# Patient Record
Sex: FEMALE | Race: BLACK OR AFRICAN AMERICAN | Employment: UNEMPLOYED | URBAN - METROPOLITAN AREA
[De-identification: names, ages, dates, MRNs, and addresses within clinical notes are randomized per-mention and may not be internally consistent; named-entity substitution may affect disease eponyms.]

---

## 2017-01-02 ENCOUNTER — HOSPITAL ENCOUNTER (EMERGENCY)
Age: 37
Discharge: HOME | End: 2017-01-02
Attending: EMERGENCY MEDICINE
Payer: SUBSIDIZED

## 2017-01-02 ENCOUNTER — APPOINTMENT (OUTPATIENT)
Dept: CT IMAGING | Age: 37
End: 2017-01-02
Attending: EMERGENCY MEDICINE
Payer: SUBSIDIZED

## 2017-01-02 VITALS
TEMPERATURE: 98.1 F | HEIGHT: 65 IN | BODY MASS INDEX: 29.05 KG/M2 | OXYGEN SATURATION: 98 % | RESPIRATION RATE: 24 BRPM | WEIGHT: 174.38 LBS | DIASTOLIC BLOOD PRESSURE: 65 MMHG | SYSTOLIC BLOOD PRESSURE: 114 MMHG | HEART RATE: 94 BPM

## 2017-01-02 DIAGNOSIS — R51.9 NONINTRACTABLE HEADACHE, UNSPECIFIED CHRONICITY PATTERN, UNSPECIFIED HEADACHE TYPE: Primary | ICD-10-CM

## 2017-01-02 LAB
ALBUMIN SERPL BCP-MCNC: 3.3 G/DL (ref 3.5–5)
ALBUMIN/GLOB SERPL: 0.7 {RATIO} (ref 1.1–2.2)
ALP SERPL-CCNC: 44 U/L (ref 45–117)
ALT SERPL-CCNC: 16 U/L (ref 12–78)
ANION GAP BLD CALC-SCNC: 12 MMOL/L (ref 5–15)
AST SERPL W P-5'-P-CCNC: 13 U/L (ref 15–37)
BASOPHILS # BLD AUTO: 0 K/UL (ref 0–0.1)
BASOPHILS # BLD: 0 % (ref 0–1)
BILIRUB SERPL-MCNC: 0.5 MG/DL (ref 0.2–1)
BUN SERPL-MCNC: 10 MG/DL (ref 6–20)
BUN/CREAT SERPL: 11 (ref 12–20)
CALCIUM SERPL-MCNC: 8.5 MG/DL (ref 8.5–10.1)
CHLORIDE SERPL-SCNC: 105 MMOL/L (ref 97–108)
CO2 SERPL-SCNC: 22 MMOL/L (ref 21–32)
CREAT SERPL-MCNC: 0.91 MG/DL (ref 0.55–1.02)
DIFFERENTIAL METHOD BLD: NORMAL
EOSINOPHIL # BLD: 0.2 K/UL (ref 0–0.4)
EOSINOPHIL NFR BLD: 3 % (ref 0–7)
ERYTHROCYTE [DISTWIDTH] IN BLOOD BY AUTOMATED COUNT: 13 % (ref 11.5–14.5)
GLOBULIN SER CALC-MCNC: 4.5 G/DL (ref 2–4)
GLUCOSE SERPL-MCNC: 91 MG/DL (ref 65–100)
HCG UR QL: NEGATIVE
HCT VFR BLD AUTO: 36.5 % (ref 35–47)
HGB BLD-MCNC: 12 G/DL (ref 11.5–16)
LYMPHOCYTES # BLD AUTO: 29 % (ref 12–49)
LYMPHOCYTES # BLD: 1.7 K/UL (ref 0.8–3.5)
MCH RBC QN AUTO: 27.7 PG (ref 26–34)
MCHC RBC AUTO-ENTMCNC: 32.9 G/DL (ref 30–36.5)
MCV RBC AUTO: 84.3 FL (ref 80–99)
MONOCYTES # BLD: 0.7 K/UL (ref 0–1)
MONOCYTES NFR BLD AUTO: 11 % (ref 5–13)
NEUTS SEG # BLD: 3.4 K/UL (ref 1.8–8)
NEUTS SEG NFR BLD AUTO: 57 % (ref 32–75)
PLATELET # BLD AUTO: 335 K/UL (ref 150–400)
POTASSIUM SERPL-SCNC: 3.6 MMOL/L (ref 3.5–5.1)
PROT SERPL-MCNC: 7.8 G/DL (ref 6.4–8.2)
RBC # BLD AUTO: 4.33 M/UL (ref 3.8–5.2)
SODIUM SERPL-SCNC: 139 MMOL/L (ref 136–145)
TSH SERPL DL<=0.05 MIU/L-ACNC: 3.26 UIU/ML (ref 0.36–3.74)
WBC # BLD AUTO: 6 K/UL (ref 3.6–11)

## 2017-01-02 PROCEDURE — 84443 ASSAY THYROID STIM HORMONE: CPT | Performed by: EMERGENCY MEDICINE

## 2017-01-02 PROCEDURE — 36415 COLL VENOUS BLD VENIPUNCTURE: CPT | Performed by: EMERGENCY MEDICINE

## 2017-01-02 PROCEDURE — 74011250636 HC RX REV CODE- 250/636: Performed by: EMERGENCY MEDICINE

## 2017-01-02 PROCEDURE — 99284 EMERGENCY DEPT VISIT MOD MDM: CPT

## 2017-01-02 PROCEDURE — 96374 THER/PROPH/DIAG INJ IV PUSH: CPT

## 2017-01-02 PROCEDURE — 70450 CT HEAD/BRAIN W/O DYE: CPT

## 2017-01-02 PROCEDURE — 96375 TX/PRO/DX INJ NEW DRUG ADDON: CPT

## 2017-01-02 PROCEDURE — 80053 COMPREHEN METABOLIC PANEL: CPT | Performed by: EMERGENCY MEDICINE

## 2017-01-02 PROCEDURE — 81025 URINE PREGNANCY TEST: CPT

## 2017-01-02 PROCEDURE — 85025 COMPLETE CBC W/AUTO DIFF WBC: CPT | Performed by: EMERGENCY MEDICINE

## 2017-01-02 RX ORDER — BUTALBITAL, ACETAMINOPHEN AND CAFFEINE 300; 40; 50 MG/1; MG/1; MG/1
1 CAPSULE ORAL
Qty: 20 CAP | Refills: 0 | Status: SHIPPED | OUTPATIENT
Start: 2017-01-02 | End: 2019-08-05

## 2017-01-02 RX ORDER — METOCLOPRAMIDE HYDROCHLORIDE 5 MG/ML
10 INJECTION INTRAMUSCULAR; INTRAVENOUS ONCE
Status: COMPLETED | OUTPATIENT
Start: 2017-01-02 | End: 2017-01-02

## 2017-01-02 RX ORDER — KETOROLAC TROMETHAMINE 30 MG/ML
30 INJECTION, SOLUTION INTRAMUSCULAR; INTRAVENOUS
Status: COMPLETED | OUTPATIENT
Start: 2017-01-02 | End: 2017-01-02

## 2017-01-02 RX ORDER — DIPHENHYDRAMINE HYDROCHLORIDE 50 MG/ML
25 INJECTION, SOLUTION INTRAMUSCULAR; INTRAVENOUS
Status: COMPLETED | OUTPATIENT
Start: 2017-01-02 | End: 2017-01-02

## 2017-01-02 RX ORDER — DEXAMETHASONE SODIUM PHOSPHATE 4 MG/ML
10 INJECTION, SOLUTION INTRA-ARTICULAR; INTRALESIONAL; INTRAMUSCULAR; INTRAVENOUS; SOFT TISSUE ONCE
Status: COMPLETED | OUTPATIENT
Start: 2017-01-02 | End: 2017-01-02

## 2017-01-02 RX ADMIN — DEXAMETHASONE SODIUM PHOSPHATE 10 MG: 4 INJECTION, SOLUTION INTRAMUSCULAR; INTRAVENOUS at 09:59

## 2017-01-02 RX ADMIN — METOCLOPRAMIDE 10 MG: 5 INJECTION, SOLUTION INTRAMUSCULAR; INTRAVENOUS at 09:55

## 2017-01-02 RX ADMIN — KETOROLAC TROMETHAMINE 30 MG: 30 INJECTION, SOLUTION INTRAMUSCULAR at 09:58

## 2017-01-02 RX ADMIN — DIPHENHYDRAMINE HYDROCHLORIDE 25 MG: 50 INJECTION, SOLUTION INTRAMUSCULAR; INTRAVENOUS at 10:03

## 2017-01-02 NOTE — ED NOTES
The patient was discharged home by Dr Paula Denver in stable condition. The patient is alert and oriented, in no respiratory distress and discharge vital signs obtained. The patient's diagnosis, condition and treatment were explained. The patient expressed understanding. Prescriptions given. No work/school note given. A discharge plan has been developed. A  was not involved in the process. Aftercare instructions were given. Pt ambulatory out of the ED with her friend.

## 2017-01-02 NOTE — ED TRIAGE NOTES
Pt rpts headache for the past 4 days. Pt rpts taking Ibuprofen without relief. Pt tearful and rpts currently having a panic attack because \"people\" are telling her she is loosing weight and is sick. Recently seen by GYN for infection; antibiotics completed.

## 2017-01-02 NOTE — DISCHARGE INSTRUCTIONS

## 2017-01-02 NOTE — ED NOTES
Pt resting on the stretcher in no distress. All results available for review and awaiting further care mgmt. Will continue to monitor.

## 2017-01-02 NOTE — ED NOTES
No changes in pt's condition since arrival.  Pt remains tearful and anxious. Toileting offered; pt declined. Ongoing plan of care discussed and pts questions addressed. Pt informed of time factors with lab/imaging study results; especially TSH. IV established and pt medicated per orders. V/S updated. Pt provided with warm blanket and lights dimmed for comfort. Call bell within reach; will continue to monitor.

## 2017-01-02 NOTE — ED PROVIDER NOTES
HPI Comments: The patient has a 5 day history of left sided headache, generalized weakness and dizziness/lightheadedness. She is currently experiencing a panic attack (which she has a history of) the call she is not sure what is causing her symptoms. She denies syncope, fever, vomiting, abdominal pain, chest pain, shortness of breath. She also complains of weight loss and says that she has lost 50 pounds in the past 2 months. She is unsure of the cause of the weight loss. Patient is a 29 y.o. female presenting with headaches. Headache    Associated symptoms include weakness and dizziness. Pertinent negatives include no fever, no shortness of breath, no nausea and no vomiting. Past Medical History:   Diagnosis Date    Anemia NEC     Anxiety attack     Asthma     B12 deficiency     Constipation     Depression     Gastroparesis 7/18/2014    Heart abnormalities      right atria at 30% [facility]    Hyperlipidemia     IGT (impaired glucose tolerance)     Incontinence of urine      1 episode of bed wetting 1 mo ago    Insomnia     Kidney stone     Neuropathy     PTSD (post-traumatic stress disorder)     Stroke ([address] 2011     TIA    Urinary tract bacterial infections     Vaginal discharge 12/4/2013       Past Surgical History:   Procedure Laterality Date    Pr eye exam & treatment      Hx gi  may 2013     polyps removed,colonscopy         Family History:   Problem Relation Age of Onset    Diabetes Mother     Heart Disease Mother     Hypertension Mother     Stroke Mother     Liver Disease Mother     Dementia Mother     Kidney Disease Mother        Social History     Social History    Marital status: SINGLE     Spouse name: N/A    Number of children: N/A    Years of education: N/A     Occupational History    Not on file.      Social History Main Topics    Smoking status: Former Smoker     Types: Cigarettes    Smokeless tobacco: Never Used    Alcohol use 0.0 oz/week     0 Standard drinks or equivalent per week      Comment: Rarely    Drug use: No    Sexual activity: Yes     Partners: Male     Birth control/ protection: Pill     Other Topics Concern    Not on file     Social History Narrative    Never , no children. Lives alone. ALLERGIES: Amoxicillin and Flagyl [metronidazole]    Review of Systems   Constitutional: Negative for fever. Eyes: Negative for visual disturbance. Respiratory: Negative for cough, shortness of breath and wheezing. Cardiovascular: Negative for chest pain and leg swelling. Gastrointestinal: Negative for abdominal pain, diarrhea, nausea and vomiting. Genitourinary: Negative for dysuria. Musculoskeletal: Negative. Negative for back pain and neck stiffness. Skin: Negative for rash. Neurological: Positive for dizziness, weakness and headaches. Negative for syncope. Psychiatric/Behavioral: Positive for agitation. Negative for confusion. Vitals:    01/02/17 0932 01/02/17 0935   BP: (!) 158/101 (!) 141/99   Pulse: 94    Resp: 24    Temp: 98.1 °F (36.7 °C)    SpO2: 100% 100%   Weight: 79.1 kg (174 lb 6.1 oz)    Height: 5' 5\" (1.651 m)             Physical Exam   Constitutional: She is oriented to person, place, and time. She appears well-developed and well-nourished. No distress. anxious   HENT:   Head: Normocephalic. Mouth/Throat: No oropharyngeal exudate. Eyes: Pupils are equal, round, and reactive to light. Neck: Normal range of motion. No thyromegaly present. Cardiovascular: Normal rate and regular rhythm. No murmur heard. Pulmonary/Chest: Effort normal and breath sounds normal. No respiratory distress. Abdominal: Soft. There is no tenderness. Musculoskeletal: Normal range of motion. She exhibits no edema. Neurological: She is alert and oriented to person, place, and time. She has normal strength. No cranial nerve deficit. Skin: Skin is warm and dry. Nursing note and vitals reviewed. [facility]  ED Course   12 noon - ha much better.     Procedures

## 2017-01-03 ENCOUNTER — DOCUMENTATION ONLY (OUTPATIENT)
Dept: INTERNAL MEDICINE CLINIC | Age: 37
End: 2017-01-03

## 2017-01-03 NOTE — PROGRESS NOTES
Patient called office wanting to speak with Dr Lola Berumen. I took the call s/w patient she stated that she was seen in the ER at [facility] for bad headache. Patient stated she was given 4 medication in an IV , said that it helped but when she got home she started to have numbness in both hands up to the elbows and numbness also in both feet up to the ankles. Patient called ER and let them know what was happening. The Patient stated that the  Er told her that she needed to take benadryl that it was probably a reaction to the IV  And to follow up with her PCP. Patient was distraught stated she needed to speak with Dr Lola Berumen , Let her know I would pass this on to Dr Lola Berumen and call her back at the number she provided.

## 2017-01-03 NOTE — PROGRESS NOTES
Medication Xanax 1MG  prescribed by Dr Crystal King called into pharmacy [facility] at [facility]   Phone # [phone]

## 2017-01-06 ENCOUNTER — OFFICE VISIT (OUTPATIENT)
Dept: INTERNAL MEDICINE CLINIC | Age: 37
End: 2017-01-06

## 2017-01-06 VITALS
SYSTOLIC BLOOD PRESSURE: 125 MMHG | HEIGHT: 65 IN | TEMPERATURE: 98.4 F | RESPIRATION RATE: 16 BRPM | DIASTOLIC BLOOD PRESSURE: 78 MMHG | WEIGHT: 172 LBS | BODY MASS INDEX: 28.66 KG/M2 | HEART RATE: 99 BPM | OXYGEN SATURATION: 99 %

## 2017-01-06 DIAGNOSIS — F39 MOOD DISORDER (HCC): ICD-10-CM

## 2017-01-06 DIAGNOSIS — K59.00 CONSTIPATION, UNSPECIFIED CONSTIPATION TYPE: ICD-10-CM

## 2017-01-06 DIAGNOSIS — R53.83 LETHARGIC: ICD-10-CM

## 2017-01-06 DIAGNOSIS — R42 DIZZINESS: Primary | ICD-10-CM

## 2017-01-06 DIAGNOSIS — R20.2 PARESTHESIA: ICD-10-CM

## 2017-01-06 DIAGNOSIS — R53.1 WEAKNESS: ICD-10-CM

## 2017-01-06 LAB — GLUCOSE POC: 72 MG/DL (ref 70–110)

## 2017-01-06 NOTE — MR AVS SNAPSHOT
Visit Information Date & Time Provider Department Dept. Phone Encounter #  
 1/6/2017  4:30 PM Lucius Wu, [address] [place] [address] and Internal Medicine [phone] [id number] Follow-up Instructions Return in about 2 weeks (around 1/20/2017), or if symptoms worsen or fail to improve, for fatigue. Upcoming Health Maintenance Date Due DTaP/Tdap/Td series (1 - Tdap) 10/2/2002 PAP AKA CERVICAL CYTOLOGY 11/11/2018 Allergies as of 1/6/2017  Review Complete On: 1/6/2017 By: Lucius Wu MD  
  
 Severity Noted Reaction Type Reactions Amoxicillin  06/23/2016    Rash Flagyl [Metronidazole]  11/28/2012   Side Effect Itching Eyes change color Current Immunizations  Reviewed on 9/14/2016 Name Date Influenza Vaccine (Quad) PF 9/12/2016, 10/8/2015 Influenza Vaccine PF 9/17/2013 Not reviewed this visit You Were Diagnosed With   
  
 Codes Comments Dizziness    -  Primary ICD-10-CM: W59 ICD-9-CM: 780.4 Lethargic     ICD-10-CM: R53.83 ICD-9-CM: 780.79 Weakness     ICD-10-CM: R53.1 ICD-9-CM: 780.79 Mood disorder ([facility]     ICD-10-CM: F39 
ICD-9-CM: 296.90 Paresthesia     ICD-10-CM: R20.2 ICD-9-CM: 782.0 Constipation, unspecified constipation type     ICD-10-CM: K59.00 ICD-9-CM: 564.00 Vitals BP Pulse Temp Resp Height(growth percentile) Weight(growth percentile) 125/78 (BP 1 Location: Left arm, BP Patient Position: Sitting) 99 98.4 °F (36.9 °C) (Oral) 16 5' 5\" (1.651 m) 172 lb (78 kg) LMP SpO2 BMI OB Status Smoking Status 12/30/2016 99% 28.62 kg/m2 Having regular periods Former Smoker Vitals History BMI and BSA Data Body Mass Index Body Surface Area  
 28.62 kg/m 2 1.89 m 2 Preferred Pharmacy Pharmacy Name Phone [facility] [address] [facility], [address] [facility] Floor [phone] Your Updated Medication List  
  
   
 This list is accurate as of: 1/6/17  6:17 PM.  Always use your most recent med list. ADVIL 200 mg tablet Generic drug:  ibuprofen Take 200 mg by mouth every eight (8) hours as needed for Pain. ALPRAZolam 1 mg tablet Commonly known as:  [place] Triny Take 1 Tab by mouth two (2) times daily as needed for Anxiety. Max Daily Amount: 2 mg. Indications: ANXIETY Biotin 2,500 mcg Cap Take 1 Cap by mouth daily. butalbital-acetaminophen-caff -40 mg per capsule Commonly known as:  [employer] Take 1 Cap by mouth every four (4) hours as needed for Pain for up to 20 doses. Max Daily Amount: 6 Caps. norethindrone 0.35 mg Tab Commonly known as:  Andreas [employer] Andreas Take 1 Tab by mouth daily. zolpidem 10 mg tablet Commonly known as:  AMBIEN Take 1 Tab by mouth nightly as needed for Sleep. Max Daily Amount: 10 mg. TAKE 1 TABLET BY MOUTH AT BEDTIME AS NEEDED FOR SLEEP  Indications: SLEEP-ONSET INSOMNIA We Performed the Following AMB POC GLUCOSE BLOOD, BY GLUCOSE MONITORING DEVICE [53379 CPT(R)] Follow-up Instructions Return in about 2 weeks (around 1/20/2017), or if symptoms worsen or fail to improve, for fatigue. To-Do List   
 Around 01/10/2017 Lab:  CBC WITH AUTOMATED DIFF Around 01/10/2017 Lab:  CK Around 01/10/2017 Lab:  MAGNESIUM Around 01/10/2017 Lab:  METABOLIC PANEL, COMPREHENSIVE Around 01/10/2017 Lab:  SED RATE (ESR) Around 01/10/2017 Lab:  T4, FREE Around 01/10/2017 Lab:  TSH 3RD GENERATION Around 01/10/2017 Lab:  VITAMIN B12 Introducing [facility]! Dear Lauri Draper: Thank you for requesting a [employer] account. Our records indicate that you already have an active [employer] account. You can access your account anytime at [email or web address] Did you know that you can access your hospital and ER discharge instructions at any time in [employer]? You can also review all of your test results from your hospital stay or ER visit. Additional Information If you have questions, please visit the Frequently Asked Questions section of the [employer] website at [email or web address]/. Remember, [employer] is NOT to be used for urgent needs. For medical emergencies, dial 911. Now available from your iPhone and Android! Please provide this summary of care documentation to your next provider. Your primary care clinician is listed as [address] If you have any questions after today's visit, please call [phone].

## 2017-01-06 NOTE — PROGRESS NOTES
HISTORY OF PRESENT ILLNESS  Irlanda Harrell is a 29 y.o. female. HPI  Presents for f/u HA and ER visit    Seen in ER for HA  Treatment resulted in tingling, dizziness    Then, lightheadedness, weakness - generalized  Not vertigo    Feels like she will pass out once up and moving around    Pt reports eating regularly, drinking plenty of fluids    Poor BMs , though - chronic problem    Mild URI sx 1 week ago    Past medical, Social, and Family history reviewed  Medications reviewed and updated. ROS  Complete ROS reviewed and negative or stable except as noted in HPI. Physical Exam   Constitutional: She is oriented to person, place, and time. She appears well-nourished. No distress. HENT:   Head: Normocephalic and atraumatic. Eyes: EOM are normal. Pupils are equal, round, and reactive to light. No scleral icterus. Neck: Normal range of motion. Neck supple. No JVD present. Cardiovascular: Normal rate, regular rhythm and normal heart sounds. Exam reveals no gallop and no friction rub. No murmur heard. Pulmonary/Chest: Effort normal and breath sounds normal. No respiratory distress. She has no wheezes. She has no rales. Abdominal: Soft. Bowel sounds are normal. She exhibits no distension. There is no tenderness. Musculoskeletal: Normal range of motion. She exhibits no edema. Lymphadenopathy:     She has no cervical adenopathy. Neurological: She is alert and oriented to person, place, and time. She exhibits normal muscle tone. Skin: Skin is warm. No rash noted. Psychiatric: Her mood appears anxious. Nursing note and vitals reviewed. Reviewed prior imaging reports  Prior labs reviewed.   Reviewed ER records    ASSESSMENT and PLAN  ?viral illness  Unclear etiology    ICD-10-CM ICD-9-CM    1. Dizziness R42 780.4 AMB POC GLUCOSE BLOOD, BY GLUCOSE MONITORING DEVICE      CBC WITH AUTOMATED DIFF      CK      METABOLIC PANEL, COMPREHENSIVE      MAGNESIUM      SED RATE (ESR)   2. Lethargic R53.83 780.79 AMB POC GLUCOSE BLOOD, BY GLUCOSE MONITORING DEVICE   3. Weakness R53.1 780.79 T4, FREE      TSH 3RD GENERATION      VITAMIN B12      MONONUCLEOSIS SCREEN      LUCY BARR VIRUS AB PANEL   4. Mood disorder (HCC) F39 296.90    5. Paresthesia R20.2 782.0    6. Constipation, unspecified constipation type K59.00 564.00      Follow-up Disposition:  Return in about 2 weeks (around 1/20/2017), or if symptoms worsen or fail to improve, for fatigue.   results and schedule of future studies reviewed with patient  reviewed diet, exercise and weight   reviewed medications and side effects in detail   Return for labs next week  Consider POTS - may need referral for tilt table testing if persists      >40 minutes time spent with >50% in counseling and coordination of care

## 2017-01-06 NOTE — PROGRESS NOTES
Room # 15    Chief Complaint   Patient presents with    Dizziness    Headache    Follow-up     from ED, still has tingling, HA, shaky    Weight Loss     Health Maintenance Due   Topic Date Due    DTaP/Tdap/Td series (1 - Tdap) 10/02/2002     Coordination of Care Questions    1. Have you been to the ER, urgent care clinic since your last visit? No       Hospitalized since your last visit? No    2. Have you seen or consulted any other health care providers outside of the [address] [place] [address] since your last visit? Include any pap smears or colon screening.  No

## 2017-01-13 ENCOUNTER — LAB ONLY (OUTPATIENT)
Dept: INTERNAL MEDICINE CLINIC | Age: 37
End: 2017-01-13

## 2017-01-13 DIAGNOSIS — R42 DIZZINESS: ICD-10-CM

## 2017-01-13 DIAGNOSIS — E53.8 B12 DEFICIENCY: Primary | ICD-10-CM

## 2017-01-13 DIAGNOSIS — R53.1 WEAKNESS: ICD-10-CM

## 2017-01-16 ENCOUNTER — LAB ONLY (OUTPATIENT)
Dept: INTERNAL MEDICINE CLINIC | Age: 37
End: 2017-01-16

## 2017-01-17 LAB
ALBUMIN SERPL-MCNC: 3.9 G/DL (ref 3.5–5.5)
ALBUMIN/GLOB SERPL: 1.1 {RATIO} (ref 1.1–2.5)
ALP SERPL-CCNC: 50 IU/L (ref 39–117)
ALT SERPL-CCNC: 7 IU/L (ref 0–32)
AST SERPL-CCNC: 11 IU/L (ref 0–40)
BASOPHILS # BLD AUTO: 0 X10E3/UL (ref 0–0.2)
BASOPHILS NFR BLD AUTO: 0 %
BILIRUB SERPL-MCNC: 0.2 MG/DL (ref 0–1.2)
BUN SERPL-MCNC: 7 MG/DL (ref 6–20)
BUN/CREAT SERPL: 10 (ref 8–20)
CALCIUM SERPL-MCNC: 8.7 MG/DL (ref 8.7–10.2)
CHLORIDE SERPL-SCNC: 102 MMOL/L (ref 96–106)
CK SERPL-CCNC: 44 U/L (ref 24–173)
CO2 SERPL-SCNC: 19 MMOL/L (ref 18–29)
CREAT SERPL-MCNC: 0.67 MG/DL (ref 0.57–1)
EOSINOPHIL # BLD AUTO: 0.1 X10E3/UL (ref 0–0.4)
EOSINOPHIL NFR BLD AUTO: 1 %
ERYTHROCYTE [DISTWIDTH] IN BLOOD BY AUTOMATED COUNT: 13.8 % (ref 12.3–15.4)
ERYTHROCYTE [SEDIMENTATION RATE] IN BLOOD BY WESTERGREN METHOD: 42 MM/HR (ref 0–32)
GLOBULIN SER CALC-MCNC: 3.4 G/DL (ref 1.5–4.5)
GLUCOSE SERPL-MCNC: 70 MG/DL (ref 65–99)
HCT VFR BLD AUTO: 37.3 % (ref 34–46.6)
HGB BLD-MCNC: 12.7 G/DL (ref 11.1–15.9)
IMM GRANULOCYTES # BLD: 0 X10E3/UL (ref 0–0.1)
IMM GRANULOCYTES NFR BLD: 0 %
LYMPHOCYTES # BLD AUTO: 1.8 X10E3/UL (ref 0.7–3.1)
LYMPHOCYTES NFR BLD AUTO: 21 %
MAGNESIUM SERPL-MCNC: 1.8 MG/DL (ref 1.6–2.3)
MCH RBC QN AUTO: 28.9 PG (ref 26.6–33)
MCHC RBC AUTO-ENTMCNC: 34 G/DL (ref 31.5–35.7)
MCV RBC AUTO: 85 FL (ref 79–97)
MONOCYTES # BLD AUTO: 0.8 X10E3/UL (ref 0.1–0.9)
MONOCYTES NFR BLD AUTO: 9 %
NEUTROPHILS # BLD AUTO: 5.8 X10E3/UL (ref 1.4–7)
NEUTROPHILS NFR BLD AUTO: 69 %
PLATELET # BLD AUTO: 294 X10E3/UL (ref 150–379)
POTASSIUM SERPL-SCNC: 3.8 MMOL/L (ref 3.5–5.2)
PROT SERPL-MCNC: 7.3 G/DL (ref 6–8.5)
RBC # BLD AUTO: 4.39 X10E6/UL (ref 3.77–5.28)
SODIUM SERPL-SCNC: 141 MMOL/L (ref 134–144)
T4 FREE SERPL-MCNC: 1.19 NG/DL (ref 0.82–1.77)
TSH SERPL DL<=0.005 MIU/L-ACNC: 3.42 UIU/ML (ref 0.45–4.5)
VIT B12 SERPL-MCNC: 170 PG/ML (ref 211–946)
WBC # BLD AUTO: 8.5 X10E3/UL (ref 3.4–10.8)

## 2017-01-17 RX ORDER — LANOLIN ALCOHOL/MO/W.PET/CERES
500 CREAM (GRAM) TOPICAL DAILY
Qty: 30 TAB | Refills: 11 | Status: SHIPPED | OUTPATIENT
Start: 2017-01-17 | End: 2019-08-05

## 2017-01-18 ENCOUNTER — HOSPITAL ENCOUNTER (OUTPATIENT)
Dept: GENERAL RADIOLOGY | Age: 37
Discharge: HOME | End: 2017-01-18
Attending: INTERNAL MEDICINE
Payer: SUBSIDIZED

## 2017-01-18 DIAGNOSIS — R63.4 WEIGHT LOSS: ICD-10-CM

## 2017-01-18 DIAGNOSIS — R13.10 DYSPHAGIA, UNSPECIFIED TYPE: ICD-10-CM

## 2017-01-18 PROCEDURE — 74241 XR UPPER GI W KUB/ BA SWALLOW: CPT

## 2017-01-19 ENCOUNTER — APPOINTMENT (OUTPATIENT)
Dept: GENERAL RADIOLOGY | Age: 37
End: 2017-01-19
Attending: INTERNAL MEDICINE
Payer: SUBSIDIZED

## 2017-01-19 ENCOUNTER — HOSPITAL ENCOUNTER (OUTPATIENT)
Dept: ULTRASOUND IMAGING | Age: 37
Discharge: HOME | End: 2017-01-19
Attending: INTERNAL MEDICINE
Payer: SUBSIDIZED

## 2017-01-19 DIAGNOSIS — R63.4 WEIGHT LOSS: ICD-10-CM

## 2017-01-19 DIAGNOSIS — R13.10 DYSPHAGIA, UNSPECIFIED TYPE: ICD-10-CM

## 2017-01-19 PROCEDURE — 76700 US EXAM ABDOM COMPLETE: CPT

## 2017-01-20 ENCOUNTER — OFFICE VISIT (OUTPATIENT)
Dept: INTERNAL MEDICINE CLINIC | Age: 37
End: 2017-01-20

## 2017-01-20 VITALS
BODY MASS INDEX: 28.96 KG/M2 | TEMPERATURE: 97.7 F | SYSTOLIC BLOOD PRESSURE: 120 MMHG | HEIGHT: 65 IN | RESPIRATION RATE: 16 BRPM | WEIGHT: 173.8 LBS | HEART RATE: 77 BPM | DIASTOLIC BLOOD PRESSURE: 79 MMHG

## 2017-01-20 DIAGNOSIS — F41.1 GAD (GENERALIZED ANXIETY DISORDER): ICD-10-CM

## 2017-01-20 DIAGNOSIS — E53.8 B12 DEFICIENCY: ICD-10-CM

## 2017-01-20 DIAGNOSIS — J02.9 SORE THROAT: Primary | ICD-10-CM

## 2017-01-20 DIAGNOSIS — F43.10 PTSD (POST-TRAUMATIC STRESS DISORDER): ICD-10-CM

## 2017-01-20 DIAGNOSIS — K21.9 GASTROESOPHAGEAL REFLUX DISEASE, ESOPHAGITIS PRESENCE NOT SPECIFIED: ICD-10-CM

## 2017-01-20 DIAGNOSIS — F41.0 PANIC DISORDER: ICD-10-CM

## 2017-01-20 DIAGNOSIS — B37.81 CANDIDA ESOPHAGITIS (HCC): ICD-10-CM

## 2017-01-20 LAB
S PYO AG THROAT QL: NEGATIVE
VALID INTERNAL CONTROL?: YES

## 2017-01-20 RX ORDER — CYANOCOBALAMIN 1000 UG/ML
1000 INJECTION, SOLUTION INTRAMUSCULAR; SUBCUTANEOUS ONCE
Qty: 1 ML | Refills: 0
Start: 2017-01-20 | End: 2017-01-20

## 2017-01-20 RX ORDER — LANOLIN ALCOHOL/MO/W.PET/CERES
500 CREAM (GRAM) TOPICAL DAILY
Qty: 30 TAB | Refills: 5 | Status: SHIPPED | OUTPATIENT
Start: 2017-01-20 | End: 2019-08-05

## 2017-01-20 RX ORDER — ALPRAZOLAM 1 MG/1
1 TABLET ORAL
Qty: 15 TAB | Refills: 0 | Status: SHIPPED | OUTPATIENT
Start: 2017-01-20 | End: 2017-09-08 | Stop reason: SDUPTHER

## 2017-01-20 RX ORDER — NYSTATIN 100000 [USP'U]/ML
1 SUSPENSION ORAL 3 TIMES DAILY
Qty: 240 ML | Refills: 1 | Status: SHIPPED | OUTPATIENT
Start: 2017-01-20 | End: 2017-02-03

## 2017-01-20 RX ORDER — PHENOL/SODIUM PHENOLATE
20 AEROSOL, SPRAY (ML) MUCOUS MEMBRANE DAILY
Qty: 30 TAB | Refills: 5 | Status: SHIPPED | OUTPATIENT
Start: 2017-01-20 | End: 2019-08-05

## 2017-01-20 RX ORDER — CITALOPRAM 20 MG/1
20 TABLET, FILM COATED ORAL DAILY
Qty: 30 TAB | Refills: 5 | Status: SHIPPED | OUTPATIENT
Start: 2017-01-20 | End: 2017-03-01 | Stop reason: SINTOL

## 2017-01-20 NOTE — PROGRESS NOTES
RM 13    Chief Complaint   Patient presents with    Sore Throat     x 2 weeks    Labs     results        1. Have you been to the ER, urgent care clinic since your last visit? Hospitalized since your last visit? No    2. Have you seen or consulted any other health care providers outside of the [address] [place] [address] since your last visit? Include any pap smears or colon screening.  No    Health Maintenance Due   Topic Date Due    DTaP/Tdap/Td series (1 - Tdap) 10/02/2002

## 2017-01-20 NOTE — PROGRESS NOTES
HISTORY OF PRESENT ILLNESS  Bonny Kamara is a 29 y.o. female. HPI  Presents for f/u fatigue, ST    C/o ST x 2 weeks  pepcid AC did not help  Even water irritates  Acidic fluids     Magic mouthwash did not help    Ibuprofen helps a little, but only for a short period of time    Pt reports stress and anxiety    Past medical, Social, and Family history reviewed  Medications reviewed and updated. ROS  Complete ROS reviewed and negative or stable except as noted in HPI. Physical Exam   Constitutional: She is oriented to person, place, and time. She appears well-nourished. No distress. HENT:   Head: Normocephalic and atraumatic. Mouth/Throat: Posterior oropharyngeal erythema (cobblestoning) present. No oropharyngeal exudate. Eyes: EOM are normal. Pupils are equal, round, and reactive to light. No scleral icterus. Neck: Normal range of motion. Neck supple. No JVD present. Cardiovascular: Normal rate, regular rhythm and normal heart sounds. Exam reveals no gallop and no friction rub. No murmur heard. Pulmonary/Chest: Effort normal and breath sounds normal. No respiratory distress. She has no wheezes. She has no rales. Abdominal: Soft. Bowel sounds are normal. She exhibits no distension. There is no tenderness. Musculoskeletal: Normal range of motion. She exhibits no edema. Lymphadenopathy:     She has no cervical adenopathy. Neurological: She is alert and oriented to person, place, and time. She exhibits normal muscle tone. Skin: Skin is warm. No rash noted. Psychiatric: Her mood appears anxious. Nursing note and vitals reviewed. Strep neg  Prior labs reviewed. Reviewed prior imaging reports    ASSESSMENT and PLAN  Symptoms are c/w esophagitis - ?etiology    ICD-10-CM ICD-9-CM    1. Sore throat J02.9 462 AMB POC RAPID STREP A      CULTURE, STREP THROAT      magic mouthwash solution   2.  Gastroesophageal reflux disease, esophagitis presence not specified K21.9 530.81 Omeprazole delayed release (PRILOSEC D/R) 20 mg tablet   3. Candida esophagitis (HCC) B37.81 112.84 nystatin (MYCOSTATIN) 100,000 unit/mL suspension   4. B12 deficiency E53.8 266.2 VITAMIN B12 INJECTION      THER/PROPH/DIAG INJECTION, SUBCUT/IM      cyanocobalamin (VITAMIN B-12) 1,000 mcg/mL injection   5. PTSD (post-traumatic stress disorder) F43.10 309.81 citalopram (CELEXA) 20 mg tablet   6. DEEPA (generalized anxiety disorder) F41.1 300.02 ALPRAZolam (XANAX) 1 mg tablet      citalopram (CELEXA) 20 mg tablet   7.  Panic disorder F41.0 300.01 ALPRAZolam (XANAX) 1 mg tablet      citalopram (CELEXA) 20 mg tablet     Follow-up Disposition:  Return in about 1 month (around 2/20/2017), or if symptoms worsen or fail to improve, for mood, throat, B12.   results and schedule of future studies reviewed with patient  reviewed diet, exercise and weight control  reviewed medications and side effects in detail   PPI  Throat cx  See GI - may need EGD and biopsy - ?eosinophilic   empiric tx for potential esophageal candidiasis - nystatin  Magic mouthwash  Prn xanax  Resume celexa

## 2017-01-20 NOTE — MR AVS SNAPSHOT
Visit Information Date & Time Provider Department Dept. Phone Encounter #  
 1/20/2017 10:45 AM Zander Hastings MD [facility] Pediatric[facility] and Internal Medicine [phone] [id number] Follow-up Instructions Return in about 1 month (around 2/20/2017), or if symptoms worsen or fail to improve, for mood, throat, B12. Upcoming Health Maintenance Date Due DTaP/Tdap/Td series (1 - Tdap) 10/2/2002 PAP AKA CERVICAL CYTOLOGY 11/11/2018 Allergies as of 1/20/2017  Review Complete On: 1/20/2017 By: Zander Hastings MD  
  
 Severity Noted Reaction Type Reactions Amoxicillin  06/23/2016    Rash Flagyl [Metronidazole]  11/28/2012   Side Effect Itching Eyes change color Current Immunizations  Reviewed on 9/14/2016 Name Date Influenza Vaccine (Quad) PF 9/12/2016, 10/8/2015 Influenza Vaccine PF 9/17/2013 Not reviewed this visit You Were Diagnosed With   
  
 Codes Comments Sore throat    -  Primary ICD-10-CM: J02.9 ICD-9-CM: 220 Gastroesophageal reflux disease, esophagitis presence not specified     ICD-10-CM: K21.9 ICD-9-CM: 530.81 Candida esophagitis ([facility] [address]     ICD-10-CM: B37.81 ICD-9-CM: 112.84   
 B12 deficiency     ICD-10-CM: E53.8 ICD-9-CM: 266.2 PTSD (post-traumatic stress disorder)     ICD-10-CM: F43.10 ICD-9-CM: 309.81 DEEPA (generalized anxiety disorder)     ICD-10-CM: F41.1 ICD-9-CM: 300.02 Panic disorder     ICD-10-CM: F41.0 ICD-9-CM: 300.01 Vitals BP Pulse Temp Resp Height(growth percentile) Weight(growth percentile) 120/79 77 97.7 °F (36.5 °C) (Oral) 16 5' 5\" (1.651 m) 173 lb 12.8 oz (78.8 kg) LMP BMI OB Status Smoking Status 12/30/2016 28.92 kg/m2 Having regular periods Former Smoker BMI and BSA Data Body Mass Index Body Surface Area  
 28.92 kg/m 2 1.9 m 2 Preferred Pharmacy Pharmacy Name Phone  [facility] - [place], [address] [facility] [address] [facility] [phone] Your Updated Medication List  
  
   
This list is accurate as of: 1/20/17 12:01 PM.  Always use your most recent med list. ADVIL 200 mg tablet Generic drug:  ibuprofen Take 200 mg by mouth every eight (8) hours as needed for Pain. ALPRAZolam 1 mg tablet Commonly known as:  [place] Amend Take 1 Tab by mouth two (2) times daily as needed for Anxiety. Max Daily Amount: 2 mg. Indications: ANXIETY Biotin 2,500 mcg Cap Take 1 Cap by mouth daily. butalbital-acetaminophen-caff -40 mg per capsule Commonly known as:  [employer] Take 1 Cap by mouth every four (4) hours as needed for Pain for up to 20 doses. Max Daily Amount: 6 Caps. citalopram 20 mg tablet Commonly known as:  Lenward Campanile Take 1 Tab by mouth daily. * cyanocobalamin 500 mcg tablet Commonly known as:  VITAMIN B12 Take 1 Tab by mouth daily. * cyanocobalamin 1,000 mcg/mL injection Commonly known as:  VITAMIN B-12  
1 mL by IntraMUSCular route once for 1 dose. * cyanocobalamin 500 mcg tablet Commonly known as:  VITAMIN B12 Take 1 Tab by mouth daily. magic mouthwash solution Take 5 mL by mouth three (3) times daily as needed for Pain. Benadryl, maalox,, lidocaine in equal quantity  
  
 norethindrone 0.35 mg Tab Commonly known as:  Andreas & Andreas Take 1 Tab by mouth daily. nystatin 100,000 unit/mL suspension Commonly known as:  MYCOSTATIN Take 5 mL by mouth three (3) times daily for 14 days. swish and spit Omeprazole delayed release 20 mg tablet Commonly known as:  PRILOSEC D/R Take 1 Tab by mouth daily. zolpidem 10 mg tablet Commonly known as:  AMBIEN Take 1 Tab by mouth nightly as needed for Sleep. Max Daily Amount: 10 mg. TAKE 1 TABLET BY MOUTH AT BEDTIME AS NEEDED FOR SLEEP  Indications: SLEEP-ONSET INSOMNIA * Notice:   This list has 3 medication(s) that are the same as other medications prescribed for you. Read the directions carefully, and ask your doctor or other care provider to review them with you. Prescriptions Printed Refills  
 magic mouthwash solution 1 Sig: Take 5 mL by mouth three (3) times daily as needed for Pain. Benadryl, maalox,, lidocaine in equal quantity Class: Print Route: Oral  
 ALPRAZolam (XANAX) 1 mg tablet 0 Sig: Take 1 Tab by mouth two (2) times daily as needed for Anxiety. Max Daily Amount: 2 mg. Indications: ANXIETY Class: Print Route: Oral  
  
Prescriptions Sent to Pharmacy Refills  
 nystatin (MYCOSTATIN) 100,000 unit/mL suspension 1 Sig: Take 5 mL by mouth three (3) times daily for 14 days. swish and spit Class: Normal  
 Pharmacy: [facility] [address] Ph #: [phone] Route: Oral  
 Omeprazole delayed release (PRILOSEC D/R) 20 mg tablet 5 Sig: Take 1 Tab by mouth daily. Class: Normal  
 Pharmacy: [facility] [address] Ph #: [phone] Route: Oral  
 cyanocobalamin (VITAMIN B12) 500 mcg tablet 5 Sig: Take 1 Tab by mouth daily. Class: Normal  
 Pharmacy: [address] [place] [address] [address] Ph #: [phone] Route: Oral  
 citalopram (CELEXA) 20 mg tablet 5 Sig: Take 1 Tab by mouth daily. Class: Normal  
 Pharmacy: [facility] [address] [place] [address] Ph #: [phone] Route: Oral  
  
We Performed the Following AMB POC RAPID STREP A [08750 CPT(R)] CULTURE, STREP THROAT [id number] CPT(R)] THER/PROPH/DIAG INJECTION, SUBCUT/IM [id number] CPT(R)] VITAMIN B12 INJECTION [ [facility]] Follow-up Instructions Return in about 1 month (around 2/20/2017), or if symptoms worsen or fail to improve, for mood, throat, B12. Introducing [facility]! Dear Boundary Fleeting: Thank you for requesting a SozializeMe account. Our records indicate that you already have an active SozializeMe account. You can access your account anytime at [email or web address] Did you know that you can access your hospital and ER discharge instructions at any time in SozializeMe? You can also review all of your test results from your hospital stay or ER visit. Additional Information If you have questions, please visit the Frequently Asked Questions section of the SozializeMe website at [email or web address] Remember, SozializeMe is NOT to be used for urgent needs. For medical emergencies, dial 911. Now available from your iPhone and Android! Please provide this summary of care documentation to your next provider. Your primary care clinician is listed as [address] If you have any questions after today's visit, please call [phone].

## 2017-01-23 LAB — B-HEM STREP SPEC QL CULT: ABNORMAL

## 2017-01-27 ENCOUNTER — TELEPHONE (OUTPATIENT)
Dept: INTERNAL MEDICINE CLINIC | Age: 37
End: 2017-01-27

## 2017-01-27 DIAGNOSIS — T14.8XXA BRUISING: Primary | ICD-10-CM

## 2017-01-27 NOTE — TELEPHONE ENCOUNTER
I spoke with pt. Pt reports bruising on LE since 1 day after last visit 1/20/17. I rec'd full office visit for comprehensive eval but pt cites her anxiety and embarassment as limitations to wanting to schedule appt. I agreed to order some labs to eval for PLTs, clotting factors and she will come in 1/30/17 to have them drawn. She should then make f/u appt later in the week for exam and to discuss results.

## 2017-01-27 NOTE — TELEPHONE ENCOUNTER
Patient states she does not want to come into clinic to be evaluated. She wants to speak directly to PCP. Pt states she has been here twice this month and doesn't want to return. Reviewed with patient that the issues she is reporting did not start until Saturday and Dr. Bipin Allen has not addressed these issues with her. Strongly encouraged office visit to be evaluated. Pt declines again and wants message sent directly to PCP to discuss. Advised he is seeing patients currently, but message will be sent. She confirmed.

## 2017-01-27 NOTE — TELEPHONE ENCOUNTER
These are physical findings that require examination to confirm in addition to considering lab work to clarify why. She must be seen to answer these concerns. Please encourage follow up for office visit appt in order to address her concerns.

## 2017-01-27 NOTE — TELEPHONE ENCOUNTER
Pt called stating that on 1/21/17 she started noticing purplish colored bruises on both legs she is having pain on one of the legs and there is oozing coming from the legs,pt state's that she know's for a fact that she didn't bump into anything or trip or even fall. Pt really doesn't want to come in she is requesting to have  call her today. Pt's # [phone].

## 2017-01-31 ENCOUNTER — HOSPITAL ENCOUNTER (OUTPATIENT)
Dept: NUCLEAR MEDICINE | Age: 37
Discharge: HOME | End: 2017-01-31
Attending: INTERNAL MEDICINE
Payer: SUBSIDIZED

## 2017-01-31 VITALS — BODY MASS INDEX: 28.29 KG/M2 | WEIGHT: 170 LBS

## 2017-01-31 DIAGNOSIS — J02.9 SORE THROAT: ICD-10-CM

## 2017-01-31 DIAGNOSIS — R10.13 EPIGASTRIC ABDOMINAL PAIN OF UNKNOWN ETIOLOGY: ICD-10-CM

## 2017-01-31 DIAGNOSIS — R13.10 DYSPHAGIA: ICD-10-CM

## 2017-01-31 DIAGNOSIS — K59.00 CONSTIPATION: ICD-10-CM

## 2017-01-31 PROCEDURE — 78227 HEPATOBIL SYST IMAGE W/DRUG: CPT

## 2017-01-31 PROCEDURE — 74011250636 HC RX REV CODE- 250/636: Performed by: INTERNAL MEDICINE

## 2017-01-31 PROCEDURE — 74011000258 HC RX REV CODE- 258: Performed by: INTERNAL MEDICINE

## 2017-01-31 RX ORDER — SODIUM CHLORIDE 9 MG/ML
25 INJECTION, SOLUTION INTRAVENOUS
Status: COMPLETED | OUTPATIENT
Start: 2017-01-31 | End: 2017-01-31

## 2017-01-31 RX ORDER — SODIUM CHLORIDE 0.9 % (FLUSH) 0.9 %
10 SYRINGE (ML) INJECTION
Status: COMPLETED | OUTPATIENT
Start: 2017-01-31 | End: 2017-01-31

## 2017-01-31 RX ADMIN — SODIUM CHLORIDE 25 ML: 900 INJECTION, SOLUTION INTRAVENOUS at 12:57

## 2017-01-31 RX ADMIN — SINCALIDE 1.54 MCG: 5 INJECTION, POWDER, LYOPHILIZED, FOR SOLUTION INTRAVENOUS at 12:57

## 2017-01-31 RX ADMIN — Medication 10 ML: at 11:55

## 2017-02-02 ENCOUNTER — OFFICE VISIT (OUTPATIENT)
Dept: INTERNAL MEDICINE CLINIC | Age: 37
End: 2017-02-02

## 2017-02-02 VITALS
SYSTOLIC BLOOD PRESSURE: 133 MMHG | DIASTOLIC BLOOD PRESSURE: 112 MMHG | HEIGHT: 65 IN | TEMPERATURE: 98.3 F | BODY MASS INDEX: 2.83 KG/M2 | WEIGHT: 17 LBS | HEART RATE: 102 BPM | RESPIRATION RATE: 16 BRPM

## 2017-02-02 DIAGNOSIS — E78.5 HYPERLIPIDEMIA, UNSPECIFIED HYPERLIPIDEMIA TYPE: ICD-10-CM

## 2017-02-02 DIAGNOSIS — J02.0 STREP PHARYNGITIS: Primary | ICD-10-CM

## 2017-02-02 DIAGNOSIS — K82.8 DYSFUNCTIONAL GALLBLADDER: ICD-10-CM

## 2017-02-02 DIAGNOSIS — R73.02 IGT (IMPAIRED GLUCOSE TOLERANCE): ICD-10-CM

## 2017-02-02 DIAGNOSIS — K31.84 GASTROPARESIS: ICD-10-CM

## 2017-02-02 DIAGNOSIS — F43.10 PTSD (POST-TRAUMATIC STRESS DISORDER): ICD-10-CM

## 2017-02-02 DIAGNOSIS — T14.8XXA BRUISING: ICD-10-CM

## 2017-02-02 DIAGNOSIS — L29.9 ITCH: ICD-10-CM

## 2017-02-02 DIAGNOSIS — F41.1 GAD (GENERALIZED ANXIETY DISORDER): ICD-10-CM

## 2017-02-02 RX ORDER — HYDROXYZINE 25 MG/1
25 TABLET, FILM COATED ORAL
Qty: 20 TAB | Refills: 1 | Status: SHIPPED | OUTPATIENT
Start: 2017-02-02 | End: 2018-10-09 | Stop reason: ALTCHOICE

## 2017-02-02 NOTE — PROGRESS NOTES
HISTORY OF PRESENT ILLNESS  Andrea Lopez is a 29 y.o. female. HPI  Presents for f/u bruising and review results    Reviewed HIDA scan results    abd pain after she eats    Reports bruising - has improved in the interim    Pt took left over azithromycin rather than omnicef  ST improved, but pt worried that did not resolve    Past medical, Social, and Family history reviewed  Medications reviewed and updated. ROS  Complete ROS reviewed and negative or stable except as noted in HPI. Physical Exam   Constitutional: She is oriented to person, place, and time. She appears well-nourished. No distress. HENT:   Head: Normocephalic and atraumatic. Eyes: EOM are normal. Pupils are equal, round, and reactive to light. No scleral icterus. Neck: Normal range of motion. Neck supple. No JVD present. Cardiovascular: Normal rate, regular rhythm and normal heart sounds. Exam reveals no gallop and no friction rub. No murmur heard. Pulmonary/Chest: Effort normal and breath sounds normal. No respiratory distress. She has no wheezes. She has no rales. Abdominal: Soft. Bowel sounds are normal. She exhibits no distension. There is no tenderness. Musculoskeletal: Normal range of motion. She exhibits no edema. Lymphadenopathy:     She has no cervical adenopathy. Neurological: She is alert and oriented to person, place, and time. She exhibits normal muscle tone. Skin: Skin is warm. No rash noted. Psychiatric: Her mood appears anxious. Nursing note and vitals reviewed. Prior labs reviewed. Pt shows photo of ecchymosis on right medial lower leg with surrounding erythema  Another photo of left knee area with similar ecchymosis and erythema  Reviewed imaging reports  Review GI notes. ASSESSMENT and PLAN  GI symptoms are at least in part related to GB dysfunction    ICD-10-CM ICD-9-CM    1. Strep pharyngitis J02.0 034.0 CULTURE, STREP THROAT   2.  Bruising T14.8 924.9 CBC WITH AUTOMATED DIFF METABOLIC PANEL, COMPREHENSIVE      C REACTIVE PROTEIN, QT      PT AND PTT      SED RATE (ESR)   3. Dysfunctional gallbladder K82.8 575.8    4. IGT (impaired glucose tolerance) R73.02 790.22    5. Hyperlipidemia, unspecified hyperlipidemia type E78.5 272.4    6. PTSD (post-traumatic stress disorder) F43.10 309.81    7. DEEPA (generalized anxiety disorder) F41.1 300.02    8. Gastroparesis K31.84 536.3    9. Itch L29.9 698.9 hydrOXYzine HCl (ATARAX) 25 mg tablet     Follow-up Disposition:  Return in about 1 month (around 3/2/2017), or if symptoms worsen or fail to improve, for stomach issues.   results and schedule of future studies reviewed with patient  reviewed diet, exercise and weight    reviewed medications and side effects in detail   GI to help arrange surgeon appt for candelaria  EGD scheduled   to review collections re: romy Chávez      >40 minutes time spent with >50% in counseling and coordination of care

## 2017-02-02 NOTE — PROGRESS NOTES
RM 13  Pt did not take antibiotic rx for strep throat due to cost. Pt states she took an old antbiotic she had at home, azithromycin  Pt states she got a call from Dr. Kalyani Castillo office stating she needed surgery . Chief Complaint   Patient presents with    Leg Injury     bruises on the legs x 2 weeks       1. Have you been to the ER, urgent care clinic since your last visit? Hospitalized since your last visit? No    2. Have you seen or consulted any other health care providers outside of the [address] [place] [address] since your last visit? Include any pap smears or colon screening.  No    Health Maintenance Due   Topic Date Due    DTaP/Tdap/Td series (1 - Tdap) 10/02/2002

## 2017-02-02 NOTE — MR AVS SNAPSHOT
Visit Information Date & Time Provider Department Dept. Phone Encounter #  
 2/2/2017 10:45 AM Petrona Bliss MD [facility] Internal Medicine [phone] [id number] Follow-up Instructions Return in about 1 month (around 3/2/2017), or if symptoms worsen or fail to improve, for stomach issues. Upcoming Health Maintenance Date Due DTaP/Tdap/Td series (1 - Tdap) 10/2/2002 PAP AKA CERVICAL CYTOLOGY 11/11/2018 Allergies as of 2/2/2017  Review Complete On: 2/2/2017 By: Petrona Bliss MD  
  
 Severity Noted Reaction Type Reactions Amoxicillin  06/23/2016    Rash Flagyl [Metronidazole]  11/28/2012   Side Effect Itching Eyes change color Current Immunizations  Reviewed on 9/14/2016 Name Date Influenza Vaccine (Quad) PF 9/12/2016, 10/8/2015 Influenza Vaccine PF 9/17/2013 Not reviewed this visit You Were Diagnosed With   
  
 Codes Comments Strep pharyngitis    -  Primary ICD-10-CM: J02.0 ICD-9-CM: 034.0 Bruising     ICD-10-CM: T14.8 ICD-9-CM: 924.9 Dysfunctional gallbladder     ICD-10-CM: K82.8 ICD-9-CM: 575.8 IGT (impaired glucose tolerance)     ICD-10-CM: R73.02 
ICD-9-CM: 790.22 Hyperlipidemia, unspecified hyperlipidemia type     ICD-10-CM: E78.5 ICD-9-CM: 272.4 PTSD (post-traumatic stress disorder)     ICD-10-CM: F43.10 ICD-9-CM: 309.81 DEEPA (generalized anxiety disorder)     ICD-10-CM: F41.1 ICD-9-CM: 300.02 Gastroparesis     ICD-10-CM: K31.84 ICD-9-CM: 536.3 Itch     ICD-10-CM: L29.9 ICD-9-CM: 698.9 Vitals BP Pulse Temp Resp Height(growth percentile) Weight(growth percentile) (!) 133/112 (!) 102 98.3 °F (36.8 °C) (Oral) 16 5' 5\" (1.651 m) 17 lb (7.711 kg) LMP BMI OB Status Smoking Status 01/26/2017 (Approximate) 2.83 kg/m2 Having regular periods Former Smoker Vitals History BMI and BSA Data Body Mass Index Body Surface Area 2.83 kg/m 2 0.59 m 2 Preferred Pharmacy Pharmacy Name Phone [facility] [address] [facility], [address] [place] [address],[address] [phone] Your Updated Medication List  
  
   
This list is accurate as of: 2/2/17 12:12 PM.  Always use your most recent med list. ADVIL 200 mg tablet Generic drug:  ibuprofen Take 200 mg by mouth every eight (8) hours as needed for Pain. ALPRAZolam 1 mg tablet Commonly known as:  Leward Lennox Take 1 Tab by mouth two (2) times daily as needed for Anxiety. Max Daily Amount: 2 mg. Indications: ANXIETY Biotin 2,500 mcg Cap Take 1 Cap by mouth daily. butalbital-acetaminophen-caff -40 mg per capsule Commonly known as:  [employer] Take 1 Cap by mouth every four (4) hours as needed for Pain for up to 20 doses. Max Daily Amount: 6 Caps. citalopram 20 mg tablet Commonly known as:  Saulo Sarmiento Take 1 Tab by mouth daily. * cyanocobalamin 500 mcg tablet Commonly known as:  VITAMIN B12 Take 1 Tab by mouth daily. * cyanocobalamin 500 mcg tablet Commonly known as:  VITAMIN B12 Take 1 Tab by mouth daily. hydrOXYzine HCl 25 mg tablet Commonly known as:  ATARAX Take 1 Tab by mouth three (3) times daily as needed for Itching.  
  
 magic mouthwash solution Take 5 mL by mouth three (3) times daily as needed for Pain. Benadryl, maalox,, lidocaine in equal quantity  
  
 norethindrone 0.35 mg Tab Commonly known as:  Andreas & Andreas Take 1 Tab by mouth daily. nystatin 100,000 unit/mL suspension Commonly known as:  MYCOSTATIN Take 5 mL by mouth three (3) times daily for 14 days. swish and spit Omeprazole delayed release 20 mg tablet Commonly known as:  PRILOSEC D/R Take 1 Tab by mouth daily. zolpidem 10 mg tablet Commonly known as:  AMBIEN Take 1 Tab by mouth nightly as needed for Sleep. Max Daily Amount: 10 mg. TAKE 1 TABLET BY MOUTH AT BEDTIME AS NEEDED FOR SLEEP  Indications: SLEEP-ONSET INSOMNIA * Notice: This list has 2 medication(s) that are the same as other medications prescribed for you. Read the directions carefully, and ask your doctor or other care provider to review them with you. Prescriptions Sent to Pharmacy Refills  
 hydrOXYzine HCl (ATARAX) 25 mg tablet 1 Sig: Take 1 Tab by mouth three (3) times daily as needed for Itching. Class: Normal  
 Pharmacy: [address] [facility], [address] [place] [address],[address]  #: [phone] Route: Oral  
  
We Performed the Following CULTURE, STREP THROAT [id number] CPT(R)] Follow-up Instructions Return in about 1 month (around 3/2/2017), or if symptoms worsen or fail to improve, for stomach issues. Introducing [facility]! Dear Garrett Sor: Thank you for requesting a iOpener account. Our records indicate that you already have an active iOpener account. You can access your account anytime at [email or web address] Did you know that you can access your hospital and ER discharge instructions at any time in iOpener? You can also review all of your test results from your hospital stay or ER visit. Additional Information If you have questions, please visit the Frequently Asked Questions section of the iOpener website at [email or web address] Remember, iOpener is NOT to be used for urgent needs. For medical emergencies, dial 911. Now available from your iPhone and Android! Please provide this summary of care documentation to your next provider. Your primary care clinician is listed as [address] If you have any questions after today's visit, please call [phone].

## 2017-02-05 LAB
ALBUMIN SERPL-MCNC: 4.1 G/DL (ref 3.5–5.5)
ALBUMIN/GLOB SERPL: 1.2 {RATIO} (ref 1.1–2.5)
ALP SERPL-CCNC: 52 IU/L (ref 39–117)
ALT SERPL-CCNC: 8 IU/L (ref 0–32)
AST SERPL-CCNC: 10 IU/L (ref 0–40)
B-HEM STREP SPEC QL CULT: NEGATIVE
BASOPHILS # BLD AUTO: 0 X10E3/UL (ref 0–0.2)
BASOPHILS NFR BLD AUTO: 0 %
BILIRUB SERPL-MCNC: 0.2 MG/DL (ref 0–1.2)
BUN SERPL-MCNC: 10 MG/DL (ref 6–20)
BUN/CREAT SERPL: 14 (ref 8–20)
CALCIUM SERPL-MCNC: 8.9 MG/DL (ref 8.7–10.2)
CHLORIDE SERPL-SCNC: 99 MMOL/L (ref 96–106)
CO2 SERPL-SCNC: 21 MMOL/L (ref 18–29)
CREAT SERPL-MCNC: 0.73 MG/DL (ref 0.57–1)
CRP SERPL-MCNC: 8.8 MG/L (ref 0–4.9)
EOSINOPHIL # BLD AUTO: 0.1 X10E3/UL (ref 0–0.4)
EOSINOPHIL NFR BLD AUTO: 1 %
ERYTHROCYTE [DISTWIDTH] IN BLOOD BY AUTOMATED COUNT: 14 % (ref 12.3–15.4)
ERYTHROCYTE [SEDIMENTATION RATE] IN BLOOD BY WESTERGREN METHOD: 33 MM/HR (ref 0–32)
GLOBULIN SER CALC-MCNC: 3.3 G/DL (ref 1.5–4.5)
GLUCOSE SERPL-MCNC: 71 MG/DL (ref 65–99)
HCT VFR BLD AUTO: 38.3 % (ref 34–46.6)
HGB BLD-MCNC: 12.5 G/DL (ref 11.1–15.9)
IMM GRANULOCYTES # BLD: 0 X10E3/UL (ref 0–0.1)
IMM GRANULOCYTES NFR BLD: 0 %
LYMPHOCYTES # BLD AUTO: 1.5 X10E3/UL (ref 0.7–3.1)
LYMPHOCYTES NFR BLD AUTO: 25 %
MCH RBC QN AUTO: 28 PG (ref 26.6–33)
MCHC RBC AUTO-ENTMCNC: 32.6 G/DL (ref 31.5–35.7)
MCV RBC AUTO: 86 FL (ref 79–97)
MONOCYTES # BLD AUTO: 0.6 X10E3/UL (ref 0.1–0.9)
MONOCYTES NFR BLD AUTO: 10 %
NEUTROPHILS # BLD AUTO: 3.8 X10E3/UL (ref 1.4–7)
NEUTROPHILS NFR BLD AUTO: 64 %
PLATELET # BLD AUTO: 394 X10E3/UL (ref 150–379)
POTASSIUM SERPL-SCNC: 4 MMOL/L (ref 3.5–5.2)
PROT SERPL-MCNC: 7.4 G/DL (ref 6–8.5)
RBC # BLD AUTO: 4.47 X10E6/UL (ref 3.77–5.28)
SODIUM SERPL-SCNC: 138 MMOL/L (ref 134–144)
WBC # BLD AUTO: 6 X10E3/UL (ref 3.4–10.8)

## 2017-02-06 ENCOUNTER — OFFICE VISIT (OUTPATIENT)
Dept: SURGERY | Age: 37
End: 2017-02-06

## 2017-02-06 VITALS
TEMPERATURE: 98.6 F | RESPIRATION RATE: 16 BRPM | HEIGHT: 65 IN | OXYGEN SATURATION: 97 % | HEART RATE: 74 BPM | SYSTOLIC BLOOD PRESSURE: 118 MMHG | WEIGHT: 176 LBS | BODY MASS INDEX: 29.32 KG/M2 | DIASTOLIC BLOOD PRESSURE: 78 MMHG

## 2017-02-06 DIAGNOSIS — K82.8 BILIARY DYSKINESIA: Primary | ICD-10-CM

## 2017-02-06 NOTE — PROGRESS NOTES
Surgery Consult    Subjective:      Quintin Harrell is a 29 y.o.  female who was referred by Dr. Wilber Mcarthur for evaluation of abdominal pain. She complains of RUQ pain when she consumes food, along with swelling, early satiety, unintentional weight loss and nausea but no vomiting. The pain does not radiate to her back. She also complains of fluid getting stuck in upper GI tract. Of note, she reports she was generally constipated before but since the symptoms started, she has been having BMs right after eating. She has had a HIDA scan and US ABD. HIDA scan 1/31/17:   Gallbladder ejection fraction is diminished and equals 23%    US ABD 1/19/17:  The gallbladder shows a small polyp measuring 3 mm. No stones are identified. There is no wall thickening or fluid around the gallbladder.        Chief Complaint   Patient presents with    Abdominal Pain     Patient Active Problem List    Diagnosis Date Noted    Biliary dyskinesia 02/06/2017    IGT (impaired glucose tolerance)     Hyperlipidemia     Polysubstance abuse 06/16/2016    Eczema 02/04/2016    Acne 08/29/2015    PTSD (post-traumatic stress disorder)     DEEPA (generalized anxiety disorder) 06/25/2015    Gastroparesis 07/18/2014    Mastodynia 01/19/2014    Mass of multiple sites of left breast 01/19/2014    B12 deficiency     Vitamin D deficiency 12/19/2013    Constipation 12/09/2013    Abnormal thyroid function test 12/09/2013    Vaginal discharge 12/04/2013    Mood disorder ([address] 09/30/2013    Panic disorder 09/30/2013    Paresthesia 07/11/2013    Depression with anxiety 03/30/2013     Past Medical History   Diagnosis Date    Anemia NEC     Anxiety attack     Asthma     B12 deficiency     Biliary dyskinesia 2/6/2017    Constipation     Depression     Gastroparesis 7/18/2014    Heart abnormalities      right atria at 30% per [facility]    Hyperlipidemia     IGT (impaired glucose tolerance)     Incontinence of urine 1 episode of bed wetting 1 mo ago    Insomnia     Kidney stone     Neuropathy     PTSD (post-traumatic stress disorder)     Stroke ([address] 2011     TIA    Urinary tract bacterial infections     Vaginal discharge 12/4/2013      Past Surgical History   Procedure Laterality Date    Pr eye exam & treatment      Hx gi  may 2013     polyps removed,colonscopy      Social History   Substance Use Topics    Smoking status: Former Smoker     Types: Cigarettes    Smokeless tobacco: Never Used    Alcohol use 0.0 oz/week     0 Standard drinks or equivalent per week      Comment: Rarely      Family History   Problem Relation Age of Onset    Diabetes Mother     Heart Disease Mother     Hypertension Mother     Stroke Mother     Liver Disease Mother     Dementia Mother     Kidney Disease Mother       Current Outpatient Prescriptions   Medication Sig    hydrOXYzine HCl (ATARAX) 25 mg tablet Take 1 Tab by mouth three (3) times daily as needed for Itching.  Omeprazole delayed release (PRILOSEC D/R) 20 mg tablet Take 1 Tab by mouth daily.  magic mouthwash solution Take 5 mL by mouth three (3) times daily as needed for Pain. Benadryl, maalox,, lidocaine in equal quantity    ALPRAZolam (XANAX) 1 mg tablet Take 1 Tab by mouth two (2) times daily as needed for Anxiety. Max Daily Amount: 2 mg. Indications: ANXIETY    cyanocobalamin (VITAMIN B12) 500 mcg tablet Take 1 Tab by mouth daily.  citalopram (CELEXA) 20 mg tablet Take 1 Tab by mouth daily.  cyanocobalamin (VITAMIN B12) 500 mcg tablet Take 1 Tab by mouth daily.  butalbital-acetaminophen-caff (FIORICET) -40 mg per capsule Take 1 Cap by mouth every four (4) hours as needed for Pain for up to 20 doses. Max Daily Amount: 6 Caps.  zolpidem (AMBIEN) 10 mg tablet Take 1 Tab by mouth nightly as needed for Sleep. Max Daily Amount: 10 mg.  TAKE 1 TABLET BY MOUTH AT BEDTIME AS NEEDED FOR SLEEP  Indications: SLEEP-ONSET INSOMNIA    norethindrone (MICRONOR) 0.35 mg tab Take 1 Tab by mouth daily.  Biotin 2,500 mcg cap Take 1 Cap by mouth daily.  ibuprofen (ADVIL) 200 mg tablet Take 200 mg by mouth every eight (8) hours as needed for Pain. No current facility-administered medications for this visit. Allergies   Allergen Reactions    Amoxicillin Rash    Biaxin [Clarithromycin] Itching and Other (comments)     'eyes turn an funny color and she starts itching'    Flagyl [Metronidazole] Itching     Eyes change color        Review of Systems:    A comprehensive review of systems was negative except for that written in the History of Present Illness. Objective:     Visit Vitals    /78 (BP 1 Location: Left arm, BP Patient Position: Sitting)    Pulse 74    Temp 98.6 °F (37 °C) (Oral)    Resp 16    Ht 5' 5\" (1.651 m)    Wt 176 lb (79.8 kg)    LMP 01/26/2017 (Approximate)    SpO2 97%    BMI 29.29 kg/m2         Physical Exam:  General appearance: alert, cooperative, no distress, appears stated age  Head: Normocephalic, without obvious abnormality, atraumatic  Neurologic: Grossly normal      Assessment:       ICD-10-CM ICD-9-CM    1. Biliary dyskinesia K82.8 575.8    need t be sure an ulcer  Is not giving us a false positive HIDA      Plan:     We'll be waiting for the EGD results with Dr. Lisset Umanzor on 2/8/17 before considering a cholecystectomy. Written by karen Mederos, as dictated by Chandler Siemens Brandon Sol, MD.    Total face to face time with patient: 19 minutes. Greater than 50% of the time was spent in counseling. Signed By: Chandler Siemens Brandon Sol, MD     February 6, 2017

## 2017-02-06 NOTE — PROGRESS NOTES
1. Have you been to the ER, urgent care clinic since your last visit? Hospitalized since your last visit? No    2. Have you seen or consulted any other health care providers outside of the [address] [place] [address] since your last visit? Include any pap smears or colon screening.  Dr. Janet Sotelo [address] [facility]

## 2017-02-06 NOTE — MR AVS SNAPSHOT
Visit Information Date & Time Provider Department Dept. Phone Encounter #  
 2/6/2017  2:50 PM Lizbeth Garza, [address] [phone] [id number] Upcoming Health Maintenance Date Due DTaP/Tdap/Td series (1 - Tdap) 10/2/2002 PAP AKA CERVICAL CYTOLOGY 11/11/2018 Allergies as of 2/6/2017  Review Complete On: 2/6/2017 By: Lizbeth Garza MD  
  
 Severity Noted Reaction Type Reactions Amoxicillin  06/23/2016    Rash Biaxin [Clarithromycin]  02/06/2017    Itching, Other (comments) 'eyes turn an funny color and she starts itching' Flagyl [Metronidazole]  11/28/2012   Side Effect Itching Eyes change color Current Immunizations  Reviewed on 9/14/2016 Name Date Influenza Vaccine (Quad) PF 9/12/2016, 10/8/2015 Influenza Vaccine PF 9/17/2013 Not reviewed this visit You Were Diagnosed With   
  
 Codes Comments Biliary dyskinesia    -  Primary ICD-10-CM: K82.8 ICD-9-CM: 575.8 Vitals BP Pulse Temp Resp Height(growth percentile) Weight(growth percentile) 118/78 (BP 1 Location: Left arm, BP Patient Position: Sitting) 74 98.6 °F (37 °C) (Oral) 16 5' 5\" (1.651 m) 176 lb (79.8 kg) LMP SpO2 BMI OB Status Smoking Status 01/26/2017 (Approximate) 97% 29.29 kg/m2 Having regular periods Former Smoker BMI and BSA Data Body Mass Index Body Surface Area  
 29.29 kg/m 2 1.91 m 2 Preferred Pharmacy Pharmacy Name Phone [facility] [address] [facility], [address] [place] [address],[facility] Floor [phone] Your Updated Medication List  
  
   
This list is accurate as of: 2/6/17  4:30 PM.  Always use your most recent med list. ADVIL 200 mg tablet Generic drug:  ibuprofen Take 200 mg by mouth every eight (8) hours as needed for Pain. ALPRAZolam 1 mg tablet Commonly known as:  Sherrie Fell Take 1 Tab by mouth two (2) times daily as needed for Anxiety.  Max Daily Amount: 2 mg. Indications: ANXIETY Biotin 2,500 mcg Cap Take 1 Cap by mouth daily. butalbital-acetaminophen-caff -40 mg per capsule Commonly known as:  [employer] Take 1 Cap by mouth every four (4) hours as needed for Pain for up to 20 doses. Max Daily Amount: 6 Caps. citalopram 20 mg tablet Commonly known as:  Delona Nestle Take 1 Tab by mouth daily. * cyanocobalamin 500 mcg tablet Commonly known as:  VITAMIN B12 Take 1 Tab by mouth daily. * cyanocobalamin 500 mcg tablet Commonly known as:  VITAMIN B12 Take 1 Tab by mouth daily. hydrOXYzine HCl 25 mg tablet Commonly known as:  ATARAX Take 1 Tab by mouth three (3) times daily as needed for Itching.  
  
 magic mouthwash solution Take 5 mL by mouth three (3) times daily as needed for Pain. Benadryl, maalox,, lidocaine in equal quantity  
  
 norethindrone 0.35 mg Tab Commonly known as:  Anderas & Andreas Take 1 Tab by mouth daily. Omeprazole delayed release 20 mg tablet Commonly known as:  PRILOSEC D/R Take 1 Tab by mouth daily. zolpidem 10 mg tablet Commonly known as:  AMBIEN Take 1 Tab by mouth nightly as needed for Sleep. Max Daily Amount: 10 mg. TAKE 1 TABLET BY MOUTH AT BEDTIME AS NEEDED FOR SLEEP  Indications: SLEEP-ONSET INSOMNIA * Notice: This list has 2 medication(s) that are the same as other medications prescribed for you. Read the directions carefully, and ask your doctor or other care provider to review them with you. Introducing [facility] & HEALTH SERVICES! Dear Josias Fletcher: Thank you for requesting a [employer] account. Our records indicate that you already have an active [employer] account. You can access your account anytime at [email or web address] Did you know that you can access your hospital and ER discharge instructions at any time in [employer]? You can also review all of your test results from your hospital stay or ER visit. Additional Information If you have questions, please visit the Frequently Asked Questions section of the [employer] website at [email or web address] Remember, [employer] is NOT to be used for urgent needs. For medical emergencies, dial 911. Now available from your iPhone and Android! Please provide this summary of care documentation to your next provider. Your primary care clinician is listed as [address] If you have any questions after today's visit, please call [phone].

## 2017-02-07 ENCOUNTER — TELEPHONE (OUTPATIENT)
Dept: INTERNAL MEDICINE CLINIC | Age: 37
End: 2017-02-07

## 2017-02-08 ENCOUNTER — ANESTHESIA (OUTPATIENT)
Dept: ENDOSCOPY | Age: 37
End: 2017-02-08
Payer: SUBSIDIZED

## 2017-02-08 ENCOUNTER — HOSPITAL ENCOUNTER (OUTPATIENT)
Age: 37
Setting detail: OUTPATIENT SURGERY
Discharge: HOME | End: 2017-02-08
Attending: INTERNAL MEDICINE | Admitting: INTERNAL MEDICINE
Payer: SUBSIDIZED

## 2017-02-08 ENCOUNTER — APPOINTMENT (OUTPATIENT)
Dept: CT IMAGING | Age: 37
End: 2017-02-08
Attending: INTERNAL MEDICINE
Payer: SUBSIDIZED

## 2017-02-08 ENCOUNTER — ANESTHESIA EVENT (OUTPATIENT)
Dept: ENDOSCOPY | Age: 37
End: 2017-02-08
Payer: SUBSIDIZED

## 2017-02-08 ENCOUNTER — SURGERY (OUTPATIENT)
Age: 37
End: 2017-02-08

## 2017-02-08 VITALS
OXYGEN SATURATION: 100 % | HEART RATE: 84 BPM | WEIGHT: 176 LBS | BODY MASS INDEX: 29.29 KG/M2 | TEMPERATURE: 97.6 F | SYSTOLIC BLOOD PRESSURE: 122 MMHG | RESPIRATION RATE: 23 BRPM | DIASTOLIC BLOOD PRESSURE: 73 MMHG

## 2017-02-08 LAB
H PYLORI FROM TISSUE: NEGATIVE
HCG UR QL: NEGATIVE
KIT LOT NO., HCLOLOT: NORMAL
NEGATIVE CONTROL: NEGATIVE
POSITIVE CONTROL: POSITIVE

## 2017-02-08 PROCEDURE — 77030009426 HC FCPS BIOP ENDOSC BSC -B: Performed by: INTERNAL MEDICINE

## 2017-02-08 PROCEDURE — 74011000258 HC RX REV CODE- 258

## 2017-02-08 PROCEDURE — 76060000031 HC ANESTHESIA FIRST 0.5 HR: Performed by: INTERNAL MEDICINE

## 2017-02-08 PROCEDURE — 74011250636 HC RX REV CODE- 250/636

## 2017-02-08 PROCEDURE — 74011000250 HC RX REV CODE- 250

## 2017-02-08 PROCEDURE — 88305 TISSUE EXAM BY PATHOLOGIST: CPT | Performed by: INTERNAL MEDICINE

## 2017-02-08 PROCEDURE — 87077 CULTURE AEROBIC IDENTIFY: CPT | Performed by: INTERNAL MEDICINE

## 2017-02-08 PROCEDURE — 81025 URINE PREGNANCY TEST: CPT

## 2017-02-08 PROCEDURE — 74150 CT ABDOMEN W/O CONTRAST: CPT

## 2017-02-08 PROCEDURE — 76040000019: Performed by: INTERNAL MEDICINE

## 2017-02-08 RX ORDER — LIDOCAINE HYDROCHLORIDE 20 MG/ML
INJECTION, SOLUTION EPIDURAL; INFILTRATION; INTRACAUDAL; PERINEURAL AS NEEDED
Status: DISCONTINUED | OUTPATIENT
Start: 2017-02-08 | End: 2017-02-08 | Stop reason: HOSPADM

## 2017-02-08 RX ORDER — PROPOFOL 10 MG/ML
INJECTION, EMULSION INTRAVENOUS AS NEEDED
Status: DISCONTINUED | OUTPATIENT
Start: 2017-02-08 | End: 2017-02-08 | Stop reason: HOSPADM

## 2017-02-08 RX ORDER — SODIUM CHLORIDE 0.9 % (FLUSH) 0.9 %
5-10 SYRINGE (ML) INJECTION AS NEEDED
Status: DISCONTINUED | OUTPATIENT
Start: 2017-02-08 | End: 2017-02-08 | Stop reason: HOSPADM

## 2017-02-08 RX ORDER — EPINEPHRINE 0.1 MG/ML
1 INJECTION INTRACARDIAC; INTRAVENOUS
Status: DISCONTINUED | OUTPATIENT
Start: 2017-02-08 | End: 2017-02-08 | Stop reason: HOSPADM

## 2017-02-08 RX ORDER — MIDAZOLAM HYDROCHLORIDE 1 MG/ML
.25-5 INJECTION, SOLUTION INTRAMUSCULAR; INTRAVENOUS
Status: DISCONTINUED | OUTPATIENT
Start: 2017-02-08 | End: 2017-02-08 | Stop reason: HOSPADM

## 2017-02-08 RX ORDER — SODIUM CHLORIDE 9 MG/ML
INJECTION, SOLUTION INTRAVENOUS
Status: DISCONTINUED | OUTPATIENT
Start: 2017-02-08 | End: 2017-02-08 | Stop reason: HOSPADM

## 2017-02-08 RX ORDER — SODIUM CHLORIDE 9 MG/ML
150 INJECTION, SOLUTION INTRAVENOUS CONTINUOUS
Status: DISCONTINUED | OUTPATIENT
Start: 2017-02-08 | End: 2017-02-08 | Stop reason: HOSPADM

## 2017-02-08 RX ORDER — SODIUM CHLORIDE 0.9 % (FLUSH) 0.9 %
5-10 SYRINGE (ML) INJECTION EVERY 8 HOURS
Status: DISCONTINUED | OUTPATIENT
Start: 2017-02-08 | End: 2017-02-08 | Stop reason: HOSPADM

## 2017-02-08 RX ORDER — ATROPINE SULFATE 0.1 MG/ML
0.5 INJECTION INTRAVENOUS
Status: DISCONTINUED | OUTPATIENT
Start: 2017-02-08 | End: 2017-02-08 | Stop reason: HOSPADM

## 2017-02-08 RX ORDER — DEXTROMETHORPHAN/PSEUDOEPHED 2.5-7.5/.8
1.2 DROPS ORAL
Status: DISCONTINUED | OUTPATIENT
Start: 2017-02-08 | End: 2017-02-08 | Stop reason: HOSPADM

## 2017-02-08 RX ADMIN — LIDOCAINE HYDROCHLORIDE 60 MG: 20 INJECTION, SOLUTION EPIDURAL; INFILTRATION; INTRACAUDAL; PERINEURAL at 15:15

## 2017-02-08 RX ADMIN — PROPOFOL 30 MG: 10 INJECTION, EMULSION INTRAVENOUS at 15:41

## 2017-02-08 RX ADMIN — PROPOFOL 50 MG: 10 INJECTION, EMULSION INTRAVENOUS at 15:39

## 2017-02-08 RX ADMIN — PROPOFOL 50 MG: 10 INJECTION, EMULSION INTRAVENOUS at 15:37

## 2017-02-08 RX ADMIN — SODIUM CHLORIDE: 9 INJECTION, SOLUTION INTRAVENOUS at 15:12

## 2017-02-08 RX ADMIN — LIDOCAINE HYDROCHLORIDE 80 MG: 20 INJECTION, SOLUTION EPIDURAL; INFILTRATION; INTRACAUDAL; PERINEURAL at 15:36

## 2017-02-08 RX ADMIN — PROPOFOL 50 MG: 10 INJECTION, EMULSION INTRAVENOUS at 15:40

## 2017-02-08 RX ADMIN — PROPOFOL 10 MG: 10 INJECTION, EMULSION INTRAVENOUS at 15:15

## 2017-02-08 RX ADMIN — PROPOFOL 10 MG: 10 INJECTION, EMULSION INTRAVENOUS at 15:16

## 2017-02-08 RX ADMIN — PROPOFOL 30 MG: 10 INJECTION, EMULSION INTRAVENOUS at 15:42

## 2017-02-08 RX ADMIN — PROPOFOL 30 MG: 10 INJECTION, EMULSION INTRAVENOUS at 15:44

## 2017-02-08 RX ADMIN — PROPOFOL 30 MG: 10 INJECTION, EMULSION INTRAVENOUS at 15:46

## 2017-02-08 RX ADMIN — PROPOFOL 50 MG: 10 INJECTION, EMULSION INTRAVENOUS at 15:38

## 2017-02-08 RX ADMIN — PROPOFOL 30 MG: 10 INJECTION, EMULSION INTRAVENOUS at 15:48

## 2017-02-08 RX ADMIN — PROPOFOL 30 MG: 10 INJECTION, EMULSION INTRAVENOUS at 15:43

## 2017-02-08 NOTE — H&P
[address], [address] [place] [address]          Pre-procedure History and Physical       NAME:  Chau Rodriguez   :   1980   MRN:   [id number]     CHIEF COMPLAINT/HPI: See procedure note    PMH:  Past Medical History   Diagnosis Date    Anemia NEC     Anxiety attack     Asthma     B12 deficiency     Biliary dyskinesia 2017    Constipation     Depression     Gastroparesis 2014    Heart abnormalities      right atria at 30% per [facility]    Hyperlipidemia     IGT (impaired glucose tolerance)     Incontinence of urine      1 episode of bed wetting 1 mo ago    Insomnia     Kidney stone     Neuropathy     PTSD (post-traumatic stress disorder)     Stroke ([facility] [address]      TIA    Urinary tract bacterial infections     Vaginal discharge 2013       PSH:  Past Surgical History   Procedure Laterality Date    Pr eye exam & treatment      Hx gi  may 2013     polyps removed,colonscopy       Allergies: Allergies   Allergen Reactions    Amoxicillin Rash    Biaxin [Clarithromycin] Itching and Other (comments)     'eyes turn an funny color and she starts itching'    Flagyl [Metronidazole] Itching     Eyes change color       Home Medications:  Prior to Admission Medications   Prescriptions Last Dose Informant Patient Reported? Taking? ALPRAZolam (XANAX) 1 mg tablet Not Taking at Unknown time  No No   Sig: Take 1 Tab by mouth two (2) times daily as needed for Anxiety. Max Daily Amount: 2 mg. Indications: ANXIETY   Biotin 2,500 mcg cap 2017 at Unknown time  Yes Yes   Sig: Take 1 Cap by mouth daily. Omeprazole delayed release (PRILOSEC D/R) 20 mg tablet 2017 at Unknown time  No Yes   Sig: Take 1 Tab by mouth daily. butalbital-acetaminophen-caff (FIORICET) -40 mg per capsule Not Taking at Unknown time  No No   Sig: Take 1 Cap by mouth every four (4) hours as needed for Pain for up to 20 doses. Max Daily Amount: 6 Caps.    citalopram (CELEXA) 20 mg tablet Not Taking at Unknown time  No No   Sig: Take 1 Tab by mouth daily. cyanocobalamin (VITAMIN B12) 500 mcg tablet Not Taking at Unknown time  No No   Sig: Take 1 Tab by mouth daily. cyanocobalamin (VITAMIN B12) 500 mcg tablet Not Taking at Unknown time  No No   Sig: Take 1 Tab by mouth daily. hydrOXYzine HCl (ATARAX) 25 mg tablet Not Taking at Unknown time  No No   Sig: Take 1 Tab by mouth three (3) times daily as needed for Itching. ibuprofen (ADVIL) 200 mg tablet Unknown at Unknown time  Yes No   Sig: Take 200 mg by mouth every eight (8) hours as needed for Pain.   magic mouthwash solution Not Taking at Unknown time  No No   Sig: Take 5 mL by mouth three (3) times daily as needed for Pain. Benadryl, maalox,, lidocaine in equal quantity   norethindrone (MICRONOR) 0.35 mg tab 2/7/2017 at Unknown time  No Yes   Sig: Take 1 Tab by mouth daily. zolpidem (AMBIEN) 10 mg tablet 2/7/2017 at Unknown time  No Yes   Sig: Take 1 Tab by mouth nightly as needed for Sleep. Max Daily Amount: 10 mg.  TAKE 1 TABLET BY MOUTH AT BEDTIME AS NEEDED FOR SLEEP  Indications: SLEEP-ONSET INSOMNIA      Facility-Administered Medications: None       Hospital Medications:  Current Facility-Administered Medications   Medication Dose Route Frequency    0.9% sodium chloride infusion  150 mL/hr IntraVENous CONTINUOUS    sodium chloride (NS) flush 5-10 mL  5-10 mL IntraVENous Q8H    sodium chloride (NS) flush 5-10 mL  5-10 mL IntraVENous PRN    midazolam (VERSED) injection 0.25-5 mg  0.25-5 mg IntraVENous Multiple    simethicone (MYLICON) 90ZZ/8.3WX oral drops 80 mg  1.2 mL Oral Multiple    atropine injection 0.5 mg  0.5 mg IntraVENous ONCE PRN    EPINEPHrine (ADRENALIN) 0.1 mg/mL syringe 1 mg  1 mg Endoscopically ONCE PRN     Facility-Administered Medications Ordered in Other Encounters   Medication Dose Route Frequency    0.9% sodium chloride infusion   IntraVENous CONTINUOUS    lidocaine (PF) (XYLOCAINE) 20 mg/mL (2 %) injection   IntraVENous PRN    propofol (DIPRIVAN) 10 mg/mL injection   IntraVENous PRN       Family History:  Family History   Problem Relation Age of Onset    Diabetes Mother     Heart Disease Mother     Hypertension Mother     Stroke Mother     Liver Disease Mother     Dementia Mother     Kidney Disease Mother        Social History:  Social History   Substance Use Topics    Smoking status: Former Smoker     Types: Cigarettes    Smokeless tobacco: Never Used    Alcohol use 0.0 oz/week     0 Standard drinks or equivalent per week      Comment: Rarely         PHYSICAL EXAM PRIOR TO SEDATION:  General: Alert, in no acute distress    Lungs:            CTA bilaterally  Heart:  Normal S1, S2    Abdomen: Soft, Non distended, Non tender. Normoactive bowel sounds. Assessment:   Stable for sedation administration.     Plan:   · Endoscopic procedure with sedation     Signed By: Vanna Isaac MD     2/8/2017  3:37 PM

## 2017-02-08 NOTE — ANESTHESIA POSTPROCEDURE EVALUATION
Post-Anesthesia Evaluation and Assessment    Patient: Chau Rodriguez MRN: [id number]  SSN: [id number]    YOB: 1980  Age: 29 y.o. Sex: female       Cardiovascular Function/Vital Signs  Visit Vitals    BP 97/56    Pulse 83    Temp 36.4 °C (97.6 °F)    Resp 16    Wt 79.8 kg (176 lb)    SpO2 100%    BMI 29.29 kg/m2       Patient is status post MAC anesthesia for Procedure(s):  ESOPHAGOGASTRODUODENOSCOPY (EGD)  ESOPHAGOGASTRODUODENAL (EGD) BIOPSY. Nausea/Vomiting: None    Postoperative hydration reviewed and adequate. Pain:  Pain Scale 1: Numeric (0 - 10) (02/08/17 1557)  Pain Intensity 1: 8 (02/08/17 1557)   Managed    Neurological Status: At baseline    Mental Status and Level of Consciousness: Arousable    Pulmonary Status:   O2 Device: Room air (02/08/17 1557)   Adequate oxygenation and airway patent    Complications related to anesthesia: None    Post-anesthesia assessment completed.  No concerns    Signed By: Stefan Acosta MD     February 8, 2017

## 2017-02-08 NOTE — PROGRESS NOTES
CT RESULTS, NO PERFORATION, DR. PATTEN INFORMED, OK TO DISCHARGE PT.UNDER LEFT BREAST PAIN 6/10. ABDOMEN SOFT AND NON--DISTENDED.

## 2017-02-08 NOTE — IP AVS SNAPSHOT
[address] 
[phone] Patient: Ailyn Anne MRN: [id number] :1980 You are allergic to the following Allergen Reactions Amoxicillin Rash Biaxin (Clarithromycin) Itching Other (comments) 'eyes turn an funny color and she starts itching' Flagyl (Metronidazole) Itching Eyes change color Recent Documentation Weight BMI OB Status Smoking Status 79.8 kg 29.29 kg/m2 Having regular periods Former Smoker Emergency Contacts Name Discharge Info Relation Home Work Mobile Riri Duncan DISCHARGE CAREGIVER [3] Other Relative [6] [phone] About your hospitalization You were admitted on:  2017 You last received care in the:  [facility] ENDOSCOPY You were discharged on:  2017 Unit phone number:  [phone] Why you were hospitalized Your primary diagnosis was:  Not on File Providers Seen During Your Hospitalizations Provider Role Specialty Primary office phone Stuart Card MD Attending Provider Gastroenterology [phone] Your Primary Care Physician (PCP) Primary Care Physician Office Phone Office Fax Cheyenne Lesch [phone] Follow-up Information None Current Discharge Medication List  
  
CONTINUE these medications which have NOT CHANGED Dose & Instructions Dispensing Information Comments Morning Noon Evening Bedtime ADVIL 200 mg tablet Generic drug:  ibuprofen Your next dose is: Today, Tomorrow Other:  _________ Dose:  200 mg Take 200 mg by mouth every eight (8) hours as needed for Pain. Refills:  0 ALPRAZolam 1 mg tablet Commonly known as:  Colleen Isaac Your next dose is: Today, Tomorrow Other:  _________ Dose:  1 mg Take 1 Tab by mouth two (2) times daily as needed for Anxiety. Max Daily Amount: 2 mg. Indications: ANXIETY Quantity:  15 Tab Refills:  0 Biotin 2,500 mcg Cap Your next dose is: Today, Tomorrow Other:  _________ Dose:  1 Cap Take 1 Cap by mouth daily. Refills:  0  
     
   
   
   
  
 butalbital-acetaminophen-caff -40 mg per capsule Commonly known as:  [employer] Your next dose is: Today, Tomorrow Other:  _________ Dose:  1 Cap Take 1 Cap by mouth every four (4) hours as needed for Pain for up to 20 doses. Max Daily Amount: 6 Caps. Quantity:  20 Cap Refills:  0  
     
   
   
   
  
 citalopram 20 mg tablet Commonly known as:  Deldyllan Nestle Your next dose is: Today, Tomorrow Other:  _________ Dose:  20 mg Take 1 Tab by mouth daily. Quantity:  30 Tab Refills:  5  
     
   
   
   
  
 * cyanocobalamin 500 mcg tablet Commonly known as:  VITAMIN B12 Your next dose is: Today, Tomorrow Other:  _________ Dose:  500 mcg Take 1 Tab by mouth daily. Quantity:  30 Tab Refills:  11  
     
   
   
   
  
 * cyanocobalamin 500 mcg tablet Commonly known as:  VITAMIN B12 Your next dose is: Today, Tomorrow Other:  _________ Dose:  500 mcg Take 1 Tab by mouth daily. Quantity:  30 Tab Refills:  5  
     
   
   
   
  
 hydrOXYzine HCl 25 mg tablet Commonly known as:  ATARAX Your next dose is: Today, Tomorrow Other:  _________ Dose:  25 mg Take 1 Tab by mouth three (3) times daily as needed for Itching. Quantity:  20 Tab Refills:  1  
     
   
   
   
  
 magic mouthwash solution Your next dose is: Today, Tomorrow Other:  _________ Dose:  5 mL Take 5 mL by mouth three (3) times daily as needed for Pain. Benadryl, maalox,, lidocaine in equal quantity Quantity:  240 mL Refills:  1 norethindrone 0.35 mg Tab Commonly known as:  Andreas & Andreas Your next dose is: Today, Tomorrow Other:  _________ Dose:  1 Tab Take 1 Tab by mouth daily. Quantity:  28 Tab Refills:  11 Omeprazole delayed release 20 mg tablet Commonly known as:  PRILOSEC D/R Your next dose is: Today, Tomorrow Other:  _________ Dose:  20 mg Take 1 Tab by mouth daily. Quantity:  30 Tab Refills:  5  
     
   
   
   
  
 zolpidem 10 mg tablet Commonly known as:  AMBIEN Your next dose is: Today, Tomorrow Other:  _________ Dose:  10 mg Take 1 Tab by mouth nightly as needed for Sleep. Max Daily Amount: 10 mg. TAKE 1 TABLET BY MOUTH AT BEDTIME AS NEEDED FOR SLEEP  Indications: SLEEP-ONSET INSOMNIA Quantity:  30 Tab Refills:  5  
     
   
   
   
  
 * Notice: This list has 2 medication(s) that are the same as other medications prescribed for you. Read the directions carefully, and ask your doctor or other care provider to review them with you. Discharge Instructions [address] [place] [address] Bianca Montgomery M.D. 
[address] [place] [address] 
[phone] EGD DISCHARGE INSTRUCTIONS Dianna Montana [id number] 
1980 DISCOMFORT: 
Sore throat- throat lozenges or warm salt water gargle Redness at IV site- apply warm compress to area; if redness or soreness persist- contact your physician Gaseous discomfort- walking, belching will help relieve any discomfort You may not operate a vehicle for 12 hours You may not engage in an occupation involving machinery or appliances for the  rest of today You may not drink alcoholic beverages for at least 12 hours Avoid making any critical decisions for at least 24 hours DIET: 
 You may resume your normal diet, but some patients find that heavy or large  meals may lead to indigestion or vomiting. I suggest a light meal as first food  intake. ACTIVITY: 
You may resume your normal daily activities. It is recommended that you spend the remainder of the day resting - avoid any strenuous activity. CALL MAIDA Miles ANY SIGN OF: Increasing pain, nausea, vomiting Abdominal distension (swelling) Significant bleeding (oral or rectal) Fever Pain in chest area Shortness of breath Additional Instructions: 
 Call Dr. Cherri Varghese if any questions or problems at [phone] You should receive the biopsy results by phone or mail within 3 weeks, if not, call my office for the results. EGD with small sliding hiatal hernia-not the cause of symptoms. Follow-up with Dr. Michael Balderas. Hiatal Hernia: Care Instructions Your Care Instructions A hiatal hernia occurs when part of the stomach bulges into the chest cavity. A hiatal hernia may allow stomach acid and juices to back up into the esophagus (acid reflux). This can cause a feeling of burning, warmth, heat, or pain behind the breastbone. This feeling may often occur after you eat, soon after you lie down, or when you bend forward, and it may come and go. You also may have a sour taste in your mouth. These symptoms are commonly known as heartburn or reflux. But not all hiatal hernias cause symptoms. Follow-up care is a key part of your treatment and safety. Be sure to make and go to all appointments, and call your doctor if you are having problems. Its also a good idea to know your test results and keep a list of the medicines you take. How can you care for yourself at home? · Take your medicines exactly as prescribed. Call your doctor if you think you are having a problem with your medicine.  
· Do not take aspirin or other nonsteroidal anti-inflammatory drugs (NSAIDs), such as ibuprofen (Advil, Motrin) or naproxen (Aleve), unless your doctor says it is okay. Ask your doctor what you can take for pain. · Your doctor may recommend over-the-counter medicine. For mild or occasional indigestion, antacids such as Tums, Gaviscon, Maalox, or Mylanta may help. Your doctor also may recommend over-the-counter acid reducers, such as famotidine (Pepcid AC), cimetidine (Tagamet HB), ranitidine (Zantac 75 and Zantac 150), or omeprazole (Prilosec). Read and follow all instructions on the label. If you use these medicines often, talk with your doctor. · Change your eating habits. ¨ Its best to eat several small meals instead of two or three large meals. ¨ After you eat, wait 2 to 3 hours before you lie down. Late-night snacks arent a good idea. ¨ Chocolate, mint, and alcohol can make heartburn worse. They relax the valve between the esophagus and the stomach. ¨ Spicy foods, foods that have a lot of acid (like tomatoes and oranges), and coffee can make heartburn symptoms worse in some people. If your symptoms are worse after you eat a certain food, you may want to stop eating that food to see if your symptoms get better. · Do not smoke or chew tobacco. 
· If you get heartburn at night, raise the head of your bed 6 to 8 inches by putting the frame on blocks or placing a foam wedge under the head of your mattress. (Adding extra pillows does not work.) · Do not wear tight clothing around your middle. · Lose weight if you need to. Losing just 5 to 10 pounds can help. When should you call for help? Call 911 anytime you think you may need emergency care. For example, call if: 
· You have symptoms of a heart attack such as: ¨ Chest pain or pressure. ¨ Sweating. ¨ Shortness of breath. ¨ Nausea or vomiting. ¨ Pain that spreads from the chest to the neck, jaw, or one or both shoulders or arms. ¨ Dizziness or lightheadedness. ¨ A fast or uneven pulse. After calling 911, chew 1 adult-strength aspirin. Wait for an ambulance. Do not try to drive yourself. · You vomit blood or what looks like coffee grounds. · You pass maroon or very bloody stools. Call your doctor now or seek immediate medical care if: 
· You have new or different belly pain. · Your stools are black and tarlike or have streaks of blood. · Food seems to catch in your throat or chest. 
Watch closely for changes in your health, and be sure to contact your doctor if: 
· Your symptoms get worse. · Your symptoms have not improved after 2 days. Where can you learn more? Go to http://maria del carmen-maury.info/. Enter V444 in the search box to learn more about \"Hiatal Hernia: Care Instructions. \" Current as of: August 9, 2016 Content Version: 11.1 © 8052-7720 [employer]. Care instructions adapted under license by [employer] (which disclaims liability or warranty for this information). If you have questions about a medical condition or this instruction, always ask your healthcare professional. [place] [address] any warranty or liability for your use of this information. Discharge Orders None Introducing [facility]! Dear Sam Mcdermott: Thank you for requesting a [employer] account. Our records indicate that you already have an active [employer] account. You can access your account anytime at [email or web address] Did you know that you can access your hospital and ER discharge instructions at any time in [employer]? You can also review all of your test results from your hospital stay or ER visit. Additional Information If you have questions, please visit the Frequently Asked Questions section of the [employer] website at [email or web address] Remember, [employer] is NOT to be used for urgent needs. For medical emergencies, dial 911. Now available from your iPhone and Android! General Information Please provide this summary of care documentation to your next provider. Patient Signature:  ____________________________________________________________ Date:  ____________________________________________________________  
  
Elmyra Sharp Provider Signature:  ____________________________________________________________ Date:  ____________________________________________________________

## 2017-02-08 NOTE — ANESTHESIA PREPROCEDURE EVALUATION
Anesthetic History   No history of anesthetic complications            Review of Systems / Medical History  Patient summary reviewed, nursing notes reviewed and pertinent labs reviewed    Pulmonary  Within defined limits          Asthma        Neuro/Psych   Within defined limits           Cardiovascular  Within defined limits                     GI/Hepatic/Renal  Within defined limits              Endo/Other  Within defined limits           Other Findings              Physical Exam    Airway  Mallampati: I  TM Distance: > 6 cm  Neck ROM: normal range of motion   Mouth opening: Normal     Cardiovascular  Regular rate and rhythm,  S1 and S2 normal,  no murmur, click, rub, or gallop             Dental  No notable dental hx       Pulmonary  Breath sounds clear to auscultation               Abdominal  GI exam deferred       Other Findings            Anesthetic Plan    ASA: 2  Anesthesia type: MAC          Induction: Intravenous  Anesthetic plan and risks discussed with: Patient

## 2017-02-08 NOTE — TELEPHONE ENCOUNTER
On call provider    Patient reports she is 'stressed out\" because she has not received return call rel lab results. She is aware of abnormal lab results from [address]      Patient advised PCP will be informed

## 2017-02-08 NOTE — TELEPHONE ENCOUNTER
Please clarify pt's concerns and notify pt of lab results    Her repeat throat culture was normal.  The mildly elevated platelet count, mild ESR elevated (which is improved since she took the antibiotic) and mild elevation of CRP are all likely the result of her recent strep throat. Which has now been treated. Generally, her lab work is reassuring.

## 2017-02-08 NOTE — PROGRESS NOTES
Pt. C/o abdominal pain and rates it a 10/10 under left breastABDOMEN SOFT AND NONDISTENDED. Dr. Wendy Aburto  NOTIFIED, MD  AT PTS BEDSIDE FOR ASSESSMENT. CT OF ABDOMEN ORDERED FOR PERFORATION.

## 2017-02-09 ENCOUNTER — TELEPHONE (OUTPATIENT)
Dept: OBGYN CLINIC | Age: 37
End: 2017-02-09

## 2017-02-09 NOTE — TELEPHONE ENCOUNTER
Pt called requesting a phone call back from the doctor. Patient stated that every time she comes to her visit she never gets a pap smear. Patient stated she is concern of having cervical cancer due to her past medical history. Patient stated she contacted her insurance company and was told that if she feel like she need a pap smear then she can get one. Patient informed me that she has records from [facility] that states she has to have a pap every year. Gave patient the fax number to have her records faxed for the MD can review them. Informed patient of the guidelines and when her next pap is and the results of her last pap on 11/11/2015 was negative. Patient continued to go on about she never get a pap smear. Patient is requesting a phone call back from the MD. Patient became hostile and continued to repeat the same thing over. Informed patient that I will pass the message to the MD. Patient gave me her number. I verbalized understanding.

## 2017-02-09 NOTE — TELEPHONE ENCOUNTER
Dr. Anneliese Blake Telephone  Received: Today       200                      Patient would like a call back from ONLY the Doctor regarding her test results.  Contact is [phone]

## 2017-02-10 NOTE — TELEPHONE ENCOUNTER
Pt has been upset that the nurse or Stacie Land has not called her back to go over her recent labs. Pt has been \"Googling\" different medications pt would like if  would call in a medication called Pamelor for anxiety. I informed the pt that Stacie Land may need to see her before he would prescribe her anything. The pharmacist keep's telling her that she really need's to talk with her PCP. Pt also state's that she has been having dreams about her mother again. Pt's # [phone]. Please call and advise.

## 2017-02-10 NOTE — TELEPHONE ENCOUNTER
She is welcome to schedule an exam for a pap smear, however since it is not needed and not medically indicated she may be required to pay for it out of pocket (that is between her an her insurace).

## 2017-02-10 NOTE — TELEPHONE ENCOUNTER
Patient called back and was advised of MD recommendations and requested appointment. Patient placed on the schedule for 2/21/17 at 2:10 PM. Patient verbalized understanding.

## 2017-02-12 NOTE — TELEPHONE ENCOUNTER
Please notify pt of results as previously noted. Please encourage follow up for office visit appt to review new medications.

## 2017-02-13 NOTE — TELEPHONE ENCOUNTER
I called pt. Pt reports increased depression in addition to her anxiety. Crying more, more emotional.   Pt inquires re: pamelor    Pt reports nightmares whenever she doesn't take ambien. Agree to restart celexa  Defer pamelor due to potential SE. Also reviewed labs and reassured pt re: CRP likely related to non group A strep throat.

## 2017-02-13 NOTE — TELEPHONE ENCOUNTER
Patient notified of all results and confirmed. Patient advised to make an appt to disucss medications. Patient begged for 5 minutes for PCP to contact her directly because she is not coming back into clinic. Reinforced importance of appt, pt declines to continuing speaking with me and states she needs to speak directly to PCP regarding current celexa rx that she is not taking and wants to discuss new medication. Advised message would be sent, but pcp with patients. She confirmed.

## 2017-02-14 DIAGNOSIS — F51.05 INSOMNIA DUE TO OTHER MENTAL DISORDER: ICD-10-CM

## 2017-02-14 DIAGNOSIS — F99 INSOMNIA DUE TO OTHER MENTAL DISORDER: ICD-10-CM

## 2017-02-15 RX ORDER — ZOLPIDEM TARTRATE 10 MG/1
10 TABLET ORAL
Qty: 30 TAB | Refills: 5 | OUTPATIENT
Start: 2017-02-15

## 2017-02-15 NOTE — TELEPHONE ENCOUNTER
Verbal given to pharmacy for Zolpidem 10mg 30 tab 3 refills as previous pharmacy closed and Rx lost in transit to new pharmacy taking over old pharmacy's pt's.

## 2017-02-15 NOTE — TELEPHONE ENCOUNTER
Rx from office visit 12/21/16 had 5 additional refills on it. Should have refills through the end of April. Please verify this with pt and pharmacy.

## 2017-02-15 NOTE — TELEPHONE ENCOUNTER
Pt called stating that she spoke with [facility] at [facility] they informed her that it had been transferred to a [facility] Pt state's that the pharmacist didn't know which [facility] it was transferred to. Pt then stated that she was sent a letter stating that the store was closing pt didn't read the letter. The pharmacist at the [facility] need's to have Bryanna Burt send a new prescription being that it's a controlled substance. [facility] # [id number] pt's # [phone].

## 2017-02-23 ENCOUNTER — OFFICE VISIT (OUTPATIENT)
Dept: DERMATOLOGY | Facility: AMBULATORY SURGERY CENTER | Age: 37
End: 2017-02-23

## 2017-02-23 ENCOUNTER — HOSPITAL ENCOUNTER (OUTPATIENT)
Dept: LAB | Age: 37
Discharge: HOME | End: 2017-02-23

## 2017-02-23 VITALS
OXYGEN SATURATION: 100 % | RESPIRATION RATE: 20 BRPM | WEIGHT: 176 LBS | SYSTOLIC BLOOD PRESSURE: 126 MMHG | TEMPERATURE: 98.1 F | HEART RATE: 94 BPM | BODY MASS INDEX: 29.32 KG/M2 | DIASTOLIC BLOOD PRESSURE: 84 MMHG | HEIGHT: 65 IN

## 2017-02-23 DIAGNOSIS — L82.1 SEBORRHEIC KERATOSES: ICD-10-CM

## 2017-02-23 DIAGNOSIS — D48.5 NEOPLASM OF UNCERTAIN BEHAVIOR OF SKIN OF FACE: Primary | ICD-10-CM

## 2017-02-23 NOTE — MR AVS SNAPSHOT
Visit Information Date & Time Provider Department Dept. Phone Encounter #  
 2/23/2017 10:30 AM Cintia Hood NP [facility] 8057 [phone] [id number] Upcoming Health Maintenance Date Due DTaP/Tdap/Td series (1 - Tdap) 10/2/2002 PAP AKA CERVICAL CYTOLOGY 11/11/2018 Allergies as of 2/23/2017  Review Complete On: 2/23/2017 By: Audi Potts LPN Severity Noted Reaction Type Reactions Amoxicillin  06/23/2016    Rash Biaxin [Clarithromycin]  02/06/2017    Itching, Other (comments) 'eyes turn an funny color and she starts itching' Flagyl [Metronidazole]  11/28/2012   Side Effect Itching Eyes change color Current Immunizations  Reviewed on 9/14/2016 Name Date Influenza Vaccine (Quad) PF 9/12/2016, 10/8/2015 Influenza Vaccine PF 9/17/2013 Not reviewed this visit Vitals BP  
  
  
  
  
  
 126/84 (BP 1 Location: Left arm, BP Patient Position: Sitting) Vitals History BMI and BSA Data Body Mass Index Body Surface Area  
 29.29 kg/m 2 1.91 m 2 Preferred Pharmacy Pharmacy Name Phone [address] [phone] Your Updated Medication List  
  
   
This list is accurate as of: 2/23/17 10:58 AM.  Always use your most recent med list. ADVIL 200 mg tablet Generic drug:  ibuprofen Take 200 mg by mouth every eight (8) hours as needed for Pain. ALPRAZolam 1 mg tablet Commonly known as:  Arline Kaska Take 1 Tab by mouth two (2) times daily as needed for Anxiety. Max Daily Amount: 2 mg. Indications: ANXIETY Biotin 2,500 mcg Cap Take 1 Cap by mouth daily. butalbital-acetaminophen-caff -40 mg per capsule Commonly known as:  [employer] Take 1 Cap by mouth every four (4) hours as needed for Pain for up to 20 doses. Max Daily Amount: 6 Caps. citalopram 20 mg tablet Commonly known as:  Jose Gola Take 1 Tab by mouth daily. * cyanocobalamin 500 mcg tablet Commonly known as:  VITAMIN B12 Take 1 Tab by mouth daily. * cyanocobalamin 500 mcg tablet Commonly known as:  VITAMIN B12 Take 1 Tab by mouth daily. hydrOXYzine HCl 25 mg tablet Commonly known as:  ATARAX Take 1 Tab by mouth three (3) times daily as needed for Itching.  
  
 magic mouthwash solution Take 5 mL by mouth three (3) times daily as needed for Pain. Benadryl, maalox,, lidocaine in equal quantity  
  
 norethindrone 0.35 mg Tab Commonly known as:  Andreas & Andreas Take 1 Tab by mouth daily. Omeprazole delayed release 20 mg tablet Commonly known as:  PRILOSEC D/R Take 1 Tab by mouth daily. zolpidem 10 mg tablet Commonly known as:  AMBIEN Take 1 Tab by mouth nightly as needed for Sleep. Max Daily Amount: 10 mg. TAKE 1 TABLET BY MOUTH AT BEDTIME AS NEEDED FOR SLEEP  Indications: SLEEP-ONSET INSOMNIA * Notice: This list has 2 medication(s) that are the same as other medications prescribed for you. Read the directions carefully, and ask your doctor or other care provider to review them with you. Introducing [facility] & HEALTH SERVICES! Dear Jaz Gray: Thank you for requesting a VeraLight account. Our records indicate that you already have an active VeraLight account. You can access your account anytime at [email or web address] Did you know that you can access your hospital and ER discharge instructions at any time in VeraLight? You can also review all of your test results from your hospital stay or ER visit. Additional Information If you have questions, please visit the Frequently Asked Questions section of the VeraLight website at [email or web address] Remember, VeraLight is NOT to be used for urgent needs. For medical emergencies, dial 911. Now available from your iPhone and Android! Please provide this summary of care documentation to your next provider. Your primary care clinician is listed as [address] If you have any questions after today's visit, please call [phone].

## 2017-02-24 NOTE — PROGRESS NOTES
Benign letter sent for SK, no further tx. Needed. Patient did call this morning with questions about wound care that I addressed. She is doing well.

## 2017-03-01 ENCOUNTER — TELEPHONE (OUTPATIENT)
Dept: INTERNAL MEDICINE CLINIC | Age: 37
End: 2017-03-01

## 2017-03-01 DIAGNOSIS — F41.8 DEPRESSION WITH ANXIETY: Primary | ICD-10-CM

## 2017-03-01 RX ORDER — PAROXETINE 10 MG/1
10 TABLET, FILM COATED ORAL DAILY
Qty: 30 TAB | Refills: 5 | Status: SHIPPED | OUTPATIENT
Start: 2017-03-01 | End: 2017-09-08 | Stop reason: ALTCHOICE

## 2017-03-01 NOTE — TELEPHONE ENCOUNTER
Patient stated that she would like to try something a new medication for her anxiety ( she stated she needed more for the anxiety than the depression)  Since celexa makes her very nauseated stated she did try to take it with food but did not help    Send new medication to pharmacy   Patient would like a call when this has been done

## 2017-03-02 NOTE — TELEPHONE ENCOUNTER
Ok.    Agree to [employer] celexa and start paxil 10mg daily    Sent to Rafat Curry at [employer] for $4 per month

## 2017-03-02 NOTE — TELEPHONE ENCOUNTER
Spoke to patient, verified using 2 patient identifiers, advised he agrees to change. Advised we sent into [facility] and it is a 4 dollar prescription. She will update us if any side effects or concerns after starting the Paxil 10 mg. She will discontinue Celexa.

## 2017-03-03 ENCOUNTER — TELEPHONE (OUTPATIENT)
Dept: INTERNAL MEDICINE CLINIC | Age: 37
End: 2017-03-03

## 2017-03-03 DIAGNOSIS — N39.0 URINARY TRACT INFECTION, SITE UNSPECIFIED: Primary | ICD-10-CM

## 2017-03-03 DIAGNOSIS — N39.0 URINARY TRACT INFECTION, SITE UNSPECIFIED: ICD-10-CM

## 2017-03-03 RX ORDER — SULFAMETHOXAZOLE AND TRIMETHOPRIM 800; 160 MG/1; MG/1
1 TABLET ORAL 2 TIMES DAILY
Qty: 20 TAB | Refills: 0 | Status: SHIPPED | OUTPATIENT
Start: 2017-03-03 | End: 2017-03-13

## 2017-03-03 RX ORDER — LEVOFLOXACIN 500 MG/1
500 TABLET, FILM COATED ORAL DAILY
Qty: 7 TAB | Refills: 0 | Status: SHIPPED | OUTPATIENT
Start: 2017-03-03 | End: 2018-11-27 | Stop reason: SDUPTHER

## 2017-03-03 NOTE — TELEPHONE ENCOUNTER
Advised pt of Dr. Jeevan Montana' recommendations    Pt states she is unable to come in to office as her insurance is \"messed up right now\".   She states she \"knows this is a UTI and has exact sx as last time\"  She would like abx sent to [facility] because she does not want this to get bad \"like last time\"

## 2017-03-03 NOTE — TELEPHONE ENCOUNTER
Pt feel's she may have a UTI she's experiencing a burning sensation whenever she urinate's,also having frequent urination and pressure in her bladder area. Pt would like if  could please call her in a prescription today before the weekend to the [address] in [facility] there # [phone]. Pt's # [phone].

## 2017-03-20 ENCOUNTER — TELEPHONE (OUTPATIENT)
Dept: INTERNAL MEDICINE CLINIC | Age: 37
End: 2017-03-20

## 2017-03-20 DIAGNOSIS — G50.0 TRIGEMINAL NEURALGIA OF LEFT SIDE OF FACE: Primary | ICD-10-CM

## 2017-03-20 DIAGNOSIS — R51.9 HEADACHE, UNSPECIFIED HEADACHE TYPE: ICD-10-CM

## 2017-03-20 RX ORDER — NORGESTIMATE AND ETHINYL ESTRADIOL 7DAYSX3 28
1 KIT ORAL DAILY
Qty: 28 TAB | Refills: 12 | Status: SHIPPED | OUTPATIENT
Start: 2017-03-20 | End: 2018-03-21 | Stop reason: SDUPTHER

## 2017-03-20 NOTE — TELEPHONE ENCOUNTER
Pt want refill on birth control [id number]. She states it is not the Micronor on her current list.    She also states that she has been having bad headaches off/on since Thursday where her eye site has been effected. She bescribes it as lights and shadows. Advil isnt effective. She refused to come in for an appt. Here and at eye Dr. And didn't want fiorcet refilled. She only wants returned call from Dr. Bertha Sanchez.   AM

## 2017-03-20 NOTE — TELEPHONE ENCOUNTER
Per pt, she wants a refill for Risprintec 28 day tablets.   Also, pt request for a nurse to call her back at #[phone]

## 2017-03-27 NOTE — TELEPHONE ENCOUNTER
Patient called again requesting if Dr. Valentin Amaral can call her back asap regarding having tests done for severe headaches.

## 2017-03-27 NOTE — TELEPHONE ENCOUNTER
Pt reports severe left sided HA, temporal area. Worst HA of her life reported.   Nothing helps - fioricet, imitrex, ibuprofen, naproxen all failed to help  Has neuro specialist appt and requested MRI    ?trigeminal neuralgia    Agree to trial of carbamazepine and MRI brain  Encouraged to see neurologist as scheduled    Please mail MRI order to pt

## 2017-03-27 NOTE — TELEPHONE ENCOUNTER
----- Message from Justin Moore sent at 3/27/2017 12:40 PM EDT -----  Regarding: Dr. Lesly Opitz   Pt would like to speak with the doctor in regards to a issue she have going on. Pt stated she's been waiting all week for a call back. Pt best contact number is [phone].

## 2017-04-07 ENCOUNTER — TELEPHONE (OUTPATIENT)
Dept: INTERNAL MEDICINE CLINIC | Age: 37
End: 2017-04-07

## 2017-04-07 DIAGNOSIS — E53.8 B12 DEFICIENCY: Primary | ICD-10-CM

## 2017-07-07 ENCOUNTER — TELEPHONE (OUTPATIENT)
Dept: INTERNAL MEDICINE CLINIC | Age: 37
End: 2017-07-07

## 2017-07-07 NOTE — TELEPHONE ENCOUNTER
I called to pt's phone #. No contact made. Detailed message left reassuring her that the finasteride is not a hormone, rather it blocks testosterone and is appropriate for her to take if Rx'd by the dermatologist.    I also noted that she should maintain daily dosing of her OCP, but reassured her that there was not likely any harm in a single missed dose.

## 2017-07-07 NOTE — TELEPHONE ENCOUNTER
Patient states she has to speak to the doctor. She was put on Finasteride by a Dr. Valerie Villar of [facility]) She has not began taking this because she needs the ok from Dr. Black Figueroa. She says this medication is known to be a hormone for men and has a lot of side effects. Ever since prescribed the Finasteride she says she has not been able to get in touch with the prescribing provider. She also is very worried and stressed because last Thursday she missed one of her pills (Tri Sprintec). She needs the doctor to confirm that this will be ok.

## 2017-07-07 NOTE — TELEPHONE ENCOUNTER
----- Message from Elisa Acuna sent at 7/7/2017  1:17 PM EDT -----  Regarding: Dr. Kayleen Garza / Telephone  Pt requesting to speak to the doctor about her Medication and best contact number  [phone].

## 2017-07-12 RX ORDER — ZOLPIDEM TARTRATE 10 MG/1
10 TABLET ORAL
Qty: 30 TAB | Refills: 5 | Status: SHIPPED | OUTPATIENT
Start: 2017-07-12 | End: 2019-01-23 | Stop reason: SDUPTHER

## 2017-07-12 NOTE — TELEPHONE ENCOUNTER
Spoke with pt regarding Rx Ambien 10 mg tablet, to let her know Rx is ready for pickup at pharmacy. Pt had understanding.

## 2017-08-21 ENCOUNTER — TELEPHONE (OUTPATIENT)
Dept: INTERNAL MEDICINE CLINIC | Age: 37
End: 2017-08-21

## 2017-08-21 NOTE — TELEPHONE ENCOUNTER
Patient called very upset over her test results, requesting a call from Dr Charo Lynch asap  [phone]

## 2017-08-22 NOTE — TELEPHONE ENCOUNTER
Clarify her concerns. [employer] messages have already been sent regarding her results. Please encourage follow up for office visit appt.

## 2017-08-22 NOTE — TELEPHONE ENCOUNTER
At this point, in order to offer any additional recommendations, she must make an appt and be examined. I ordered the labs as a courtesy to move the process along, but pt MUST MAKE AN APPT.

## 2017-08-22 NOTE — TELEPHONE ENCOUNTER
Patient stated she is concerned about the results of the manuel bar virus results  States she is still in pain in her armpits and the glands in her neck. Patient was encouraged yesterday to make an appt but patient refused stated she wanted to talk to Dr Zunilda Oh. I did tell her the best way to talk to Dr Zunilda Oh is to schedule and appt and he could go over all her results in detail and answer all her questions. I will contact patient again to try to schedule and appt.

## 2017-09-08 ENCOUNTER — OFFICE VISIT (OUTPATIENT)
Dept: INTERNAL MEDICINE CLINIC | Age: 37
End: 2017-09-08

## 2017-09-08 VITALS
HEIGHT: 65 IN | TEMPERATURE: 98.5 F | HEART RATE: 70 BPM | DIASTOLIC BLOOD PRESSURE: 88 MMHG | BODY MASS INDEX: 30.79 KG/M2 | SYSTOLIC BLOOD PRESSURE: 131 MMHG | WEIGHT: 184.8 LBS | RESPIRATION RATE: 16 BRPM | OXYGEN SATURATION: 100 %

## 2017-09-08 DIAGNOSIS — R68.89 IMPAIRED REGULATION OF BODY TEMPERATURE: ICD-10-CM

## 2017-09-08 DIAGNOSIS — I88.9 AXILLARY LYMPHADENITIS: Primary | ICD-10-CM

## 2017-09-08 DIAGNOSIS — R68.89 COLD INTOLERANCE: ICD-10-CM

## 2017-09-08 DIAGNOSIS — F43.10 PTSD (POST-TRAUMATIC STRESS DISORDER): ICD-10-CM

## 2017-09-08 DIAGNOSIS — R21 RASH: ICD-10-CM

## 2017-09-08 DIAGNOSIS — F41.0 PANIC ATTACKS: ICD-10-CM

## 2017-09-08 DIAGNOSIS — M79.622 AXILLARY PAIN, LEFT: ICD-10-CM

## 2017-09-08 DIAGNOSIS — F41.0 PANIC DISORDER: ICD-10-CM

## 2017-09-08 DIAGNOSIS — F41.8 DEPRESSION WITH ANXIETY: ICD-10-CM

## 2017-09-08 DIAGNOSIS — R59.9 PALPABLE LYMPH NODE: ICD-10-CM

## 2017-09-08 DIAGNOSIS — F41.1 GAD (GENERALIZED ANXIETY DISORDER): ICD-10-CM

## 2017-09-08 RX ORDER — ALPRAZOLAM 1 MG/1
1 TABLET ORAL
Qty: 20 TAB | Refills: 0 | Status: SHIPPED | OUTPATIENT
Start: 2017-09-08 | End: 2018-03-28 | Stop reason: SDUPTHER

## 2017-09-08 RX ORDER — FLUOXETINE HYDROCHLORIDE 20 MG/1
20 CAPSULE ORAL DAILY
Qty: 30 CAP | Refills: 5 | Status: SHIPPED | OUTPATIENT
Start: 2017-09-08 | End: 2018-02-02 | Stop reason: SDUPTHER

## 2017-09-08 RX ORDER — TRAMADOL HYDROCHLORIDE 50 MG/1
50 TABLET ORAL
Qty: 30 TAB | Refills: 1 | Status: SHIPPED | OUTPATIENT
Start: 2017-09-08 | End: 2019-08-05

## 2017-09-08 RX ORDER — CLINDAMYCIN HYDROCHLORIDE 300 MG/1
300 CAPSULE ORAL 3 TIMES DAILY
Qty: 30 CAP | Refills: 0 | Status: SHIPPED | OUTPATIENT
Start: 2017-09-08 | End: 2017-09-18

## 2017-09-08 NOTE — MR AVS SNAPSHOT
Visit Information Date & Time Provider Department Dept. Phone Encounter #  
 9/8/2017  3:00 PM Reyes Curling, [address] [place] [address] and Internal Medicine [phone] [id number] Follow-up Instructions Return in about 6 weeks (around 10/20/2017), or if symptoms worsen or fail to improve, for mood, node. Upcoming Health Maintenance Date Due DTaP/Tdap/Td series (1 - Tdap) 10/2/2002 INFLUENZA AGE 9 TO ADULT 8/1/2017 PAP AKA CERVICAL CYTOLOGY 11/11/2018 Allergies as of 9/8/2017  Review Complete On: 9/8/2017 By: Reyes Curling, MD  
  
 Severity Noted Reaction Type Reactions Amoxicillin  06/23/2016    Rash Biaxin [Clarithromycin]  02/06/2017    Itching, Other (comments) 'eyes turn an funny color and she starts itching' Flagyl [Metronidazole]  11/28/2012   Side Effect Itching Eyes change color Current Immunizations  Reviewed on 9/14/2016 Name Date Influenza Vaccine (Quad) PF 9/12/2016, 10/8/2015 Influenza Vaccine PF 9/17/2013 Not reviewed this visit You Were Diagnosed With   
  
 Codes Comments Axillary lymphadenitis    -  Primary ICD-10-CM: V38.9 ICD-9-CM: 289.3 Rash     ICD-10-CM: R21 
ICD-9-CM: 782.1 Axillary pain, left     ICD-10-CM: W55.388 ICD-9-CM: 729.5 DEEPA (generalized anxiety disorder)     ICD-10-CM: F41.1 ICD-9-CM: 300.02 Panic disorder     ICD-10-CM: F41.0 ICD-9-CM: 300.01 Depression with anxiety     ICD-10-CM: F41.8 ICD-9-CM: 300.4 PTSD (post-traumatic stress disorder)     ICD-10-CM: F43.10 ICD-9-CM: 309.81 Panic attacks     ICD-10-CM: F41.0 ICD-9-CM: 300.01 Vitals BP Pulse Temp Resp Height(growth percentile) Weight(growth percentile) 131/88 (BP 1 Location: Left arm, BP Patient Position: Sitting) 70 98.5 °F (36.9 °C) (Oral) 16 5' 5\" (1.651 m) 184 lb 12.8 oz (83.8 kg) SpO2 BMI OB Status Smoking Status 100% 30.75 kg/m2 Having regular periods Former Smoker Vitals History BMI and BSA Data Body Mass Index Body Surface Area 30.75 kg/m 2 1.96 m 2 Preferred Pharmacy Pharmacy Name Phone [id number], [address] [place] [address] [phone] Your Updated Medication List  
  
   
This list is accurate as of: 9/8/17  4:22 PM.  Always use your most recent med list. ADVIL 200 mg tablet Generic drug:  ibuprofen Take 200 mg by mouth every eight (8) hours as needed for Pain. ALPRAZolam 1 mg tablet Commonly known as:  Tasneem Hopes Take 1 Tab by mouth two (2) times daily as needed for Anxiety. Max Daily Amount: 2 mg. Indications: anxiety Biotin 2,500 mcg Cap Take 1 Cap by mouth daily. butalbital-acetaminophen-caff -40 mg per capsule Commonly known as:  [employer] Take 1 Cap by mouth every four (4) hours as needed for Pain for up to 20 doses. Max Daily Amount: 6 Caps. clindamycin 300 mg capsule Commonly known as:  CLEOCIN Take 1 Cap by mouth three (3) times daily for 10 days. * cyanocobalamin 500 mcg tablet Commonly known as:  VITAMIN B12 Take 1 Tab by mouth daily. * cyanocobalamin 500 mcg tablet Commonly known as:  VITAMIN B12 Take 1 Tab by mouth daily. * cyanocobalamin (vitamin B-12) 1,000 mcg/mL Kit Commonly known as:  B-12 KIT  
1 mL by Injection route every thirty (30) days. FLUoxetine 20 mg capsule Commonly known as:  PROzac Take 1 Cap by mouth daily. hydrOXYzine HCl 25 mg tablet Commonly known as:  ATARAX Take 1 Tab by mouth three (3) times daily as needed for Itching.  
  
 magic mouthwash solution Take 5 mL by mouth three (3) times daily as needed for Pain. Benadryl, maalox,, lidocaine in equal quantity  
  
 multivit-min-folic acid-biotin 449-997 mcg Chew Take 1 Tab by mouth daily. norgestimate-ethinyl estradiol 0.18/0.215/0.25 mg-35 mcg (28) Tab Commonly known as:  ORTHO TRI-CYCLEN, TRI-SPRINTEC Take 1 Tab by mouth daily. Omeprazole delayed release 20 mg tablet Commonly known as:  PRILOSEC D/R Take 1 Tab by mouth daily. traMADol 50 mg tablet Commonly known as:  ULTRAM  
Take 1 Tab by mouth every eight (8) hours as needed for Pain. Max Daily Amount: 150 mg.  
  
 zolpidem 10 mg tablet Commonly known as:  AMBIEN Take 1 Tab by mouth nightly as needed for Sleep. Max Daily Amount: 10 mg. Indications: SLEEP-ONSET INSOMNIA * Notice: This list has 3 medication(s) that are the same as other medications prescribed for you. Read the directions carefully, and ask your doctor or other care provider to review them with you. Prescriptions Printed Refills  
 traMADol (ULTRAM) 50 mg tablet 1 Sig: Take 1 Tab by mouth every eight (8) hours as needed for Pain. Max Daily Amount: 150 mg.  
 Class: Print Route: Oral  
 ALPRAZolam (XANAX) 1 mg tablet 0 Sig: Take 1 Tab by mouth two (2) times daily as needed for Anxiety. Max Daily Amount: 2 mg. Indications: anxiety Class: Print Route: Oral  
  
Prescriptions Sent to Pharmacy Refills  
 clindamycin (CLEOCIN) 300 mg capsule 0 Sig: Take 1 Cap by mouth three (3) times daily for 10 days. Class: Normal  
 Pharmacy: [facility] Ph #: [phone] Route: Oral  
 FLUoxetine (PROZAC) 20 mg capsule 5 Sig: Take 1 Cap by mouth daily. Class: Normal  
 Pharmacy: [facility] Ph #: [phone] Route: Oral  
  
We Performed the Following REFERRAL TO PSYCHIATRY [REF91 Custom] Comments:  
 Please evaluate patient for panic, anxiety, PTSD. Follow-up Instructions Return in about 6 weeks (around 10/20/2017), or if symptoms worsen or fail to improve, for mood, node. Referral Information Referral ID Referred By Referred To  
  
 [id number] SY José Miguel1 W President Ángel Snyder MD   
   [facility] [place] [facility] [address] [place] Phone: [phone] Fax: [phone] Visits Status Start Date End Date 1 New Request 9/8/17 9/8/18 If your referral has a status of pending review or denied, additional information will be sent to support the outcome of this decision. Introducing \[facility]\"" & HEALTH SERVICES! Dear Antonio Cavazos: Thank you for requesting a Oktogo account. Our records indicate that you already have an active Oktogo account. You can access your account anytime at [email or web address] Telephone [email or web address] Telegraph. Art Circle/Virtual Telephone [email or web address] Telegraph Did you know that you can access your hospital and ER discharge instructions at any time in Oktogo? You can also review all of your test results from your hospital stay or ER visit. Additional Information If you have questions, please visit the Frequently Asked Questions section of the Oktogo website at [email or web address] [email or web address] Remember, Oktogo is NOT to be used for urgent needs. For medical emergencies, dial 911. Now available from your iPhone and Android! Please provide this summary of care documentation to your next provider. Your primary care clinician is listed as [address] If you have any questions after today's visit, please call [phone].

## 2017-09-08 NOTE — PROGRESS NOTES
Rm 14    Chief Complaint   Patient presents with    Arm Pain     underarm, bilateral,ongoing for about a month, not sure what happened     1. Have you been to the ER, urgent care clinic since your last visit? Hospitalized since your last visit? No    2. Have you seen or consulted any other health care providers outside of the [address] [place] [address] since your last visit? Include any pap smears or colon screening.  No    Health Maintenance Due   Topic Date Due    DTaP/Tdap/Td series (1 - Tdap) 10/02/2002    INFLUENZA AGE 9 TO ADULT  08/01/2017

## 2017-09-08 NOTE — PROGRESS NOTES
HISTORY OF PRESENT ILLNESS  aMggie Nash is a 29 y.o. female. HPI  Presents for f/u left axillary pain > right     Pt has seen [facility]  Dx node   Node biopsy rec'd  Pt was unsure of this and hoped the issue would resolve spontaneously    It has not resolved. Pt reports severe pain with activity  Pt describes waxing and waning pattern  No clear trigger. Not burning or squeezing  Limited benefit from ibuprofen    paxil not helpful per pt report  Pt requests to resume prozac    +panic attacks and requests some xanax    Pt reports a longstanding hx of cold intolerance and impaired temperature regulation. This results in the need for additional layers of clothing and goosebumps and chills. Has been severe enough at work at times that she felt she must leave work. Pt requests space heater at work - cold intolerance  Needs letter for such an accommodation. Past medical, Social, and Family history reviewed  Medications reviewed and updated. ROS  Complete ROS reviewed and negative or stable except as noted in HPI. Physical Exam   Constitutional: She is oriented to person, place, and time. She appears well-nourished. No distress. HENT:   Head: Normocephalic and atraumatic. Eyes: EOM are normal. Pupils are equal, round, and reactive to light. No scleral icterus. Neck: Normal range of motion. Neck supple. Cardiovascular: Normal rate, regular rhythm and normal heart sounds. Exam reveals no gallop and no friction rub. No murmur heard. Pulmonary/Chest: Effort normal and breath sounds normal. No respiratory distress. She has no wheezes. She has no rales. Abdominal: Soft. She exhibits no distension. There is no tenderness. Musculoskeletal: Normal range of motion. She exhibits tenderness (left anterior axillary fold ). She exhibits no edema or deformity. Lymphadenopathy:     She has no cervical adenopathy. Neurological: She is alert and oriented to person, place, and time.  She exhibits normal muscle tone. Skin: Skin is warm. No rash noted. Psychiatric: She has a normal mood and affect. Nursing note and vitals reviewed. Prior labs reviewed. Reviewed prior imaging reports      ASSESSMENT and PLAN    ICD-10-CM ICD-9-CM    1. Axillary lymphadenitis I88.9 289.3 clindamycin (CLEOCIN) 300 mg capsule   2. Rash R21 782.1    3. Axillary pain, left M79.622 729.5 traMADol (ULTRAM) 50 mg tablet   4. DEEPA (generalized anxiety disorder) F41.1 300.02 FLUoxetine (PROZAC) 20 mg capsule      ALPRAZolam (XANAX) 1 mg tablet      REFERRAL TO PSYCHIATRY   5. Panic disorder F41.0 300.01 FLUoxetine (PROZAC) 20 mg capsule      ALPRAZolam (XANAX) 1 mg tablet      REFERRAL TO PSYCHIATRY   6. Depression with anxiety F41.8 300.4 FLUoxetine (PROZAC) 20 mg capsule      REFERRAL TO PSYCHIATRY   7. PTSD (post-traumatic stress disorder) F43.10 309.81 FLUoxetine (PROZAC) 20 mg capsule      REFERRAL TO PSYCHIATRY   8. Panic attacks F41.0 300.01 FLUoxetine (PROZAC) 20 mg capsule      ALPRAZolam (XANAX) 1 mg tablet      REFERRAL TO PSYCHIATRY   9. Cold intolerance R68.89 780.99    10. Impaired regulation of body temperature R68.89 780.99    11. Palpable lymph node R59.9 785.6      Follow-up Disposition:  Return in about 6 weeks (around 10/20/2017), or if symptoms worsen or fail to improve, for mood, node.    results and schedule of future studies reviewed with patient  reviewed diet, exercise and weight   reviewed medications and side effects in detail   clinda trial for ?lymphadenitis  If pain persists then needs biopsy - encouraged pt to seek it at [facility]  If pain resolves, consider confirmatory repeat US  Resume prozac  Prn xanax  Letter for space heater at work      >40 minutes time spent with >50% in counseling and coordination of care

## 2017-09-08 NOTE — LETTER
9/11/2017 Ms. Ed Delay [address] [facility] [address] To whom it may concern: 
 
I am writing as Ms. Vanda Sebastian primary care physician in support of a reasonable work place accommodation for her chronic, longstanding cold intolerance and impaired temperature regulation. This represents a disability as defined under the ADAAA. She is unable to work productively, concentrate, or perform manual tasks when the ambient temperature is below her body's regulatory set point. She experiences piloerection of the skin and chills and discomfort which limit her functional status under these conditions. Thus, her job function is impaired. In my opinion, Ms. Vanda Sebastian would be able to overcome this disability and perform her job functions with an accommodation. A localized space heater would allow her to better regulate her temperature tolerance and maintain her job functions and improve her performance. Please consider the allowance and/or provision of a local space heater at her work station/area. Thank you for your consideration. Sincerely, Faraz Courtney MD

## 2017-09-11 PROBLEM — R68.89 COLD INTOLERANCE: Status: ACTIVE | Noted: 2017-09-11

## 2017-09-11 PROBLEM — R68.89 IMPAIRED REGULATION OF BODY TEMPERATURE: Status: ACTIVE | Noted: 2017-09-11

## 2017-10-27 ENCOUNTER — TELEPHONE (OUTPATIENT)
Dept: INTERNAL MEDICINE CLINIC | Age: 37
End: 2017-10-27

## 2017-10-27 DIAGNOSIS — K59.00 CONSTIPATION, UNSPECIFIED CONSTIPATION TYPE: Primary | ICD-10-CM

## 2017-10-27 RX ORDER — SODIUM, POTASSIUM,MAG SULFATES 17.5-3.13G
177 SOLUTION, RECONSTITUTED, ORAL ORAL
Qty: 1 KIT | Refills: 1 | Status: SHIPPED | OUTPATIENT
Start: 2017-10-27 | End: 2018-11-27 | Stop reason: ALTCHOICE

## 2017-10-27 NOTE — TELEPHONE ENCOUNTER
Patient is requesting to have Supret sent to pharmacy. Wanted to get more samples from our office as she has gotten in the past. States that her stomach is swollen and has not eaten in a week. Would like to have it asap.   Spoke with clinical supervisor and placed a refill request. Patient can be reached at: [phone]

## 2017-12-21 DIAGNOSIS — E55.9 VITAMIN D DEFICIENCY: Primary | ICD-10-CM

## 2017-12-21 RX ORDER — GLUCOSAMINE SULFATE 1500 MG
POWDER IN PACKET (EA) ORAL
Qty: 30 CAP | Refills: 11 | Status: SHIPPED | OUTPATIENT
Start: 2017-12-21 | End: 2018-11-02 | Stop reason: SDUPTHER

## 2018-02-02 ENCOUNTER — OFFICE VISIT (OUTPATIENT)
Dept: INTERNAL MEDICINE CLINIC | Age: 38
End: 2018-02-02

## 2018-02-02 VITALS
HEIGHT: 65 IN | DIASTOLIC BLOOD PRESSURE: 90 MMHG | BODY MASS INDEX: 31.25 KG/M2 | WEIGHT: 187.6 LBS | TEMPERATURE: 98.6 F | HEART RATE: 81 BPM | OXYGEN SATURATION: 98 % | SYSTOLIC BLOOD PRESSURE: 145 MMHG | RESPIRATION RATE: 16 BRPM

## 2018-02-02 DIAGNOSIS — F41.0 PANIC ATTACKS: ICD-10-CM

## 2018-02-02 DIAGNOSIS — F41.0 PANIC DISORDER: ICD-10-CM

## 2018-02-02 DIAGNOSIS — E55.9 VITAMIN D DEFICIENCY: ICD-10-CM

## 2018-02-02 DIAGNOSIS — E53.8 B12 DEFICIENCY: ICD-10-CM

## 2018-02-02 DIAGNOSIS — R53.83 FATIGUE, UNSPECIFIED TYPE: ICD-10-CM

## 2018-02-02 DIAGNOSIS — R68.89 COLD INTOLERANCE: Primary | ICD-10-CM

## 2018-02-02 DIAGNOSIS — K59.00 CONSTIPATION, UNSPECIFIED CONSTIPATION TYPE: ICD-10-CM

## 2018-02-02 DIAGNOSIS — R06.83 SNORING: ICD-10-CM

## 2018-02-02 DIAGNOSIS — F41.8 DEPRESSION WITH ANXIETY: ICD-10-CM

## 2018-02-02 DIAGNOSIS — R73.02 IGT (IMPAIRED GLUCOSE TOLERANCE): ICD-10-CM

## 2018-02-02 DIAGNOSIS — F41.1 GAD (GENERALIZED ANXIETY DISORDER): ICD-10-CM

## 2018-02-02 DIAGNOSIS — F43.10 PTSD (POST-TRAUMATIC STRESS DISORDER): ICD-10-CM

## 2018-02-02 RX ORDER — FINASTERIDE 1 MG/1
1 TABLET, FILM COATED ORAL DAILY
COMMUNITY
End: 2019-08-05

## 2018-02-02 RX ORDER — FLUOXETINE HYDROCHLORIDE 40 MG/1
40 CAPSULE ORAL DAILY
Qty: 30 CAP | Refills: 5 | Status: SHIPPED | OUTPATIENT
Start: 2018-02-02 | End: 2018-03-28 | Stop reason: ALTCHOICE

## 2018-02-02 NOTE — PROGRESS NOTES
Chief Complaint   Patient presents with    Fatigue     Visit Vitals    /90 (BP 1 Location: Left arm, BP Patient Position: Sitting)    Pulse 81    Temp 98.6 °F (37 °C) (Oral)    Resp 16    Ht 5' 5\" (1.651 m)    Wt 187 lb 9.6 oz (85.1 kg)    SpO2 98%    BMI 31.22 kg/m2     1. Have you been to the ER, urgent care clinic since your last visit? Hospitalized since your last visit?no    2. Have you seen or consulted any other health care providers outside of the [address] [place] [address] since your last visit? Include any pap smears or colon screening.  no

## 2018-02-02 NOTE — MR AVS SNAPSHOT
[address] [place] [address] [address] [facility] [address] 
[phone] Patient: Debi Holder MRN: [id number] :1980 Visit Information Date & Time Provider Department Dept. Phone Encounter #  
 2018  1:30 PM Charles Menendez, [address] [place] [address] and Internal Medicine [phone] [id number] Follow-up Instructions Return in about 3 months (around 2018), or if symptoms worsen or fail to improve, for fatigue, mood. Upcoming Health Maintenance Date Due DTaP/Tdap/Td series (1 - Tdap) 10/2/2002 Influenza Age 5 to Adult 2017 PAP AKA CERVICAL CYTOLOGY 2018 Allergies as of 2018  Review Complete On: 2018 By: Charles Menendez MD  
  
 Severity Noted Reaction Type Reactions Amoxicillin  2016    Rash Biaxin [Clarithromycin]  2017    Itching, Other (comments) 'eyes turn an funny color and she starts itching' Flagyl [Metronidazole]  2012   Side Effect Itching Eyes change color Current Immunizations  Reviewed on 2016 Name Date Influenza Vaccine (Quad) PF 2016, 10/8/2015 Influenza Vaccine PF 2013 Not reviewed this visit You Were Diagnosed With   
  
 Codes Comments Cold intolerance    -  Primary ICD-10-CM: R68.89 ICD-9-CM: 780.99 IGT (impaired glucose tolerance)     ICD-10-CM: R73.02 
ICD-9-CM: 790.22 PTSD (post-traumatic stress disorder)     ICD-10-CM: F43.10 ICD-9-CM: 309.81   
 B12 deficiency     ICD-10-CM: E53.8 ICD-9-CM: 266.2 Vitamin D deficiency     ICD-10-CM: E55.9 ICD-9-CM: 268.9 Constipation, unspecified constipation type     ICD-10-CM: K59.00 ICD-9-CM: 564.00 Fatigue, unspecified type     ICD-10-CM: R53.83 ICD-9-CM: 780.79 DEEPA (generalized anxiety disorder)     ICD-10-CM: F41.1 ICD-9-CM: 300.02 Snoring     ICD-10-CM: R06.83 
ICD-9-CM: 786.09 Panic disorder     ICD-10-CM: F41.0 ICD-9-CM: 300.01 Depression with anxiety     ICD-10-CM: F41.8 ICD-9-CM: 300.4 Panic attacks     ICD-10-CM: F41.0 ICD-9-CM: 300.01 Vitals BP Pulse Temp Resp Height(growth percentile) Weight(growth percentile) 145/90 (BP 1 Location: Left arm, BP Patient Position: Sitting) 81 98.6 °F (37 °C) (Oral) 16 5' 5\" (1.651 m) 187 lb 9.6 oz (85.1 kg) SpO2 BMI OB Status Smoking Status 98% 31.22 kg/m2 Having regular periods Former Smoker Vitals History BMI and BSA Data Body Mass Index Body Surface Area  
 31.22 kg/m 2 1.98 m 2 Preferred Pharmacy Pharmacy Name Phone Tod [facility] [address] [place] [address], . [address] [phone] Your Updated Medication List  
  
   
This list is accurate as of: 2/2/18  2:21 PM.  Always use your most recent med list. ADVIL 200 mg tablet Generic drug:  ibuprofen Take 200 mg by mouth every eight (8) hours as needed for Pain. ALPRAZolam 1 mg tablet Commonly known as:  Bettie Peter Take 1 Tab by mouth two (2) times daily as needed for Anxiety. Max Daily Amount: 2 mg. Indications: anxiety Biotin 2,500 mcg Cap Take 1 Cap by mouth daily. butalbital-acetaminophen-caff -40 mg per capsule Commonly known as:  [employer] Take 1 Cap by mouth every four (4) hours as needed for Pain for up to 20 doses. Max Daily Amount: 6 Caps. cholecalciferol 1,000 unit Cap Commonly known as:  VITAMIN D3  
TAKE 1 CAP BY MOUTH EVERY DAY  
  
 * cyanocobalamin 500 mcg tablet Commonly known as:  VITAMIN B12 Take 1 Tab by mouth daily. * cyanocobalamin 500 mcg tablet Commonly known as:  VITAMIN B12 Take 1 Tab by mouth daily. * cyanocobalamin (vitamin B-12) 1,000 mcg/mL Kit Commonly known as:  B-12 KIT  
1 mL by Injection route every thirty (30) days. FLUoxetine 40 mg capsule Commonly known as:  PROzac Take 1 Cap by mouth daily. hydrOXYzine HCl 25 mg tablet Commonly known as:  ATARAX Take 1 Tab by mouth three (3) times daily as needed for Itching.  
  
 magic mouthwash solution Take 5 mL by mouth three (3) times daily as needed for Pain. Benadryl, maalox,, lidocaine in equal quantity  
  
 multivit-min-folic acid-biotin 343-327 mcg Chew Take 1 Tab by mouth daily. norgestimate-ethinyl estradiol 0.18/0.215/0.25 mg-35 mcg (28) Tab Commonly known as:  ORTHO TRI-CYCLEN, TRI-SPRINTEC Take 1 Tab by mouth daily. Omeprazole delayed release 20 mg tablet Commonly known as:  PRILOSEC D/R Take 1 Tab by mouth daily. PROPECIA 1 mg tablet Generic drug:  finasteride Take 1 mg by mouth daily. sodium-potassium-mag sulfate 17.5-3.13-1.6 gram Solr oral solution Commonly known as:  Nomi Crofts Take 177 mL by mouth once as needed for up to 1 dose. traMADol 50 mg tablet Commonly known as:  ULTRAM  
Take 1 Tab by mouth every eight (8) hours as needed for Pain. Max Daily Amount: 150 mg.  
  
 zolpidem 10 mg tablet Commonly known as:  AMBIEN Take 1 Tab by mouth nightly as needed for Sleep. Max Daily Amount: 10 mg. Indications: SLEEP-ONSET INSOMNIA * Notice: This list has 3 medication(s) that are the same as other medications prescribed for you. Read the directions carefully, and ask your doctor or other care provider to review them with you. Prescriptions Sent to Pharmacy Refills  
 cyanocobalamin, vitamin B-12, (B-12 KIT) 1,000 mcg/mL kit 1 Si mL by Injection route every thirty (30) days. Class: Normal  
 Pharmacy: [facility] [address] [facility], [address] Ph #: [phone] Route: Injection FLUoxetine (PROZAC) 40 mg capsule 5 Sig: Take 1 Cap by mouth daily. Class: Normal  
 Pharmacy: [facility] [address] [facility], [address] Ph #: [phone] Route: Oral  
  
We Performed the Following CBC WITH AUTOMATED DIFF [39689 CPT(R)] CK [id number] CPT(R)] FERRITIN [10308 CPT(R)] IRON PROFILE [id number] CPT(R)] METABOLIC PANEL, COMPREHENSIVE [02967 CPT(R)] SED RATE (ESR) [id number] CPT(R)] SLEEP MEDICINE REFERRAL [BWA215 Custom] Comments:  
 Orders: 
Sleep Medicine Consult - Schedule patient for a sleep specialist consult. If appropriate, schedule patient for sleep study(s). Initiate treatment if needed. Forward correspondance to my office. T4, FREE [id number] CPT(R)] THYROID PEROXIDASE (TPO) AB [62110 CPT(R)] TSH 3RD GENERATION [71337 CPT(R)] VITAMIN B12 [id number] CPT(R)] VITAMIN D, 25 HYDROXY [id number] CPT(R)] Follow-up Instructions Return in about 3 months (around 5/2/2018), or if symptoms worsen or fail to improve, for fatigue, mood. Referral Information Referral ID Referred By Referred To  
  
 [id number] Xochilt DAN MD   
   [address] [place] [address] Phone: [phone] Fax: [phone] Visits Status Start Date End Date 1 New Request 2/2/18 2/2/19 If your referral has a status of pending review or denied, additional information will be sent to support the outcome of this decision. Introducing [facility]! Dear Jo-Ann Ragland: Thank you for requesting a [facility] [employer] account. Our records indicate that you already have an active [facility] [employer] account. You can access your account anytime at [email or web address]. [email or web address] Did you know that you can access your hospital and ER discharge instructions at any time in [facility] [employer]? You can also review all of your test results from your hospital stay or ER visit. Additional Information If you have questions, please visit the Frequently Asked Questions section of the [facility] [employer] website at [email or web address]. [email or web address] Remember, [facility] [employer] [facility] is NOT to be used for urgent needs. For medical emergencies, dial 911. Now available from your iPhone and Android! Please provide this summary of care documentation to your next provider. Your primary care clinician is listed as [address] If you have any questions after today's visit, please call [phone].

## 2018-02-02 NOTE — PROGRESS NOTES
HISTORY OF PRESENT ILLNESS  Tatianna Carmichael is a 40 y.o. female. HPI  Presents for f/u fatigue    Pt reports persistent fatigue  Sleeps \"a lot\"  Feels like her sleep does not feel productive  No energy. +snoring  No known apnea at night    Also, reports feeling cold  Has heater that she must use even in addition to normal room temperature    Pt reports labs showed B12 and vit D deficiencies and IGT    Pt taking prozac daily x 3 weeks  Pt has not noticed any improvement. Past medical, Social, and Family history reviewed  Medications reviewed and updated. ROS  Complete ROS reviewed and negative or stable except as noted in HPI. Physical Exam   Constitutional: She is oriented to person, place, and time. She appears well-nourished. No distress. HENT:   Head: Normocephalic and atraumatic. Eyes: EOM are normal. Pupils are equal, round, and reactive to light. No scleral icterus. Neck: Normal range of motion. Neck supple. No JVD present. Cardiovascular: Normal rate, regular rhythm and normal heart sounds. Exam reveals no gallop and no friction rub. No murmur heard. Pulmonary/Chest: Effort normal and breath sounds normal. No respiratory distress. She has no wheezes. She has no rales. Abdominal: Soft. Bowel sounds are normal. She exhibits no distension. There is no tenderness. Musculoskeletal: Normal range of motion. She exhibits no edema. Lymphadenopathy:     She has no cervical adenopathy. Neurological: She is alert and oriented to person, place, and time. She exhibits normal muscle tone. Skin: Skin is warm. No rash noted. Psychiatric: Her mood appears not anxious. Much better interaction today. Nursing note and vitals reviewed. Prior labs reviewed. ASSESSMENT and PLAN    ICD-10-CM ICD-9-CM    1. Cold intolerance R68.89 780.99    2. IGT (impaired glucose tolerance) R73.02 790.22    3.  PTSD (post-traumatic stress disorder) F43.10 309.81 FLUoxetine (PROZAC) 40 mg capsule 4. B12 deficiency E53.8 266.2 VITAMIN B12      cyanocobalamin, vitamin B-12, (B-12 KIT) 1,000 mcg/mL kit   5. Vitamin D deficiency E55.9 268.9 VITAMIN D, 25 HYDROXY      ergocalciferol (ERGOCALCIFEROL) 50,000 unit capsule   6. Constipation, unspecified constipation type K59.00 564.00    7. Fatigue, unspecified type R53.83 780.79 CBC WITH AUTOMATED DIFF      CK      METABOLIC PANEL, COMPREHENSIVE      SED RATE (ESR)      T4, FREE      TSH 3RD GENERATION      THYROID PEROXIDASE (TPO) AB      FERRITIN      IRON PROFILE      SLEEP MEDICINE REFERRAL   8. DEEPA (generalized anxiety disorder) F41.1 300.02 FLUoxetine (PROZAC) 40 mg capsule   9. Snoring R06.83 786.09 SLEEP MEDICINE REFERRAL   10. Panic disorder F41.0 300.01 FLUoxetine (PROZAC) 40 mg capsule   11. Depression with anxiety F41.8 300.4 FLUoxetine (PROZAC) 40 mg capsule   12. Panic attacks F41.0 300.01 FLUoxetine (PROZAC) 40 mg capsule     Follow-up Disposition:  Return in about 3 months (around 5/2/2018), or if symptoms worsen or fail to improve, for fatigue, mood. results and schedule of future studies reviewed with patient  reviewed diet, exercise and weight   reviewed medications and side effects in detail   Ref to sleep medicine   Increase prozac to 40 mg daily  Repeat labs  B12 shots - pt prefers to do it at the pharmacy since insurance bills her when done here.

## 2018-02-03 LAB
25(OH)D3+25(OH)D2 SERPL-MCNC: 17.4 NG/ML (ref 30–100)
ALBUMIN SERPL-MCNC: 4.2 G/DL (ref 3.5–5.5)
ALBUMIN/GLOB SERPL: 1.4 {RATIO} (ref 1.2–2.2)
ALP SERPL-CCNC: 48 IU/L (ref 39–117)
ALT SERPL-CCNC: 6 IU/L (ref 0–32)
AST SERPL-CCNC: 14 IU/L (ref 0–40)
BASOPHILS # BLD AUTO: 0 X10E3/UL (ref 0–0.2)
BASOPHILS NFR BLD AUTO: 0 %
BILIRUB SERPL-MCNC: 0.2 MG/DL (ref 0–1.2)
BUN SERPL-MCNC: 9 MG/DL (ref 6–20)
BUN/CREAT SERPL: 12 (ref 9–23)
CALCIUM SERPL-MCNC: 8.7 MG/DL (ref 8.7–10.2)
CHLORIDE SERPL-SCNC: 101 MMOL/L (ref 96–106)
CK SERPL-CCNC: 81 U/L (ref 24–173)
CO2 SERPL-SCNC: 20 MMOL/L (ref 18–29)
CREAT SERPL-MCNC: 0.77 MG/DL (ref 0.57–1)
EOSINOPHIL # BLD AUTO: 0.1 X10E3/UL (ref 0–0.4)
EOSINOPHIL NFR BLD AUTO: 1 %
ERYTHROCYTE [DISTWIDTH] IN BLOOD BY AUTOMATED COUNT: 14.1 % (ref 12.3–15.4)
ERYTHROCYTE [SEDIMENTATION RATE] IN BLOOD BY WESTERGREN METHOD: 21 MM/HR (ref 0–32)
FERRITIN SERPL-MCNC: 40 NG/ML (ref 15–150)
GLOBULIN SER CALC-MCNC: 2.9 G/DL (ref 1.5–4.5)
GLUCOSE SERPL-MCNC: 79 MG/DL (ref 65–99)
HCT VFR BLD AUTO: 36.4 % (ref 34–46.6)
HGB BLD-MCNC: 11.9 G/DL (ref 11.1–15.9)
IMM GRANULOCYTES # BLD: 0 X10E3/UL (ref 0–0.1)
IMM GRANULOCYTES NFR BLD: 0 %
IRON SATN MFR SERPL: 19 % (ref 15–55)
IRON SERPL-MCNC: 84 UG/DL (ref 27–159)
LYMPHOCYTES # BLD AUTO: 1.9 X10E3/UL (ref 0.7–3.1)
LYMPHOCYTES NFR BLD AUTO: 31 %
MCH RBC QN AUTO: 27.8 PG (ref 26.6–33)
MCHC RBC AUTO-ENTMCNC: 32.7 G/DL (ref 31.5–35.7)
MCV RBC AUTO: 85 FL (ref 79–97)
MONOCYTES # BLD AUTO: 0.6 X10E3/UL (ref 0.1–0.9)
MONOCYTES NFR BLD AUTO: 10 %
NEUTROPHILS # BLD AUTO: 3.4 X10E3/UL (ref 1.4–7)
NEUTROPHILS NFR BLD AUTO: 58 %
PLATELET # BLD AUTO: 288 X10E3/UL (ref 150–379)
POTASSIUM SERPL-SCNC: 3.9 MMOL/L (ref 3.5–5.2)
PROT SERPL-MCNC: 7.1 G/DL (ref 6–8.5)
RBC # BLD AUTO: 4.28 X10E6/UL (ref 3.77–5.28)
SODIUM SERPL-SCNC: 139 MMOL/L (ref 134–144)
T4 FREE SERPL-MCNC: 1.3 NG/DL (ref 0.82–1.77)
THYROPEROXIDASE AB SERPL-ACNC: 22 IU/ML (ref 0–34)
TIBC SERPL-MCNC: 431 UG/DL (ref 250–450)
TSH SERPL DL<=0.005 MIU/L-ACNC: 2.09 UIU/ML (ref 0.45–4.5)
UIBC SERPL-MCNC: 347 UG/DL (ref 131–425)
VIT B12 SERPL-MCNC: 243 PG/ML (ref 232–1245)
WBC # BLD AUTO: 5.9 X10E3/UL (ref 3.4–10.8)

## 2018-02-04 RX ORDER — ERGOCALCIFEROL 1.25 MG/1
50000 CAPSULE ORAL
Qty: 10 CAP | Refills: 0 | Status: SHIPPED | OUTPATIENT
Start: 2018-02-04 | End: 2019-01-14 | Stop reason: SDUPTHER

## 2018-03-15 ENCOUNTER — TELEPHONE (OUTPATIENT)
Dept: INTERNAL MEDICINE CLINIC | Age: 38
End: 2018-03-15

## 2018-03-15 NOTE — TELEPHONE ENCOUNTER
Pt not getting B12. Agree to continue prozac for now. Get B12 shots weekly x 4 weeks    If no improvement in 3-4 weeks then plan to transition to an alternative med ie lexapro. If B12 helps, then plan to continue monthly.

## 2018-03-15 NOTE — TELEPHONE ENCOUNTER
Pt would like a return call from Dr. Gaston Morrow. Pt states that she has been taking Prozac and that in her last appt Dr. Gaston Morrow increased medication to 40 mg. Pt states that she feels horrible and very weak.  She can be reached best at [phone]

## 2018-03-27 ENCOUNTER — TELEPHONE (OUTPATIENT)
Dept: INTERNAL MEDICINE CLINIC | Age: 38
End: 2018-03-27

## 2018-03-27 DIAGNOSIS — F41.8 DEPRESSION WITH ANXIETY: Primary | ICD-10-CM

## 2018-03-27 DIAGNOSIS — F41.0 PANIC DISORDER: ICD-10-CM

## 2018-03-27 DIAGNOSIS — F43.10 PTSD (POST-TRAUMATIC STRESS DISORDER): ICD-10-CM

## 2018-03-27 DIAGNOSIS — F41.1 GAD (GENERALIZED ANXIETY DISORDER): ICD-10-CM

## 2018-03-27 DIAGNOSIS — F41.0 PANIC ATTACKS: ICD-10-CM

## 2018-03-27 NOTE — TELEPHONE ENCOUNTER
Spoke to Patient. Patient stated that the Prozac isn't working. Patient wants to stop taking Prozac. Writer advised the patient ,not to and to discuss stopping Prozac with Dr. Dulce Smith. Patient stated she still feels stressed out, upset all the time and anxious. Patient stated she is seeing dead people walking around. Patient is requesting a call from Dr. Dulce Smith. Will discuss with Dr. Dulce Smith. Patient given an opportunity to ask questions, repeated information, and verbalized understanding.

## 2018-03-28 RX ORDER — ALPRAZOLAM 1 MG/1
1 TABLET ORAL
Qty: 20 TAB | Refills: 0 | Status: SHIPPED | OUTPATIENT
Start: 2018-03-28 | End: 2018-07-12 | Stop reason: SDUPTHER

## 2018-03-28 RX ORDER — FLUOXETINE HYDROCHLORIDE 20 MG/1
20 CAPSULE ORAL DAILY
Qty: 10 CAP | Refills: 0 | Status: SHIPPED | OUTPATIENT
Start: 2018-03-28 | End: 2018-04-17 | Stop reason: ALTCHOICE

## 2018-03-28 RX ORDER — ESCITALOPRAM OXALATE 10 MG/1
10 TABLET ORAL DAILY
Qty: 30 TAB | Refills: 5 | Status: SHIPPED | OUTPATIENT
Start: 2018-03-28 | End: 2018-10-09 | Stop reason: SDUPTHER

## 2018-03-28 NOTE — TELEPHONE ENCOUNTER
Patient called stating she was on the phone with Dr. Karla Park and the line went dead and she was unable to hear what he had to say.  She wanted a return call from Dr. Karla Park

## 2018-03-28 NOTE — TELEPHONE ENCOUNTER
I spoke with pt. Pt feeling more anxiety, having flashbacks re: mother. Pt reports weekly B12 x 2 weeks and still feels weak, poor energy. Getting good sleep per report. Mood likely primary problem, not B12 def. Transition to lexapro. Taper off prozac.   Xanax prn    Pt has counseling appt Monday  Encouraged counseling    Please call in xanax since cannot be sent electronically

## 2018-04-17 ENCOUNTER — OFFICE VISIT (OUTPATIENT)
Dept: INTERNAL MEDICINE CLINIC | Age: 38
End: 2018-04-17

## 2018-04-17 VITALS
SYSTOLIC BLOOD PRESSURE: 125 MMHG | RESPIRATION RATE: 16 BRPM | BODY MASS INDEX: 31.19 KG/M2 | DIASTOLIC BLOOD PRESSURE: 75 MMHG | HEIGHT: 65 IN | WEIGHT: 187.2 LBS | OXYGEN SATURATION: 99 % | HEART RATE: 71 BPM | TEMPERATURE: 98.5 F

## 2018-04-17 DIAGNOSIS — F41.1 GAD (GENERALIZED ANXIETY DISORDER): ICD-10-CM

## 2018-04-17 DIAGNOSIS — F39 MOOD DISORDER (HCC): Primary | ICD-10-CM

## 2018-04-17 DIAGNOSIS — R73.02 IGT (IMPAIRED GLUCOSE TOLERANCE): ICD-10-CM

## 2018-04-17 DIAGNOSIS — R53.83 FATIGUE, UNSPECIFIED TYPE: ICD-10-CM

## 2018-04-17 DIAGNOSIS — E53.8 B12 DEFICIENCY: ICD-10-CM

## 2018-04-17 DIAGNOSIS — E55.9 VITAMIN D DEFICIENCY: ICD-10-CM

## 2018-04-17 DIAGNOSIS — F43.10 PTSD (POST-TRAUMATIC STRESS DISORDER): ICD-10-CM

## 2018-04-17 DIAGNOSIS — E56.9 VITAMIN DEFICIENCY RELATED NEUROPATHY (HCC): ICD-10-CM

## 2018-04-17 DIAGNOSIS — E53.8 FOLATE DEFICIENCY: ICD-10-CM

## 2018-04-17 DIAGNOSIS — F41.8 DEPRESSION WITH ANXIETY: ICD-10-CM

## 2018-04-17 DIAGNOSIS — R20.2 PARESTHESIA: ICD-10-CM

## 2018-04-17 DIAGNOSIS — G63 VITAMIN DEFICIENCY RELATED NEUROPATHY (HCC): ICD-10-CM

## 2018-04-17 NOTE — PROGRESS NOTES
Rm 13    Chief Complaint   Patient presents with    Tingling     bilateral feet ongoing for a couple weeks per pt    Fatigue     weak and tired all the time, ongoing for about 2 months per pt   pt would like blood work done to test nutrient levels    1. Have you been to the ER, urgent care clinic since your last visit? Hospitalized since your last visit? No    2. Have you seen or consulted any other health care providers outside of the [facility] since your last visit? Include any pap smears or colon screening.  No    Health Maintenance Due   Topic Date Due    DTaP/Tdap/Td series (1 - Tdap) 10/02/2002    Influenza Age 5 to Adult  08/01/2017   flu shot done      Learning Assessment 2/6/2017   PRIMARY LEARNER Patient   HIGHEST LEVEL OF EDUCATION - PRIMARY LEARNER  -   BARRIERS PRIMARY LEARNER -   CO-LEARNER CAREGIVER No   PRIMARY LANGUAGE ENGLISH    NEED No   LEARNER PREFERENCE PRIMARY DEMONSTRATION     PICTURES     VIDEOS     LISTENING     READING   LEARNING SPECIAL TOPICS -   ANSWERED BY self   RELATIONSHIP SELF   ASSESSMENT COMMENT none

## 2018-04-17 NOTE — MR AVS SNAPSHOT
[address] [place] [address] E Amy Bermeo [address] 
[phone] Patient: Jacquelyn Rudd MRN: [id number] :[id number] Visit Information Date & Time Provider Department Dept. Phone Encounter #  
 2018  4:00 PM Rafy Silva, [address] [place] [address] and Internal Medicine [phone] [id number] Follow-up Instructions Return in about 6 weeks (around 2018), or if symptoms worsen or fail to improve, for mood, med adjustments. Upcoming Health Maintenance Date Due DTaP/Tdap/Td series (1 - Tdap) 10/2/2002 Influenza Age 5 to Adult 2017 PAP AKA CERVICAL CYTOLOGY 2018 Allergies as of 2018  Review Complete On: 2018 By: Rafy Silva MD  
  
 Severity Noted Reaction Type Reactions Amoxicillin  2016    Rash Biaxin [Clarithromycin]  2017    Itching, Other (comments) 'eyes turn an funny color and she starts itching' Flagyl [Metronidazole]  2012   Side Effect Itching Eyes change color Current Immunizations  Reviewed on 2016 Name Date Influenza Vaccine (Quad) PF 2016, 10/8/2015 Influenza Vaccine PF 2013 Not reviewed this visit You Were Diagnosed With   
  
 Codes Comments Mood disorder ([facility]    -  Primary ICD-10-CM: F39 
ICD-9-CM: 296.90 IGT (impaired glucose tolerance)     ICD-10-CM: R73.02 
ICD-9-CM: 790.22 DEEPA (generalized anxiety disorder)     ICD-10-CM: F41.1 ICD-9-CM: 300.02 PTSD (post-traumatic stress disorder)     ICD-10-CM: F43.10 ICD-9-CM: 309.81   
 B12 deficiency     ICD-10-CM: E53.8 ICD-9-CM: 266.2 Vitamin D deficiency     ICD-10-CM: E55.9 ICD-9-CM: 268.9 Paresthesia     ICD-10-CM: R20.2 ICD-9-CM: 782.0 Fatigue, unspecified type     ICD-10-CM: R53.83 ICD-9-CM: 780.79 Folate deficiency     ICD-10-CM: E53.8 ICD-9-CM: 266.2 Vitamin deficiency related neuropathy     ICD-10-CM: E56.9 ICD-9-CM: 269.2, 357.4 Vitals BP Pulse Temp Resp Height(growth percentile) Weight(growth percentile) 125/75 (BP 1 Location: Left arm, BP Patient Position: Sitting) 71 98.5 °F (36.9 °C) (Oral) 16 5' 5\" (1.651 m) 187 lb 3.2 oz (84.9 kg) SpO2 BMI OB Status Smoking Status 99% 31.15 kg/m2 Having regular periods Former Smoker Vitals History BMI and BSA Data Body Mass Index Body Surface Area  
 31.15 kg/m 2 1.97 m 2 Preferred Pharmacy Pharmacy Name Phone [facility] #[id number]- [address] [phone] Your Updated Medication List  
  
   
This list is accurate as of 4/17/18  5:17 PM.  Always use your most recent med list. ADVIL 200 mg tablet Generic drug:  ibuprofen Take 200 mg by mouth every eight (8) hours as needed for Pain. ALPRAZolam 1 mg tablet Commonly known as:  Preeti Belling Take 1 Tab by mouth two (2) times daily as needed for Anxiety. Max Daily Amount: 2 mg. Indications: anxiety Biotin 2,500 mcg Cap Take 1 Cap by mouth daily. butalbital-acetaminophen-caff -40 mg per capsule Commonly known as:  [employer] Take 1 Cap by mouth every four (4) hours as needed for Pain for up to 20 doses. Max Daily Amount: 6 Caps. cholecalciferol 1,000 unit Cap Commonly known as:  VITAMIN D3  
TAKE 1 CAP BY MOUTH EVERY DAY  
  
 * cyanocobalamin 500 mcg tablet Commonly known as:  VITAMIN B12 Take 1 Tab by mouth daily. * cyanocobalamin 500 mcg tablet Commonly known as:  VITAMIN B12 Take 1 Tab by mouth daily. * cyanocobalamin (vitamin B-12) 1,000 mcg/mL Kit Commonly known as:  B-12 KIT  
1 mL by Injection route every thirty (30) days. escitalopram oxalate 10 mg tablet Commonly known as:  Monica Mealing Take 1 Tab by mouth daily. 0.5 tab x 1 week then 1 tab per day hydrOXYzine HCl 25 mg tablet Commonly known as:  ATARAX Take 1 Tab by mouth three (3) times daily as needed for Itching.  
  
 magic mouthwash solution Take 5 mL by mouth three (3) times daily as needed for Pain. Benadryl, maalox,, lidocaine in equal quantity  
  
 multivit-min-folic acid-biotin 809-910 mcg Chew Take 1 Tab by mouth daily. Omeprazole delayed release 20 mg tablet Commonly known as:  PRILOSEC D/R Take 1 Tab by mouth daily. PROPECIA 1 mg tablet Generic drug:  finasteride Take 1 mg by mouth daily. sodium-potassium-mag sulfate 17.5-3.13-1.6 gram Solr oral solution Commonly known as:  Roman Passey Take 177 mL by mouth once as needed for up to 1 dose. traMADol 50 mg tablet Commonly known as:  ULTRAM  
Take 1 Tab by mouth every eight (8) hours as needed for Pain. Max Daily Amount: 150 mg. TRI-SPRINTEC (28) 0.18/0.215/0.25 mg-35 mcg (28) Tab Generic drug:  norgestimate-ethinyl estradiol TAKE ONE TABLET BY MOUTH DAILY  
  
 vit B Cmplx 3-FA-Vit C-Biotin 1- mg-mg-mcg tablet Commonly known as:  NEPHRO MERCEDES RX Take 1 Tab by mouth daily. zolpidem 10 mg tablet Commonly known as:  AMBIEN Take 1 Tab by mouth nightly as needed for Sleep. Max Daily Amount: 10 mg. Indications: SLEEP-ONSET INSOMNIA * Notice: This list has 3 medication(s) that are the same as other medications prescribed for you. Read the directions carefully, and ask your doctor or other care provider to review them with you. Prescriptions Sent to Pharmacy Refills  
 vit B Cmplx 3-FA-Vit C-Biotin (NEPHRO MERCEDES RX) 1- mg-mg-mcg tablet 5 Sig: Take 1 Tab by mouth daily. Class: Normal  
 Pharmacy: [facility] #: [phone] Route: Oral  
  
We Performed the Following CBC WITH AUTOMATED DIFF [07128 CPT(R)] FOLATE [id number] CPT(R)] HEMOGLOBIN A1C WITH EAG [84847 CPT(R)] MAGNESIUM [id number] CPT(R)] METABOLIC PANEL, COMPREHENSIVE [39851 CPT(R)] METHYLMALONIC ACID [03445 CPT(R)] VITAMIN B12 [id number] CPT(R)] VITAMIN D, 25 HYDROXY [id number] CPT(R)] Follow-up Instructions Return in about 6 weeks (around 5/29/2018), or if symptoms worsen or fail to improve, for mood, med adjustments. Introducing [facility]! Dear Crista Durant: Thank you for requesting a Zong account. Our records indicate that you already have an active Zong account. You can access your account anytime at [email or web address] Did you know that you can access your hospital and ER discharge instructions at any time in Zong? You can also review all of your test results from your hospital stay or ER visit. Additional Information If you have questions, please visit the Frequently Asked Questions section of the Zong website at [email or web address] Remember, Zong is NOT to be used for urgent needs. For medical emergencies, dial 911. Now available from your iPhone and Android! Please provide this summary of care documentation to your next provider. Your primary care clinician is listed as [address] If you have any questions after today's visit, please call [phone].

## 2018-04-17 NOTE — PROGRESS NOTES
HPI:  Presents for f/u mood, fatigue    C/o persistent fatigue, weakness x 2 months    Paresthesias x 2 weeks in bilat feet. Pt concerned re: nutritional deficiency(s)  Brings a list of common nutritional vitamin deficiencies    Pt did not change to lexapro due to cost  So, pt still taking prozac  But, missed a couple of doses and felt more depressed. Pt reports limited benefit from prozac though. Seeing a counselor with +benefit    Using marijuana to control mood. It has allowed her to maintain a job, limit anxiety and panic  But, ingested versions not effective while smoking is. Thus, pt concerned re: GI absorption issues shreyas given other known hx poor gastric emptying and poor GB function on nuclear studies. Pt expresses goal to find another agent, FDA approved pill, that can control her symptoms at least as well as marijuana has. Past medical, Social, and Family history reviewed    Prior to Admission medications    Medication Sig Start Date End Date Taking? Authorizing Provider   ALPRAZolam Drexelmichelle Escoto) 1 mg tablet Take 1 Tab by mouth two (2) times daily as needed for Anxiety. Max Daily Amount: 2 mg. Indications: anxiety 3/28/18  Yes Jeniffer Bennett MD   TRI-SPRINTEC, 28, 0.18/0.215/0.25 mg-35 mcg (28) tab TAKE ONE TABLET BY MOUTH DAILY 3/21/18  Yes Jeniffer Bennett MD   cyanocobalamin, vitamin B-12, (B-12 KIT) 1,000 mcg/mL kit 1 mL by Injection route every thirty (30) days. 2/2/18  Yes Jeniffer Bennett MD   cholecalciferol (VITAMIN D3) 1,000 unit cap TAKE 1 CAP BY MOUTH EVERY DAY 12/21/17  Yes Jeniffer Bennett MD   escitalopram oxalate (LEXAPRO) 10 mg tablet Take 1 Tab by mouth daily. 0.5 tab x 1 week then 1 tab per day 3/28/18   Jeniffer Bennett MD   finasteride (PROPECIA) 1 mg tablet Take 1 mg by mouth daily. Historical Provider   sodium-potassium-mag sulfate (SUPREP) 17.5-3.13-1.6 gram solr oral solution Take 177 mL by mouth once as needed for up to 1 dose.  10/27/17   Jeniffer Bennett MD traMADol (ULTRAM) 50 mg tablet Take 1 Tab by mouth every eight (8) hours as needed for Pain. Max Daily Amount: 150 mg. 9/8/17   Obed Ponce MD   zolpidem (AMBIEN) 10 mg tablet Take 1 Tab by mouth nightly as needed for Sleep. Max Daily Amount: 10 mg. Indications: SLEEP-ONSET INSOMNIA 7/12/17   Obed Ponce MD   multivit-min-folic acid-biotin 153-220 mcg chew Take 1 Tab by mouth daily. 5/23/17   Obed Ponce MD   hydrOXYzine HCl (ATARAX) 25 mg tablet Take 1 Tab by mouth three (3) times daily as needed for Itching. 2/2/17   Obed Ponce MD   Omeprazole delayed release (PRILOSEC D/R) 20 mg tablet Take 1 Tab by mouth daily. 1/20/17   Obed Ponce MD   magic mouthwash solution Take 5 mL by mouth three (3) times daily as needed for Pain. Benadryl, maalox,, lidocaine in equal quantity 1/20/17   Obed Ponce MD   cyanocobalamin (VITAMIN B12) 500 mcg tablet Take 1 Tab by mouth daily. 1/20/17   Obed Ponce MD   cyanocobalamin (VITAMIN B12) 500 mcg tablet Take 1 Tab by mouth daily. 1/17/17   Obed Ponce MD   butalbital-acetaminophen-caff (FIORICET) -40 mg per capsule Take 1 Cap by mouth every four (4) hours as needed for Pain for up to 20 doses. Max Daily Amount: 6 Caps. 1/2/17   Tessa Hurley MD   Biotin 2,500 mcg cap Take 1 Cap by mouth daily. Historical Provider   ibuprofen (ADVIL) 200 mg tablet Take 200 mg by mouth every eight (8) hours as needed for Pain. Historical Provider          ROS  Complete ROS reviewed and negative or stable except as noted in HPI. Physical Exam   Constitutional: She is oriented to person, place, and time. She appears well-nourished. No distress. HENT:   Head: Normocephalic and atraumatic. Eyes: EOM are normal. Pupils are equal, round, and reactive to light. No scleral icterus. Neck: Normal range of motion. Neck supple. No JVD present. Cardiovascular: Normal rate, regular rhythm and normal heart sounds.   Exam reveals no gallop and no friction rub. No murmur heard. Pulmonary/Chest: Effort normal and breath sounds normal. No respiratory distress. She has no wheezes. She has no rales. Abdominal: Soft. Bowel sounds are normal. She exhibits no distension. There is no tenderness. Musculoskeletal: Normal range of motion. She exhibits no edema. Lymphadenopathy:     She has no cervical adenopathy. Neurological: She is alert and oriented to person, place, and time. She exhibits normal muscle tone. Skin: Skin is warm. No rash noted. Psychiatric: She has a normal mood and affect. Her mood appears not anxious. Not anxious or tremulous which had been her baseline. Nursing note and vitals reviewed. Prior labs reviewed. Assessment/Plan:    ICD-10-CM ICD-9-CM    1. Mood disorder (HCC) F39 296.90    2. IGT (impaired glucose tolerance) R73.02 790.22 HEMOGLOBIN A1C WITH EAG   3. DEEPA (generalized anxiety disorder) F41.1 300.02    4. PTSD (post-traumatic stress disorder) F43.10 309.81    5. B12 deficiency E53.8 266.2 VITAMIN B12      METHYLMALONIC ACID   6. Vitamin D deficiency E55.9 268.9 VITAMIN D, 25 HYDROXY   7. Paresthesia I30.0 288.8 METABOLIC PANEL, COMPREHENSIVE      MAGNESIUM   8. Fatigue, unspecified type R53.83 780.79 CBC WITH AUTOMATED DIFF      FOLATE   9. Folate deficiency E53.8 266.2    10. 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 Depression with anxiety F41.8 300.4      Follow-up Disposition:  Return in about 6 weeks (around 5/29/2018), or if symptoms worsen or fail to improve, for mood, med adjustments.   results and schedule of future studies reviewed with patient  reviewed diet, exercise and weight  reviewed medications and side effects in detail   Recheck labs  Explained that specific testing for all of the vitamins should not be needed if use MVI and other benign or measureable supplements  Agreed to B complex vit daily   Continue vit D and B12 supplements  Agreed to change to lexapro from prozac  Once on lexapro titrated and on x 4 weeks, then try to decrease or taper off of marijuana use      >40 minutes time spent with >50% in counseling and coordination of care

## 2018-04-18 LAB — FOLATE SERPL-MCNC: 9.1 NG/ML

## 2018-04-19 LAB
25(OH)D3+25(OH)D2 SERPL-MCNC: 35.6 NG/ML (ref 30–100)
ALBUMIN SERPL-MCNC: 4.1 G/DL (ref 3.5–5.5)
ALBUMIN/GLOB SERPL: 1.1 {RATIO} (ref 1.2–2.2)
ALP SERPL-CCNC: 52 IU/L (ref 39–117)
ALT SERPL-CCNC: 7 IU/L (ref 0–32)
AST SERPL-CCNC: 10 IU/L (ref 0–40)
BASOPHILS # BLD AUTO: 0 X10E3/UL (ref 0–0.2)
BASOPHILS NFR BLD AUTO: 0 %
BILIRUB SERPL-MCNC: 0.2 MG/DL (ref 0–1.2)
BUN SERPL-MCNC: 8 MG/DL (ref 6–20)
BUN/CREAT SERPL: 10 (ref 9–23)
CALCIUM SERPL-MCNC: 9.1 MG/DL (ref 8.7–10.2)
CHLORIDE SERPL-SCNC: 101 MMOL/L (ref 96–106)
CO2 SERPL-SCNC: 22 MMOL/L (ref 18–29)
CREAT SERPL-MCNC: 0.83 MG/DL (ref 0.57–1)
EOSINOPHIL # BLD AUTO: 0.1 X10E3/UL (ref 0–0.4)
EOSINOPHIL NFR BLD AUTO: 1 %
ERYTHROCYTE [DISTWIDTH] IN BLOOD BY AUTOMATED COUNT: 13.4 % (ref 12.3–15.4)
EST. AVERAGE GLUCOSE BLD GHB EST-MCNC: 105 MG/DL
GLOBULIN SER CALC-MCNC: 3.6 G/DL (ref 1.5–4.5)
GLUCOSE SERPL-MCNC: 86 MG/DL (ref 65–99)
HBA1C MFR BLD: 5.3 % (ref 4.8–5.6)
HCT VFR BLD AUTO: 36.6 % (ref 34–46.6)
HGB BLD-MCNC: 12.1 G/DL (ref 11.1–15.9)
IMM GRANULOCYTES # BLD: 0 X10E3/UL (ref 0–0.1)
IMM GRANULOCYTES NFR BLD: 0 %
LYMPHOCYTES # BLD AUTO: 1.9 X10E3/UL (ref 0.7–3.1)
LYMPHOCYTES NFR BLD AUTO: 28 %
MAGNESIUM SERPL-MCNC: 1.8 MG/DL (ref 1.6–2.3)
MCH RBC QN AUTO: 27.6 PG (ref 26.6–33)
MCHC RBC AUTO-ENTMCNC: 33.1 G/DL (ref 31.5–35.7)
MCV RBC AUTO: 84 FL (ref 79–97)
METHYLMALONATE SERPL-SCNC: 96 NMOL/L (ref 0–378)
MONOCYTES # BLD AUTO: 0.7 X10E3/UL (ref 0.1–0.9)
MONOCYTES NFR BLD AUTO: 11 %
NEUTROPHILS # BLD AUTO: 3.9 X10E3/UL (ref 1.4–7)
NEUTROPHILS NFR BLD AUTO: 60 %
PLATELET # BLD AUTO: 354 X10E3/UL (ref 150–379)
POTASSIUM SERPL-SCNC: 3.9 MMOL/L (ref 3.5–5.2)
PROT SERPL-MCNC: 7.7 G/DL (ref 6–8.5)
RBC # BLD AUTO: 4.38 X10E6/UL (ref 3.77–5.28)
SODIUM SERPL-SCNC: 139 MMOL/L (ref 134–144)
VIT B12 SERPL-MCNC: 343 PG/ML (ref 232–1245)
WBC # BLD AUTO: 6.6 X10E3/UL (ref 3.4–10.8)

## 2018-04-19 NOTE — PROGRESS NOTES
These labs are all normal.  HgbA1C (average blood sugar) is normal, too. Keep up the good work! Continue your current medications including the B12 and daily vitamin D in order to keep these in the normal range.

## 2018-05-10 DIAGNOSIS — E53.8 B12 DEFICIENCY: ICD-10-CM

## 2018-05-21 RX ORDER — FLUOXETINE HYDROCHLORIDE 20 MG/1
CAPSULE ORAL
Qty: 10 CAP | Refills: 0 | OUTPATIENT
Start: 2018-05-21

## 2018-05-21 NOTE — TELEPHONE ENCOUNTER
Pt has not started taking the Lexapro but is not taking the Prozac. After conversation pt states she will start taking the Lexapro.

## 2018-07-10 ENCOUNTER — PATIENT MESSAGE (OUTPATIENT)
Dept: INTERNAL MEDICINE CLINIC | Age: 38
End: 2018-07-10

## 2018-07-10 DIAGNOSIS — F41.0 PANIC ATTACKS: ICD-10-CM

## 2018-07-10 DIAGNOSIS — F41.1 GAD (GENERALIZED ANXIETY DISORDER): ICD-10-CM

## 2018-07-10 DIAGNOSIS — F41.0 PANIC DISORDER: ICD-10-CM

## 2018-07-12 RX ORDER — ALPRAZOLAM 1 MG/1
1 TABLET ORAL
Qty: 20 TAB | Refills: 0 | Status: SHIPPED | OUTPATIENT
Start: 2018-07-12 | End: 2018-09-21 | Stop reason: SDUPTHER

## 2018-07-12 NOTE — TELEPHONE ENCOUNTER
From: Rock Adame  To: Anshu Maravilla MD  Sent: 7/10/2018 1:51 PM EDT  Subject: Prescription Question    Hi doctor Cindy Hu I need a refill of the Xanax. I will be getting on a plane Monday and I don't want them kicking me off the plane. I panic just thinking about trip. Even tho in my mind I'm like im going to be ok. When I think about it my leg shakes and my hands sweat and I get excited. My friend told me to make sure I have my medicine because he doesn't want them kicking us off the flight and I could not guarantee him that I was not gonna have a panick attack. I don't have control over the attacks.

## 2018-09-21 DIAGNOSIS — F41.1 GAD (GENERALIZED ANXIETY DISORDER): ICD-10-CM

## 2018-09-21 DIAGNOSIS — F41.0 PANIC DISORDER: ICD-10-CM

## 2018-09-21 DIAGNOSIS — F41.0 PANIC ATTACKS: ICD-10-CM

## 2018-09-21 RX ORDER — ALPRAZOLAM 1 MG/1
1 TABLET ORAL
Qty: 20 TAB | Refills: 0 | Status: CANCELLED | OUTPATIENT
Start: 2018-09-21

## 2018-09-21 RX ORDER — ALPRAZOLAM 1 MG/1
1 TABLET ORAL
Qty: 20 TAB | Refills: 0 | Status: SHIPPED | OUTPATIENT
Start: 2018-09-21 | End: 2018-10-09 | Stop reason: SDUPTHER

## 2018-09-21 NOTE — TELEPHONE ENCOUNTER
Pt called stating that she is having a Panic Attack today and would like if  could refill her Xanax 1 mg today. Pt just started a new job and can't take a chance on losing it and doesn't want her co-worker's to see her like this. Pt state's that has been having more Panic Attack's recently. Pt would like it sent to [facility] on   [facility] there # [phone].

## 2018-10-09 ENCOUNTER — TELEPHONE (OUTPATIENT)
Dept: INTERNAL MEDICINE CLINIC | Age: 38
End: 2018-10-09

## 2018-10-09 DIAGNOSIS — F41.8 ANXIETY WITH DEPRESSION: ICD-10-CM

## 2018-10-09 DIAGNOSIS — F41.1 GAD (GENERALIZED ANXIETY DISORDER): Primary | ICD-10-CM

## 2018-10-09 DIAGNOSIS — F43.10 PTSD (POST-TRAUMATIC STRESS DISORDER): ICD-10-CM

## 2018-10-09 RX ORDER — ALPRAZOLAM 1 MG/1
1 TABLET ORAL
Qty: 20 TAB | Refills: 0 | Status: SHIPPED | OUTPATIENT
Start: 2018-10-09 | End: 2018-11-20 | Stop reason: SDUPTHER

## 2018-10-09 RX ORDER — ESCITALOPRAM OXALATE 10 MG/1
10 TABLET ORAL DAILY
Qty: 30 TAB | Refills: 5 | Status: SHIPPED | OUTPATIENT
Start: 2018-10-09 | End: 2018-10-31

## 2018-10-09 RX ORDER — BUSPIRONE HYDROCHLORIDE 7.5 MG/1
7.5 TABLET ORAL 2 TIMES DAILY
Qty: 60 TAB | Refills: 5 | Status: SHIPPED | OUTPATIENT
Start: 2018-10-09 | End: 2018-10-29

## 2018-10-09 NOTE — TELEPHONE ENCOUNTER
Telephone  Received:  Today       [address] [facility] Office Pool       Phone Number: [phone]                     Pt returning call and requested a call back regarding jean carlos herrerag.

## 2018-10-09 NOTE — TELEPHONE ENCOUNTER
I called to speak with pt. Pt reports frequent panic attacks recently. Using xanax prn, but lost some of the pills. Fears losing her job. Seeing a counselor regularly. I encouraged it. Resume lexapro  Add buspar bid as well for more short term relief  Xanax prn called in earlier today    Encouraged pt to f/u with us here in 1-2 months to review further.

## 2018-10-09 NOTE — TELEPHONE ENCOUNTER
----- Message from Cristofer Torres LPN sent at  10:56 AM EDT -----  Regarding: FW: Visit Follow-Up Question  Contact: [phone]      ----- Message -----     From: Mack Stephany     Sent: 10/9/2018  10:29 AM       To: [facility] Nurse Pool  Subject: Visit Follow-Up Question                         Dr. Sarah Murray please call me so i can  talk to you. i have been seeing a counselor. I just gotta get thru this month. October My birtday and my momma  in the same month plus my boyfriend dumped me last week and I keep having panick attacks and flashbacks. Im having them at work and My supervisor has gotten involved. I cant afford to lose this job. Please call me. [phone]. I have a decent job I cant afford to let this DEEPA take this job away from me. I need to explain to you what happen to my medicine too. They have a bann on taking off where im at because we are behind in our work and the [employer] office has gotten involved. I can keep asking for time off but danielle if i will get it approved. I go to my counseling at 730 once a week.

## 2018-10-29 ENCOUNTER — TELEPHONE (OUTPATIENT)
Dept: INTERNAL MEDICINE CLINIC | Age: 38
End: 2018-10-29

## 2018-10-29 DIAGNOSIS — F43.10 PTSD (POST-TRAUMATIC STRESS DISORDER): ICD-10-CM

## 2018-10-29 DIAGNOSIS — F41.1 GAD (GENERALIZED ANXIETY DISORDER): ICD-10-CM

## 2018-10-29 RX ORDER — BUSPIRONE HYDROCHLORIDE 15 MG/1
15 TABLET ORAL 2 TIMES DAILY
Qty: 60 TAB | Refills: 5 | Status: SHIPPED | OUTPATIENT
Start: 2018-10-29 | End: 2018-10-31 | Stop reason: RX

## 2018-10-29 NOTE — TELEPHONE ENCOUNTER
Patient came into the office today to get labs completed. Patient stated her hands are super sweaty at different times. Patient stated she is currently taking all the medicines that Dr. Lesley Sotomayor have prescribed for her but the Buspar isn't working. Patient would like to know what Dr. Lesley Sotomayor would recommendation. Today is the anniversary of her mother's death and patient is experiencing flashbacks. Patient stated she had an anxiety/panic attack today. Patient stated she used xanax today and it helped a lot. Will discuss wit Dr. Lesley Sotomayor.

## 2018-10-29 NOTE — TELEPHONE ENCOUNTER
She may use the xanax for panic symptoms. Increase the buspar to 15 mg bid    The lexapro is 3 weeks into it, so may not be fully effective yet.   Continue the lexapro 10mg  daily

## 2018-10-30 NOTE — TELEPHONE ENCOUNTER
LOV: 4/17/18  NOV: 11/20/18    busPIRone (BUSPAR) 15 mg tablet   SIG: Take 1 tablet by mouth twice daily  Qty: 60    This med is backordered, please send an alternative med to [facility] pharmacy on file

## 2018-10-30 NOTE — TELEPHONE ENCOUNTER
Please clarify whether only the 15 mg dose is back ordered.     Pt could take 7.5 mg tab 2 po bid if needed and I could send new Rx for this if med is available

## 2018-10-31 RX ORDER — ESCITALOPRAM OXALATE 20 MG/1
20 TABLET ORAL DAILY
Qty: 30 TAB | Refills: 5 | Status: SHIPPED | OUTPATIENT
Start: 2018-10-31 | End: 2019-03-18 | Stop reason: SDUPTHER

## 2018-11-02 DIAGNOSIS — E55.9 VITAMIN D DEFICIENCY: ICD-10-CM

## 2018-11-02 RX ORDER — LEVOTHYROXINE SODIUM 25 UG/1
25 TABLET ORAL
Qty: 30 TAB | Refills: 5 | Status: SHIPPED | OUTPATIENT
Start: 2018-11-02 | End: 2019-01-11

## 2018-11-02 RX ORDER — GLUCOSAMINE SULFATE 1500 MG
2000 POWDER IN PACKET (EA) ORAL DAILY
Qty: 60 CAP | Refills: 11 | Status: SHIPPED | OUTPATIENT
Start: 2018-11-02 | End: 2021-06-02 | Stop reason: SDUPTHER

## 2018-11-20 ENCOUNTER — OFFICE VISIT (OUTPATIENT)
Dept: INTERNAL MEDICINE CLINIC | Age: 38
End: 2018-11-20

## 2018-11-20 VITALS
RESPIRATION RATE: 18 BRPM | DIASTOLIC BLOOD PRESSURE: 80 MMHG | WEIGHT: 198 LBS | HEIGHT: 65 IN | TEMPERATURE: 98.3 F | BODY MASS INDEX: 32.99 KG/M2 | HEART RATE: 102 BPM | OXYGEN SATURATION: 100 % | SYSTOLIC BLOOD PRESSURE: 119 MMHG

## 2018-11-20 DIAGNOSIS — F41.0 PANIC ATTACKS: ICD-10-CM

## 2018-11-20 DIAGNOSIS — F41.0 PANIC DISORDER: ICD-10-CM

## 2018-11-20 DIAGNOSIS — E03.9 ACQUIRED HYPOTHYROIDISM: ICD-10-CM

## 2018-11-20 DIAGNOSIS — E55.9 VITAMIN D DEFICIENCY: ICD-10-CM

## 2018-11-20 DIAGNOSIS — F41.8 DEPRESSION WITH ANXIETY: ICD-10-CM

## 2018-11-20 DIAGNOSIS — E78.5 HYPERLIPIDEMIA, UNSPECIFIED HYPERLIPIDEMIA TYPE: ICD-10-CM

## 2018-11-20 DIAGNOSIS — R11.0 NAUSEA: ICD-10-CM

## 2018-11-20 DIAGNOSIS — F41.1 GAD (GENERALIZED ANXIETY DISORDER): Primary | ICD-10-CM

## 2018-11-20 RX ORDER — ALPRAZOLAM 1 MG/1
1 TABLET ORAL
Qty: 20 TAB | Refills: 0 | Status: SHIPPED | OUTPATIENT
Start: 2018-11-20 | End: 2018-12-21 | Stop reason: SDUPTHER

## 2018-11-20 RX ORDER — ONDANSETRON 8 MG/1
8 TABLET, ORALLY DISINTEGRATING ORAL
Qty: 30 TAB | Refills: 2 | Status: SHIPPED | OUTPATIENT
Start: 2018-11-20 | End: 2019-08-05

## 2018-11-20 NOTE — PROGRESS NOTES
Rm #[id number]      Chief Complaint   Patient presents with    Fatigue    Anxiety     1. Have you been to the ER, urgent care clinic since your last visit? Hospitalized since your last visit? No    2. Have you seen or consulted any other health care providers outside of the [facility] since your last visit? Include any pap smears or colon screening.  No

## 2018-11-20 NOTE — LETTER
NOTIFICATION RETURN TO WORK / SCHOOL 
 
11/20/2018 Ms. Sabino Don [address] [facility] [address] To Whom It May Concern: 
 
Sabino Don is currently under the care of Omar. She will return to work/school on: 11/20/18 If there are questions or concerns please have the patient contact our office. Sincerely, Dennie Castleman, MD

## 2018-11-20 NOTE — PROGRESS NOTES
HPI:  Presents for f/u anxiety, cold intolerance    Pt c/o cold intolerance, thinning hair    buspar caused nausea - has limited use  Stopped x 1 week  Also, limited benefit  Not feeling less anxiety with it. Taking lexapro daily - but 10 mg dose only  Did not get message re: 20 mg titration. Pt did not get message re: thyroid    Seeing counseling weekly  Pt not sure of the benefit yet. Past medical, Social, and Family history reviewed    Prior to Admission medications    Medication Sig Start Date End Date Taking? Authorizing Provider   levothyroxine (SYNTHROID) 25 mcg tablet Take 1 Tab by mouth Daily (before breakfast). 11/2/18  Yes Jose Bo MD   escitalopram oxalate (LEXAPRO) 20 mg tablet Take 1 Tab by mouth daily. 10/31/18  Yes Jose Bo MD   ALPRAZolam Dorotha Riches) 1 mg tablet Take 1 Tab by mouth two (2) times daily as needed for Anxiety. Max Daily Amount: 2 mg. Indications: anxiety 10/9/18  Yes Jose Bo MD   vit B Cmplx 3-FA-Vit C-Biotin (NEPHRO MERCEDES RX) 1- mg-mg-mcg tablet Take 1 Tab by mouth daily. 4/17/18  Yes Jose Bo MD   TRI-SPRINTEC, 28, 0.18/0.215/0.25 mg-35 mcg (28) tab TAKE ONE TABLET BY MOUTH DAILY 3/21/18  Yes Jose Bo MD   zolpidem (AMBIEN) 10 mg tablet Take 1 Tab by mouth nightly as needed for Sleep. Max Daily Amount: 10 mg. Indications: SLEEP-ONSET INSOMNIA 7/12/17  Yes Jose Bo MD   cholecalciferol (VITAMIN D3) 1,000 unit cap Take 2 Caps by mouth daily. 11/2/18   Jose Bo MD   cyanocobalamin, vitamin B-12, (B-12 KIT) 1,000 mcg/mL kit 1 mL by Injection route every thirty (30) days. 5/10/18   Jose Bo MD   finasteride (PROPECIA) 1 mg tablet Take 1 mg by mouth daily. Provider, Historical   sodium-potassium-mag sulfate (SUPREP) 17.5-3.13-1.6 gram solr oral solution Take 177 mL by mouth once as needed for up to 1 dose.  10/27/17   Jose Bo MD   traMADol Lanie Worthy) 50 mg tablet Take 1 Tab by mouth every eight (8) hours as needed for Pain. Max Daily Amount: 150 mg. 9/8/17   Ramona Owen MD   multivit-min-folic acid-biotin 059-144 mcg chew Take 1 Tab by mouth daily. 5/23/17   Ramona Owen MD   Omeprazole delayed release (PRILOSEC D/R) 20 mg tablet Take 1 Tab by mouth daily. 1/20/17   Ramona Owen MD   magic mouthwash solution Take 5 mL by mouth three (3) times daily as needed for Pain. Benadryl, maalox,, lidocaine in equal quantity 1/20/17   Ramona Owen MD   cyanocobalamin (VITAMIN B12) 500 mcg tablet Take 1 Tab by mouth daily. 1/20/17   Ramona Owen MD   cyanocobalamin (VITAMIN B12) 500 mcg tablet Take 1 Tab by mouth daily. 1/17/17   Ramona Owen MD   butalbital-acetaminophen-caff (FIORICET) -40 mg per capsule Take 1 Cap by mouth every four (4) hours as needed for Pain for up to 20 doses. Max Daily Amount: 6 Caps. 1/2/17   Neetu Pena MD   Biotin 2,500 mcg cap Take 1 Cap by mouth daily. Provider, Historical   ibuprofen (ADVIL) 200 mg tablet Take 200 mg by mouth every eight (8) hours as needed for Pain. Provider, Historical          ROS  Complete ROS reviewed and negative or stable except as noted in HPI. Physical Exam   Constitutional: She is oriented to person, place, and time. She appears well-nourished. No distress. HENT:   Head: Normocephalic and atraumatic. Eyes: EOM are normal. Pupils are equal, round, and reactive to light. No scleral icterus. Neck: Normal range of motion. Neck supple. Cardiovascular: Normal rate. Pulmonary/Chest: Effort normal. No respiratory distress. Abdominal: Soft. She exhibits no distension. There is no tenderness. Musculoskeletal: Normal range of motion. She exhibits no edema. Neurological: She is alert and oriented to person, place, and time. She exhibits normal muscle tone. Skin: Skin is warm. No rash noted. Psychiatric:   Anxious, but fairly contained. Nursing note and vitals reviewed.         Prior labs reviewed. Assessment/Plan:    ICD-10-CM ICD-9-CM    1. DEEPA (generalized anxiety disorder) F41.1 300.02 ALPRAZolam (XANAX) 1 mg tablet   2. Hyperlipidemia, unspecified hyperlipidemia type E78.5 272.4    3. Vitamin D deficiency E55.9 268.9    4. Depression with anxiety F41.8 300.4    5. Nausea R11.0 787.02    6. Acquired hypothyroidism E03.9 244.9    7. Panic disorder F41.0 300.01 ALPRAZolam (XANAX) 1 mg tablet   8. Panic attacks F41.0 300.01 ALPRAZolam (XANAX) 1 mg tablet     Follow-up Disposition:  Return in about 2 months (around 1/20/2019), or if symptoms worsen or fail to improve, for thyroid, mood.   results and schedule of future studies reviewed with patient  reviewed diet, exercise and weight    cardiovascular risk and specific lipid/LDL goals reviewed  reviewed medications and side effects in detail   Increase lexapro to 20mg  Start low dose thyroid supplement  zofran prn - may allow buspar use  Trial of 15mg buspar bid if tolerated and available      >40 minutes time spent with >50% in counseling and coordination of care

## 2018-11-27 ENCOUNTER — OFFICE VISIT (OUTPATIENT)
Dept: INTERNAL MEDICINE CLINIC | Age: 38
End: 2018-11-27

## 2018-11-27 ENCOUNTER — HOSPITAL ENCOUNTER (OUTPATIENT)
Dept: GENERAL RADIOLOGY | Age: 38
Discharge: HOME | End: 2018-11-27
Payer: COMMERCIAL

## 2018-11-27 VITALS
TEMPERATURE: 98.5 F | RESPIRATION RATE: 16 BRPM | HEIGHT: 65 IN | WEIGHT: 194.8 LBS | HEART RATE: 85 BPM | DIASTOLIC BLOOD PRESSURE: 84 MMHG | BODY MASS INDEX: 32.46 KG/M2 | OXYGEN SATURATION: 100 % | SYSTOLIC BLOOD PRESSURE: 133 MMHG

## 2018-11-27 DIAGNOSIS — J40 BRONCHITIS: Primary | ICD-10-CM

## 2018-11-27 DIAGNOSIS — J02.8 PHARYNGITIS DUE TO OTHER ORGANISM: ICD-10-CM

## 2018-11-27 DIAGNOSIS — F41.1 GAD (GENERALIZED ANXIETY DISORDER): ICD-10-CM

## 2018-11-27 DIAGNOSIS — J04.0 LARYNGITIS, ACUTE: ICD-10-CM

## 2018-11-27 DIAGNOSIS — R05.9 COUGH: ICD-10-CM

## 2018-11-27 DIAGNOSIS — J32.9 SINUSITIS, UNSPECIFIED CHRONICITY, UNSPECIFIED LOCATION: ICD-10-CM

## 2018-11-27 DIAGNOSIS — E78.5 HYPERLIPIDEMIA, UNSPECIFIED HYPERLIPIDEMIA TYPE: ICD-10-CM

## 2018-11-27 DIAGNOSIS — E03.9 ACQUIRED HYPOTHYROIDISM: ICD-10-CM

## 2018-11-27 DIAGNOSIS — F41.8 DEPRESSION WITH ANXIETY: ICD-10-CM

## 2018-11-27 DIAGNOSIS — J02.9 SORE THROAT: ICD-10-CM

## 2018-11-27 LAB
S PYO AG THROAT QL: NEGATIVE
VALID INTERNAL CONTROL?: YES

## 2018-11-27 PROCEDURE — 71046 X-RAY EXAM CHEST 2 VIEWS: CPT

## 2018-11-27 RX ORDER — LEVOFLOXACIN 500 MG/1
500 TABLET, FILM COATED ORAL DAILY
Qty: 7 TAB | Refills: 0 | Status: SHIPPED | OUTPATIENT
Start: 2018-11-27 | End: 2019-01-11 | Stop reason: SDUPTHER

## 2018-11-27 RX ORDER — ALBUTEROL SULFATE 90 UG/1
2 AEROSOL, METERED RESPIRATORY (INHALATION)
Qty: 1 INHALER | Refills: 2 | Status: SHIPPED | OUTPATIENT
Start: 2018-11-27 | End: 2019-08-05

## 2018-11-27 RX ORDER — PREDNISONE 20 MG/1
40 TABLET ORAL DAILY
Qty: 10 TAB | Refills: 0 | Status: SHIPPED | OUTPATIENT
Start: 2018-11-27 | End: 2019-01-11 | Stop reason: SDUPTHER

## 2018-11-27 RX ORDER — BENZONATATE 200 MG/1
200 CAPSULE ORAL
Qty: 30 CAP | Refills: 1 | Status: SHIPPED | OUTPATIENT
Start: 2018-11-27 | End: 2019-08-05

## 2018-11-27 NOTE — PROGRESS NOTES
HPI:  Presents for acute care    Cough and congestion x several days    Started as upper resp sx - nasal    Increased blood tinged nasal secretions  +colored, thickened secretions      Past medical, Social, and Family history reviewed    Prior to Admission medications    Medication Sig Start Date End Date Taking? Authorizing Provider   predniSONE (DELTASONE) 20 mg tablet Take 40 mg by mouth daily. 11/27/18  Yes Addie Amaral MD   albuterol (PROVENTIL HFA, VENTOLIN HFA, PROAIR HFA) 90 mcg/actuation inhaler Take 2 Puffs by inhalation every four (4) hours as needed for Wheezing. 11/27/18  Yes Addie Amaral MD   levoFLOXacin (LEVAQUIN) 500 mg tablet Take 1 Tab by mouth daily for 7 days. 11/27/18 12/4/18 Yes Addie Amaral MD   benzonatate (TESSALON) 200 mg capsule Take 1 Cap by mouth three (3) times daily as needed for Cough. 11/27/18  Yes Addie Amaral MD   guaiFENesin-dextromethorphan SR [facility] [place] DM) 600-30 mg per tablet Take 1 Tab by mouth every twelve (12) hours as needed for Cough. 11/27/18  Yes Addie Amaral MD   ALPRAZolam Brerichard Felicianoman) 1 mg tablet Take 1 Tab by mouth two (2) times daily as needed for Anxiety. Max Daily Amount: 2 mg. 11/20/18  Yes Addie Amaral MD   levothyroxine (SYNTHROID) 25 mcg tablet Take 1 Tab by mouth Daily (before breakfast). 11/2/18  Yes Addie Amaral MD   escitalopram oxalate (LEXAPRO) 20 mg tablet Take 1 Tab by mouth daily. 10/31/18  Yes Addie Amaral MD   TRI-SPRINTEC, 28, 0.18/0.215/0.25 mg-35 mcg (28) tab TAKE ONE TABLET BY MOUTH DAILY 3/21/18  Yes Addie Amaral MD   zolpidem (AMBIEN) 10 mg tablet Take 1 Tab by mouth nightly as needed for Sleep. Max Daily Amount: 10 mg. Indications: SLEEP-ONSET INSOMNIA 7/12/17  Yes Addie Amaarl MD   ondansetron (ZOFRAN ODT) 8 mg disintegrating tablet Take 1 Tab by mouth every eight (8) hours as needed for Nausea.  11/20/18   Addie Amaral MD   cholecalciferol (VITAMIN D3) 1,000 unit cap Take 2 Caps by mouth daily. 11/2/18   Diana Miller MD   cyanocobalamin, vitamin B-12, (B-12 KIT) 1,000 mcg/mL kit 1 mL by Injection route every thirty (30) days. 5/10/18   Diana Miller MD   vit B Cmplx 3-FA-Vit C-Biotin (NEPHRO MERCEDES RX) 1- mg-mg-mcg tablet Take 1 Tab by mouth daily. 4/17/18   Diana Miller MD   finasteride (PROPECIA) 1 mg tablet Take 1 mg by mouth daily. Provider, Historical   traMADol (ULTRAM) 50 mg tablet Take 1 Tab by mouth every eight (8) hours as needed for Pain. Max Daily Amount: 150 mg. 9/8/17   Diana Miller MD   multivit-min-folic acid-biotin 194-819 mcg chew Take 1 Tab by mouth daily. 5/23/17   Diana Miller MD   Omeprazole delayed release (PRILOSEC D/R) 20 mg tablet Take 1 Tab by mouth daily. 1/20/17   Diana Miller MD   magic mouthwash solution Take 5 mL by mouth three (3) times daily as needed for Pain. Benadryl, maalox,, lidocaine in equal quantity 1/20/17   Diana Miller MD   cyanocobalamin (VITAMIN B12) 500 mcg tablet Take 1 Tab by mouth daily. 1/20/17   Diana Miller MD   cyanocobalamin (VITAMIN B12) 500 mcg tablet Take 1 Tab by mouth daily. 1/17/17   Diana Miller MD   butalbital-acetaminophen-caff (FIORICET) -40 mg per capsule Take 1 Cap by mouth every four (4) hours as needed for Pain for up to 20 doses. Max Daily Amount: 6 Caps. 1/2/17   Tara Martinez MD   Biotin 2,500 mcg cap Take 1 Cap by mouth daily. Provider, Historical   ibuprofen (ADVIL) 200 mg tablet Take 200 mg by mouth every eight (8) hours as needed for Pain. Provider, Historical          ROS  Complete ROS reviewed and negative or stable except as noted in HPI. Physical Exam   Constitutional: She is oriented to person, place, and time. She appears well-nourished. No distress. HENT:   Head: Normocephalic and atraumatic. Eyes: EOM are normal. Pupils are equal, round, and reactive to light. No scleral icterus. Neck: Normal range of motion. Neck supple.  No JVD present. Cardiovascular: Normal rate, regular rhythm and normal heart sounds. Exam reveals no gallop and no friction rub. No murmur heard. Pulmonary/Chest: Effort normal and breath sounds normal. No respiratory distress. She has no wheezes. She has no rales. Abdominal: Soft. Bowel sounds are normal. She exhibits no distension. There is no tenderness. Musculoskeletal: Normal range of motion. She exhibits no edema. Lymphadenopathy:     She has no cervical adenopathy. Neurological: She is alert and oriented to person, place, and time. She exhibits normal muscle tone. Skin: Skin is warm. No rash noted. Psychiatric: She has a normal mood and affect. Her mood appears not anxious. Not anxious or tremulous which had been her baseline. Nursing note and vitals reviewed. Prior labs reviewed. Assessment/Plan:    ICD-10-CM ICD-9-CM    1. Bronchitis J40 490 predniSONE (DELTASONE) 20 mg tablet      albuterol (PROVENTIL HFA, VENTOLIN HFA, PROAIR HFA) 90 mcg/actuation inhaler      levoFLOXacin (LEVAQUIN) 500 mg tablet      benzonatate (TESSALON) 200 mg capsule      guaiFENesin-dextromethorphan SR (MUCINEX DM) 600-30 mg per tablet   2. Sore throat J02.9 462 AMB POC RAPID STREP A   3. Pharyngitis due to other organism J02.8 462    4. Sinusitis, unspecified chronicity, unspecified location J32.9 473.9    5. Laryngitis, acute J04.0 464.00    6. Acquired hypothyroidism E03.9 244.9    7. DEEPA (generalized anxiety disorder) F41.1 300.02    8. Hyperlipidemia, unspecified hyperlipidemia type E78.5 272.4    9. Depression with anxiety F41.8 300.4      Follow-up Disposition:  Return in about 8 weeks (around 1/23/2019), or if symptoms worsen or fail to improve, for as scheduled.   results and schedule of future studies reviewed with patient  reviewed diet, exercise and weight   cardiovascular risk and specific lipid/LDL goals reviewed  reviewed medications and side effects in detail  pred  Bronchodilator  abx  Cough meds  Continue other care.

## 2018-11-27 NOTE — PROGRESS NOTES
Rm 13    Chief Complaint   Patient presents with    Cough     productive yellowish/greenish,chest hurting from coughing per pt    Cold Symptoms     chest congestion    Sore Throat     started yesterday, hoarsness     1. Have you been to the ER, urgent care clinic since your last visit? Hospitalized since your last visit? No    2. Have you seen or consulted any other health care providers outside of the [address] [place] [address] since your last visit? Include any pap smears or colon screening.  No    Health Maintenance Due   Topic Date Due    DTaP/Tdap/Td series (1 - Tdap) 10/02/2002    PAP AKA CERVICAL CYTOLOGY  11/11/2018     Learning Assessment 2/6/2017   PRIMARY LEARNER Patient   HIGHEST LEVEL OF EDUCATION - PRIMARY LEARNER  -   BARRIERS PRIMARY LEARNER -   CO-LEARNER CAREGIVER No   PRIMARY LANGUAGE ENGLISH    NEED No   LEARNER PREFERENCE PRIMARY DEMONSTRATION     PICTURES     VIDEOS     LISTENING     READING   LEARNING SPECIAL TOPICS -   ANSWERED BY self   RELATIONSHIP SELF   ASSESSMENT COMMENT none

## 2018-12-13 NOTE — TELEPHONE ENCOUNTER
----- Message from Dena Reardon sent at 4/7/2017 12:32 AM EDT -----  Regarding: Prescription Question  Contact: [phone]  The viles I need are called cyanocobalamine. I haven't had b12 since the shot u gave me in January. This tingling is driving me crazy. I didn't know I needed it every month so I guess its got real low again. If u call me in the 4 viles like u did before I will get someone to give it to me.
----- Message from Dena Trevizo Constantino Inder Rollins sent at 4/7/2017 12:11 AM EDT -----  Regarding: Prescription Question  Contact: [phone]  Dr Benedict Rosenberg I am experiencing really bad body tingling from low b12. Can u call in a prescription for the 4 viles of b12. I have old viles at the house from where I was suppose to take it and I couldn't find anybody to give me the shots. They are old. I need new script. I will ask the pharmacist at [facility] to give me the shot. As bad as I feel I will stick myself if I have too. I tingle really bad and I am not anxious I know its my b12. Sooner I get the script sooner I feel better.
Agree to B12 injection kit Rx. But, pt may come to the office for nursing visit to get B12 as well at an appropriate schedule.
Pt called back again stating that she called [address] they informed her that they have not received the B-12 Injections. Pt still state's that she is experiencing tingling all over her body and would like if  could please call the prescription in before the end of today's business.
Pt was given a B-12 shot back in January and Lanning Buerger had given the pt a prescription for the B-12 500 mcg. Pt state's that she didn't realize that she needed to take it daily until she looked on her Mychart. Pt is now experiencing tingling all over her body and feel's that it could be from her not taking the B-12 daily. Pt would like if  could please call her in a B-12 injection prescription today to [facility] there # [phone].
Spoke with patient stated she was waiting to  her RX at [facility] on [address] but was called into wrong pharmacy.   Patient stated she would go pick it up at the [facility]
needed assist

## 2019-01-11 ENCOUNTER — OFFICE VISIT (OUTPATIENT)
Dept: INTERNAL MEDICINE CLINIC | Age: 39
End: 2019-01-11

## 2019-01-11 VITALS
TEMPERATURE: 98.3 F | RESPIRATION RATE: 16 BRPM | BODY MASS INDEX: 33.32 KG/M2 | OXYGEN SATURATION: 100 % | WEIGHT: 200 LBS | HEART RATE: 68 BPM | HEIGHT: 65 IN | DIASTOLIC BLOOD PRESSURE: 77 MMHG | SYSTOLIC BLOOD PRESSURE: 119 MMHG

## 2019-01-11 DIAGNOSIS — E03.9 ACQUIRED HYPOTHYROIDISM: Primary | ICD-10-CM

## 2019-01-11 DIAGNOSIS — E55.9 VITAMIN D DEFICIENCY: ICD-10-CM

## 2019-01-11 DIAGNOSIS — H69.81 EUSTACHIAN TUBE DYSFUNCTION, RIGHT: ICD-10-CM

## 2019-01-11 DIAGNOSIS — J01.90: ICD-10-CM

## 2019-01-11 DIAGNOSIS — R73.02 IGT (IMPAIRED GLUCOSE TOLERANCE): ICD-10-CM

## 2019-01-11 DIAGNOSIS — J11.1 INFLUENZA-LIKE ILLNESS: ICD-10-CM

## 2019-01-11 DIAGNOSIS — B37.31 VAGINAL YEAST INFECTION: ICD-10-CM

## 2019-01-11 DIAGNOSIS — F41.1 GAD (GENERALIZED ANXIETY DISORDER): ICD-10-CM

## 2019-01-11 DIAGNOSIS — J40 BRONCHITIS: ICD-10-CM

## 2019-01-11 DIAGNOSIS — F41.8 DEPRESSION WITH ANXIETY: ICD-10-CM

## 2019-01-11 DIAGNOSIS — K59.00 CONSTIPATION, UNSPECIFIED CONSTIPATION TYPE: ICD-10-CM

## 2019-01-11 RX ORDER — FLUCONAZOLE 150 MG/1
150 TABLET ORAL DAILY
Qty: 1 TAB | Refills: 1 | Status: SHIPPED | OUTPATIENT
Start: 2019-01-11 | End: 2019-01-12

## 2019-01-11 RX ORDER — LEVOFLOXACIN 500 MG/1
500 TABLET, FILM COATED ORAL DAILY
Qty: 7 TAB | Refills: 0 | Status: SHIPPED | OUTPATIENT
Start: 2019-01-11 | End: 2019-01-18

## 2019-01-11 RX ORDER — FLUTICASONE PROPIONATE 50 MCG
1 SPRAY, SUSPENSION (ML) NASAL
Qty: 1 BOTTLE | Refills: 2 | Status: SHIPPED | OUTPATIENT
Start: 2019-01-11 | End: 2020-09-29

## 2019-01-11 RX ORDER — LEVOTHYROXINE SODIUM 75 UG/1
75 TABLET ORAL
Qty: 30 TAB | Refills: 5 | Status: SHIPPED | OUTPATIENT
Start: 2019-01-11 | End: 2019-03-18 | Stop reason: SDUPTHER

## 2019-01-11 RX ORDER — PREDNISONE 20 MG/1
40 TABLET ORAL DAILY
Qty: 10 TAB | Refills: 0 | Status: SHIPPED | OUTPATIENT
Start: 2019-01-11 | End: 2019-04-16 | Stop reason: ALTCHOICE

## 2019-01-11 NOTE — PROGRESS NOTES
HPI:  Presents for f/u thyroid, anxiety, acute illness    Pt taking thyroid daily  Not feeling any better yet  +dry skin  +cold intolerance  +alopecia and brittle nails  +achiness    2 days of left facial pain, pressure  Now ear popping shreyas with swallowing   +PND   +chest congestion and tightness. +runny nose and congestion    No known sick contacts    Pt felt better in the interim with tx for prior resp illness    Currently on clindamycin for BV per GYN    Past medical, Social, and Family history reviewed    Prior to Admission medications    Medication Sig Start Date End Date Taking? Authorizing Provider   ALPRAZolam Gwendolyn Escoto) 1 mg tablet Take 1 Tab by mouth two (2) times daily as needed for Anxiety. Max Daily Amount: 2 mg. 12/21/18  Yes Nicola Mckeon MD   levothyroxine (SYNTHROID) 25 mcg tablet Take 1 Tab by mouth Daily (before breakfast). 11/2/18  Yes Nicola Mckeon MD   escitalopram oxalate (LEXAPRO) 20 mg tablet Take 1 Tab by mouth daily. 10/31/18  Yes Nicola Mckeon MD   TRI-SPRINTEC, 28, 0.18/0.215/0.25 mg-35 mcg (28) tab TAKE ONE TABLET BY MOUTH DAILY 3/21/18  Yes Nicola Mckeon MD   zolpidem (AMBIEN) 10 mg tablet Take 1 Tab by mouth nightly as needed for Sleep. Max Daily Amount: 10 mg. Indications: SLEEP-ONSET INSOMNIA 7/12/17  Yes Nicola Mckeon MD   predniSONE (DELTASONE) 20 mg tablet Take 40 mg by mouth daily. 11/27/18   Nicola Mckeon MD   albuterol (PROVENTIL HFA, VENTOLIN HFA, PROAIR HFA) 90 mcg/actuation inhaler Take 2 Puffs by inhalation every four (4) hours as needed for Wheezing. 11/27/18   Nicola Mckeon MD   benzonatate (TESSALON) 200 mg capsule Take 1 Cap by mouth three (3) times daily as needed for Cough. 11/27/18   Nicola Mckeon MD   guaiFENesin-dextromethorphan SR [facility] DM) 600-30 mg per tablet Take 1 Tab by mouth every twelve (12) hours as needed for Cough.  11/27/18   Nicola Mckeon MD   ondansetron (ZOFRAN ODT) 8 mg disintegrating tablet Take 1 Tab by mouth every eight (8) hours as needed for Nausea. 11/20/18   Marcia Kirk MD   cholecalciferol (VITAMIN D3) 1,000 unit cap Take 2 Caps by mouth daily. 11/2/18   Marcia Kirk MD   cyanocobalamin, vitamin B-12, (B-12 KIT) 1,000 mcg/mL kit 1 mL by Injection route every thirty (30) days. 5/10/18   Marcia Kirk MD   vit B Cmplx 3-FA-Vit C-Biotin (NEPHRO MERCEDES RX) 1- mg-mg-mcg tablet Take 1 Tab by mouth daily. 4/17/18   Marcia Kirk MD   finasteride (PROPECIA) 1 mg tablet Take 1 mg by mouth daily. Provider, Historical   traMADol (ULTRAM) 50 mg tablet Take 1 Tab by mouth every eight (8) hours as needed for Pain. Max Daily Amount: 150 mg. 9/8/17   Marcia Kirk MD   multivit-min-folic acid-biotin 348-221 mcg chew Take 1 Tab by mouth daily. 5/23/17   Marcia Kirk MD   Omeprazole delayed release (PRILOSEC D/R) 20 mg tablet Take 1 Tab by mouth daily. 1/20/17   Marcia Kirk MD   magic mouthwash solution Take 5 mL by mouth three (3) times daily as needed for Pain. Benadryl, maalox,, lidocaine in equal quantity 1/20/17   Marcia Kirk MD   cyanocobalamin (VITAMIN B12) 500 mcg tablet Take 1 Tab by mouth daily. 1/20/17   Marcia Kirk MD   cyanocobalamin (VITAMIN B12) 500 mcg tablet Take 1 Tab by mouth daily. 1/17/17   Marcia Kirk MD   butalbital-acetaminophen-caff (FIORICET) -40 mg per capsule Take 1 Cap by mouth every four (4) hours as needed for Pain for up to 20 doses. Max Daily Amount: 6 Caps. 1/2/17   Arvin Denver, MD   Biotin 2,500 mcg cap Take 1 Cap by mouth daily. Provider, Historical   ibuprofen (ADVIL) 200 mg tablet Take 200 mg by mouth every eight (8) hours as needed for Pain. Provider, Historical          ROS  Complete ROS reviewed and negative or stable except as noted in HPI. Physical Exam   Constitutional: She is oriented to person, place, and time. She appears well-nourished. No distress.    HENT:   Head: Normocephalic and atraumatic. Nose: Sinus tenderness present. Eyes: EOM are normal. Pupils are equal, round, and reactive to light. No scleral icterus. Neck: Normal range of motion. Neck supple. No JVD present. Cardiovascular: Normal rate, regular rhythm and normal heart sounds. Exam reveals no gallop and no friction rub. No murmur heard. Pulmonary/Chest: Effort normal and breath sounds normal. No respiratory distress. She has no wheezes. She has no rales. Abdominal: Soft. Bowel sounds are normal. She exhibits no distension. There is no tenderness. Musculoskeletal: Normal range of motion. She exhibits no edema. Lymphadenopathy:     She has no cervical adenopathy. Neurological: She is alert and oriented to person, place, and time. She exhibits normal muscle tone. Skin: Skin is warm. No rash noted. Psychiatric: She has a normal mood and affect. Her mood appears not anxious. Not anxious or tremulous which had been her baseline. Nursing note and vitals reviewed. Prior labs reviewed. Flu - neg      Assessment/Plan:  Possible sinusitis    ICD-10-CM ICD-9-CM    1. Acquired hypothyroidism E03.9 244.9 TSH 3RD GENERATION      T4, FREE      levothyroxine (SYNTHROID) 75 mcg tablet   2. Influenza-like illness R69 799.89 AMB POC IZZY INFLUENZA A/B TEST      CBC WITH AUTOMATED DIFF   3. IGT (impaired glucose tolerance) R73.02 790.22    4. DEEPA (generalized anxiety disorder) F41.1 300.02    5. Depression with anxiety F41.8 300.4    6. Constipation, unspecified constipation type K59.00 564.00    7. Bronchitis J40 490 levoFLOXacin (LEVAQUIN) 500 mg tablet      predniSONE (DELTASONE) 20 mg tablet      guaiFENesin-dextromethorphan SR (MUCINEX DM) 600-30 mg per tablet   8. Vitamin D deficiency E55.9 268.9 VITAMIN D, 25 HYDROXY   9. Acute inflammation of nasal sinus J01.90 461.9    10. Eustachian tube dysfunction, right H69.81 381.81 fluticasone (FLONASE) 50 mcg/actuation nasal spray   11.  Vaginal yeast infection B37.3 112.1 fluconazole (DIFLUCAN) 150 mg tablet     Follow-up Disposition:  Return in about 2 months (around 3/11/2019), or if symptoms worsen or fail to improve, for thyroid.    results and schedule of future studies reviewed with patient  reviewed diet, exercise and weight    cardiovascular risk and specific lipid/LDL goals reviewed  reviewed medications and side effects in detail   Recheck thyroid  Titrate dose of thyroid supplement  pred burst  levaquin   Sx care

## 2019-01-11 NOTE — PROGRESS NOTES
Jez Vazquez is a 41 y.o. female    Chief Complaint   Patient presents with    Fatigue     Pt is here for a f/u for feling fatigue.  Cold Symptoms     Pt is c/o feeling tightening in her chest, popping in her ears. pressure and congestion in her chest and ears starting two days ago. 1. Have you been to the ER, urgent care clinic since your last visit? Hospitalized since your last visit? No     2. Have you seen or consulted any other health care providers outside of the [facility] since your last visit? Include any pap smears or colon screening.   No     Health Maintenance Due   Topic Date Due    DTaP/Tdap/Td series (1 - Tdap) 10/02/2002    PAP AKA CERVICAL CYTOLOGY  11/11/2018         Visit Vitals  /77 (BP 1 Location: Right arm, BP Patient Position: Sitting)   Pulse 68   Temp 98.3 °F (36.8 °C) (Oral)   Resp 16   Ht 5' 5\" (1.651 m)   Wt 200 lb (90.7 kg)   SpO2 100%   BMI 33.28 kg/m²

## 2019-01-12 LAB
25(OH)D3+25(OH)D2 SERPL-MCNC: 14.3 NG/ML (ref 30–100)
BASOPHILS # BLD AUTO: 0 X10E3/UL (ref 0–0.2)
BASOPHILS NFR BLD AUTO: 0 %
EOSINOPHIL # BLD AUTO: 0.3 X10E3/UL (ref 0–0.4)
EOSINOPHIL NFR BLD AUTO: 4 %
ERYTHROCYTE [DISTWIDTH] IN BLOOD BY AUTOMATED COUNT: 13.9 % (ref 12.3–15.4)
HCT VFR BLD AUTO: 38.7 % (ref 34–46.6)
HGB BLD-MCNC: 12.3 G/DL (ref 11.1–15.9)
IMM GRANULOCYTES # BLD AUTO: 0 X10E3/UL (ref 0–0.1)
IMM GRANULOCYTES NFR BLD AUTO: 0 %
LYMPHOCYTES # BLD AUTO: 1.9 X10E3/UL (ref 0.7–3.1)
LYMPHOCYTES NFR BLD AUTO: 30 %
MCH RBC QN AUTO: 27.5 PG (ref 26.6–33)
MCHC RBC AUTO-ENTMCNC: 31.8 G/DL (ref 31.5–35.7)
MCV RBC AUTO: 87 FL (ref 79–97)
MONOCYTES # BLD AUTO: 0.8 X10E3/UL (ref 0.1–0.9)
MONOCYTES NFR BLD AUTO: 12 %
NEUTROPHILS # BLD AUTO: 3.5 X10E3/UL (ref 1.4–7)
NEUTROPHILS NFR BLD AUTO: 54 %
PLATELET # BLD AUTO: 316 X10E3/UL (ref 150–379)
RBC # BLD AUTO: 4.47 X10E6/UL (ref 3.77–5.28)
T4 FREE SERPL-MCNC: 1.14 NG/DL (ref 0.82–1.77)
TSH SERPL DL<=0.005 MIU/L-ACNC: 3.58 UIU/ML (ref 0.45–4.5)
WBC # BLD AUTO: 6.5 X10E3/UL (ref 3.4–10.8)

## 2019-01-14 DIAGNOSIS — E55.9 VITAMIN D DEFICIENCY: ICD-10-CM

## 2019-01-14 RX ORDER — ERGOCALCIFEROL 1.25 MG/1
50000 CAPSULE ORAL
Qty: 10 CAP | Refills: 0 | Status: SHIPPED | OUTPATIENT
Start: 2019-01-14 | End: 2019-03-18 | Stop reason: SDUPTHER

## 2019-01-15 NOTE — PROGRESS NOTES
TSH is better but since your symptoms suggest your thyroid is low, then let's continue with the plan to increase the thyroid supplement to 75 mcg per day and recheck in 2 months. Vitamin D remains low. Take a high dosed vitamin D once a week for 10 weeks along with increasing your daily vitamin D by 1000 units per day. Other labs are OK.

## 2019-01-22 ENCOUNTER — DOCUMENTATION ONLY (OUTPATIENT)
Dept: INTERNAL MEDICINE CLINIC | Age: 39
End: 2019-01-22

## 2019-02-04 DIAGNOSIS — F43.10 PTSD (POST-TRAUMATIC STRESS DISORDER): Primary | ICD-10-CM

## 2019-02-04 DIAGNOSIS — F41.0 PANIC DISORDER: ICD-10-CM

## 2019-02-04 DIAGNOSIS — F41.8 DEPRESSION WITH ANXIETY: ICD-10-CM

## 2019-02-04 RX ORDER — BUPROPION HYDROCHLORIDE 150 MG/1
150 TABLET ORAL
Qty: 30 TAB | Refills: 5 | Status: SHIPPED | OUTPATIENT
Start: 2019-02-04 | End: 2019-03-18 | Stop reason: SDUPTHER

## 2019-02-05 ENCOUNTER — TELEPHONE (OUTPATIENT)
Dept: INTERNAL MEDICINE CLINIC | Age: 39
End: 2019-02-05

## 2019-02-05 DIAGNOSIS — F41.0 PANIC DISORDER: ICD-10-CM

## 2019-02-05 DIAGNOSIS — F41.0 PANIC ATTACKS: ICD-10-CM

## 2019-02-05 DIAGNOSIS — F41.1 GAD (GENERALIZED ANXIETY DISORDER): ICD-10-CM

## 2019-02-05 NOTE — TELEPHONE ENCOUNTER
Pt states someone close to her passed away yesterday, and someone else that is close to her is currently in the hospital and is not expected to make it. Pt is upset / anxious. Pt wants to speak with Dr Luong Givens re: her current meds and what she can do during this time.     Please call pt back - ph#: [phone]

## 2019-02-06 RX ORDER — ALPRAZOLAM 1 MG/1
1 TABLET ORAL
Qty: 20 TAB | Refills: 0 | Status: SHIPPED | OUTPATIENT
Start: 2019-02-06 | End: 2019-02-20 | Stop reason: SDUPTHER

## 2019-02-06 NOTE — TELEPHONE ENCOUNTER
Pt may have a refill on the xanax    Please call in since cannot be sent electronically    Continue other current medications

## 2019-02-20 DIAGNOSIS — F41.0 PANIC DISORDER: ICD-10-CM

## 2019-02-20 DIAGNOSIS — F41.1 GAD (GENERALIZED ANXIETY DISORDER): ICD-10-CM

## 2019-02-20 DIAGNOSIS — F41.0 PANIC ATTACKS: ICD-10-CM

## 2019-02-20 DIAGNOSIS — R51.9 HEADACHE, UNSPECIFIED HEADACHE TYPE: ICD-10-CM

## 2019-02-20 RX ORDER — NORGESTIMATE AND ETHINYL ESTRADIOL 7DAYSX3 28
KIT ORAL
Qty: 28 TAB | Refills: 10 | Status: SHIPPED | OUTPATIENT
Start: 2019-02-20 | End: 2019-09-12 | Stop reason: SDUPTHER

## 2019-02-20 RX ORDER — ALPRAZOLAM 1 MG/1
1 TABLET ORAL
Qty: 30 TAB | Refills: 0 | Status: SHIPPED | OUTPATIENT
Start: 2019-02-20 | End: 2019-03-11 | Stop reason: SDUPTHER

## 2019-03-11 ENCOUNTER — TELEPHONE (OUTPATIENT)
Dept: INTERNAL MEDICINE CLINIC | Age: 39
End: 2019-03-11

## 2019-03-11 NOTE — TELEPHONE ENCOUNTER
Pt was scheduled to see Xochitl Astorga tomorrow, 3/12/19, at 4:00pm. However, pt's insurance, [employer] would not approve pt's visit. Pt states she was on a 3 way call with [place] [facility] office. Ms Garner's wants Dr Rolanda Hooker to know that she was going to go to her appt tomorrow; however,the ins will not authorize the visit.

## 2019-03-12 ENCOUNTER — PATIENT MESSAGE (OUTPATIENT)
Dept: INTERNAL MEDICINE CLINIC | Age: 39
End: 2019-03-12

## 2019-03-12 NOTE — TELEPHONE ENCOUNTER
Outgoing call made to Dr. Artie Loco [facility] to return call to see if it is anything we here can do to help this patient get into therapy.

## 2019-03-12 NOTE — TELEPHONE ENCOUNTER
Regarding: Prescription Question  Contact: [phone]  ----- Message from [address] [place] [address] [address], Generic sent at 3/12/2019  6:16 AM EDT -----    [facility] but she said [facility] wasnt ther. she was there  8pm b4 they close. she said it was never called in\I really need my medicine. need Octava to call Caren Womack for [employer])  he will tel u my ins would not give me ref. he was  on 3 way wit me and ins, comp. he had to cancel me appt. when i wake up and realiz im in my momma house evaristo bout to lose with nw heat, no rogelio, my boyfriend dumpb me last night he said I cant talk to his son lissy more. my lil baby.  having a panucj attack right now with no medicine. everyday its something. my nerves so bad i caesar even figur how to load this vid, wake up at 6 relize no job. please call me when it called in. i have to asj my neighnor to bring it to me.

## 2019-03-15 NOTE — TELEPHONE ENCOUNTER
Pt wants to check status of getting therapy. Pt is very stressed due to her life circumstances. Pt states the stress is now affecting her body, (pt states her right leg was shaking and has now given out on her twice. Pt was advised to go to urgent care to have leg evaluated since no available appt for 3/15/19). Please let pt know if she will be able to get therapy, states her current ins coverage will run out mid April 2019.   Pt ph: [phone]

## 2019-03-18 DIAGNOSIS — E03.9 ACQUIRED HYPOTHYROIDISM: ICD-10-CM

## 2019-03-18 DIAGNOSIS — F41.8 DEPRESSION WITH ANXIETY: ICD-10-CM

## 2019-03-18 DIAGNOSIS — E55.9 VITAMIN D DEFICIENCY: ICD-10-CM

## 2019-03-18 DIAGNOSIS — F41.1 GAD (GENERALIZED ANXIETY DISORDER): ICD-10-CM

## 2019-03-18 DIAGNOSIS — G47.9 SLEEP DISORDER: ICD-10-CM

## 2019-03-18 DIAGNOSIS — F41.0 PANIC ATTACKS: ICD-10-CM

## 2019-03-18 DIAGNOSIS — F43.10 PTSD (POST-TRAUMATIC STRESS DISORDER): ICD-10-CM

## 2019-03-18 DIAGNOSIS — F41.0 PANIC DISORDER: ICD-10-CM

## 2019-03-18 RX ORDER — LEVOTHYROXINE SODIUM 75 UG/1
75 TABLET ORAL
Qty: 90 TAB | Refills: 1 | Status: SHIPPED | OUTPATIENT
Start: 2019-03-18 | End: 2019-08-05

## 2019-03-18 RX ORDER — ERGOCALCIFEROL 1.25 MG/1
50000 CAPSULE ORAL
Qty: 12 CAP | Refills: 0 | Status: SHIPPED | OUTPATIENT
Start: 2019-03-18 | End: 2019-06-07 | Stop reason: SDUPTHER

## 2019-03-18 RX ORDER — ZOLPIDEM TARTRATE 10 MG/1
10 TABLET ORAL
Qty: 90 TAB | Refills: 1 | Status: SHIPPED | OUTPATIENT
Start: 2019-03-18 | End: 2020-04-24

## 2019-03-18 RX ORDER — ALPRAZOLAM 1 MG/1
TABLET ORAL
Qty: 60 TAB | Refills: 0 | Status: SHIPPED | OUTPATIENT
Start: 2019-03-18 | End: 2019-03-27 | Stop reason: SDUPTHER

## 2019-03-18 RX ORDER — BUPROPION HYDROCHLORIDE 150 MG/1
150 TABLET ORAL
Qty: 90 TAB | Refills: 1 | Status: SHIPPED | OUTPATIENT
Start: 2019-03-18 | End: 2019-03-27 | Stop reason: SDUPTHER

## 2019-03-18 RX ORDER — ESCITALOPRAM OXALATE 20 MG/1
20 TABLET ORAL DAILY
Qty: 90 TAB | Refills: 1 | Status: SHIPPED | OUTPATIENT
Start: 2019-03-18 | End: 2019-03-27 | Stop reason: SINTOL

## 2019-03-19 NOTE — PROGRESS NOTES
See telephone encounter re: med refills    Medication refills authorized for 90 day Rx's    Please call in [place] rickey and xanax since they cannot be sent electronically     Please notify pt of rec's    Note that the lexapro (generic), wellbutrin (generic), and levothyroxine are available at [facility] for either $4 or $9 per month.

## 2019-03-19 NOTE — PROGRESS NOTES
Rx Ambien 10 mg and Xanax 1 mg [facility]. Informed pt as well that medication was sent and called into [facility]. Informed her of the medication prices at "[employer]. Pt is still upset and crying stating that she does not know what to do after her insurance is cancelled. Pt states she does not want to take the lexapro because it makes her feel bad and act crazy. Pt states she stopped taking lexapro when she lost her job. She is supposed to go back for an appeal to get her job back and wants to know if there is anything that could keep her keep calm to get her job back.

## 2019-03-20 NOTE — PROGRESS NOTES
Buspar may help although she has tried this before. My concern is that the lexapro is not the cause of her symptoms, rather the recent loss of her close friend's mother and her underlying issues regarding her own mother are the source of the mood disturbance. I would recommend lexapro + wellbutrin with use of Xanax as needed for acute panic symptoms. Ultimately, she will need to see a psychiatrist to manage her complex mood disorder.

## 2019-03-27 ENCOUNTER — TELEPHONE (OUTPATIENT)
Dept: INTERNAL MEDICINE CLINIC | Age: 39
End: 2019-03-27

## 2019-03-27 ENCOUNTER — OFFICE VISIT (OUTPATIENT)
Dept: INTERNAL MEDICINE CLINIC | Age: 39
End: 2019-03-27

## 2019-03-27 VITALS
HEART RATE: 74 BPM | RESPIRATION RATE: 17 BRPM | SYSTOLIC BLOOD PRESSURE: 119 MMHG | HEIGHT: 65 IN | DIASTOLIC BLOOD PRESSURE: 82 MMHG | TEMPERATURE: 98.3 F | BODY MASS INDEX: 33.42 KG/M2 | OXYGEN SATURATION: 100 % | WEIGHT: 200.6 LBS

## 2019-03-27 DIAGNOSIS — F41.1 GAD (GENERALIZED ANXIETY DISORDER): Primary | ICD-10-CM

## 2019-03-27 DIAGNOSIS — E53.8 B12 DEFICIENCY: ICD-10-CM

## 2019-03-27 DIAGNOSIS — K59.00 CONSTIPATION, UNSPECIFIED CONSTIPATION TYPE: ICD-10-CM

## 2019-03-27 DIAGNOSIS — E03.9 ACQUIRED HYPOTHYROIDISM: ICD-10-CM

## 2019-03-27 DIAGNOSIS — E78.2 MIXED HYPERLIPIDEMIA: ICD-10-CM

## 2019-03-27 DIAGNOSIS — R73.02 IGT (IMPAIRED GLUCOSE TOLERANCE): ICD-10-CM

## 2019-03-27 DIAGNOSIS — K92.1 BLOOD IN STOOL: ICD-10-CM

## 2019-03-27 DIAGNOSIS — F41.0 PANIC DISORDER: ICD-10-CM

## 2019-03-27 DIAGNOSIS — F43.10 PTSD (POST-TRAUMATIC STRESS DISORDER): ICD-10-CM

## 2019-03-27 DIAGNOSIS — E55.9 VITAMIN D DEFICIENCY: ICD-10-CM

## 2019-03-27 DIAGNOSIS — F41.0 PANIC ATTACKS: ICD-10-CM

## 2019-03-27 DIAGNOSIS — F41.8 DEPRESSION WITH ANXIETY: ICD-10-CM

## 2019-03-27 RX ORDER — BUPROPION HYDROCHLORIDE 300 MG/1
300 TABLET ORAL
Qty: 90 TAB | Refills: 1 | Status: SHIPPED | OUTPATIENT
Start: 2019-03-27 | End: 2019-05-29 | Stop reason: ALTCHOICE

## 2019-03-27 RX ORDER — ALPRAZOLAM 1 MG/1
TABLET ORAL
Qty: 60 TAB | Refills: 2 | Status: SHIPPED | OUTPATIENT
Start: 2019-03-27 | End: 2019-07-20 | Stop reason: SDUPTHER

## 2019-03-27 NOTE — PROGRESS NOTES
HPI:  Presents for f/u anxiety    Pt seeing counselor - Ms. Crisostomo    Pt lost her job due to threatening a coworker. Pt reports chest pains    Pt acknowledges panic attacks  Taking xanax bid at this point    C/o leg shaking - uncontrollable at times. Pt is concerned re: med side effects causing some of her symptoms. Pt stopped taking lexapro 2/16/19  Symptoms increased when dose was increased to 20 mg lexapro    +wellbutrin compliance - but not as consistent for the past month    Off thyroid med for a few days - back on it now    Pt reports multiple stressors including cable turned off, TV stopped working, computer stopped working. Had blood in the stool  Was seeing GI and planned colonoscopy  Recently cancelled     Pt requests letter to assist with her appeal of her job. Past medical, Social, and Family history reviewed    Prior to Admission medications    Medication Sig Start Date End Date Taking? Authorizing Provider   ALPRAZolam [place]) 1 mg tablet TAKE 1 TABLET BY MOUTH TWICE A DAY AS NEEDED FOR ANXIETY 3/18/19  Yes Guilherme Galvez MD   buPROPion XL (WELLBUTRIN XL) 150 mg tablet Take 1 Tab by mouth every morning. 3/18/19  Yes Guilherme Galvez MD   levothyroxine (SYNTHROID) 75 mcg tablet Take 1 Tab by mouth Daily (before breakfast). 3/18/19  Yes Guilherme Galvez MD   TRI-SPRINTEC, 28, 0.18/0.215/0.25 mg-35 mcg (28) tab TAKE ONE TABLET BY MOUTH DAILY 2/20/19  Yes Guilherme Galvez MD   fluticasone [facility]) 50 mcg/actuation nasal spray 1 [place] by Both Nostrils route two (2) times daily as needed for Rhinitis. 1/11/19  Yes Guilherme Galvez MD   cholecalciferol (VITAMIN D3) 1,000 unit cap Take 2 Caps by mouth daily. 11/2/18  Yes Guilherme Galvez MD   zolpidem (AMBIEN) 10 mg tablet Take 1 Tab by mouth nightly as needed for Sleep. Max Daily Amount: 10 mg.   Patient not taking: Reported on 3/27/2019 3/18/19   Guilherme Galvez MD   ergocalciferol (ERGOCALCIFEROL) 50,000 unit capsule Take 1 Cap by mouth every seven (7) days for 12 doses. Patient not taking: Reported on 3/27/2019 3/18/19 6/4/19  Lance Carrington MD   escitalopram oxalate (LEXAPRO) 20 mg tablet Take 1 Tab by mouth daily. Patient not taking: Reported on 3/27/2019 3/18/19   Lance Carrington MD   predniSONE (DELTASONE) 20 mg tablet Take 40 mg by mouth daily. Patient not taking: Reported on 3/27/2019 1/11/19   Lance Carrington MD   guaiFENesin-dextromethorphan SR [facility] DM) 600-30 mg per tablet Take 1 Tab by mouth every twelve (12) hours as needed for Cough. Patient not taking: Reported on 3/27/2019 1/11/19   Lance Carrington MD   albuterol (PROVENTIL HFA, VENTOLIN HFA, PROAIR HFA) 90 mcg/actuation inhaler Take 2 Puffs by inhalation every four (4) hours as needed for Wheezing. Patient not taking: Reported on 3/27/2019 11/27/18   Lance Carrington MD   benzonatate (TESSALON) 200 mg capsule Take 1 Cap by mouth three (3) times daily as needed for Cough. Patient not taking: Reported on 3/27/2019 11/27/18   Lance Carrington MD   ondansetron [facility] ODT) 8 mg disintegrating tablet Take 1 Tab by mouth every eight (8) hours as needed for Nausea. Patient not taking: Reported on 3/27/2019 11/20/18   Lance Carrington MD   cyanocobalamin, vitamin B-12, (B-12 KIT) 1,000 mcg/mL kit 1 mL by Injection route every thirty (30) days. Patient not taking: Reported on 3/27/2019 5/10/18   Lance Carrington MD   vit B Cmplx 3-FA-Vit C-Biotin (NEPHRO MERCEDES RX) 1- mg-mg-mcg tablet Take 1 Tab by mouth daily. Patient not taking: Reported on 3/27/2019 4/17/18   Lance Carrington MD   finasteride (PROPECIA) 1 mg tablet Take 1 mg by mouth daily. Provider, Historical   traMADol (ULTRAM) 50 mg tablet Take 1 Tab by mouth every eight (8) hours as needed for Pain. Max Daily Amount: 150 mg. Patient not taking: Reported on 3/27/2019 9/8/17   Lance Carrington MD   multivit-min-folic acid-biotin 972-038 mcg chew Take 1 Tab by mouth daily.   Patient not taking: Reported on 3/27/2019 5/23/17   Titus Latham MD   Omeprazole delayed release (PRILOSEC D/R) 20 mg tablet Take 1 Tab by mouth daily. Patient not taking: Reported on 3/27/2019 1/20/17   Titus Latham MD   magic mouthwash solution Take 5 mL by mouth three (3) times daily as needed for Pain. Benadryl, maalox,, lidocaine in equal quantity  Patient not taking: Reported on 3/27/2019 1/20/17   Titus Latham MD   cyanocobalamin (VITAMIN B12) 500 mcg tablet Take 1 Tab by mouth daily. Patient not taking: Reported on 3/27/2019 1/20/17   Titus Latham MD   cyanocobalamin (VITAMIN B12) 500 mcg tablet Take 1 Tab by mouth daily. Patient not taking: Reported on 3/27/2019 1/17/17   Titus Latham MD   butalbital-acetaminophen-caff (FIORICET) -40 mg per capsule Take 1 Cap by mouth every four (4) hours as needed for Pain for up to 20 doses. Max Daily Amount: 6 Caps. Patient not taking: Reported on 3/27/2019 1/2/17   Mira Tobar MD   Biotin 2,500 mcg cap Take 1 Cap by mouth daily. Provider, Historical   ibuprofen (ADVIL) 200 mg tablet Take 200 mg by mouth every eight (8) hours as needed for Pain. Provider, Historical          ROS  Complete ROS reviewed and negative or stable except as noted in HPI. Physical Exam   Constitutional: She is oriented to person, place, and time. She appears well-nourished. No distress. HENT:   Head: Normocephalic and atraumatic. Eyes: Pupils are equal, round, and reactive to light. EOM are normal. No scleral icterus. Neck: Normal range of motion. Neck supple. No JVD present. Cardiovascular: Normal rate, regular rhythm and normal heart sounds. Exam reveals no gallop and no friction rub. No murmur heard. Pulmonary/Chest: Effort normal and breath sounds normal. No respiratory distress. She has no wheezes. She has no rales. Abdominal: Soft. Bowel sounds are normal. She exhibits no distension. There is no tenderness.    Musculoskeletal: Normal range of motion. She exhibits no edema. Lymphadenopathy:     She has no cervical adenopathy. Neurological: She is alert and oriented to person, place, and time. She exhibits normal muscle tone. Skin: Skin is warm. No rash noted. Psychiatric: Her mood appears anxious. Her speech is rapid and/or pressured. She is agitated. She expresses impulsivity. Nursing note and vitals reviewed. Prior labs reviewed. NL stress echo in the summer 2018      Assessment/Plan:  Potential med effects complicating her underlying pathology    ICD-10-CM ICD-9-CM    1. DEEPA (generalized anxiety disorder) F41.1 300.02 REFERRAL TO PSYCHIATRY      ALPRAZolam (XANAX) 1 mg tablet   2. Blood in stool K92.1 578.1 CBC WITH AUTOMATED DIFF      METABOLIC PANEL, COMPREHENSIVE      FERRITIN      IRON PROFILE   3. IGT (impaired glucose tolerance) R73.02 790.22    4. PTSD (post-traumatic stress disorder) F43.10 309.81 REFERRAL TO PSYCHIATRY   5. Vitamin D deficiency E55.9 268.9 VITAMIN D, 25 HYDROXY   6. Panic disorder F41.0 300.01 REFERRAL TO PSYCHIATRY      ALPRAZolam (XANAX) 1 mg tablet      buPROPion XL (WELLBUTRIN XL) 300 mg XL tablet   7. Depression with anxiety F41.8 300.4 REFERRAL TO PSYCHIATRY      buPROPion XL (WELLBUTRIN XL) 300 mg XL tablet   8. Mixed hyperlipidemia E78.2 272.2    9. Constipation, unspecified constipation type K59.00 564.00    10. B12 deficiency E53.8 266.2 VITAMIN B12   11. Acquired hypothyroidism E03.9 244.9 T4, FREE      TSH 3RD GENERATION   12. Panic attacks F41.0 300.01 ALPRAZolam (XANAX) 1 mg tablet     Follow-up and Dispositions    · Return in about 2 months (around 5/27/2019), or if symptoms worsen or fail to improve, for anxiety, thyroid.         results and schedule of future studies reviewed with patient  reviewed diet, exercise and weight    cardiovascular risk and specific lipid/LDL goals reviewed  reviewed medications and side effects in detail   Dc lexapro  Continue wellbutrin XL - increase to 300mg   Continue bid xanax for now  Refills for xanax  Check labs  Ref to [address] - assist in arranging appt   Continue counseling.   Letter for employer      >40 minutes time spent with >50% in counseling and coordination of care

## 2019-03-27 NOTE — LETTER
3/28/2019 RE: 
 
Ms. Sukumar Meléndez [address] [facility] [address] [id number]  - 1980 To whom it may concern: 
 
I am writing as Ms. Jinny Hayes' primary care physician and have been caring for her for over 5 years. She has been prescribed medication to treat her condition which was titrated to a higher dose around 3-4 months ago. She subsequently developed some behavioral changes that are known side effects of the prescribed medication. Once realized, we have stopped the medication in question. Unfortunately, in the interval during which she was taking the higher medication dose as prescribed, her behaviors at work have been called into question and she has had her job terminated as a result. I feel that the medication change and unintended side effects contributed to her work related behavioral concerns. She is no longer taking this medication and a plan is in place to improve her condition by other means, with which she has been compliant and participating as directed. Please consider this medical information in regards to her employment appeal and decisions regarding actions related to the behaviors in question. Thank you for your consideration. Sincerely, Jade Liu MD

## 2019-03-27 NOTE — TELEPHONE ENCOUNTER
Left voicemail for patient to return call to our office. Patient left before her labs were performed and wanted to let her know she needs to return to have labs done.

## 2019-03-27 NOTE — TELEPHONE ENCOUNTER
Patient seen in office today and forget to mention to provider that she is losing hair on the side of her temples. She is very concerned about this and wanted him to know that she thinks it is from her losing her job.

## 2019-03-27 NOTE — PROGRESS NOTES
Informed pt of providers recommendations. Pt is scheduled for 3/27/19 to discuss more with provider.

## 2019-03-27 NOTE — PROGRESS NOTES
Exam Room 14    Chuckie Richardson is a 41 y.o. female    Chief Complaint   Patient presents with    Anxiety     Since 2/15/2019     1. Have you been to the ER, urgent care clinic since your last visit? Hospitalized since your last visit? No    2. Have you seen or consulted any other health care providers outside of the [address] [place] [address] since your last visit? Include any pap smears or colon screening.  No     Health Maintenance Due   Topic Date Due    DTaP/Tdap/Td series (1 - Tdap) 10/02/2002    PAP AKA CERVICAL CYTOLOGY  11/11/2018

## 2019-03-28 NOTE — TELEPHONE ENCOUNTER
I agree that hair loss may be stress related and should improve as we improve her mood, etc.    Also, letter to assist in her employment appeal has been drafted.

## 2019-04-01 ENCOUNTER — TELEPHONE (OUTPATIENT)
Dept: INTERNAL MEDICINE CLINIC | Age: 39
End: 2019-04-01

## 2019-04-01 NOTE — TELEPHONE ENCOUNTER
Pt has already taken 2 Xanex today; however, pt is very stressed out re: losing her job, and states that her paycheck today was short money and pt is concerned about paying bills. Due to her stress, pt wants to know if she can take anything else to help with her stress / anxiety.     Please call pt: mitzi#: [phone]

## 2019-04-01 NOTE — TELEPHONE ENCOUNTER
Called patient. She repeatedly said hello on phone. Could not hear me on phone. Attempted to call back, this time rang and went to [place]. Left message encouarging her to call back with business hours. If having emergency to call 911 or go to hospital.     Stella Maier MD    Please call patient and confirm follow-up plan. Per Dr. Meka Ellison last note, she is not meeting with counsellor. Has she contacted this group? Also do we know if she is getting connected with psychiatrist as disucssed a the last visit?      Thanks  Stella Maier MD

## 2019-04-12 ENCOUNTER — HOSPITAL ENCOUNTER (EMERGENCY)
Age: 39
Discharge: HOME | End: 2019-04-12
Attending: STUDENT IN AN ORGANIZED HEALTH CARE EDUCATION/TRAINING PROGRAM
Payer: SUBSIDIZED

## 2019-04-12 ENCOUNTER — APPOINTMENT (OUTPATIENT)
Dept: GENERAL RADIOLOGY | Age: 39
End: 2019-04-12
Attending: STUDENT IN AN ORGANIZED HEALTH CARE EDUCATION/TRAINING PROGRAM
Payer: SUBSIDIZED

## 2019-04-12 VITALS
DIASTOLIC BLOOD PRESSURE: 71 MMHG | HEART RATE: 79 BPM | OXYGEN SATURATION: 100 % | RESPIRATION RATE: 16 BRPM | SYSTOLIC BLOOD PRESSURE: 141 MMHG | TEMPERATURE: 99.4 F

## 2019-04-12 DIAGNOSIS — N30.01 ACUTE CYSTITIS WITH HEMATURIA: Primary | ICD-10-CM

## 2019-04-12 LAB
ALBUMIN SERPL-MCNC: 3.1 G/DL (ref 3.5–5)
ALBUMIN/GLOB SERPL: 0.7 {RATIO} (ref 1.1–2.2)
ALP SERPL-CCNC: 42 U/L (ref 45–117)
ALT SERPL-CCNC: 12 U/L (ref 12–78)
AMPHET UR QL SCN: NEGATIVE
ANION GAP SERPL CALC-SCNC: 14 MMOL/L (ref 5–15)
APPEARANCE UR: CLEAR
AST SERPL-CCNC: 13 U/L (ref 15–37)
BACTERIA URNS QL MICRO: ABNORMAL /HPF
BARBITURATES UR QL SCN: NEGATIVE
BASOPHILS # BLD: 0 K/UL (ref 0–0.1)
BASOPHILS NFR BLD: 0 % (ref 0–1)
BENZODIAZ UR QL: POSITIVE
BILIRUB SERPL-MCNC: 0.2 MG/DL (ref 0.2–1)
BILIRUB UR QL: NEGATIVE
BUN SERPL-MCNC: 3 MG/DL (ref 6–20)
BUN/CREAT SERPL: 3 (ref 12–20)
CALCIUM SERPL-MCNC: 8.2 MG/DL (ref 8.5–10.1)
CANNABINOIDS UR QL SCN: POSITIVE
CHLORIDE SERPL-SCNC: 103 MMOL/L (ref 97–108)
CO2 SERPL-SCNC: 21 MMOL/L (ref 21–32)
COCAINE UR QL SCN: NEGATIVE
COLOR UR: ABNORMAL
CREAT SERPL-MCNC: 0.96 MG/DL (ref 0.55–1.02)
DIFFERENTIAL METHOD BLD: ABNORMAL
DRUG SCRN COMMENT,DRGCM: ABNORMAL
EOSINOPHIL # BLD: 0 K/UL (ref 0–0.4)
EOSINOPHIL NFR BLD: 0 % (ref 0–7)
EPITH CASTS URNS QL MICRO: ABNORMAL /LPF
ERYTHROCYTE [DISTWIDTH] IN BLOOD BY AUTOMATED COUNT: 12.8 % (ref 11.5–14.5)
FLUAV AG NPH QL IA: NEGATIVE
FLUBV AG NOSE QL IA: NEGATIVE
GLOBULIN SER CALC-MCNC: 4.7 G/DL (ref 2–4)
GLUCOSE SERPL-MCNC: 101 MG/DL (ref 65–100)
GLUCOSE UR STRIP.AUTO-MCNC: NEGATIVE MG/DL
HCG UR QL: NEGATIVE
HCT VFR BLD AUTO: 37 % (ref 35–47)
HGB BLD-MCNC: 12.6 G/DL (ref 11.5–16)
HGB UR QL STRIP: ABNORMAL
KETONES UR QL STRIP.AUTO: 15 MG/DL
LEUKOCYTE ESTERASE UR QL STRIP.AUTO: ABNORMAL
LYMPHOCYTES # BLD: 0.7 K/UL (ref 0.8–3.5)
LYMPHOCYTES NFR BLD: 9 % (ref 12–49)
MCH RBC QN AUTO: 28.7 PG (ref 26–34)
MCHC RBC AUTO-ENTMCNC: 34.1 G/DL (ref 30–36.5)
MCV RBC AUTO: 84.3 FL (ref 80–99)
METHADONE UR QL: NEGATIVE
MONOCYTES # BLD: 1 K/UL (ref 0–1)
MONOCYTES NFR BLD: 13 % (ref 5–13)
MUCOUS THREADS URNS QL MICRO: ABNORMAL /LPF
NEUTS SEG # BLD: 5.7 K/UL (ref 1.8–8)
NEUTS SEG NFR BLD: 78 % (ref 32–75)
NITRITE UR QL STRIP.AUTO: NEGATIVE
OPIATES UR QL: NEGATIVE
PCP UR QL: NEGATIVE
PH UR STRIP: 6.5 [PH] (ref 5–8)
PLATELET # BLD AUTO: 276 K/UL (ref 150–400)
PMV BLD AUTO: 9.9 FL (ref 8.9–12.9)
POTASSIUM SERPL-SCNC: 3.4 MMOL/L (ref 3.5–5.1)
PROT SERPL-MCNC: 7.8 G/DL (ref 6.4–8.2)
PROT UR STRIP-MCNC: ABNORMAL MG/DL
RBC # BLD AUTO: 4.39 M/UL (ref 3.8–5.2)
RBC #/AREA URNS HPF: ABNORMAL /HPF (ref 0–5)
RBC MORPH BLD: ABNORMAL
SODIUM SERPL-SCNC: 138 MMOL/L (ref 136–145)
SP GR UR REFRACTOMETRY: 1.01 (ref 1–1.03)
UROBILINOGEN UR QL STRIP.AUTO: 0.2 EU/DL (ref 0.2–1)
WBC # BLD AUTO: 7.4 K/UL (ref 3.6–11)
WBC URNS QL MICRO: ABNORMAL /HPF (ref 0–4)

## 2019-04-12 PROCEDURE — 96374 THER/PROPH/DIAG INJ IV PUSH: CPT

## 2019-04-12 PROCEDURE — 87086 URINE CULTURE/COLONY COUNT: CPT

## 2019-04-12 PROCEDURE — 74011250636 HC RX REV CODE- 250/636: Performed by: STUDENT IN AN ORGANIZED HEALTH CARE EDUCATION/TRAINING PROGRAM

## 2019-04-12 PROCEDURE — 96375 TX/PRO/DX INJ NEW DRUG ADDON: CPT

## 2019-04-12 PROCEDURE — 81025 URINE PREGNANCY TEST: CPT

## 2019-04-12 PROCEDURE — 80307 DRUG TEST PRSMV CHEM ANLYZR: CPT

## 2019-04-12 PROCEDURE — 74011000250 HC RX REV CODE- 250: Performed by: STUDENT IN AN ORGANIZED HEALTH CARE EDUCATION/TRAINING PROGRAM

## 2019-04-12 PROCEDURE — 81001 URINALYSIS AUTO W/SCOPE: CPT

## 2019-04-12 PROCEDURE — 74011250637 HC RX REV CODE- 250/637: Performed by: STUDENT IN AN ORGANIZED HEALTH CARE EDUCATION/TRAINING PROGRAM

## 2019-04-12 PROCEDURE — 96361 HYDRATE IV INFUSION ADD-ON: CPT

## 2019-04-12 PROCEDURE — 99285 EMERGENCY DEPT VISIT HI MDM: CPT

## 2019-04-12 PROCEDURE — 80053 COMPREHEN METABOLIC PANEL: CPT

## 2019-04-12 PROCEDURE — 87804 INFLUENZA ASSAY W/OPTIC: CPT

## 2019-04-12 PROCEDURE — 85025 COMPLETE CBC W/AUTO DIFF WBC: CPT

## 2019-04-12 PROCEDURE — 71046 X-RAY EXAM CHEST 2 VIEWS: CPT

## 2019-04-12 PROCEDURE — 36415 COLL VENOUS BLD VENIPUNCTURE: CPT

## 2019-04-12 RX ORDER — ONDANSETRON 2 MG/ML
4 INJECTION INTRAMUSCULAR; INTRAVENOUS
Status: COMPLETED | OUTPATIENT
Start: 2019-04-12 | End: 2019-04-12

## 2019-04-12 RX ORDER — KETOROLAC TROMETHAMINE 30 MG/ML
15 INJECTION, SOLUTION INTRAMUSCULAR; INTRAVENOUS
Status: COMPLETED | OUTPATIENT
Start: 2019-04-12 | End: 2019-04-12

## 2019-04-12 RX ORDER — BUTALBITAL, ACETAMINOPHEN AND CAFFEINE 50; 325; 40 MG/1; MG/1; MG/1
1 TABLET ORAL
Status: COMPLETED | OUTPATIENT
Start: 2019-04-12 | End: 2019-04-12

## 2019-04-12 RX ORDER — ACETAMINOPHEN 500 MG
1000 TABLET ORAL
Status: COMPLETED | OUTPATIENT
Start: 2019-04-12 | End: 2019-04-12

## 2019-04-12 RX ORDER — NITROFURANTOIN 25; 75 MG/1; MG/1
100 CAPSULE ORAL 2 TIMES DAILY
Qty: 6 CAP | Refills: 0 | Status: SHIPPED | OUTPATIENT
Start: 2019-04-12 | End: 2019-04-15

## 2019-04-12 RX ADMIN — WATER 1 G: 1 INJECTION INTRAMUSCULAR; INTRAVENOUS; SUBCUTANEOUS at 14:12

## 2019-04-12 RX ADMIN — BUTALBITAL, ACETAMINOPHEN AND CAFFEINE 1 TABLET: 50; 325; 40 TABLET ORAL at 14:11

## 2019-04-12 RX ADMIN — SODIUM CHLORIDE 1000 ML: 900 INJECTION, SOLUTION INTRAVENOUS at 14:09

## 2019-04-12 RX ADMIN — ONDANSETRON 4 MG: 2 INJECTION INTRAMUSCULAR; INTRAVENOUS at 12:23

## 2019-04-12 RX ADMIN — KETOROLAC TROMETHAMINE 15 MG: 30 INJECTION, SOLUTION INTRAMUSCULAR at 12:55

## 2019-04-12 RX ADMIN — ACETAMINOPHEN 1000 MG: 500 TABLET ORAL at 12:24

## 2019-04-12 RX ADMIN — SODIUM CHLORIDE 1000 ML: 900 INJECTION, SOLUTION INTRAVENOUS at 12:22

## 2019-04-12 NOTE — DISCHARGE INSTRUCTIONS

## 2019-04-12 NOTE — ED TRIAGE NOTES
Pt presents to ED with c/o headache, cough,fever and chest pain since yesterday. Pt with c/o fatigue.

## 2019-04-12 NOTE — LETTER
[facility] EMERGENCY DEPT 
[address] [facility] [place] [address] 
[phone] Work/School Note Date: 4/12/2019 To Whom It May concern: 
 
Yoni Holguin was seen and treated today in the emergency room by the following Dr Gurpreet Bryant.    
 
  
Sincerely, 
 
 
 
 
Isela Velasco RN

## 2019-04-14 ENCOUNTER — TELEPHONE (OUTPATIENT)
Dept: PRIMARY CARE CLINIC | Age: 39
End: 2019-04-14

## 2019-04-14 LAB
BACTERIA SPEC CULT: NORMAL
CC UR VC: NORMAL
SERVICE CMNT-IMP: NORMAL

## 2019-04-14 NOTE — TELEPHONE ENCOUNTER
On call note:    Patient called with a c/o fever and wheezing for the past 3-4 days. Reports that she went to ER on 4/12/2019 with the above symptoms where Xray, flu test was negative she was treated with one injectable  Abx and sent home with Macrobid X3 days for UTI. She reports that she is still wheezing and having fever of 103. She thinks she is having bad lung infection and asking if she can take Levaquin she has at home. Advised that I cannot advise her to take levaquin without evaluating her. Advised she can continue Albuterol as needed but she needs to see a provider. She can go to nearest ER/urgent care for proper diagnosis and treatment as soon as possible. She voiced understanding and thanked me.

## 2019-04-14 NOTE — ED PROVIDER NOTES
Patient is a 27-year-old female presenting to the emergency department complaining of headache, cough, fever or any chest pain. Patient states that symptoms began yesterday also complaining of fatigue. Patient has a long-standing history of anxiety.            Past Medical History:   Diagnosis Date    Anemia NEC     Anxiety attack     Asthma     B12 deficiency     Biliary dyskinesia 2/6/2017    Constipation     Depression     Gastroparesis 7/18/2014    Heart abnormalities     right atria at 30% per [facility]    Hyperlipidemia     IGT (impaired glucose tolerance)     Incontinence of urine     1 episode of bed wetting 1 mo ago    Insomnia     Kidney stone     Neuropathy     Pap smear for cervical cancer screening 11/11/2015    11/11/15 neg HPV neg    PTSD (post-traumatic stress disorder)     Stroke ([facility] [address] 2011    TIA    Urinary tract bacterial infections     Vaginal discharge 12/4/2013       Past Surgical History:   Procedure Laterality Date    EYE EXAM & TREATMENT      HX GI  may 2013    polyps removed,colonscopy         Family History:   Problem Relation Age of Onset    Diabetes Mother     Heart Disease Mother     Hypertension Mother     Stroke Mother     Liver Disease Mother     Dementia Mother     Kidney Disease Mother        Social History     Socioeconomic History    Marital status: SINGLE     Spouse name: Not on file    Number of children: Not on file    Years of education: Not on file    Highest education level: Not on file   Occupational History    Not on file   Social Needs    Financial resource strain: Not on file    Food insecurity:     Worry: Not on file     Inability: Not on file    Transportation needs:     Medical: Not on file     Non-medical: Not on file   Tobacco Use    Smoking status: Former Smoker     Types: Cigarettes    Smokeless tobacco: Never Used   Substance and Sexual Activity    Alcohol use: Not Currently     Alcohol/week: 0.0 oz     Comment: Rarely    Drug use: No    Sexual activity: Yes     Partners: Male     Birth control/protection: Pill   Lifestyle    Physical activity:     Days per week: Not on file     Minutes per session: Not on file    Stress: Not on file   Relationships    Social connections:     Talks on phone: Not on file     Gets together: Not on file     Attends Shinto service: Not on file     Active member of club or organization: Not on file     Attends meetings of clubs or organizations: Not on file     Relationship status: Not on file    Intimate partner violence:     Fear of current or ex partner: Not on file     Emotionally abused: Not on file     Physically abused: Not on file     Forced sexual activity: Not on file   Other Topics Concern    Not on file   Social History Narrative    Never , no children. Lives alone. ALLERGIES: Amoxicillin; Biaxin [clarithromycin]; and Flagyl [metronidazole]    Review of Systems   Constitutional: Positive for activity change, fatigue and fever. Respiratory: Positive for cough and chest tightness. Cardiovascular: Positive for chest pain. Neurological: Positive for headaches. All other systems reviewed and are negative. Vitals:    04/12/19 1340 04/12/19 1400 04/12/19 1430 04/12/19 1503   BP:  125/69  141/71   Pulse:   92 79   Resp:   23 16   Temp: 99.4 °F (37.4 °C)      SpO2:  94% 100% 100%            Physical Exam   Constitutional: She is oriented to person, place, and time. She appears well-developed and well-nourished. She appears distressed. Eyes: Pupils are equal, round, and reactive to light. Conjunctivae and EOM are normal.   Neck: Normal range of motion. Neck supple. Cardiovascular: Tachycardia present. Abdominal: Soft. There is no tenderness. Musculoskeletal: Normal range of motion. Neurological: She is alert and oriented to person, place, and time. Skin: Skin is warm. She is diaphoretic. Psychiatric: Her mood appears anxious.  Her speech is rapid and/or pressured. She is hyperactive.         MDM  Number of Diagnoses or Management Options  Acute cystitis with hematuria:      Amount and/or Complexity of Data Reviewed  Clinical lab tests: ordered and reviewed  Tests in the radiology section of CPT®: reviewed and ordered  Review and summarize past medical records: yes  Independent visualization of images, tracings, or specimens: yes    Risk of Complications, Morbidity, and/or Mortality  Presenting problems: moderate  Diagnostic procedures: moderate  Management options: moderate    Patient Progress  Patient progress: improved         Procedures

## 2019-05-29 ENCOUNTER — OFFICE VISIT (OUTPATIENT)
Dept: INTERNAL MEDICINE CLINIC | Age: 39
End: 2019-05-29

## 2019-05-29 VITALS
BODY MASS INDEX: 31.16 KG/M2 | SYSTOLIC BLOOD PRESSURE: 122 MMHG | WEIGHT: 187 LBS | DIASTOLIC BLOOD PRESSURE: 77 MMHG | TEMPERATURE: 98.7 F | HEART RATE: 86 BPM | RESPIRATION RATE: 18 BRPM | HEIGHT: 65 IN | OXYGEN SATURATION: 100 %

## 2019-05-29 DIAGNOSIS — F43.10 PTSD (POST-TRAUMATIC STRESS DISORDER): ICD-10-CM

## 2019-05-29 DIAGNOSIS — F41.8 DEPRESSION WITH ANXIETY: Primary | ICD-10-CM

## 2019-05-29 DIAGNOSIS — R73.02 IGT (IMPAIRED GLUCOSE TOLERANCE): ICD-10-CM

## 2019-05-29 DIAGNOSIS — Z87.898 HISTORY OF URINE COLOR CHANGES: ICD-10-CM

## 2019-05-29 DIAGNOSIS — F41.1 GAD (GENERALIZED ANXIETY DISORDER): ICD-10-CM

## 2019-05-29 DIAGNOSIS — E53.8 B12 DEFICIENCY: ICD-10-CM

## 2019-05-29 DIAGNOSIS — F39 MOOD DISORDER (HCC): ICD-10-CM

## 2019-05-29 DIAGNOSIS — B37.9 YEAST INFECTION: ICD-10-CM

## 2019-05-29 DIAGNOSIS — B96.89 BV (BACTERIAL VAGINOSIS): ICD-10-CM

## 2019-05-29 DIAGNOSIS — E03.9 ACQUIRED HYPOTHYROIDISM: ICD-10-CM

## 2019-05-29 DIAGNOSIS — K59.00 CONSTIPATION, UNSPECIFIED CONSTIPATION TYPE: ICD-10-CM

## 2019-05-29 DIAGNOSIS — N76.0 BV (BACTERIAL VAGINOSIS): ICD-10-CM

## 2019-05-29 DIAGNOSIS — E55.9 VITAMIN D DEFICIENCY: ICD-10-CM

## 2019-05-29 DIAGNOSIS — F43.21 COMPLICATED GRIEF: ICD-10-CM

## 2019-05-29 RX ORDER — CLINDAMYCIN HYDROCHLORIDE 300 MG/1
300 CAPSULE ORAL 3 TIMES DAILY
Qty: 30 CAP | Refills: 0 | Status: SHIPPED | OUTPATIENT
Start: 2019-05-29 | End: 2019-06-08

## 2019-05-29 RX ORDER — PAROXETINE HYDROCHLORIDE 20 MG/1
20 TABLET, FILM COATED ORAL DAILY
Qty: 30 TAB | Refills: 5 | Status: SHIPPED | OUTPATIENT
Start: 2019-05-29 | End: 2019-06-13 | Stop reason: SDUPTHER

## 2019-05-29 RX ORDER — FLUCONAZOLE 150 MG/1
150 TABLET ORAL DAILY
Qty: 1 TAB | Refills: 0 | Status: SHIPPED | OUTPATIENT
Start: 2019-05-29 | End: 2019-05-30

## 2019-05-29 NOTE — PROGRESS NOTES
HPI:  Presents for f/u thyroid, anxiety    Pt dx as complicated grief at counseling   Then, stopped going to Ms. Lawrence Avenda[facility] since she threatened to call the police and . Pt reports shaking a lot - mainly legs  Worsened by stress. Rambling thoughts, cannot stay on topic    Off wellbutrin x 2 weeks or so  Reports losing her med. Increased anger issues. Pt plans to get a boxing bag for an outlet to release tension. Resp illness - Rx'd pred and azithro  Sx improved. Vaginal dc - similar to prior BV    Poor appetite, eating very little. Past medical, Social, and Family history reviewed    Prior to Admission medications    Medication Sig Start Date End Date Taking? Authorizing Provider   ALPRAZolam Saba Amos) 1 mg tablet TAKE 1 TABLET BY MOUTH TWICE A DAY AS NEEDED FOR ANXIETY 3/27/19  Yes Vijay Queen MD   buPROPion XL (WELLBUTRIN XL) 300 mg XL tablet Take 1 Tab by mouth every morning. 3/27/19  Yes Vijay Queen MD   zolpidem (AMBIEN) 10 mg tablet Take 1 Tab by mouth nightly as needed for Sleep. Max Daily Amount: 10 mg. 3/18/19  Yes Vijay Queen MD   ergocalciferol (ERGOCALCIFEROL) 50,000 unit capsule Take 1 Cap by mouth every seven (7) days for 12 doses. 3/18/19 6/4/19 Yes Vijay Queen MD   levothyroxine (SYNTHROID) 75 mcg tablet Take 1 Tab by mouth Daily (before breakfast). 3/18/19  Yes Vijay Queen MD   TRI-SPRINTEC, 28, 0.18/0.215/0.25 mg-35 mcg (28) tab TAKE ONE TABLET BY MOUTH DAILY 2/20/19  Yes Vijay Queen MD   fluticasone [facility]) 50 mcg/actuation nasal spray 1 [place] by Both Nostrils route two (2) times daily as needed for Rhinitis. 1/11/19  Yes Vijay Queen MD   albuterol (PROVENTIL HFA, VENTOLIN HFA, PROAIR HFA) 90 mcg/actuation inhaler Take 2 Puffs by inhalation every four (4) hours as needed for Wheezing. 11/27/18  Yes Vijay Queen MD   cholecalciferol (VITAMIN D3) 1,000 unit cap Take 2 Caps by mouth daily.  11/2/18  Yes Maxx Lord, Tania Burt MD   butalbital-acetaminophen-caff (FIORICET) -40 mg per capsule Take 1 Cap by mouth every four (4) hours as needed for Pain for up to 20 doses. Max Daily Amount: 6 Caps. 1/2/17  Yes Gauri Kelly MD   guaiFENesin-dextromethorphan SR [facility] DM) 600-30 mg per tablet Take 1 Tab by mouth every twelve (12) hours as needed for Cough. Patient not taking: Reported on 3/27/2019 1/11/19   Karen Tijerina MD   benzonatate (TESSALON) 200 mg capsule Take 1 Cap by mouth three (3) times daily as needed for Cough. Patient not taking: Reported on 3/27/2019 11/27/18   Karen Tijerina MD   ondansetron [facility] ODT) 8 mg disintegrating tablet Take 1 Tab by mouth every eight (8) hours as needed for Nausea. Patient not taking: Reported on 3/27/2019 11/20/18   Karen Tijerina MD   cyanocobalamin, vitamin B-12, (B-12 KIT) 1,000 mcg/mL kit 1 mL by Injection route every thirty (30) days. Patient not taking: Reported on 3/27/2019 5/10/18   Karen Tijerina MD   vit B Cmplx 3-FA-Vit C-Biotin (NEPHRO MERCEDES RX) 1- mg-mg-mcg tablet Take 1 Tab by mouth daily. Patient not taking: Reported on 3/27/2019 4/17/18   Karen Tijerina MD   finasteride (PROPECIA) 1 mg tablet Take 1 mg by mouth daily. Provider, Historical   traMADol (ULTRAM) 50 mg tablet Take 1 Tab by mouth every eight (8) hours as needed for Pain. Max Daily Amount: 150 mg. Patient not taking: Reported on 3/27/2019 9/8/17   Karen Tijerina MD   multivit-min-folic acid-biotin 314-605 mcg chew Take 1 Tab by mouth daily. Patient not taking: Reported on 3/27/2019 5/23/17   Karen Tijerina MD   Omeprazole delayed release (PRILOSEC D/R) 20 mg tablet Take 1 Tab by mouth daily. Patient not taking: Reported on 3/27/2019 1/20/17   Karen Tijerina MD   magic mouthwash solution Take 5 mL by mouth three (3) times daily as needed for Pain.  Benadryl, maalox,, lidocaine in equal quantity  Patient not taking: Reported on 3/27/2019 1/20/17   Tino Eb Mendoza MD   cyanocobalamin (VITAMIN B12) 500 mcg tablet Take 1 Tab by mouth daily. Patient not taking: Reported on 3/27/2019 1/20/17   Mima Nolasco MD   cyanocobalamin (VITAMIN B12) 500 mcg tablet Take 1 Tab by mouth daily. Patient not taking: Reported on 3/27/2019 1/17/17   Mima Nolasco MD   Biotin 2,500 mcg cap Take 1 Cap by mouth daily. Provider, Historical   ibuprofen (ADVIL) 200 mg tablet Take 200 mg by mouth every eight (8) hours as needed for Pain. Provider, Historical          ROS  Complete ROS reviewed and negative or stable except as noted in HPI. Physical Exam   Constitutional: She is oriented to person, place, and time. She appears well-nourished. No distress. HENT:   Head: Normocephalic and atraumatic. Eyes: Pupils are equal, round, and reactive to light. EOM are normal. No scleral icterus. Neck: Normal range of motion. Neck supple. No JVD present. Cardiovascular: Normal rate, regular rhythm and normal heart sounds. Exam reveals no gallop and no friction rub. No murmur heard. Pulmonary/Chest: Effort normal and breath sounds normal. No respiratory distress. She has no wheezes. She has no rales. Abdominal: Soft. Bowel sounds are normal. She exhibits no distension. There is no tenderness. Musculoskeletal: Normal range of motion. She exhibits no edema. Lymphadenopathy:     She has no cervical adenopathy. Neurological: She is alert and oriented to person, place, and time. She exhibits normal muscle tone. Skin: Skin is warm. No rash noted. Psychiatric: Her mood appears anxious. Nursing note and vitals reviewed. Prior labs reviewed. Assessment/Plan:    ICD-10-CM ICD-9-CM    1. Depression with anxiety F41.8 300.4 REFERRAL TO PSYCHIATRY   2. Mood disorder (HCC) F39 296.90 REFERRAL TO PSYCHIATRY   3. DEEPA (generalized anxiety disorder) F41.1 300.02 REFERRAL TO PSYCHIATRY   4. Vitamin D deficiency E55.9 268.9 VITAMIN D, 25 HYDROXY   5.  PTSD (post-traumatic stress disorder) F43.10 309.81 REFERRAL TO PSYCHIATRY   6. Acquired hypothyroidism E03.9 244.9 CBC WITH AUTOMATED DIFF      T4, FREE      TSH 3RD GENERATION   7. Complicated grief B58.33 309.0 REFERRAL TO PSYCHIATRY    Z63.4     8. BV (bacterial vaginosis) N76.0 616.10 clindamycin (CLEOCIN) 300 mg capsule    B96.89 041.9    9. Yeast infection B37.9 112.9 fluconazole (DIFLUCAN) 150 mg tablet   10. History of urine color changes Z87.448 V13.00 CULTURE, URINE      URINALYSIS W/ RFLX MICROSCOPIC      AMB POC URINALYSIS DIP STICK AUTO W/O MICRO   11. B12 deficiency E53.8 266.2 VITAMIN B12   12. Constipation, unspecified constipation type K59.00 564.00    13. IGT (impaired glucose tolerance) R73.02 790.22 HEMOGLOBIN A1C WITH EAG      METABOLIC PANEL, COMPREHENSIVE     Follow-up and Dispositions    · Return in about 1 month (around 6/29/2019), or if symptoms worsen or fail to improve, for mood, results. results and schedule of future studies reviewed with patient  reviewed diet, exercise and weight   cardiovascular risk and specific lipid/LDL goals reviewed  reviewed medications and side effects in detail   Diflucan  Check thyroid, urine  [facility]   Continue xanax for now  Trial of paxil      >40 minutes time spent with >50% in counseling and coordination of care

## 2019-05-29 NOTE — PROGRESS NOTES
Exam room [address] [facility] Jessie Quinones is a 41 y.o. female    Chief Complaint   Patient presents with    Thyroid Problem    Anxiety     1. Have you been to the ER, urgent care clinic since your last visit? Hospitalized since your last visit? 4/12/2019,headache, cough, fever or any chest painheadache, cough, fever or any chest pain, [facility]    2. Have you seen or consulted any other health care providers outside of the [address] [place] [address] since your last visit? Include any pap smears or colon screening.  No

## 2019-05-30 LAB
25(OH)D3+25(OH)D2 SERPL-MCNC: 36.1 NG/ML (ref 30–100)
ALBUMIN SERPL-MCNC: 4.1 G/DL (ref 3.5–5.5)
ALBUMIN/GLOB SERPL: 1.2 {RATIO} (ref 1.2–2.2)
ALP SERPL-CCNC: 43 IU/L (ref 39–117)
ALT SERPL-CCNC: 6 IU/L (ref 0–32)
APPEARANCE UR: CLEAR
AST SERPL-CCNC: 10 IU/L (ref 0–40)
BACTERIA #/AREA URNS HPF: NORMAL /[HPF]
BASOPHILS # BLD AUTO: 0 X10E3/UL (ref 0–0.2)
BASOPHILS NFR BLD AUTO: 0 %
BILIRUB SERPL-MCNC: 0.3 MG/DL (ref 0–1.2)
BILIRUB UR QL STRIP: NEGATIVE
BUN SERPL-MCNC: 7 MG/DL (ref 6–20)
BUN/CREAT SERPL: 8 (ref 9–23)
CALCIUM SERPL-MCNC: 9.2 MG/DL (ref 8.7–10.2)
CASTS URNS QL MICRO: NORMAL /LPF
CHLORIDE SERPL-SCNC: 107 MMOL/L (ref 96–106)
CO2 SERPL-SCNC: 16 MMOL/L (ref 20–29)
COLOR UR: YELLOW
CREAT SERPL-MCNC: 0.85 MG/DL (ref 0.57–1)
EOSINOPHIL # BLD AUTO: 0.1 X10E3/UL (ref 0–0.4)
EOSINOPHIL NFR BLD AUTO: 2 %
EPI CELLS #/AREA URNS HPF: NORMAL /HPF (ref 0–10)
ERYTHROCYTE [DISTWIDTH] IN BLOOD BY AUTOMATED COUNT: 14.6 % (ref 12.3–15.4)
EST. AVERAGE GLUCOSE BLD GHB EST-MCNC: 114 MG/DL
GLOBULIN SER CALC-MCNC: 3.4 G/DL (ref 1.5–4.5)
GLUCOSE SERPL-MCNC: 103 MG/DL (ref 65–99)
GLUCOSE UR QL: NEGATIVE
HBA1C MFR BLD: 5.6 % (ref 4.8–5.6)
HCT VFR BLD AUTO: 35.3 % (ref 34–46.6)
HGB BLD-MCNC: 11.6 G/DL (ref 11.1–15.9)
HGB UR QL STRIP: ABNORMAL
IMM GRANULOCYTES # BLD AUTO: 0 X10E3/UL (ref 0–0.1)
IMM GRANULOCYTES NFR BLD AUTO: 0 %
KETONES UR QL STRIP: NEGATIVE
LEUKOCYTE ESTERASE UR QL STRIP: ABNORMAL
LYMPHOCYTES # BLD AUTO: 1.5 X10E3/UL (ref 0.7–3.1)
LYMPHOCYTES NFR BLD AUTO: 27 %
MCH RBC QN AUTO: 27.5 PG (ref 26.6–33)
MCHC RBC AUTO-ENTMCNC: 32.9 G/DL (ref 31.5–35.7)
MCV RBC AUTO: 84 FL (ref 79–97)
MICRO URNS: ABNORMAL
MONOCYTES # BLD AUTO: 0.6 X10E3/UL (ref 0.1–0.9)
MONOCYTES NFR BLD AUTO: 11 %
MUCOUS THREADS URNS QL MICRO: PRESENT
NEUTROPHILS # BLD AUTO: 3.2 X10E3/UL (ref 1.4–7)
NEUTROPHILS NFR BLD AUTO: 60 %
NITRITE UR QL STRIP: NEGATIVE
PH UR STRIP: 6 [PH] (ref 5–7.5)
PLATELET # BLD AUTO: 324 X10E3/UL (ref 150–450)
POTASSIUM SERPL-SCNC: 4.3 MMOL/L (ref 3.5–5.2)
PROT SERPL-MCNC: 7.5 G/DL (ref 6–8.5)
PROT UR QL STRIP: NEGATIVE
RBC # BLD AUTO: 4.22 X10E6/UL (ref 3.77–5.28)
RBC #/AREA URNS HPF: NORMAL /HPF (ref 0–2)
SODIUM SERPL-SCNC: 141 MMOL/L (ref 134–144)
SP GR UR: 1.01 (ref 1–1.03)
T4 FREE SERPL-MCNC: 1.19 NG/DL (ref 0.82–1.77)
TSH SERPL DL<=0.005 MIU/L-ACNC: 2.02 UIU/ML (ref 0.45–4.5)
UROBILINOGEN UR STRIP-MCNC: 0.2 MG/DL (ref 0.2–1)
VIT B12 SERPL-MCNC: 336 PG/ML (ref 232–1245)
WBC # BLD AUTO: 5.4 X10E3/UL (ref 3.4–10.8)
WBC #/AREA URNS HPF: NORMAL /HPF (ref 0–5)

## 2019-05-30 NOTE — PROGRESS NOTES
In general, labs look good. Thyroid dose is appropriate. Continue your current medications as discussed.

## 2019-05-31 LAB — BACTERIA UR CULT: NORMAL

## 2019-06-07 DIAGNOSIS — E55.9 VITAMIN D DEFICIENCY: ICD-10-CM

## 2019-06-07 RX ORDER — ERGOCALCIFEROL 1.25 MG/1
CAPSULE ORAL
Qty: 12 CAP | Refills: 0 | Status: SHIPPED | OUTPATIENT
Start: 2019-06-07 | End: 2020-05-19

## 2019-06-13 DIAGNOSIS — F41.8 DEPRESSION WITH ANXIETY: ICD-10-CM

## 2019-06-13 DIAGNOSIS — F41.0 PANIC DISORDER: ICD-10-CM

## 2019-06-13 RX ORDER — BUPROPION HYDROCHLORIDE 150 MG/1
TABLET ORAL
Qty: 90 TAB | Refills: 0 | OUTPATIENT
Start: 2019-06-13

## 2019-06-13 RX ORDER — PAROXETINE HYDROCHLORIDE 20 MG/1
20 TABLET, FILM COATED ORAL DAILY
Qty: 90 TAB | Refills: 1 | Status: SHIPPED | OUTPATIENT
Start: 2019-06-13 | End: 2019-06-17

## 2019-06-13 NOTE — TELEPHONE ENCOUNTER
Medication refill request:    Last Office Visit:  5/29/19  Next Office Visit:    Future Appointments   Date Time Provider Rafat Marisabel   9/19/2019 11:00 AM Julianna Posey MD [facility] SINDI COLE   10/9/2019 11:00 AM Christiana Calderon MD [address] verified.   yes    Patient requesting 90 day supply

## 2019-06-19 ENCOUNTER — DOCUMENTATION ONLY (OUTPATIENT)
Dept: INTERNAL MEDICINE CLINIC | Age: 39
End: 2019-06-19

## 2019-06-19 NOTE — PROGRESS NOTES
Called transferred from the call center due to rep stating that she wasn't able to understand the patient b/c the patient seemed to be crying. Patient stated that she was [place] a problem\" and needed to speak with Stephanie. She was notified that Tello Lao was out of the office and would be back in tomorrow morning. She requested an appt with Dr. John Child and was informed that he will be out of the office for the next several weeks but could be scheduled with a different provider but she stated that Dr. John Child and Tello Lao are the only ones that could help her. She kept repeating, \"I don't know what I'm going to do. \" The patient was then asked to take a deep breath and try to calm down so that I could help her. She said ok. She was then notified that a message would be left for Tello Lao when she returned to the office.

## 2019-07-02 ENCOUNTER — OFFICE VISIT (OUTPATIENT)
Dept: CARDIOLOGY CLINIC | Age: 39
End: 2019-07-02

## 2019-07-02 VITALS
HEIGHT: 65 IN | RESPIRATION RATE: 16 BRPM | SYSTOLIC BLOOD PRESSURE: 110 MMHG | WEIGHT: 189 LBS | OXYGEN SATURATION: 99 % | DIASTOLIC BLOOD PRESSURE: 76 MMHG | BODY MASS INDEX: 31.49 KG/M2 | HEART RATE: 102 BPM

## 2019-07-02 DIAGNOSIS — R07.9 CHEST PAIN, UNSPECIFIED TYPE: Primary | ICD-10-CM

## 2019-07-02 NOTE — PROGRESS NOTES
HISTORY OF PRESENT ILLNESS  Michelle Josue is a 41 y.o. female. Patient with anxiety, depression, here for evaluation of cp  Normal stress echo on 7/18 at [facility]  Past Medical History:   Diagnosis Date    Anemia NEC     Anxiety attack     Asthma     B12 deficiency     Biliary dyskinesia 0/2/4523    Complicated grief     Constipation     Depression     Gastroparesis 7/18/2014    Heart abnormalities     right atria at 30% per [facility]    Hyperlipidemia     IGT (impaired glucose tolerance)     Incontinence of urine     1 episode of bed wetting 1 mo ago    Insomnia     Kidney stone     Neuropathy     Pap smear for cervical cancer screening 11/11/2015    11/11/15 neg HPV neg    PTSD (post-traumatic stress disorder)     Stroke [facility] [address] 2011    TIA    Urinary tract bacterial infections     Vaginal discharge 12/4/2013     Past Surgical History:   Procedure Laterality Date    EYE EXAM & TREATMENT      HX GI  may 2013    polyps removed,colonscopy     Social History     Tobacco Use    Smoking status: Former Smoker     Types: Cigarettes    Smokeless tobacco: Never Used   Substance Use Topics    Alcohol use: Not Currently     Alcohol/week: 0.0 oz     Comment: Rarely    Drug use: No       HPI  Patient here for cp evaluation which has been ongoing for 2 weeks  It is independent of exertion lasts from few minutes to several hours and apparently not reproducible  Also c/o non provoked ecchymosis on her legs    Physical Exam  Physical Exam   Blood pressure 110/76, pulse (!) 102, resp. rate 16, height 5' 5\" (1.651 m), weight 189 lb (85.7 kg), SpO2 99 %. Constitutional: She is oriented to person, place, and time. She appears well-developed and well-nourished. No distress. HENT: Head: Normocephalic. Eyes: No scleral icterus. Neck: Normal range of motion. Neck supple. No JVD present. No tracheal deviation present. Cardiovascular: Normal rate, regular rhythm, normal heart sounds and intact distal pulses. Exam reveals no gallop and no friction rub. No murmur heard. Pulmonary/Chest: Effort normal and breath sounds normal. No stridor. No respiratory distress, wheezes or  rales. Abdominal: She exhibits no distension. Musculoskeletal: She exhibits no edema. Neurological: She is alert and oriented to person, place, and time. Coordination normal.   Skin: Skin is warm. No rash noted. Not diaphoretic. No erythema. Psychiatric:  Normal mood and affect. Behavior is normal.   Current Outpatient Medications on File Prior to Visit   Medication Sig Dispense Refill    ALPRAZolam (XANAX) 1 mg tablet TAKE 1 TABLET BY MOUTH TWICE A DAY AS NEEDED FOR ANXIETY 60 Tab 2    PARoxetine (PAXIL) 40 mg tablet Take 1 Tab by mouth daily. 90 Tab 1    ergocalciferol (ERGOCALCIFEROL) 50,000 unit capsule TAKE 1 CAPSULE BY MOUTH EVERY SEVEN (7) DAYS FOR 12 DOSES. 12 Cap 0    zolpidem (AMBIEN) 10 mg tablet Take 1 Tab by mouth nightly as needed for Sleep. Max Daily Amount: 10 mg. 90 Tab 1    levothyroxine (SYNTHROID) 75 mcg tablet Take 1 Tab by mouth Daily (before breakfast). 90 Tab 1    TRI-SPRINTEC, 28, 0.18/0.215/0.25 mg-35 mcg (28) tab TAKE ONE TABLET BY MOUTH DAILY 28 Tab 10    guaiFENesin-dextromethorphan SR (MUCINEX DM) 600-30 mg per tablet Take 1 Tab by mouth every twelve (12) hours as needed for Cough. (Patient not taking: Reported on 3/27/2019) 30 Tab 1    fluticasone (FLONASE) 50 mcg/actuation nasal spray 1 [place] by Both Nostrils route two (2) times daily as needed for Rhinitis. 1 Bottle 2    albuterol (PROVENTIL HFA, VENTOLIN HFA, PROAIR HFA) 90 mcg/actuation inhaler Take 2 Puffs by inhalation every four (4) hours as needed for Wheezing. 1 Inhaler 2    benzonatate (TESSALON) 200 mg capsule Take 1 Cap by mouth three (3) times daily as needed for Cough.  (Patient not taking: Reported on 3/27/2019) 30 Cap 1    ondansetron (ZOFRAN ODT) 8 mg disintegrating tablet Take 1 Tab by mouth every eight (8) hours as needed for Nausea. (Patient not taking: Reported on 3/27/2019) 30 Tab 2    cholecalciferol (VITAMIN D3) 1,000 unit cap Take 2 Caps by mouth daily. 60 Cap 11    cyanocobalamin, vitamin B-12, (B-12 KIT) 1,000 mcg/mL kit 1 mL by Injection route every thirty (30) days. (Patient not taking: Reported on 3/27/2019) 3 Kit 1    vit B Cmplx 3-FA-Vit C-Biotin (NEPHRO MERCEDES RX) 1- mg-mg-mcg tablet Take 1 Tab by mouth daily. (Patient not taking: Reported on 3/27/2019) 30 Tab 5    finasteride (PROPECIA) 1 mg tablet Take 1 mg by mouth daily.  traMADol (ULTRAM) 50 mg tablet Take 1 Tab by mouth every eight (8) hours as needed for Pain. Max Daily Amount: 150 mg. (Patient not taking: Reported on 3/27/2019) 30 Tab 1    multivit-min-folic acid-biotin 034-653 mcg chew Take 1 Tab by mouth daily. (Patient not taking: Reported on 3/27/2019) 30 Tab 11    Omeprazole delayed release (PRILOSEC D/R) 20 mg tablet Take 1 Tab by mouth daily. (Patient not taking: Reported on 3/27/2019) 30 Tab 5    magic mouthwash solution Take 5 mL by mouth three (3) times daily as needed for Pain. Benadryl, maalox,, lidocaine in equal quantity (Patient not taking: Reported on 3/27/2019) 240 mL 1    cyanocobalamin (VITAMIN B12) 500 mcg tablet Take 1 Tab by mouth daily. (Patient not taking: Reported on 3/27/2019) 30 Tab 5    cyanocobalamin (VITAMIN B12) 500 mcg tablet Take 1 Tab by mouth daily. (Patient not taking: Reported on 3/27/2019) 30 Tab 11    butalbital-acetaminophen-caff (FIORICET) -40 mg per capsule Take 1 Cap by mouth every four (4) hours as needed for Pain for up to 20 doses. Max Daily Amount: 6 Caps. 20 Cap 0    Biotin 2,500 mcg cap Take 1 Cap by mouth daily.  ibuprofen (ADVIL) 200 mg tablet Take 200 mg by mouth every eight (8) hours as needed for Pain. No current facility-administered medications on file prior to visit. ASSESSMENT and PLAN  CP: it does have atypical and typical features.  , her ECG shows NSR and no significant st t changes   she is very anxious about her cp  proceed with stress echo  She denies tobacco etoh or illicit drugs    HTN: well controlled    Ecchymosis: I have asked her to follow up with her pcp but in the meanwhile we will obtain basic labs cbc ddimer crp and cmp    See her back after stress echo

## 2019-07-02 NOTE — PROGRESS NOTES
1. Have you been to the ER, urgent care clinic since your last visit? Hospitalized since your last visit? Yes When: 4/12/2019 Where: [facility] Reason for visit: Cough    2. Have you seen or consulted any other health care providers outside of the [address] [place] [address] since your last visit? Include any pap smears or colon screening. No     Patient reports med rec. Completed.      Chief Complaint   Patient presents with    Chest Pain (Angina)    Skin Discoloration     Bilateral legs       Visit Vitals  /76 (BP 1 Location: Left arm, BP Patient Position: Sitting)   Pulse (!) 102   Resp 16   Ht 5' 5\" (1.651 m)   Wt 189 lb (85.7 kg)   SpO2 99%   BMI 31.45 kg/m²

## 2019-07-02 NOTE — PATIENT INSTRUCTIONS
You will be scheduled for a stress echo after your appointment today. Please wear comfortable clothing (shorts or pants with a shirt or blouse) and walking/athletic shoes.     Do not eat or drink anything, except water, for at least 2 hours prior to your test.    Do take your scheduled medications prior to your test.    Please have your labs drawn

## 2019-07-03 ENCOUNTER — DOCUMENTATION ONLY (OUTPATIENT)
Dept: INTERNAL MEDICINE CLINIC | Age: 39
End: 2019-07-03

## 2019-07-03 DIAGNOSIS — R07.9 CHEST PAIN, UNSPECIFIED TYPE: Primary | ICD-10-CM

## 2019-07-03 NOTE — PROGRESS NOTES
Pt's cardiologist is requesting labs, orders in cc. Since pt hs a care card, she would like to get labs done in office instead of [facility]. Please place orders. Pt has lab appt scheduled for Fri 7/5/19.

## 2019-07-05 NOTE — PROGRESS NOTES
Labs ordered as per cardiology (Dr. Jake Cardoso) orders, to do here:    Future Appointments   Date Time Provider Rafat Gormani   7/5/2019 11:00 AM LAB [facility] SINDI SCHED   7/10/2019 11:30 AM Xin Graves MD [facility] S[facility] SCHED   8/5/2019  4:00 PM Irina Giang MD [place] [address]   9/19/2019 11:00 AM Leland Bravo MD [facility] SINDI SCHED   10/9/2019 11:00 AM Te Flynn MD [address] [place] [address]     Diagnoses and all orders for this visit:    1. Chest pain, unspecified type  -     METABOLIC PANEL, COMPREHENSIVE; Future  -     C REACTIVE PROTEIN, QT; Future  -     D DIMER; Future  -     CBC WITH AUTOMATED DIFF;  Future

## 2019-07-10 ENCOUNTER — TELEPHONE (OUTPATIENT)
Dept: CARDIOLOGY CLINIC | Age: 39
End: 2019-07-10

## 2019-07-10 ENCOUNTER — OFFICE VISIT (OUTPATIENT)
Dept: INTERNAL MEDICINE CLINIC | Age: 39
End: 2019-07-10

## 2019-07-10 ENCOUNTER — DOCUMENTATION ONLY (OUTPATIENT)
Dept: INTERNAL MEDICINE CLINIC | Age: 39
End: 2019-07-10

## 2019-07-10 VITALS
RESPIRATION RATE: 16 BRPM | TEMPERATURE: 98.6 F | SYSTOLIC BLOOD PRESSURE: 107 MMHG | DIASTOLIC BLOOD PRESSURE: 73 MMHG | HEART RATE: 66 BPM | BODY MASS INDEX: 30.68 KG/M2 | HEIGHT: 65 IN | OXYGEN SATURATION: 98 % | WEIGHT: 184.13 LBS

## 2019-07-10 DIAGNOSIS — R94.39 ABNORMAL STRESS ECG WITH TREADMILL: ICD-10-CM

## 2019-07-10 DIAGNOSIS — R10.13 EPIGASTRIC PAIN: Primary | ICD-10-CM

## 2019-07-10 DIAGNOSIS — R07.9 CHEST PAIN, UNSPECIFIED TYPE: ICD-10-CM

## 2019-07-10 DIAGNOSIS — R23.3 EASY BRUISING: ICD-10-CM

## 2019-07-10 DIAGNOSIS — K92.1 HEMATOCHEZIA: ICD-10-CM

## 2019-07-10 RX ORDER — ISOSORBIDE MONONITRATE 30 MG/1
30 TABLET, EXTENDED RELEASE ORAL DAILY
Qty: 30 TAB | Refills: 0 | Status: SHIPPED | OUTPATIENT
Start: 2019-07-10 | End: 2019-08-05

## 2019-07-10 NOTE — PROGRESS NOTES
History of Present Illness:   Gera Wallace is a 41 y.o. female here for evaluation:    Chief Complaint   Patient presents with    Bleeding/Bruising     Patient reports bruising and pain to bilateral legs. Reports pain as 7/10 to BLE. Patient has tried using ice     Fatigue     Patient reports feeling weak and fatigue for the past 2 weeks. Notes:  Patient fasting at this time. Patient reports chest pain occuing for the past 2 weeks, seen by cardiology. Pt here to have labs done--needs re-ordered here for coverage. Has orders from cardiology for H18, D-dimer, CRP quant and CMP. Has separate order from GI Dr. Lary Huitron for H18, hepatic function panel and basic metabolic. Labs ordered and done here as below. Cardiology labs had been ordered on 7-5-19 as requested for lab visit. Added GI labs today. She notes concern also about recent bruising. Reviewed labs above for eval with addition aPTT and PT/INR. Done today at visit. Has photos of bruisies on UE's and LE's but relates no specific trauma causing bruising. Referral reviewed with pt at visit--to hematology to evaluate. She is aware hematologists are also oncology specialists, but reviewed no concern for cancer at this time. She had concerns, b/c relative currently hospitalized with lung cancer. Reviewed stress echo done 7-3. Report provided to and reviewed with pt at visit. Reviewed normal echo portion with ST segment change with exercise that may be false positive. She notes CP continues, but stable pattern. She is aware cardiology will manage plan further once labs result, and stress testing implications clarified. She will contact Dr. Luciano Goltz to review after visit. Has follow-up with him on 8-5-19 to review evaluation and plan. Nursing screenings reviewed by provider at visit. Prior to Admission medications    Medication Sig Start Date End Date Taking?  Authorizing Provider   PARoxetine (PAXIL) 40 mg tablet Take 1 Tab by mouth daily. 6/17/19  Yes Calista Mcmahan MD   ergocalciferol (ERGOCALCIFEROL) 50,000 unit capsule TAKE 1 CAPSULE BY MOUTH EVERY SEVEN (7) DAYS FOR 12 DOSES. 6/7/19  Yes Calista Mcmahan MD   ALPRAZolam [facility]) 1 mg tablet TAKE 1 TABLET BY MOUTH TWICE A DAY AS NEEDED FOR ANXIETY 3/27/19  Yes Calista Mcmahan MD   zolpidem (AMBIEN) 10 mg tablet Take 1 Tab by mouth nightly as needed for Sleep. Max Daily Amount: 10 mg. 3/18/19  Yes Calista Mcmahan MD   levothyroxine (SYNTHROID) 75 mcg tablet Take 1 Tab by mouth Daily (before breakfast). 3/18/19  Yes Calista Mcmahan MD   TRI-SPRINTEC, 28, 0.18/0.215/0.25 mg-35 mcg (28) tab TAKE ONE TABLET BY MOUTH DAILY 2/20/19  Yes Calista Mcmahan MD   fluticasone [facility]) 50 mcg/actuation nasal spray 1 [place] by Both Nostrils route two (2) times daily as needed for Rhinitis. 1/11/19  Yes Calista Mcmahan MD   albuterol (PROVENTIL HFA, VENTOLIN HFA, PROAIR HFA) 90 mcg/actuation inhaler Take 2 Puffs by inhalation every four (4) hours as needed for Wheezing. 11/27/18  Yes Calista Mcmahan MD   cholecalciferol (VITAMIN D3) 1,000 unit cap Take 2 Caps by mouth daily. 11/2/18  Yes Calista Mcmahan MD   butalbital-acetaminophen-caff (FIORICET) -40 mg per capsule Take 1 Cap by mouth every four (4) hours as needed for Pain for up to 20 doses. Max Daily Amount: 6 Caps. 1/2/17  Yes Saadia Pereira MD   guaiFENesin-dextromethorphan [facility] DM) 600-30 mg per tablet Take 1 Tab by mouth every twelve (12) hours as needed for Cough. Patient not taking: Reported on 3/27/2019 1/11/19   Calista Mcmahan MD   benzonatate (TESSALON) 200 mg capsule Take 1 Cap by mouth three (3) times daily as needed for Cough. Patient not taking: Reported on 3/27/2019 11/27/18   Calista Mcmahan MD   ondansetron [facility] ODT) 8 mg disintegrating tablet Take 1 Tab by mouth every eight (8) hours as needed for Nausea.   Patient not taking: Reported on 3/27/2019 11/20/18 Eugenio Candelario MD   cyanocobalamin, vitamin B-12, (B-12 KIT) 1,000 mcg/mL kit 1 mL by Injection route every thirty (30) days. Patient not taking: Reported on 3/27/2019 5/10/18   Eugenio Candelario MD   vit B Cmplx 3-FA-Vit C-Biotin (NEPHRO MERCEDES RX) 1- mg-mg-mcg tablet Take 1 Tab by mouth daily. Patient not taking: Reported on 3/27/2019 4/17/18   Eugenio Candelario MD   finasteride (PROPECIA) 1 mg tablet Take 1 mg by mouth daily. Provider, Historical   traMADol (ULTRAM) 50 mg tablet Take 1 Tab by mouth every eight (8) hours as needed for Pain. Max Daily Amount: 150 mg. Patient not taking: Reported on 3/27/2019 9/8/17   Eugenio Candelario MD   multivit-min-folic acid-biotin 763-214 mcg chew Take 1 Tab by mouth daily. Patient not taking: Reported on 3/27/2019 5/23/17   Eugenio Candelario MD   Omeprazole delayed release (PRILOSEC D/R) 20 mg tablet Take 1 Tab by mouth daily. Patient not taking: Reported on 3/27/2019 1/20/17   Eugenio Candelario MD   magic mouthwash solution Take 5 mL by mouth three (3) times daily as needed for Pain. Benadryl, maalox,, lidocaine in equal quantity  Patient not taking: Reported on 3/27/2019 1/20/17   Eugenio Candelario MD   cyanocobalamin (VITAMIN B12) 500 mcg tablet Take 1 Tab by mouth daily. Patient not taking: Reported on 3/27/2019 1/20/17   Eugenio Candelario MD   cyanocobalamin (VITAMIN B12) 500 mcg tablet Take 1 Tab by mouth daily. Patient not taking: Reported on 3/27/2019 1/17/17   Eugenio Candelario MD   Biotin 2,500 mcg cap Take 1 Cap by mouth daily. Provider, Historical   ibuprofen (ADVIL) 200 mg tablet Take 200 mg by mouth every eight (8) hours as needed for Pain. Provider, Historical        ROS    Vitals:    07/10/19 1145   BP: 107/73   Pulse: 66   Resp: 16   Temp: 98.6 °F (37 °C)   TempSrc: Oral   SpO2: 98%   Weight: 184 lb 2 oz (83.5 kg)   Height: 5' 5\" (1.651 m)   PainSc:   8   PainLoc: Leg      Body mass index is 30.64 kg/m².     Physical Exam: Physical Exam   Constitutional: She appears well-developed and well-nourished. No distress. HENT:   Head: Normocephalic and atraumatic. Eyes: Conjunctivae are normal. Right eye exhibits no discharge. Left eye exhibits no discharge. No scleral icterus. Cardiovascular: Normal rate, regular rhythm, normal heart sounds and intact distal pulses. Exam reveals no gallop and no friction rub. No murmur heard. Pulmonary/Chest: Effort normal and breath sounds normal. No respiratory distress. She has no wheezes. She has no rales. She exhibits no tenderness. Abdominal: Soft. Bowel sounds are normal. She exhibits no distension. Musculoskeletal: She exhibits no edema or tenderness. Neurological: She is alert. She exhibits normal muscle tone. Coordination normal.   Skin: Skin is warm. No rash noted. She is not diaphoretic. No erythema. No pallor. Psychiatric: She has a normal mood and affect. Her behavior is normal. Judgment and thought content normal.       Assessment and Plan:       ICD-10-CM ICD-9-CM    1. Epigastric pain R10.13 789.06 HEPATIC FUNCTION PANEL      METABOLIC PANEL, BASIC   2. Hematochezia K92.1 578.1    3. Easy bruising R23.8 782.9 REFERRAL TO HEMATOLOGY ONCOLOGY      PROTHROMBIN TIME + INR      PTT   4. Chest pain, unspecified type R07.9 786.50 CBC WITH AUTOMATED DIFF      D DIMER      C REACTIVE PROTEIN, QT   5. Abnormal stress ECG with treadmill R94.39 794.39        1,2:  Dx undergoing eval with GI as above. Reviewed would fax requested labs to GI provider once they result. 3.  Eval and referral reviewed. 4.  Labs today as requested from cardiology--done at today's visit. 5.  Stress EKG portion abnormal as above. Pt to review with cardiology for further evaluation/recommendations. Follow-up and Dispositions    · Return in about 6 weeks (around 8/21/2019) for medication follow-up--with PCP Dr. Nga Lozano.        lab results and schedule of future lab studies reviewed with patient  reviewed medications and side effects in detail    For additional documentation of information and/or recommendations discussed this visit, please see notes in instructions. Plan and evaluation (above) reviewed with pt at visit  Patient voiced understanding of plan and provided with time to ask/review questions. After Visit Summary (AVS) provided to pt after visit with additional instructions as needed/reviewed.         Future Appointments   Date Time Provider Rafat Petit          8/5/2019  4:00 PM Hunter Schofield MD [place] [id number] [facility]   9/19/2019 11:00 AM Margarita Lim MD [facility] SCHED   10/9/2019 11:00 AM Abner Sanchez MD [address] [place] [address]

## 2019-07-10 NOTE — PATIENT INSTRUCTIONS
1.  Please contact Dr. Dorothy Pineda today or tomorrow to review stress testing and labs. Labs done today should result tomorrow afternoon or by Thursday. We will fax labs to GI provider as reviewed/requested. 2.  Please follow the following instructions to process/authorize your referral, if needed:    Referrals processing  Please verify with your insurance IF you need referral authorization submitted. For insurance plans which require this, please follow the following steps. FAILURE TO DO SO MAY RESULT IN INABILITY TO SEE THE SPECIALIST YOU HAVE BEEN REFERRED TO (once you are scheduled to see them). 1. Call and schedule appointment with specialist  2. Call our clinic and leave message with provider name, and date of appointment  3. We will then submit the referral to your insurance. This process takes 2-5 business days. If you have questions about scheduling or authorizing referral, you can review with our referral coordinator Abhi Aguiar). You can review with her today if available/if you have time, or you can call to review once you have made your referral/appointment. If you are not sure if you need referral authorizations, please review with the referral coordinator(s), either prior to or after you have made the appointment, as reviewed.

## 2019-07-10 NOTE — LETTER
2019 12:33 PM 
 
Ms. Leonarda Koyanagi [address] [facility] [address] [id number] :  1980 Attention: Reena Dowd MD--Gastroenterology Phone:  [phone] Fax:   [phone] Reena Dowd MD  Gastroenterology 2019 End  19 Phone: [phone]; Fax: [phone] Please see attached lab results--as requested Please let me know if you have other questions.  
 
Swathi Adkins MD

## 2019-07-10 NOTE — TELEPHONE ENCOUNTER
Per Dr. Kylah Wayne \"It is probably normal needs to come to see me as planned\"    Verified patient with two types of identifiers. Patient states that she was in to see Dr. Herman Hernandes who brought to her attention that she had EKG changes on her stress echo and instructed patient to call Cardiologist. Notified patient per Dr. Kylah Wayne that it is probably normal but to still follow up as planned. Patient states that she is still having chest pain and would like a recommendation prior to follow up. Patient states that she just had blood work done today at PCP and that those results will probably be ready tomorrow. Notified patient will call with lab results. Patient reiterated again that she wanted MD to know that she is still having on going chest pain.

## 2019-07-10 NOTE — LETTER
2019 12:39 PM 
 
Ms. Luz Lane [address] [facility] [id number] :  1980 Please see attached lab results. --Kidney and liver testing normal. 
 
--Blood counts are normal (white blood cells, hemoglobin/anemia testing, platelets). --Mild elevation in C-reactive protein--inflammatory marker--please follow-up with cardiology and hematology. --Normal aPTT and PT--testing for bruising. Hematology can complete further testing if needed, once you see them. --D-dimer elevated--as reviewed previously. Please let me know if you have other questions.  
 
Duke Ma MD

## 2019-07-10 NOTE — TELEPHONE ENCOUNTER
Pt called stating her heart test came back abnormal at her PCP office. She states she was told to contact her cardiologist and to get blood work done through her pcp.    Phone #[phone]  Thanks

## 2019-07-10 NOTE — TELEPHONE ENCOUNTER
Verified patient with two types of identifiers. Notified patient of MD recommendations. Verified pharmacy. Patient verbalized understanding and will call with any other questions.

## 2019-07-10 NOTE — TELEPHONE ENCOUNTER
Per Dr. Madden [place] significant cp she needs to go to er   ecg was probably a false normal    if she is agreeable we can try short trial with imdur 30 mg daily ( tell her headache a side effect at times)\"    Attempted to reach patient by telephone. A message was left for return call.

## 2019-07-10 NOTE — PROGRESS NOTES
RM 17    Patient fasting at this time. Patient reports chest pain occuing for the past 2 weeks, seen by cardiology. Chief Complaint   Patient presents with    Bleeding/Bruising     Patient reports bruising and pain to bilateral legs. Reports pain as 7/10 to BLE. Patient has tried using ice     Fatigue     Patient reports feeling weak and fatigue for the past 2 weeks. 1. Have you been to the ER, urgent care clinic since your last visit? Hospitalized since your last visit? No    2. Have you seen or consulted any other health care providers outside of the [address] [place] [address] since your last visit? Include any pap smears or colon screening. Yes Reason for visit: 5/26/19, [facility], chest pain    Health Maintenance Due   Topic Date Due    DTaP/Tdap/Td series (1 - Tdap) 10/02/2002    PAP AKA CERVICAL CYTOLOGY  11/11/2018     Abuse Screening Questionnaire 7/10/2019   Do you ever feel afraid of your partner? N   Are you in a relationship with someone who physically or mentally threatens you? N   Is it safe for you to go home?  Y       3 most recent PHQ Screens 9/9/2015   Little interest or pleasure in doing things More than half the days   Feeling down, depressed, irritable, or hopeless More than half the days   Total Score PHQ 2 4   Trouble falling or staying asleep, or sleeping too much More than half the days   Feeling tired or having little energy Not at all   Poor appetite, weight loss, or overeating More than half the days   Feeling bad about yourself - or that you are a failure or have let yourself or your family down Several days   Trouble concentrating on things such as school, work, reading, or watching TV Several days   Moving or speaking so slowly that other people could have noticed; or the opposite being so fidgety that others notice Not at all   Thoughts of being better off dead, or hurting yourself in some way Not at all   PHQ 9 Score 10   How difficult have these problems made it for you to do your work, take care of your home and get along with others Very difficult

## 2019-07-11 ENCOUNTER — DOCUMENTATION ONLY (OUTPATIENT)
Dept: INTERNAL MEDICINE CLINIC | Age: 39
End: 2019-07-11

## 2019-07-11 ENCOUNTER — HOSPITAL ENCOUNTER (OUTPATIENT)
Dept: CT IMAGING | Age: 39
Discharge: HOME | End: 2019-07-11
Attending: INTERNAL MEDICINE
Payer: SUBSIDIZED

## 2019-07-11 ENCOUNTER — TELEPHONE (OUTPATIENT)
Dept: CARDIOLOGY CLINIC | Age: 39
End: 2019-07-11

## 2019-07-11 DIAGNOSIS — R79.89 POSITIVE D DIMER: ICD-10-CM

## 2019-07-11 DIAGNOSIS — R07.9 CHEST PAIN, UNSPECIFIED TYPE: ICD-10-CM

## 2019-07-11 LAB
ALBUMIN SERPL-MCNC: 3.8 G/DL (ref 3.5–5.5)
ALP SERPL-CCNC: 45 IU/L (ref 39–117)
ALT SERPL-CCNC: 5 IU/L (ref 0–32)
AST SERPL-CCNC: 10 IU/L (ref 0–40)
BASOPHILS # BLD AUTO: 0 X10E3/UL (ref 0–0.2)
BASOPHILS NFR BLD AUTO: 0 %
BILIRUB DIRECT SERPL-MCNC: 0.06 MG/DL (ref 0–0.4)
BILIRUB SERPL-MCNC: 0.2 MG/DL (ref 0–1.2)
BUN SERPL-MCNC: 6 MG/DL (ref 6–20)
BUN/CREAT SERPL: 8 (ref 9–23)
CALCIUM SERPL-MCNC: 8.8 MG/DL (ref 8.7–10.2)
CHLORIDE SERPL-SCNC: 109 MMOL/L (ref 96–106)
CO2 SERPL-SCNC: 22 MMOL/L (ref 20–29)
CREAT SERPL-MCNC: 0.79 MG/DL (ref 0.57–1)
CRP SERPL-MCNC: 18 MG/L (ref 0–10)
EOSINOPHIL # BLD AUTO: 0.1 X10E3/UL (ref 0–0.4)
EOSINOPHIL NFR BLD AUTO: 2 %
ERYTHROCYTE [DISTWIDTH] IN BLOOD BY AUTOMATED COUNT: 14.7 % (ref 12.3–15.4)
GLUCOSE SERPL-MCNC: 77 MG/DL (ref 65–99)
HCT VFR BLD AUTO: 38.7 % (ref 34–46.6)
HGB BLD-MCNC: 11.9 G/DL (ref 11.1–15.9)
IMM GRANULOCYTES # BLD AUTO: 0 X10E3/UL (ref 0–0.1)
IMM GRANULOCYTES NFR BLD AUTO: 0 %
LYMPHOCYTES # BLD AUTO: 1.8 X10E3/UL (ref 0.7–3.1)
LYMPHOCYTES NFR BLD AUTO: 30 %
MCH RBC QN AUTO: 27.4 PG (ref 26.6–33)
MCHC RBC AUTO-ENTMCNC: 30.7 G/DL (ref 31.5–35.7)
MCV RBC AUTO: 89 FL (ref 79–97)
MONOCYTES # BLD AUTO: 0.5 X10E3/UL (ref 0.1–0.9)
MONOCYTES NFR BLD AUTO: 8 %
NEUTROPHILS # BLD AUTO: 3.6 X10E3/UL (ref 1.4–7)
NEUTROPHILS NFR BLD AUTO: 60 %
PLATELET # BLD AUTO: 304 X10E3/UL (ref 150–450)
POTASSIUM SERPL-SCNC: 4.6 MMOL/L (ref 3.5–5.2)
PROT SERPL-MCNC: 7.1 G/DL (ref 6–8.5)
RBC # BLD AUTO: 4.35 X10E6/UL (ref 3.77–5.28)
SODIUM SERPL-SCNC: 141 MMOL/L (ref 134–144)
WBC # BLD AUTO: 6 X10E3/UL (ref 3.4–10.8)

## 2019-07-11 PROCEDURE — 74011000258 HC RX REV CODE- 258: Performed by: INTERNAL MEDICINE

## 2019-07-11 PROCEDURE — 71275 CT ANGIOGRAPHY CHEST: CPT

## 2019-07-11 PROCEDURE — 74011636320 HC RX REV CODE- 636/320: Performed by: INTERNAL MEDICINE

## 2019-07-11 RX ORDER — SODIUM CHLORIDE 0.9 % (FLUSH) 0.9 %
10 SYRINGE (ML) INJECTION ONCE
Status: COMPLETED | OUTPATIENT
Start: 2019-07-11 | End: 2019-07-11

## 2019-07-11 RX ADMIN — IOPAMIDOL 80 ML: 755 INJECTION, SOLUTION INTRAVENOUS at 14:50

## 2019-07-11 RX ADMIN — SODIUM CHLORIDE 50 ML: 900 INJECTION, SOLUTION INTRAVENOUS at 14:50

## 2019-07-11 RX ADMIN — Medication 10 ML: at 14:50

## 2019-07-11 NOTE — PROGRESS NOTES
7/10/19 lab results faxed to [facility], Dr Brittany Medel, fax#: [phone].  Scanned into cc

## 2019-07-11 NOTE — TELEPHONE ENCOUNTER
Pt called in with questions about lab work that was completed at primary care yesterday. Pt stated that she is still having chest pain and is every concerned because she stated that her stress test was abnormal. Attempted to explain to patient the same message as yesterday. Pt stated several times she was having chest pain that is getting worse. Pt was informed that she should go to the ER for an evaluation. Pt was very anxious and kept saying that everything keeps coming back abnormal. Pt agreed to go to the ER. She was informed that I will let MD know that she will be going to the ER.

## 2019-07-11 NOTE — TELEPHONE ENCOUNTER
MD Vidal Ruiz, RN   Caller: Unspecified (Today,  9:47 AM)             Ok yes she needs to be further evaluated    discussed elevated ddimer late afternoon yesterday with Dr. Kennedy Ice needs CTA   Stress echo was probably normal

## 2019-07-15 ENCOUNTER — TELEPHONE (OUTPATIENT)
Dept: ONCOLOGY | Age: 39
End: 2019-07-15

## 2019-07-15 NOTE — TELEPHONE ENCOUNTER
Voicemail left requesting a call back. Called patient at the request of Dr. Raiza Avendaño to schedule a new patient appointment with Dr. Samia Holly, for easy bruising.

## 2019-07-17 ENCOUNTER — TELEPHONE (OUTPATIENT)
Dept: INTERNAL MEDICINE CLINIC | Age: 39
End: 2019-07-17

## 2019-07-20 DIAGNOSIS — F41.0 PANIC DISORDER: ICD-10-CM

## 2019-07-20 DIAGNOSIS — F41.0 PANIC ATTACKS: ICD-10-CM

## 2019-07-20 DIAGNOSIS — F41.1 GAD (GENERALIZED ANXIETY DISORDER): ICD-10-CM

## 2019-07-20 RX ORDER — ALPRAZOLAM 1 MG/1
TABLET ORAL
Qty: 30 TAB | Refills: 0 | OUTPATIENT
Start: 2019-07-20 | End: 2019-07-31 | Stop reason: SDUPTHER

## 2019-07-20 NOTE — TELEPHONE ENCOUNTER
On-Call Note:    Phoned refill into pharmacy for another pt. Pharmacy asked about refill for pt, as refill needed at that pharmacy and pt had called for refill today. Pt contacted pharmacy for refill, but none remaining. Pharmacist noted pt was having a panic attack on the phone when realized no further refills. Refill request already routed to Dr. Yash Meng today by pharmacy (documenting in that request). Prescription Monitoring Program ([place]) database query with fills:  06/19/2019 2 03/27/2019 Alprazolam 1 Mg Tablet 60 30 Ra Mor [id number] Vir (1418) 2 4.00 [facility] [place]   05/19/2019 2 03/27/2019 Alprazolam 1 Mg Tablet 60 30 Ra Mor [id number] Vir (1418) 1 4.[place] [facility] [place]   04/20/2019 2 03/27/2019 Alprazolam 1 Mg Tablet 60 30 Ra Mor [id number] Vir (1418) 0 4.00 [facility] [place]    Reviewed interim notes. Pt seeing [facility] for management. Approved interim script for #30 tabs as below by phone with pharmacist.  Pharmacy will notify pt regarding refill request.    Routed to PCP to review plan for further interim refills. Requested Prescriptions     Signed Prescriptions Disp Refills    ALPRAZolam (XANAX) 1 mg tablet 30 Tab 0     Sig: TAKE 1 TABLET BY MOUTH TWICE A DAY AS NEEDED FOR ANXIETY     Authorizing Provider: Oracio Villarreal     Note on request indicated:  \"REQUESTING REFILLS.  NEEDS BEFORE MONDAY FOR APPEAL AT 9AM.\"

## 2019-07-23 ENCOUNTER — TELEPHONE (OUTPATIENT)
Dept: CARDIOLOGY CLINIC | Age: 39
End: 2019-07-23

## 2019-07-23 NOTE — TELEPHONE ENCOUNTER
Patient states she needs to speak to you regarding her appointment on 7/23/19 with Dr. Michelle Hidalgo. She would like to know if there is any way she can be called instead of her having to come in to be seen.      Phone: [phone]

## 2019-07-24 NOTE — TELEPHONE ENCOUNTER
Verified patient with two patient identifiers. Spoke with patient and remind her appointment on Friday @ 2:40 to see .

## 2019-07-31 DIAGNOSIS — F41.0 PANIC ATTACKS: ICD-10-CM

## 2019-07-31 DIAGNOSIS — F41.0 PANIC DISORDER: ICD-10-CM

## 2019-07-31 DIAGNOSIS — F41.1 GAD (GENERALIZED ANXIETY DISORDER): ICD-10-CM

## 2019-07-31 NOTE — TELEPHONE ENCOUNTER
Pt accepted the 8/2/19 appt again she stated that she only has one pill left,pt would like if  would be willing to call her in four Xanax 1 mg to get her to her appt. Writer advised pt that I would send the message back but could not determine if he give her several pill's. [phone].

## 2019-07-31 NOTE — TELEPHONE ENCOUNTER
Pt states her panic attacks are getting worse. Pt states she is starting to have them daily. Pt states she has difficulty breathing and her legs get weak during her panic attacks. Pt states she is working on getting in with a counselor thru a [employer]. Pt is concerned about her panic attacks and the medication she is currently taking.  Please call pt - [phone]

## 2019-08-01 RX ORDER — ALPRAZOLAM 1 MG/1
1 TABLET ORAL
Qty: 30 TAB | Refills: 0 | Status: SHIPPED | OUTPATIENT
Start: 2019-08-01 | End: 2019-08-27 | Stop reason: SDUPTHER

## 2019-08-01 NOTE — TELEPHONE ENCOUNTER
Pt called all upset stating that she really need's to have this medication called in ASAP. Pt is scheudled to see a counselor Martina Gil today at 1pm pt know's that this Martina Gil will be asking her about her mom and other issue's that trigger's her anxiety. So please have  send a few pill's in by 1:00 pm,pt stated if she doesn't have the pill's she have to have a drink or two.

## 2019-08-02 ENCOUNTER — OFFICE VISIT (OUTPATIENT)
Dept: INTERNAL MEDICINE CLINIC | Age: 39
End: 2019-08-02

## 2019-08-02 VITALS
SYSTOLIC BLOOD PRESSURE: 121 MMHG | DIASTOLIC BLOOD PRESSURE: 86 MMHG | TEMPERATURE: 99 F | OXYGEN SATURATION: 100 % | RESPIRATION RATE: 14 BRPM | HEART RATE: 72 BPM | HEIGHT: 65 IN | WEIGHT: 182.13 LBS | BODY MASS INDEX: 30.34 KG/M2

## 2019-08-02 DIAGNOSIS — F41.1 GAD (GENERALIZED ANXIETY DISORDER): Primary | ICD-10-CM

## 2019-08-02 DIAGNOSIS — F41.0 PANIC ATTACKS: ICD-10-CM

## 2019-08-02 DIAGNOSIS — F41.8 DEPRESSION WITH ANXIETY: ICD-10-CM

## 2019-08-02 DIAGNOSIS — K59.00 CONSTIPATION, UNSPECIFIED CONSTIPATION TYPE: ICD-10-CM

## 2019-08-02 DIAGNOSIS — F41.0 PANIC DISORDER: ICD-10-CM

## 2019-08-02 DIAGNOSIS — R30.0 DYSURIA: ICD-10-CM

## 2019-08-02 LAB
BILIRUB UR QL STRIP: NEGATIVE
GLUCOSE UR-MCNC: NEGATIVE MG/DL
KETONES P FAST UR STRIP-MCNC: NEGATIVE MG/DL
PH UR STRIP: 6 [PH] (ref 4.6–8)
PROT UR QL STRIP: NEGATIVE
SP GR UR STRIP: 1 (ref 1–1.03)
UA UROBILINOGEN AMB POC: NORMAL (ref 0.2–1)
URINALYSIS CLARITY POC: CLEAR
URINALYSIS COLOR POC: YELLOW
URINE BLOOD POC: NORMAL
URINE LEUKOCYTES POC: NEGATIVE
URINE NITRITES POC: NEGATIVE

## 2019-08-02 RX ORDER — CLONAZEPAM 1 MG/1
1 TABLET ORAL 2 TIMES DAILY
Qty: 60 TAB | Refills: 2 | Status: SHIPPED | OUTPATIENT
Start: 2019-08-02 | End: 2019-08-28 | Stop reason: ALTCHOICE

## 2019-08-02 NOTE — PROGRESS NOTES
RM  
 
No chief complaint on file. 1. Have you been to the ER, urgent care clinic since your last visit? Hospitalized since your last visit? No 
 
2. Have you seen or consulted any other health care providers outside of the [address] [place] [address] since your last visit? Include any pap smears or colon screening. No 
 
Health Maintenance Due Topic Date Due  
 DTaP/Tdap/Td series (1 - Tdap) 10/02/2002  PAP AKA CERVICAL CYTOLOGY  11/11/2018  Influenza Age 5 to Adult  08/01/2019 Abuse Screening Questionnaire 7/10/2019 Do you ever feel afraid of your partner? Tamy Aver Are you in a relationship with someone who physically or mentally threatens you? Tamy Aver Is it safe for you to go home? Y  
 
3 most recent PHQ Screens 9/9/2015 Little interest or pleasure in doing things More than half the days Feeling down, depressed, irritable, or hopeless More than half the days Total Score PHQ 2 4 Trouble falling or staying asleep, or sleeping too much More than half the days Feeling tired or having little energy Not at all Poor appetite, weight loss, or overeating More than half the days Feeling bad about yourself - or that you are a failure or have let yourself or your family down Several days Trouble concentrating on things such as school, work, reading, or watching TV Several days Moving or speaking so slowly that other people could have noticed; or the opposite being so fidgety that others notice Not at all Thoughts of being better off dead, or hurting yourself in some way Not at all PHQ 9 Score 10 How difficult have these problems made it for you to do your work, take care of your home and get along with others Very difficult Learning Assessment 2/6/2017 PRIMARY LEARNER Patient HIGHEST LEVEL OF EDUCATION - PRIMARY LEARNER  -  
BARRIERS PRIMARY LEARNER -  
CO-LEARNER CAREGIVER No  
PRIMARY LANGUAGE ENGLISH  NEED No  
LEARNER PREFERENCE PRIMARY DEMONSTRATION  
  PICTURES  
 VIDEOS  
  LISTENING  
  READING  
LEARNING SPECIAL TOPICS -  
ANSWERED BY self RELATIONSHIP SELF  
ASSESSMENT COMMENT none
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
## 2019-08-02 NOTE — PROGRESS NOTES
RM 15    Patient reports while voiding it burns. Reports color of apple juice       Chief Complaint   Patient presents with    Medication Evaluation    Panic Attack     1. Have you been to the ER, urgent care clinic since your last visit? Hospitalized since your last visit? No    2. Have you seen or consulted any other health care providers outside of the [address] [place] [address] since your last visit? Include any pap smears or colon screening. No     Health Maintenance Due   Topic Date Due    DTaP/Tdap/Td series (1 - Tdap) 10/02/2002    PAP AKA CERVICAL CYTOLOGY  11/11/2018    Influenza Age 5 to Adult  08/01/2019     Abuse Screening Questionnaire 7/10/2019   Do you ever feel afraid of your partner? N   Are you in a relationship with someone who physically or mentally threatens you? N   Is it safe for you to go home?  Y     3 most recent PHQ Screens 9/9/2015   Little interest or pleasure in doing things More than half the days   Feeling down, depressed, irritable, or hopeless More than half the days   Total Score PHQ 2 4   Trouble falling or staying asleep, or sleeping too much More than half the days   Feeling tired or having little energy Not at all   Poor appetite, weight loss, or overeating More than half the days   Feeling bad about yourself - or that you are a failure or have let yourself or your family down Several days   Trouble concentrating on things such as school, work, reading, or watching TV Several days   Moving or speaking so slowly that other people could have noticed; or the opposite being so fidgety that others notice Not at all   Thoughts of being better off dead, or hurting yourself in some way Not at all   PHQ 9 Score 10   How difficult have these problems made it for you to do your work, take care of your home and get along with others Very difficult     Learning Assessment 2/6/2017   PRIMARY LEARNER Patient   HIGHEST LEVEL OF EDUCATION - PRIMARY LEARNER  -   BARRIERS PRIMARY LEARNER - CO-LEARNER CAREGIVER No   PRIMARY LANGUAGE ENGLISH    NEED No   LEARNER PREFERENCE PRIMARY DEMONSTRATION     PICTURES     VIDEOS     LISTENING     READING   LEARNING SPECIAL TOPICS -   ANSWERED BY self   RELATIONSHIP SELF   ASSESSMENT COMMENT none

## 2019-08-04 LAB
APPEARANCE UR: CLEAR
BACTERIA #/AREA URNS HPF: NORMAL /[HPF]
BACTERIA UR CULT: NORMAL
BILIRUB UR QL STRIP: NEGATIVE
CASTS URNS QL MICRO: NORMAL /LPF
COLOR UR: YELLOW
EPI CELLS #/AREA URNS HPF: NORMAL /HPF (ref 0–10)
GLUCOSE UR QL: NEGATIVE
HGB UR QL STRIP: ABNORMAL
KETONES UR QL STRIP: NEGATIVE
LEUKOCYTE ESTERASE UR QL STRIP: NEGATIVE
MICRO URNS: ABNORMAL
NITRITE UR QL STRIP: NEGATIVE
PH UR STRIP: 6 [PH] (ref 5–7.5)
PROT UR QL STRIP: NEGATIVE
RBC #/AREA URNS HPF: NORMAL /HPF (ref 0–2)
SP GR UR: 1 (ref 1–1.03)
UROBILINOGEN UR STRIP-MCNC: 0.2 MG/DL (ref 0.2–1)
WBC #/AREA URNS HPF: NORMAL /HPF (ref 0–5)

## 2019-08-05 ENCOUNTER — OFFICE VISIT (OUTPATIENT)
Dept: CARDIOLOGY CLINIC | Age: 39
End: 2019-08-05

## 2019-08-05 VITALS
SYSTOLIC BLOOD PRESSURE: 112 MMHG | HEART RATE: 69 BPM | DIASTOLIC BLOOD PRESSURE: 77 MMHG | HEIGHT: 65 IN | OXYGEN SATURATION: 99 % | WEIGHT: 180 LBS | RESPIRATION RATE: 16 BRPM | BODY MASS INDEX: 29.99 KG/M2

## 2019-08-05 DIAGNOSIS — R07.9 CHEST PAIN, UNSPECIFIED TYPE: Primary | ICD-10-CM

## 2019-08-05 RX ORDER — BUPROPION HYDROCHLORIDE 150 MG/1
TABLET, EXTENDED RELEASE ORAL 2 TIMES DAILY
COMMUNITY
End: 2019-09-19

## 2019-08-05 NOTE — PROGRESS NOTES
HISTORY OF PRESENT ILLNESS  Meredith Guy is a 41 y.o. female. Patient with anxiety, depression, here for evaluation of cp  Normal stress echo on 7/18 at [facility]  · Stress echo on 7/19:Baseline ECG: Normal sinus rhythm, non-specific ST-T wave abnormalities. · Moderate risk Duke treadmill score. · Abnormal ECG findings s/o ischemia but rapid resolution of st depression in the first minute into recovery suggest the possibility of a false positive. · Image quality is adequate. Probably normal stress echo No MR or AS Trace to mild TR no PHTN        chest CT on 7/11/19:  No acute pulmonary embolism or other acute abnormality in the chest.               Past Medical History:   Diagnosis Date    Anemia NEC      Anxiety attack      Asthma      B12 deficiency      Biliary dyskinesia 7/5/1057    Complicated grief      Constipation      Depression      Gastroparesis 7/18/2014    Heart abnormalities       right atria at 30% per [facility]    Hyperlipidemia      IGT (impaired glucose tolerance)      Incontinence of urine       1 episode of bed wetting 1 mo ago    Insomnia      Kidney stone      Neuropathy      Pap smear for cervical cancer screening 11/11/2015     11/11/15 neg HPV neg    PTSD (post-traumatic stress disorder)      Stroke ([facility] [address] 2011     TIA    Urinary tract bacterial infections      Vaginal discharge 12/4/2013            Past Surgical History:   Procedure Laterality Date    EYE EXAM & TREATMENT        HX GI   may 2013     polyps removed,colonscopy      Social History            Tobacco Use    Smoking status: Former Smoker       Types: Cigarettes    Smokeless tobacco: Never Used   Substance Use Topics    Alcohol use: Not Currently       Alcohol/week: 0.0 oz       Comment: Rarely    Drug use: No           HPI  Still with right sided chest pain without activities for the most part lasting 2 minutes  Review of Systems   Cardiovascular: Positive for chest pain.        Physical Exam  Visit Vitals  /77 (BP 1 Location: Left arm, BP Patient Position: Sitting)   Pulse 69   Resp 16   Ht 5' 5\" (1.651 m)   Wt 180 lb (81.6 kg)   SpO2 99%   BMI 29.95 kg/m²       Lab Results   Component Value Date/Time    D-dimer 0.59 06/07/2015 06:08 AM     Lab Results   Component Value Date/Time    Sodium 141 07/10/2019 01:54 PM    Potassium 4.6 07/10/2019 01:54 PM    Chloride 109 (H) 07/10/2019 01:54 PM    CO2 22 07/10/2019 01:54 PM    Anion gap 14 04/12/2019 12:19 PM    Glucose 77 07/10/2019 01:54 PM    Glucose 78 06/26/2015 04:45 AM    BUN 6 07/10/2019 01:54 PM    Creatinine 0.79 07/10/2019 01:54 PM    BUN/Creatinine ratio 8 (L) 07/10/2019 01:54 PM    GFR est AA >60 04/12/2019 12:19 PM    GFR est non-AA >60 04/12/2019 12:19 PM    Calcium 8.8 07/10/2019 01:54 PM     Lab Results   Component Value Date/Time    INR 0.9 07/10/2019 01:56 PM    INR 0.9 08/14/2015 03:56 PM    Prothrombin time 9.5 07/10/2019 01:56 PM    Prothrombin time 9.9 08/14/2015 03:56 PM     Lab Results   Component Value Date/Time    aPTT 27 07/10/2019 01:56 PM     Lab Results   Component Value Date/Time    ALT (SGPT) 5 07/10/2019 01:54 PM    AST (SGOT) 10 07/10/2019 01:54 PM    Alk. phosphatase 45 07/10/2019 01:54 PM    Bilirubin, direct 0.06 07/10/2019 01:54 PM    Bilirubin, total 0.2 07/10/2019 01:54 PM       ASSESSMENT and PLAN  CP: for the most part atypical   essentially normal stress echo and no changes with imdur   I suspect non cardiac chest pain     Chest ct also with no pe or other abnormalities   anxiety? Costochondritis?   Stop imdur and start nsaids motrin 200 bid  Not higher dose also for bruising  Follow up with hematology for elevated ddimer   her ECG showed  NSR and no significant st t changes   she is very anxious     She denies tobacco etoh or illicit drugs     HTN: well controlled     Ecchymosis: I have asked her to follow up with her pcp and hematology       See her back in 10 days

## 2019-08-05 NOTE — PATIENT INSTRUCTIONS
Stop taking imdur. Start taking motrin 200 mg (over the counter) twice a day. Follow up with Dr. Jorden Dc in 10 days.

## 2019-08-05 NOTE — PROGRESS NOTES
Visit Vitals  /77 (BP 1 Location: Left arm, BP Patient Position: Sitting)   Pulse 69   Resp 16   Ht 5' 5\" (1.651 m)   Wt 180 lb (81.6 kg)   SpO2 99%   BMI 29.95 kg/m²

## 2019-08-06 ENCOUNTER — OFFICE VISIT (OUTPATIENT)
Dept: ONCOLOGY | Age: 39
End: 2019-08-06

## 2019-08-06 VITALS
BODY MASS INDEX: 29.66 KG/M2 | OXYGEN SATURATION: 97 % | HEIGHT: 65 IN | WEIGHT: 178 LBS | DIASTOLIC BLOOD PRESSURE: 74 MMHG | TEMPERATURE: 99.7 F | HEART RATE: 67 BPM | SYSTOLIC BLOOD PRESSURE: 108 MMHG

## 2019-08-06 DIAGNOSIS — D64.9 ANEMIA, UNSPECIFIED TYPE: Primary | ICD-10-CM

## 2019-08-06 DIAGNOSIS — F39 MOOD DISORDER (HCC): ICD-10-CM

## 2019-08-06 DIAGNOSIS — R58 BLEEDING: ICD-10-CM

## 2019-08-06 NOTE — PROGRESS NOTES
[facility] at [address] [address] [address], [address]  W: [phone]  F: [phone]    Reason for Visit:   Doreen Reyes is a 41 y.o. female who is seen in consultation at the request of Dr. Dr. Kvng Hernández for evaluation of easy bruising    Treatment History:   · none    History of Present Illness:   Patient is a 41 y.o. female seen for easy bruising. She is very anxious today. She has many complains and digresses frequently    She states that she has noted some easy bruising in the last 1 month. They show up without trauma and she has also noted some mottling. She has no other bleeding history. She has had colonoscopies for evaluation of constipation and these showed some benign polyps. She states that she is due for another colonoscopy with Dr. Chelsi lAlan this year. She has no nausea, vomiting or dysphagia. She has no appetite for 1 month. She has lost 6 lb in 1 month. She has noted chills. She does not think she has depression. She has noted some chest pain which she sees Dr. Kong Servin for. A CTA was unremakable. She has never been pregnant. Her father has a cancer - she does not know what type.     Does not smoke      Past Medical History:   Diagnosis Date    Anemia NEC     Anxiety attack     Asthma     B12 deficiency     Biliary dyskinesia 6/8/8765    Complicated grief     Constipation     Depression     Gastroparesis 7/18/2014    Heart abnormalities     right atria at 30% per [facility]    Hyperlipidemia     IGT (impaired glucose tolerance)     Incontinence of urine     1 episode of bed wetting 1 mo ago    Insomnia     Kidney stone     Neuropathy     Pap smear for cervical cancer screening 11/11/2015    11/11/15 neg HPV neg    PTSD (post-traumatic stress disorder)     Stroke ([facility] [address] 2011    TIA    Urinary tract bacterial infections     Vaginal discharge 12/4/2013      Past Surgical History:   Procedure Laterality Date    EYE EXAM & TREATMENT      HX GI  may 2013    polyps removed,colonscopy      Social History     Tobacco Use    Smoking status: Former Smoker     Types: Cigarettes    Smokeless tobacco: Never Used   Substance Use Topics    Alcohol use: Not Currently     Alcohol/week: 0.0 standard drinks     Comment: Rarely      Family History   Problem Relation Age of Onset    Diabetes Mother     Heart Disease Mother     Hypertension Mother     Stroke Mother     Liver Disease Mother     Dementia Mother     Kidney Disease Mother      Current Outpatient Medications   Medication Sig    buPROPion SR (WELLBUTRIN SR) 150 mg SR tablet Take  by mouth two (2) times a day.  clonazePAM (KLONOPIN) 1 mg tablet Take 1 Tab by mouth two (2) times a day. Max Daily Amount: 2 mg.  ALPRAZolam (XANAX) 1 mg tablet Take 1 Tab by mouth two (2) times daily as needed for Anxiety. Max Daily Amount: 2 mg.  ergocalciferol (ERGOCALCIFEROL) 50,000 unit capsule TAKE 1 CAPSULE BY MOUTH EVERY SEVEN (7) DAYS FOR 12 DOSES.  zolpidem (AMBIEN) 10 mg tablet Take 1 Tab by mouth nightly as needed for Sleep. Max Daily Amount: 10 mg.    TRI-SPRINTEC, 28, 0.18/0.215/0.25 mg-35 mcg (28) tab TAKE ONE TABLET BY MOUTH DAILY    fluticasone (FLONASE) 50 mcg/actuation nasal spray 1 [place] by Both Nostrils route two (2) times daily as needed for Rhinitis.  cholecalciferol (VITAMIN D3) 1,000 unit cap Take 2 Caps by mouth daily.  ibuprofen (ADVIL) 200 mg tablet Take 200 mg by mouth two (2) times a day. No current facility-administered medications for this visit. Allergies   Allergen Reactions    Amoxicillin Rash    Biaxin [Clarithromycin] Itching and Other (comments)     'eyes turn an funny color and she starts itching'    Flagyl [Metronidazole] Itching     Eyes change color        Review of Systems: A complete review of systems was obtained, negative except as described above.     Physical Exam:     Visit Vitals  /74 (BP 1 Location: Right arm, BP Patient Position: Sitting)   Pulse 67   Temp 99.7 °F (37.6 °C) (Oral)   Ht 5' 5\" (1.651 m)   Wt 178 lb (80.7 kg)   SpO2 97%   BMI 29.62 kg/m²     ECOG PS: 1  General: No distress  Eyes: PERRLA, anicteric sclerae  HENT: Atraumatic, OP clear  Neck: Supple  Lymphatic: No cervical, supraclavicular, or inguinal adenopathy  Respiratory: CTAB, normal respiratory effort  CV: Normal rate, regular rhythm, no murmurs, no peripheral edema  GI: Soft, nontender, nondistended, no masses, no hepatomegaly, no splenomegaly  MS: Normal gait and station. Digits without clubbing or cyanosis. Skin: No rashes, ecchymoses, or petechiae. Normal temperature, turgor, and texture. Though I did not see any bruises she had pictures with her that show some small bruises on her legs   Psych: Alert, oriented, appropriate affect, very anxious    Results:     Lab Results   Component Value Date/Time    WBC 6.0 07/10/2019 01:54 PM    HGB 11.9 07/10/2019 01:54 PM    HCT 38.7 07/10/2019 01:54 PM    PLATELET 533 62/24/3810 01:54 PM    MCV 89 07/10/2019 01:54 PM    ABS. NEUTROPHILS 3.6 07/10/2019 01:54 PM     Lab Results   Component Value Date/Time    Sodium 141 07/10/2019 01:54 PM    Potassium 4.6 07/10/2019 01:54 PM    Chloride 109 (H) 07/10/2019 01:54 PM    CO2 22 07/10/2019 01:54 PM    Glucose 77 07/10/2019 01:54 PM    Glucose 78 06/26/2015 04:45 AM    BUN 6 07/10/2019 01:54 PM    Creatinine 0.79 07/10/2019 01:54 PM    GFR est AA >60 04/12/2019 12:19 PM    GFR est non-AA >60 04/12/2019 12:19 PM    Calcium 8.8 07/10/2019 01:54 PM    Glucose POC 72 01/06/2017 05:09 PM     Lab Results   Component Value Date/Time    Bilirubin, total 0.2 07/10/2019 01:54 PM    ALT (SGPT) 5 07/10/2019 01:54 PM    AST (SGOT) 10 07/10/2019 01:54 PM    Alk.  phosphatase 45 07/10/2019 01:54 PM    Protein, total 7.1 07/10/2019 01:54 PM    Albumin 3.8 07/10/2019 01:54 PM    Globulin 4.7 (H) 04/12/2019 12:19 PM       Lab Results   Component Value Date/Time    Iron % saturation 18 10/29/2018 04:22 PM    TIBC 500 (H) 10/29/2018 04:22 PM    Ferritin 32 10/29/2018 04:22 PM    Vitamin B12 336 05/29/2019 10:35 AM    Folate 9.1 04/17/2018 04:47 PM    Sed rate (ESR) 21 02/02/2018 02:24 PM    C-Reactive Protein, Qt 18 (H) 07/10/2019 01:54 PM    TSH 2.020 05/29/2019 10:35 AM    JAYDA Direct Negative 08/06/2014 10:48 AM    Antinuclear Antibodies Direct Negative 08/11/2017 03:19 PM    Lipase 63 (L) 07/09/2016 09:17 AM    HEP C VIRUS AB <0.1 09/17/2014 11:21 AM    HIV SCREEN 4TH GENERATION WRFX Non Reactive 08/11/2017 03:19 PM    HIV 1/O/2 Abs <1.00 02/12/2016 04:22 PM     Lab Results   Component Value Date/Time    INR 0.9 07/10/2019 01:56 PM    aPTT 27 07/10/2019 01:56 PM    D-dimer 0.59 06/07/2015 06:08 AM    D-Dimer 1.45 (H) 07/10/2019 01:56 PM       Records reviewed and summarized above. Pathology report(s) reviewed above. Radiology report(s) reviewed above. Assessment:   1) Easy bruising    This is new in 1 month  Her history otherwise is not consistent with an underlying bleeding diathesis  Screening Coags - PT/PTT and platelets are also normal  Will r/o vWF deficiency   Rarely malignancies and autoimmune disorders may lead to acquired coaguopathies such as factor X deficiency and acquired inhibitors. Generally we would expect to see some abnormalities in PTT in tat case    2) Unexplained weight loss    CT CAP ordered   Encourage to follow up with Dr. Karlee Garcia for an EGD and Colonoscopy    3) Anxiety and depression  Per Dr. Anitha Vazquez    4) HM  Uptodate    Plan:     · Cbc diff, smear, LD, B12, Gammopathy eval, vWF panel, Factor VII and X   · CT CAP    RTC 3 weeks    I appreciate the opportunity to participate in Ms. Aracely kingston.     Signed By: Angélica Rosas MD

## 2019-08-06 NOTE — PROGRESS NOTES
Michelle Josue is a 41 y.o. female  Chief Complaint   Patient presents with    New Patient     Referred by  for easy bruising     1. Have you been to the ER, urgent care clinic since your last visit? Hospitalized since your last visit? No, this is the pt firs time here. 2. Have you seen or consulted any other health care providers outside of the [address] [place] [address] since your last visit? Include any pap smears or colon screening. No, this is the pt first time here. Pt has been having chest pains. And her cardiologist wants us to do another Dimer Test on pt.

## 2019-08-08 ENCOUNTER — OFFICE VISIT (OUTPATIENT)
Dept: INTERNAL MEDICINE CLINIC | Age: 39
End: 2019-08-08

## 2019-08-08 VITALS
HEART RATE: 73 BPM | WEIGHT: 187.2 LBS | SYSTOLIC BLOOD PRESSURE: 99 MMHG | RESPIRATION RATE: 17 BRPM | BODY MASS INDEX: 31.19 KG/M2 | TEMPERATURE: 98.2 F | DIASTOLIC BLOOD PRESSURE: 67 MMHG | HEIGHT: 65 IN | OXYGEN SATURATION: 100 %

## 2019-08-08 DIAGNOSIS — R23.3 EASY BRUISING: ICD-10-CM

## 2019-08-08 DIAGNOSIS — F41.8 DEPRESSION WITH ANXIETY: ICD-10-CM

## 2019-08-08 DIAGNOSIS — R10.11 RUQ PAIN: Primary | ICD-10-CM

## 2019-08-08 NOTE — PROGRESS NOTES
Exam room 17    lEina Stuart is a 41 y.o. female    Chief Complaint   Patient presents with    Abdominal Pain     right side     1. Have you been to the ER, urgent care clinic since your last visit? Hospitalized since your last visit? No    2. Have you seen or consulted any other health care providers outside of the [address] [place] [address] since your last visit? Include any pap smears or colon screening.  No     Visit Vitals  BP 99/67 (BP 1 Location: Left arm, BP Patient Position: Sitting)   Pulse 73   Temp 98.2 °F (36.8 °C) (Oral)   Resp 17   Ht 5' 5\" (1.651 m)   Wt 187 lb 3.2 oz (84.9 kg)   SpO2 100%   BMI 31.15 kg/m²

## 2019-08-08 NOTE — PROGRESS NOTES
History of Present Illness:   Leonarda Koyanagi is a 41 y.o. female here for evaluation:    Chief Complaint   Patient presents with    Abdominal Pain     right side     Notes pain in RUQ--started yesterday/last night. She had problems sleeping. Hurting this AM when she awakened. She took Aleve which didn't help--2 of the 220mg tabs. Eating makes pain worse--worse after hamburger. She is still drinking water today. She has not eaten today due to concerns about pain. No radiation. She notes no  symptoms. Reviewed evaluation and imaging. Prior to Admission medications    Medication Sig Start Date End Date Taking? Authorizing Provider   buPROPion SR [facility] SR) 150 mg SR tablet Take  by mouth two (2) times a day. Yes Provider, Historical   clonazePAM (KLONOPIN) 1 mg tablet Take 1 Tab by mouth two (2) times a day. Max Daily Amount: 2 mg. 8/2/19  Yes Tommy Carlton MD   ALPRAZolam Imtiaz Estes) 1 mg tablet Take 1 Tab by mouth two (2) times daily as needed for Anxiety. Max Daily Amount: 2 mg. 8/1/19  Yes Tommy Carlton MD   ergocalciferol (ERGOCALCIFEROL) 50,000 unit capsule TAKE 1 CAPSULE BY MOUTH EVERY SEVEN (7) DAYS FOR 12 DOSES. 6/7/19  Yes Tommy Carlton MD   zolpidem (AMBIEN) 10 mg tablet Take 1 Tab by mouth nightly as needed for Sleep. Max Daily Amount: 10 mg. 3/18/19  Yes Tommy Carlton MD   TRI-SPRINTEC, 28, 0.18/0.215/0.25 mg-35 mcg (28) tab TAKE ONE TABLET BY MOUTH DAILY 2/20/19  Yes Tommy Carlton MD   fluticasone [facility]) 50 mcg/actuation nasal spray 1 [place] by Both Nostrils route two (2) times daily as needed for Rhinitis. 1/11/19  Yes Tommy Carlton MD   cholecalciferol (VITAMIN D3) 1,000 unit cap Take 2 Caps by mouth daily. 11/2/18  Yes Tommy Carlton MD   ibuprofen (ADVIL) 200 mg tablet Take 200 mg by mouth two (2) times a day.    Yes Provider, Historical        ROS    Vitals:    08/08/19 1458   BP: 99/67   Pulse: 73   Resp: 17   Temp: 98.2 °F (36.8 °C)   TempSrc: Oral   SpO2: 100%   Weight: 187 lb 3.2 oz (84.9 kg)   Height: 5' 5\" (1.651 m)   PainSc:   9   PainLoc: Abdomen      Body mass index is 31.15 kg/m². Physical Exam:     Physical Exam   Constitutional: She appears well-developed and well-nourished. No distress. HENT:   Head: Normocephalic and atraumatic. Eyes: Conjunctivae are normal. Right eye exhibits no discharge. Left eye exhibits no discharge. No scleral icterus. Neck: Neck supple. Cardiovascular: Normal rate, regular rhythm, normal heart sounds and intact distal pulses. Exam reveals no gallop and no friction rub. No murmur heard. Pulmonary/Chest: Effort normal and breath sounds normal. No respiratory distress. She has no wheezes. She has no rales. She exhibits no tenderness. Abdominal: Soft. Bowel sounds are normal. She exhibits no distension and no mass. There is tenderness. There is no rebound and no guarding. RUQ pain. Not worse with exhalation. No other pain on abd exam.   Musculoskeletal: She exhibits no edema or tenderness. Neurological: She is alert. She exhibits normal muscle tone. Coordination normal.   Skin: Skin is warm. No rash noted. She is not diaphoretic. No erythema. No pallor. Psychiatric: She has a normal mood and affect. Her behavior is normal. Judgment and thought content normal.       Assessment and Plan:       ICD-10-CM ICD-9-CM    1. RUQ pain R10.11 789.01 CBC WITH AUTOMATED DIFF      HEPATIC FUNCTION PANEL      METABOLIC PANEL, BASIC      US ABD [facility]   2. Easy bruising R23.8 782.9 D DIMER      FACTOR VII ACTIVITY      FACTOR X ACTIVITY      FERRITIN      LD      VITAMIN B12      VON WILLEBRAND PANEL      GAMMOPATHY EVAL, SPEP/MAKSIM, IG QT/FLC      PATHOLOGIST REVIEW SMEARS       1. Labs and imaging reviewed. US scheduled (stat order) for pt as reviewed. 2.  Labs ordered for hematology as reviewed.       Follow-up and Dispositions    · Return if symptoms worsen or fail to improve.       lab results and schedule of future lab studies reviewed with patient  reviewed diet, exercise and weight control  reviewed medications and side effects in detail    For additional documentation of information and/or recommendations discussed this visit, please see notes in instructions. Plan and evaluation (above) reviewed with pt at visit  Patient voiced understanding of plan and provided with time to ask/review questions. After Visit Summary (AVS) provided to pt after visit with additional instructions as needed/reviewed.

## 2019-08-13 ENCOUNTER — HOSPITAL ENCOUNTER (OUTPATIENT)
Dept: CT IMAGING | Age: 39
End: 2019-08-13
Attending: INTERNAL MEDICINE
Payer: SUBSIDIZED

## 2019-08-13 ENCOUNTER — APPOINTMENT (OUTPATIENT)
Dept: CT IMAGING | Age: 39
End: 2019-08-13
Attending: INTERNAL MEDICINE
Payer: SUBSIDIZED

## 2019-08-13 ENCOUNTER — HOSPITAL ENCOUNTER (OUTPATIENT)
Dept: ULTRASOUND IMAGING | Age: 39
Discharge: HOME | End: 2019-08-13
Attending: INTERNAL MEDICINE
Payer: SUBSIDIZED

## 2019-08-13 DIAGNOSIS — R10.11 RUQ PAIN: ICD-10-CM

## 2019-08-13 PROCEDURE — 76705 ECHO EXAM OF ABDOMEN: CPT

## 2019-08-13 NOTE — TELEPHONE ENCOUNTER
Pt called the office very upset today the pt has not taken her Xanax yet this morning. Pt informed writer that something really bad happened yesterday to upset her,pt didn't give any detail's. Just that she was trying to hold off on taking the Xanax so that she would have enough to last her. Writer spoke with Beatriz Mendoza I then informed the pt that  wanted her to start the Klonopin and to just use the Xanax for break thru Panic Attack's. Pt then stated that she was unsure if she could go thru with her US and CT today with being so upset writer gave the pt Centralized Scheduling's number pt voiced understanding.

## 2019-08-15 LAB
ALBUMIN SERPL ELPH-MCNC: 3.5 G/DL (ref 2.9–4.4)
ALBUMIN SERPL-MCNC: 4.1 G/DL (ref 3.5–5.5)
ALBUMIN/GLOB SERPL: 0.9 {RATIO} (ref 0.7–1.7)
ALP SERPL-CCNC: 54 IU/L (ref 39–117)
ALPHA1 GLOB SERPL ELPH-MCNC: 0.3 G/DL (ref 0–0.4)
ALPHA2 GLOB SERPL ELPH-MCNC: 0.8 G/DL (ref 0.4–1)
ALT SERPL-CCNC: 10 IU/L (ref 0–32)
AST SERPL-CCNC: 14 IU/L (ref 0–40)
B-GLOBULIN SERPL ELPH-MCNC: 1.2 G/DL (ref 0.7–1.3)
BASOPHILS # BLD AUTO: 0 X10E3/UL (ref 0–0.2)
BASOPHILS NFR BLD AUTO: 1 %
BILIRUB DIRECT SERPL-MCNC: 0.1 MG/DL (ref 0–0.4)
BILIRUB SERPL-MCNC: 0.3 MG/DL (ref 0–1.2)
BUN SERPL-MCNC: 7 MG/DL (ref 6–20)
BUN/CREAT SERPL: 8 (ref 9–23)
CALCIUM SERPL-MCNC: 9.1 MG/DL (ref 8.7–10.2)
CHLORIDE SERPL-SCNC: 101 MMOL/L (ref 96–106)
CO2 SERPL-SCNC: 21 MMOL/L (ref 20–29)
CREAT SERPL-MCNC: 0.88 MG/DL (ref 0.57–1)
D DIMER PPP FEU-MCNC: 0.52 MG/L FEU (ref 0–0.49)
EOSINOPHIL # BLD AUTO: 0.1 X10E3/UL (ref 0–0.4)
EOSINOPHIL NFR BLD AUTO: 2 %
ERYTHROCYTE [DISTWIDTH] IN BLOOD BY AUTOMATED COUNT: 13.9 % (ref 12.3–15.4)
FACT VII ACT/NOR PPP: 103 % (ref 51–186)
FACT VIII ACT/NOR PPP: 139 % (ref 56–140)
FACT X ACT/NOR PPP: 146 % (ref 76–183)
FERRITIN SERPL-MCNC: 82 NG/ML (ref 15–150)
GAMMA GLOB SERPL ELPH-MCNC: 1.7 G/DL (ref 0.4–1.8)
GLOBULIN SER-MCNC: 4 G/DL (ref 2.2–3.9)
GLUCOSE SERPL-MCNC: 69 MG/DL (ref 65–99)
HCT VFR BLD AUTO: 38.4 % (ref 34–46.6)
HGB BLD-MCNC: 12.3 G/DL (ref 11.1–15.9)
IGA SERPL-MCNC: 152 MG/DL (ref 87–352)
IGG SERPL-MCNC: 1789 MG/DL (ref 700–1600)
IGM SERPL-MCNC: 54 MG/DL (ref 26–217)
IMM GRANULOCYTES # BLD AUTO: 0 X10E3/UL (ref 0–0.1)
IMM GRANULOCYTES NFR BLD AUTO: 0 %
INTERPRETATION SERPL IEP-IMP: ABNORMAL
KAPPA LC FREE SER-MCNC: 22.8 MG/L (ref 3.3–19.4)
KAPPA LC FREE/LAMBDA FREE SER: 1.66 {RATIO} (ref 0.26–1.65)
LAMBDA LC FREE SERPL-MCNC: 13.7 MG/L (ref 5.7–26.3)
LDH SERPL-CCNC: 141 IU/L (ref 119–226)
LYMPHOCYTES # BLD AUTO: 2.4 X10E3/UL (ref 0.7–3.1)
LYMPHOCYTES NFR BLD AUTO: 42 %
M PROTEIN SERPL ELPH-MCNC: ABNORMAL G/DL
MCH RBC QN AUTO: 27.6 PG (ref 26.6–33)
MCHC RBC AUTO-ENTMCNC: 32 G/DL (ref 31.5–35.7)
MCV RBC AUTO: 86 FL (ref 79–97)
MONOCYTES # BLD AUTO: 0.7 X10E3/UL (ref 0.1–0.9)
MONOCYTES NFR BLD AUTO: 12 %
NEUTROPHILS # BLD AUTO: 2.5 X10E3/UL (ref 1.4–7)
NEUTROPHILS NFR BLD AUTO: 43 %
PATH INTERP BLD-IMP: NORMAL
PATH REV BLD -IMP: NORMAL
PATHOLOGIST NAME: NORMAL
PLATELET # BLD AUTO: 291 X10E3/UL (ref 150–450)
PLEASE NOTE:, 149534: ABNORMAL
POTASSIUM SERPL-SCNC: 3.8 MMOL/L (ref 3.5–5.2)
PROT SERPL-MCNC: 7.5 G/DL (ref 6–8.5)
RBC # BLD AUTO: 4.45 X10E6/UL (ref 3.77–5.28)
SODIUM SERPL-SCNC: 137 MMOL/L (ref 134–144)
VIT B12 SERPL-MCNC: 453 PG/ML (ref 232–1245)
VWF AG ACT/NOR PPP IA: 136 % (ref 50–200)
VWF:RCO ACT/NOR PPP PL AGG: 152 % (ref 50–200)
WBC # BLD AUTO: 5.7 X10E3/UL (ref 3.4–10.8)

## 2019-08-27 ENCOUNTER — TELEPHONE (OUTPATIENT)
Dept: INTERNAL MEDICINE CLINIC | Age: 39
End: 2019-08-27

## 2019-08-27 DIAGNOSIS — F41.1 GAD (GENERALIZED ANXIETY DISORDER): ICD-10-CM

## 2019-08-27 DIAGNOSIS — F41.0 PANIC DISORDER: ICD-10-CM

## 2019-08-27 DIAGNOSIS — F41.0 PANIC ATTACKS: ICD-10-CM

## 2019-08-27 RX ORDER — ALPRAZOLAM 1 MG/1
1 TABLET ORAL
Qty: 20 TAB | Refills: 0 | OUTPATIENT
Start: 2019-08-27 | End: 2019-09-09 | Stop reason: SDUPTHER

## 2019-08-27 NOTE — TELEPHONE ENCOUNTER
Pt states the Klonopin is not working, pt states people are 'yelling' at her and 'she can't deal' with it. Pt states the Xanax works better and she wants to stay on this; however, pt only has 2 of these left. Pt requesting a refill on this med. Pt very tearful and hyperventilating.  Please call pt back - #: [phone]

## 2019-08-27 NOTE — TELEPHONE ENCOUNTER
On-Call Note:    Pt calling to clarify request for Xanax. Reviewed with pt by phone. Plan had been to limit Xanax by usin Clonazepam instead. She notes 1mg Clonazepam BID not effective. Alprazolam 1mg BID is. She has taken both together--advised only to take one or the other. Reviewed would approve limited Xanax but needs to be seen to evaluate by MH. She notes [facility] provider appt changed. On chart review she is seeing provider at [facility]. Prescription Monitoring Program ([place]) database query with fills:  08/14/2019 2 08/14/2019 Clonazepam 1 Mg Tablet 60.00 30 Cl Chi [id number] Vir (1418) 0 4.00 LME Private Pay [place]   08/13/2019 2 08/13/2019 Alprazolam 1 Mg Tablet 30.00 15 Cl Chi [id number] Vir (1418) 0 4.00 LME [place]   08/01/2019 2 08/01/2019 Alprazolam 1 Mg Tablet 30.00 15 Ra Mor [id number] Vir (1418) 0 4.00 [place]    She stated on phone would usually get #60 Alprazolam.  Reviewed would not give that many at this time. Reviewed note from earlier today and pending appt. Limited refill Xanax phoned to pharmacy as requested and reviewed. Requested Prescriptions     Signed Prescriptions Disp Refills    ALPRAZolam (XANAX) 1 mg tablet 20 Tab 0     Sig: Take 1 Tab by mouth two (2) times daily as needed for Anxiety. Max Daily Amount: 2 mg. Do not take with clonazepam.  Indications: Repeated Episodes of Anxiety     Authorizing Provider: Francesco Canela     Left VM with pharmacy with above refill and to cancel remaining clonazepam refills. VM confirmed sent.
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
## 2019-08-28 ENCOUNTER — OFFICE VISIT (OUTPATIENT)
Dept: INTERNAL MEDICINE CLINIC | Age: 39
End: 2019-08-28

## 2019-08-28 VITALS
DIASTOLIC BLOOD PRESSURE: 84 MMHG | HEART RATE: 70 BPM | WEIGHT: 184.5 LBS | BODY MASS INDEX: 30.74 KG/M2 | SYSTOLIC BLOOD PRESSURE: 123 MMHG | OXYGEN SATURATION: 98 % | RESPIRATION RATE: 16 BRPM | TEMPERATURE: 98.5 F | HEIGHT: 65 IN

## 2019-08-28 DIAGNOSIS — F41.0 PANIC DISORDER: ICD-10-CM

## 2019-08-28 DIAGNOSIS — F41.1 GAD (GENERALIZED ANXIETY DISORDER): Primary | ICD-10-CM

## 2019-08-28 RX ORDER — BUSPIRONE HYDROCHLORIDE 5 MG/1
5 TABLET ORAL
Status: CANCELLED | OUTPATIENT
Start: 2019-08-28

## 2019-08-28 NOTE — PROGRESS NOTES
History of Present Illness:   Ramone King is a 41 y.o. female here for evaluation:    Chief Complaint   Patient presents with    Panic Attack     Patient reports medication not effective for panic attacks.  Medication Evaluation     Here to rj above. Notes:  Patient states Clonazepam is not working, picked up Xanax this AM.   Patient states she has polyps in gall bladder, experience gall bladder pain at times. Notes had been interested in seeing Dr. Meagan Whatley at [address] [facility], but she heard he was leaving for [facility]. 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 She notes not having significant effect with clonazepam.  She had been taking 1mg BID. Worse anxiety symptoms in AM.  When took clonazepam in AM to try to control AM anxiety symptoms, noted no effect. She has bottle of clonazepam with ~40 tabs of #60 fill from 8-14-19. Had spoken with pt by phone (on-call) and changed to alprazolam 1mg BID instead night prior to to visit (last night). She was able to calm herself during visit when called by another person she was trying to get in touch with with work. Reviewed mgt with alprazolam here until sees [facility] provider as scheduled below. Nursing screenings reviewed by provider at visit. Prior to Admission medications    Medication Sig Start Date End Date Taking? Authorizing Provider   ALPRAZolam Casper Manner) 1 mg tablet Take 1 Tab by mouth two (2) times daily as needed for Anxiety. Max Daily Amount: 2 mg. Do not take with clonazepam.  Indications: Repeated Episodes of Anxiety 8/27/19  Yes Jerman Coppola MD   buPROPion SR [facility] SR) 150 mg SR tablet Take  by mouth two (2) times a day. Yes Provider, Historical   zolpidem (AMBIEN) 10 mg tablet Take 1 Tab by mouth nightly as needed for Sleep.  Max Daily Amount: 10 mg. 3/18/19  Yes Cy Morrow MD   TRI-SPRINTEC, 28, 0.18/0.215/0.25 mg-35 mcg (28) tab TAKE ONE TABLET BY MOUTH DAILY 2/20/19  Yes Cy Morrow MD   fluticasone (FLONASE) 50 mcg/actuation nasal spray 1 [place] by Both Nostrils route two (2) times daily as needed for Rhinitis. 1/11/19  Yes Calista Mcmahan MD   ibuprofen (ADVIL) 200 mg tablet Take 200 mg by mouth two (2) times a day. Yes Rocio Muir   clonazePAM (KLONOPIN) 1 mg tablet Take 1 Tab by mouth two (2) times a day. Max Daily Amount: 2 mg. 8/2/19   Calista Mcmahan MD   ergocalciferol (ERGOCALCIFEROL) 50,000 unit capsule TAKE 1 CAPSULE BY MOUTH EVERY SEVEN (7) DAYS FOR 12 DOSES. 6/7/19   Calista Mcmahan MD   cholecalciferol (VITAMIN D3) 1,000 unit cap Take 2 Caps by mouth daily. 11/2/18   Calista Mcmahan MD        ROS    Vitals:    08/28/19 1339   BP: 123/84   Pulse: 70   Resp: 16   Temp: 98.5 °F (36.9 °C)   TempSrc: Oral   SpO2: 98%   Weight: 184 lb 8 oz (83.7 kg)   Height: 5' 5\" (1.651 m)   PainSc:   0 - No pain      Body mass index is 30.7 kg/m². Physical Exam:     Physical Exam   Constitutional: She appears well-developed and well-nourished. No distress. HENT:   Head: Normocephalic and atraumatic. Eyes: Conjunctivae are normal. Right eye exhibits no discharge. Left eye exhibits no discharge. No scleral icterus. Cardiovascular: Normal rate, regular rhythm and normal heart sounds. Exam reveals no gallop and no friction rub. No murmur heard. Pulmonary/Chest: Effort normal and breath sounds normal. No stridor. No respiratory distress. Abdominal: She exhibits no distension. Neurological: She is alert. Coordination normal.   Skin: Skin is warm. No rash noted. She is not diaphoretic. No pallor. Psychiatric: She has a normal mood and affect. Her behavior is normal. Judgment and thought content normal.   Pt intermittently pacing in room during visit. Has leg shaking related to anxiety as well. Assessment and Plan:       ICD-10-CM ICD-9-CM    1. DEEPA (generalized anxiety disorder) F41.1 300.02    2.  Panic disorder F41.0 300.01        1,2:  Medication(s), management and follow-up based on response reviewed at visit. Plan alprazolam 1mg BID for anxiety mgt in interim until sees [place] [address] provider below. She will keep clonazepam in case changed/trial with [place] [address] provider once seen. She will continue Wellbutrin 150mg BID. She cannot tolerate buspirone due to significant ausea with 7.5mg dose. Follow-up and Dispositions    · Return if symptoms worsen or fail to improve. reviewed medications and side effects in detail    For additional documentation of information and/or recommendations discussed this visit, please see notes in instructions. Plan and evaluation (above) reviewed with pt at visit  Patient voiced understanding of plan and provided with time to ask/review questions. After Visit Summary (AVS) provided to pt after visit with additional instructions as needed/reviewed. Greater than 50% of the time in this 25min visit was spent by me in face-to-face counseling and coordination of care.       Future Appointments   Date Time Provider Rafat Petit   9/19/2019 11:00 AM Malik Christy MD [id number] [facility]

## 2019-08-28 NOTE — TELEPHONE ENCOUNTER
Pt called stating that Clonazepam doesn't work for her. She stated that she has severe attacks every morning. She stated the pharmacist recommends that she gets Valium instead. She stated that her therapist stated she needs to be on medication before she can come in for an appt. She also stated that her  Dr is leaving and she doesn't have insurance so it has been difficult finding someone that will see her. She would like to know what she would like a call back from [facility].  She is scheduled to come in at  w/ Dr. Kemar Oliver

## 2019-08-28 NOTE — PROGRESS NOTES
RM  18    Patient states she has polyps in gall bladder, experiences gall bladder pain at times. Chief Complaint   Patient presents with    Panic Attack     Patient states Clonazepam is effective for panic attacks, picked up Xanax this AM, which is effective     1. Have you been to the ER, urgent care clinic since your last visit? Hospitalized since your last visit? No    2. Have you seen or consulted any other health care providers outside of the [address] [place] [address] since your last visit? Include any pap smears or colon screening. No    Health Maintenance Due   Topic Date Due    DTaP/Tdap/Td series (1 - Tdap) 10/02/2002    PAP AKA CERVICAL CYTOLOGY  11/11/2018    Influenza Age 5 to Adult  08/01/2019       Abuse Screening Questionnaire 8/28/2019   Do you ever feel afraid of your partner? N   Are you in a relationship with someone who physically or mentally threatens you? N   Is it safe for you to go home?  Y     3 most recent PHQ Screens 8/28/2019   PHQ Not Done (No Data)   Little interest or pleasure in doing things -   Feeling down, depressed, irritable, or hopeless Nearly every day   Total Score PHQ 2 -   Trouble falling or staying asleep, or sleeping too much -   Feeling tired or having little energy -   Poor appetite, weight loss, or overeating -   Feeling bad about yourself - or that you are a failure or have let yourself or your family down -   Trouble concentrating on things such as school, work, reading, or watching TV -   Moving or speaking so slowly that other people could have noticed; or the opposite being so fidgety that others notice -   Thoughts of being better off dead, or hurting yourself in some way -   PHQ 9 Score -   How difficult have these problems made it for you to do your work, take care of your home and get along with others -     Learning Assessment 8/28/2019   PRIMARY LEARNER Patient   HIGHEST LEVEL OF EDUCATION - PRIMARY LEARNER  -   [address] LEARNER -   Petrona Merchant CAREGIVER -   PRIMARY LANGUAGE ENGLISH    NEED -   LEARNER PREFERENCE PRIMARY OTHER (COMMENT)     -     -     -     -   LEARNING SPECIAL TOPICS -   ANSWERED BY patient   RELATIONSHIP SELF   ASSESSMENT COMMENT -

## 2019-08-28 NOTE — PATIENT INSTRUCTIONS
Please let us know next week and can refill Xanax until your next appt. Keep the clonazepam until you see mental health provider in case they prefer to use that instead of current Xanax.

## 2019-09-09 DIAGNOSIS — F41.0 PANIC DISORDER: ICD-10-CM

## 2019-09-09 DIAGNOSIS — F41.0 PANIC ATTACKS: ICD-10-CM

## 2019-09-09 DIAGNOSIS — F41.1 GAD (GENERALIZED ANXIETY DISORDER): ICD-10-CM

## 2019-09-09 RX ORDER — ALPRAZOLAM 1 MG/1
1 TABLET ORAL
Qty: 20 TAB | Refills: 0 | Status: CANCELLED | OUTPATIENT
Start: 2019-09-09

## 2019-09-09 NOTE — TELEPHONE ENCOUNTER
----- Message from Cary Ying sent at 9/9/2019 11:15 AM EDT -----  Regarding: /Refill  Medication Refill    Caller (if not patient):      Relationship of caller (if not patient):      Best contact number(s): [phone]      Name of medication and dosage if known: xanax      Is patient out of this medication (yes/no): yes      Pharmacy name:[facility]    Pharmacy listed in chart? (yes/no): yes  Pharmacy phone number:      Details to clarify the request: Pt requesting medication refilled today 09/09/2019      Cary Ying

## 2019-09-09 NOTE — TELEPHONE ENCOUNTER
Medication refill request:    Last Office Visit:  8/28/19  Next Office Visit:    Future Appointments   Date Time Provider Rafat Petit   9/19/2019 11:00 AM Hernesto Christy MD [id number] [facility]

## 2019-09-10 RX ORDER — ALPRAZOLAM 1 MG/1
TABLET ORAL
Qty: 20 TAB | Refills: 0 | OUTPATIENT
Start: 2019-09-10 | End: 2019-09-18 | Stop reason: SDUPTHER

## 2019-09-10 NOTE — TELEPHONE ENCOUNTER
Reviewed with nursing. Last alprazolam script here 8-26-19 for #20 tabs. She has [address] appt as below:    Future Appointments   Date Time Provider Rafat Petit   9/19/2019 11:00 AM Bailee Christy MD [facility] SCHED     Refill approved and nursing phoned in below script as reviewed:    Requested Prescriptions     Signed Prescriptions Disp Refills    ALPRAZolam (XANAX) 1 mg tablet 20 Tab 0     Sig: TAKE 1 TABLET BY MOUTH TWICE A DAY AS NEEDED FOR ANXIETY.  DO NOT TAKE WITH KLONOPIN     Authorizing Provider: Jessika Urbina

## 2019-09-10 NOTE — TELEPHONE ENCOUNTER
Spoke with pt, after verifying pt name and . Let pt know we had received her refill request for her alprazolam but needed to verify that is is not taking clonazepam. Pt stated she is not taking clonazepam . Pt stated she took her last alprazolam last night . Pt is taking alprazolam as directed. I advised pt that we will be calling in  her alprazolam  1mg giving her 20 tabs , enough to bridge her to her upcoming MH appt. Verified pharmacy. Answered any and all questions pt had , pt voiced understanding.     Please sign order, thank you    I will call in

## 2019-09-10 NOTE — TELEPHONE ENCOUNTER
Called [facility] pharmacy, spoke with Chaim Valle pharmacists, after verifying pt name and . Provided verbal order for pt alprazolam . Let Chami Valle know pt is aware rx is being called in. Rx being filled now.

## 2019-09-12 DIAGNOSIS — R51.9 HEADACHE, UNSPECIFIED HEADACHE TYPE: ICD-10-CM

## 2019-09-13 RX ORDER — NORGESTIMATE AND ETHINYL ESTRADIOL 7DAYSX3 28
KIT ORAL
Qty: 28 TAB | Refills: 2 | Status: SHIPPED | OUTPATIENT
Start: 2019-09-13 | End: 2019-12-30

## 2019-09-18 ENCOUNTER — TELEPHONE (OUTPATIENT)
Dept: INTERNAL MEDICINE CLINIC | Age: 39
End: 2019-09-18

## 2019-09-18 RX ORDER — ALPRAZOLAM 1 MG/1
1 TABLET ORAL 2 TIMES DAILY
Qty: 6 TAB | Refills: 0 | Status: SHIPPED | OUTPATIENT
Start: 2019-09-18 | End: 2019-09-19 | Stop reason: SDUPTHER

## 2019-09-18 NOTE — TELEPHONE ENCOUNTER
Pt has taken last alprazolam, and not having good response to clonazepam prior. She has MH appt tomorrow. She is requesting additional alprazolam until seen by [address] tomorrow. Limited refill (#6 of the alprazolam 1mg) provided to pt--she had come by clinic and met with nurse clinical coordinator.

## 2019-09-18 NOTE — TELEPHONE ENCOUNTER
On-Call Note  Received call from this number from call center around 7:23 AM today. Called x2, left message x1. Patient came in and spoke with nurse manager this morning for 1 hour.      Sonia Crowe MD

## 2019-09-19 ENCOUNTER — OFFICE VISIT (OUTPATIENT)
Dept: BEHAVIORAL/MENTAL HEALTH CLINIC | Age: 39
End: 2019-09-19

## 2019-09-19 VITALS
SYSTOLIC BLOOD PRESSURE: 111 MMHG | DIASTOLIC BLOOD PRESSURE: 79 MMHG | BODY MASS INDEX: 29.66 KG/M2 | HEART RATE: 67 BPM | HEIGHT: 65 IN | WEIGHT: 178 LBS

## 2019-09-19 DIAGNOSIS — F41.0 PANIC ATTACKS: ICD-10-CM

## 2019-09-19 DIAGNOSIS — F41.0 GENERALIZED ANXIETY DISORDER WITH PANIC ATTACKS: ICD-10-CM

## 2019-09-19 DIAGNOSIS — F41.1 GENERALIZED ANXIETY DISORDER WITH PANIC ATTACKS: ICD-10-CM

## 2019-09-19 DIAGNOSIS — F41.0 PANIC DISORDER: ICD-10-CM

## 2019-09-19 DIAGNOSIS — F43.81 GRIEF REACTION WITH PROLONGED BEREAVEMENT: ICD-10-CM

## 2019-09-19 DIAGNOSIS — F43.29 ADJUSTMENT DISORDER WITH MIXED EMOTIONAL FEATURES: Primary | ICD-10-CM

## 2019-09-19 DIAGNOSIS — F41.1 GAD (GENERALIZED ANXIETY DISORDER): ICD-10-CM

## 2019-09-19 RX ORDER — MIRTAZAPINE 15 MG/1
15 TABLET, ORALLY DISINTEGRATING ORAL
Qty: 30 TAB | Refills: 2 | Status: SHIPPED | OUTPATIENT
Start: 2019-09-19 | End: 2019-09-20

## 2019-09-19 RX ORDER — ALPRAZOLAM 1 MG/1
1 TABLET ORAL
Qty: 30 TAB | Refills: 0 | Status: SHIPPED | OUTPATIENT
Start: 2019-09-19 | End: 2019-10-01 | Stop reason: DRUGHIGH
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
## 2019-09-20 RX ORDER — MIRTAZAPINE 15 MG/1
15 TABLET, FILM COATED ORAL
Qty: 30 TAB | Refills: 1 | Status: SHIPPED | OUTPATIENT
Start: 2019-09-20 | End: 2019-11-18

## 2019-10-01 ENCOUNTER — OFFICE VISIT (OUTPATIENT)
Dept: BEHAVIORAL/MENTAL HEALTH CLINIC | Age: 39
End: 2019-10-01

## 2019-10-01 VITALS
WEIGHT: 173.2 LBS | HEIGHT: 65 IN | SYSTOLIC BLOOD PRESSURE: 122 MMHG | BODY MASS INDEX: 28.86 KG/M2 | DIASTOLIC BLOOD PRESSURE: 86 MMHG | HEART RATE: 91 BPM

## 2019-10-01 DIAGNOSIS — Z91.89 COMPLIANCE WITH MEDICATION REGIMEN: ICD-10-CM

## 2019-10-01 DIAGNOSIS — F41.0 PANIC DISORDER WITHOUT AGORAPHOBIA: Primary | ICD-10-CM

## 2019-10-01 DIAGNOSIS — F41.1 GENERALIZED ANXIETY DISORDER WITH PANIC ATTACKS: ICD-10-CM

## 2019-10-01 DIAGNOSIS — F41.0 GENERALIZED ANXIETY DISORDER WITH PANIC ATTACKS: ICD-10-CM

## 2019-10-01 DIAGNOSIS — F43.29 ADJUSTMENT DISORDER WITH MIXED EMOTIONAL FEATURES: ICD-10-CM

## 2019-10-01 DIAGNOSIS — F43.81 GRIEF REACTION WITH PROLONGED BEREAVEMENT: ICD-10-CM

## 2019-10-01 RX ORDER — ALPRAZOLAM 0.5 MG/1
0.5 TABLET ORAL
Qty: 30 TAB | Refills: 0 | Status: SHIPPED | OUTPATIENT
Start: 2019-10-01 | End: 2019-10-07 | Stop reason: DRUGHIGH

## 2019-10-01 RX ORDER — QUETIAPINE FUMARATE 50 MG/1
50 TABLET, FILM COATED ORAL 2 TIMES DAILY
Qty: 60 TAB | Refills: 1 | Status: SHIPPED | OUTPATIENT
Start: 2019-10-01 | End: 2019-11-04
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
## 2019-10-02 ENCOUNTER — TELEPHONE (OUTPATIENT)
Dept: BEHAVIORAL/MENTAL HEALTH CLINIC | Age: 39
End: 2019-10-02

## 2019-10-02 NOTE — TELEPHONE ENCOUNTER
Pt calls to ask if the provider would consider keeping her Xanax at the same 1mg dose through October. She states that emotionally October is a bad month and would prefer to cut the dose in a few weeks vs October 1st.   I encouraged pt to keep the dose provider gave her Oct 1st office visit and to definitely go to [facility] to register for their outpatient program that Dr Jasson Herrera had recommended. Staff attempted to call pt after her office visit on Oct 1st and provider [facility] information but pt did not answer the phone. I provided pt and Aunt on the phone as Baron Vásquez was on the call as well the info about meds and [facility]. Aunt said she would assist pt. I am passing along this message to Dr Jasson Herrera.  No need to call pt back if you were clear in your office visit on October 1st what the treatment plan was to be.

## 2019-10-06 LAB
ALPRAZ UR CFM-MCNC: 1429 NG/ML
ALPRAZ UR QL: POSITIVE
AMPHETAMINES UR QL SCN: NEGATIVE NG/ML
BARBITURATES UR QL SCN: NEGATIVE NG/ML
BENZODIAZ UR QL: POSITIVE NG/ML
BZE UR QL SCN: NEGATIVE NG/ML
CANNABINOIDS UR QL CFM: POSITIVE
CANNABINOIDS UR QL SCN: ABNORMAL NG/ML
CLONAZEPAM UR QL: NEGATIVE
CREAT UR-MCNC: 310.3 MG/DL (ref 20–300)
FLURAZEPAM UR QL: NEGATIVE
LORAZEPAM UR QL: NEGATIVE
METHADONE UR QL SCN: NEGATIVE NG/ML
MIDAZOLAM UR QL CFM: NEGATIVE
NORDIAZEPAM UR QL: NEGATIVE
OPIATES UR QL SCN: NEGATIVE NG/ML
OXAZEPAM UR QL: NEGATIVE
OXYCODONE+OXYMORPHONE UR QL SCN: NEGATIVE NG/ML
PCP UR QL: NEGATIVE NG/ML
PH UR: 5.6 [PH] (ref 4.5–8.9)
PLEASE NOTE:, 733157: ABNORMAL
PROPOXYPH UR QL SCN: NEGATIVE NG/ML
SP GR UR: 1.02
TEMAZEPAM UR QL CFM: NEGATIVE
THC UR CFM-MCNC: >400 NG/ML
TRIAZOLAM UR QL: NEGATIVE

## 2019-10-07 ENCOUNTER — TELEPHONE (OUTPATIENT)
Dept: BEHAVIORAL/MENTAL HEALTH CLINIC | Age: 39
End: 2019-10-07

## 2019-10-07 ENCOUNTER — OFFICE VISIT (OUTPATIENT)
Dept: INTERNAL MEDICINE CLINIC | Age: 39
End: 2019-10-07

## 2019-10-07 VITALS
OXYGEN SATURATION: 97 % | BODY MASS INDEX: 28.99 KG/M2 | WEIGHT: 174 LBS | HEART RATE: 167 BPM | TEMPERATURE: 97.9 F | HEIGHT: 65 IN | RESPIRATION RATE: 30 BRPM

## 2019-10-07 DIAGNOSIS — F41.0 PANIC ATTACKS: ICD-10-CM

## 2019-10-07 DIAGNOSIS — Z23 ENCOUNTER FOR IMMUNIZATION: ICD-10-CM

## 2019-10-07 DIAGNOSIS — F43.10 PTSD (POST-TRAUMATIC STRESS DISORDER): ICD-10-CM

## 2019-10-07 DIAGNOSIS — F43.29 ADJUSTMENT DISORDER WITH MIXED EMOTIONAL FEATURES: ICD-10-CM

## 2019-10-07 DIAGNOSIS — F41.1 GAD (GENERALIZED ANXIETY DISORDER): Primary | ICD-10-CM

## 2019-10-07 DIAGNOSIS — F41.0 PANIC DISORDER: ICD-10-CM

## 2019-10-07 RX ORDER — CETIRIZINE HCL 10 MG
10 TABLET ORAL
Qty: 30 TAB | Refills: 5 | Status: SHIPPED | OUTPATIENT
Start: 2019-10-07 | End: 2020-05-19

## 2019-10-07 RX ORDER — ALPRAZOLAM 1 MG/1
1 TABLET ORAL
Qty: 30 TAB | Refills: 0 | Status: SHIPPED | OUTPATIENT
Start: 2019-10-07 | End: 2019-11-04 | Stop reason: SDUPTHER

## 2019-10-07 NOTE — PROGRESS NOTES
HPI:  Presents for f/u panic and anxiety    Saw psych 10/1/19  Plan was to reduce xanax dose and add seroquel and refer to intensive day program.    Pt has taken 1 mg xanax per dose due to inadequate control of anxiety/panic  Thus, ran out of her supply of xanax given by psych. Pt reports appt with an  to review here case of wrongful termination of her most recent job. Pt reports another court date as well related to prior job issues. Pt acknowledges plan to go to [facility] to enroll in the day program - Partial Hospitalization Program (PHP)  But, deferred due to  visit and court date within the next week. Pt's aunt is not present today due to uncle is on hospice care and she is with him. Past medical, Social, and Family history reviewed    Prior to Admission medications    Medication Sig Start Date End Date Taking? Authorizing Provider   QUEtiapine (SEROQUEL) 50 mg tablet Take 1 Tab by mouth two (2) times a day. 10/1/19  Yes Genesis Christy MD   ALPRAZolam (XANAX) 0.5 mg tablet Take 1 Tab by mouth two (2) times daily as needed for Anxiety. Max Daily Amount: 1 mg. 10/1/19  Yes Crista Metcalf MD   TRI-SPRINTEC, 28, 0.18/0.215/0.25 mg-35 mcg (28) tab TAKE 1 TABLET BY MOUTH EVERY DAY 9/13/19  Yes Jason Lopez NP   ergocalciferol (ERGOCALCIFEROL) 50,000 unit capsule TAKE 1 CAPSULE BY MOUTH EVERY SEVEN (7) DAYS FOR 12 DOSES. 6/7/19  Yes Tonia Sorto MD   cholecalciferol (VITAMIN D3) 1,000 unit cap Take 2 Caps by mouth daily. 11/2/18  Yes Tonia Sorto MD   ibuprofen (ADVIL) 200 mg tablet Take 200 mg by mouth two (2) times a day. Yes Provider, Rocio   mirtazapine (REMERON) 15 mg tablet Take 1 Tab by mouth nightly. 9/20/19   Crista Metcalf MD   zolpidem (AMBIEN) 10 mg tablet Take 1 Tab by mouth nightly as needed for Sleep.  Max Daily Amount: 10 mg. 3/18/19   Tonia Sorto MD   fluticasone [facility]) 50 mcg/actuation nasal spray 1 [place] by Both Nostrils route two (2) times daily as needed for Rhinitis. 1/11/19   Elida Waters MD          ROS  Complete ROS reviewed and negative or stable except as noted in HPI. Physical Exam   Constitutional: She is oriented to person, place, and time. She appears well-nourished. No distress. HENT:   Head: Normocephalic and atraumatic. Eyes: Pupils are equal, round, and reactive to light. EOM are normal. No scleral icterus. Neck: Normal range of motion. Neck supple. Cardiovascular:   Tachycardic    Pulmonary/Chest: Effort normal. No respiratory distress. Abdominal: Soft. She exhibits no distension. There is no tenderness. Musculoskeletal: Normal range of motion. She exhibits no edema. Neurological: She is alert and oriented to person, place, and time. She exhibits normal muscle tone. Skin: Skin is warm. No rash noted. Psychiatric: Her mood appears anxious. She is agitated. She expresses impulsivity. Nursing note and vitals reviewed. Prior labs reviewed. Assessment/Plan:  Must control her acute anxiety/panic prior to removal of benzo, stabilizing tx. If we establish definable improvement in her condition, then we should taper her benzos    ICD-10-CM ICD-9-CM    1. DEEPA (generalized anxiety disorder) F41.1 300.02 ALPRAZolam (XANAX) 1 mg tablet   2. Panic disorder F41.0 300.01 ALPRAZolam (XANAX) 1 mg tablet   3. Panic attacks F41.0 300.01 ALPRAZolam (XANAX) 1 mg tablet   4. Encounter for immunization Z23 V03.89 IN IMMUNIZ ADMIN,1 SINGLE/COMB VAC/TOXOID      INFLUENZA VIRUS VAC QUAD,SPLIT,PRESV FREE SYRINGE IM      CANCELED: INFLUENZA VACCINE INACTIVATED (IIV), SUBUNIT, ADJUVANTED, IM   5. PTSD (post-traumatic stress disorder) F43.10 309.81    6. Adjustment disorder with mixed emotional features F43.29 309.9      Follow-up and Dispositions    · Return in about 3 months (around 1/7/2020), or if symptoms worsen or fail to improve, for anxiety.         results and schedule of future studies reviewed with patient  reviewed diet, exercise and weight   reviewed medications and side effects in detail  Agree to prior dose of xanax to allow for alternative intervention per psych and day program - but if she is not calm, she will not be able to participate and gain benefit from the PHP (partial hospitalization program)  Strongly encouraged this program.  Follow with psych closely. Pt to meet with an  re: her termination and associated consequences.       >40 minutes time spent with >50% in counseling and coordination of care

## 2019-10-07 NOTE — PROGRESS NOTES
RM#15  Pt presents in room with clinical supervisor. Upset, crying, shaking, and rapid talking. Chief Complaint   Patient presents with    Panic Attack     1. Have you been to the ER, urgent care clinic since your last visit? Hospitalized since your last visit? Yes 10-4-19, [facility]     2. Have you seen or consulted any other health care providers outside of the [address] [place] [address] since your last visit? Include any pap smears or colon screening.  Yes [facility], 10-4-19     Health Maintenance Due   Topic Date Due    DTaP/Tdap/Td series (1 - Tdap) 10/02/2002    PAP AKA CERVICAL CYTOLOGY  11/11/2018    Influenza Age 9 to Adult  08/01/2019

## 2019-10-07 NOTE — TELEPHONE ENCOUNTER
Patient called at 8:01am, yelling and screaming to speak with Dr Nai Campbell nurse. I explain I have been helping her so how could I this morning? She said she need to speak to Dr Loreta Cuellar now and she need her to give her the Xanax that helps her not what she thinks. She began screaming. I explain to patient unless someone else is there I could speak to I will have to disconnect the phone call. She hung up and at 8:07am she called back. I explain she may want to go to ER if it was urgent. She said no because they want to admit her. The patient told me to put Dr Loreta Cuellar on the phone or either make sure she gives her the correct medicine or she was coming to make us. I told the patient to please put someone else on the phone because I took her comment as a threat and I will have to call security for the safety of our patients, Dr Loreta Cuellar and rest of staff. She laugh, and put her aunt on the phone . She was on the other line over talking her aunt. Ms Martina Cornejo said she couldn't come unless she brought her so not to worry. Patient said she need the medication to be able to go court. I told her we were trying to help her, she has to go up to [facility] to apply for partial hospitalization and I spoke with Dr Loreta Cuellar and she is to take the Seroquel and she wasn't getting the Xanax. The aunt said thank you for clarifications and hung up.

## 2019-10-14 ENCOUNTER — TELEPHONE (OUTPATIENT)
Dept: PRIMARY CARE CLINIC | Age: 39
End: 2019-10-14

## 2019-10-14 ENCOUNTER — TELEPHONE (OUTPATIENT)
Dept: INTERNAL MEDICINE CLINIC | Age: 39
End: 2019-10-14

## 2019-10-14 NOTE — TELEPHONE ENCOUNTER
Noted. Agree that both effexor for long term management and seroquel for acute mood stabilization (ie anger) may be needed. If seroquel is too sedating then it may be stopped. Pt will need intensive psychotherapy and hopefully the frustration of the group sessions in the PHP can be overcome by progress in individual therapy.

## 2019-10-14 NOTE — TELEPHONE ENCOUNTER
Patient showed up at the office at 4:45 looking for [facility] stating that she needed help in regards to medication. Patient attended her first day of the partial program today. Patient does not feel her needs will be met due to patient not being able to discuss her issues and what is \"wrong with her. \" Patient was told that she needs one on one sessions. Patient feels that due to lack of insurance she will not be able to do that. Patient was given an rx for Effexor today by the psychiatrist. Patient did not want to take this medication because of not \"trusting\" this person because they did not take the time to listen to her. Patient was previously rx'd seroquel by [facility] psychiatrist which patient did not try either. Spoke with Dr. Suzette Cabral and advised patient to take both. Advised her that Seroquel is likely to make her drowsy so encouraged her to take it closer to bedtime and to try the effexor for during the day. Patient agreed to try medication. Advised her to send a my-chart message if upon taking the medication she felt it was too much or was having unsafe side effects as far as drowsiness not subsiding enough for her to make it to her program during the day. Patient stated she will contact the office if that occurs. Encouraged patient to try a more positive outlook with the program. Instead of coming with a written list of things that she does not like about the program, try to find something positive and think of ways it could possibly benefit her because its not going to hurt or make things worse. Patient agreed to try and to continue with the program. Patient will be in touch for future assistance.

## 2019-10-14 NOTE — TELEPHONE ENCOUNTER
----- Message from Elgin Nolan sent at 10/14/2019 12:33 PM EDT -----  Regarding: Dr. Nico Jorgensen Message/Vendor Calls    Caller's first and last name:Kathi Castellon      Reason for call:speak with \"Stephanie\"      Callback required yes/no and why:yes      Best contact number(s): [phone]      Details to clarify the request: Pt stated she is at her \"program\" and requested a call from \"Stephanie\".  Pt stated she was given a Rx for anxiety and depression, but  the doctor would need to write the Rx for \"Xanax\", and they are not addressing her issues and the reason why she need to come to the program.      Elgin Nolan

## 2019-10-14 NOTE — TELEPHONE ENCOUNTER
On call note:     Patient called kay that the police showed up at her house earlier this evening. She was at the pharmacy picking up prescriptions and got a call from a friend that the police were at her house. She has no idea what they wanted and is very worried. They were gone when she got home, but now thinks she needs to call them to deal with this. Patient is prescribed Seroquel by Dr. Marquis Collins and she took 50mg this evening about 30 minutes ago, but states that it has not kicked in and she is still panicked. States she needs to calm down before calling the police. Wonders if she can take a dose of Xanax, which she last took this morning. Per chart she can take 1mg BID as needed. Discussed that she can take Xanax, but keep in mind that Seroquel and Xanax are both sedating medications and avoid any activity that requires mental alertness (driving, etc.). I am unsure what the issue is with the police, but discussed that if they are not at her house now that perhaps it is a good idea to wait to call them until she is feeling calm and less anxious. Patient expressed understanding and agreement with plan. She will reach out to Dr. Margo Childers' office in the AM with any further issues.

## 2019-10-18 ENCOUNTER — TELEPHONE (OUTPATIENT)
Dept: INTERNAL MEDICINE CLINIC | Age: 39
End: 2019-10-18

## 2019-10-18 NOTE — TELEPHONE ENCOUNTER
----- Message from Criss Veliz sent at 10/17/2019  4:30 PM EDT -----  Regarding: Dr. Mali Flores Message/Vendor Calls    Caller's first and last name:Pt      Reason for call: Stated gave  permission to call and talk to EribertoTiffanydileepTiffany concerning her.  Stated need to get out the hospital.       Pascale Rutherford required yes/no and why:Yes      Best contact number(s):[phone]      Details to clarify the request:      Criss Veliz

## 2019-11-04 ENCOUNTER — OFFICE VISIT (OUTPATIENT)
Dept: INTERNAL MEDICINE CLINIC | Age: 39
End: 2019-11-04

## 2019-11-04 VITALS
OXYGEN SATURATION: 98 % | HEART RATE: 88 BPM | BODY MASS INDEX: 28.16 KG/M2 | RESPIRATION RATE: 16 BRPM | SYSTOLIC BLOOD PRESSURE: 136 MMHG | DIASTOLIC BLOOD PRESSURE: 83 MMHG | TEMPERATURE: 98.5 F | HEIGHT: 65 IN | WEIGHT: 169 LBS

## 2019-11-04 DIAGNOSIS — E61.1 IRON DEFICIENCY: ICD-10-CM

## 2019-11-04 DIAGNOSIS — F41.0 PANIC DISORDER WITHOUT AGORAPHOBIA: ICD-10-CM

## 2019-11-04 DIAGNOSIS — R61 EXCESSIVE SWEATING: ICD-10-CM

## 2019-11-04 DIAGNOSIS — R25.1 TREMULOUSNESS: ICD-10-CM

## 2019-11-04 DIAGNOSIS — E55.9 VITAMIN D DEFICIENCY: ICD-10-CM

## 2019-11-04 DIAGNOSIS — F41.1 GAD (GENERALIZED ANXIETY DISORDER): Primary | ICD-10-CM

## 2019-11-04 DIAGNOSIS — F41.0 PANIC ATTACKS: ICD-10-CM

## 2019-11-04 DIAGNOSIS — F43.10 PTSD (POST-TRAUMATIC STRESS DISORDER): ICD-10-CM

## 2019-11-04 DIAGNOSIS — F41.0 PANIC DISORDER: ICD-10-CM

## 2019-11-04 RX ORDER — QUETIAPINE FUMARATE 50 MG/1
25 TABLET, FILM COATED ORAL 2 TIMES DAILY
Qty: 30 TAB | Refills: 5 | Status: SHIPPED | OUTPATIENT
Start: 2019-11-04 | End: 2019-11-27

## 2019-11-04 RX ORDER — ALPRAZOLAM 1 MG/1
1 TABLET ORAL 3 TIMES DAILY
Qty: 90 TAB | Refills: 2 | Status: SHIPPED | OUTPATIENT
Start: 2019-11-04 | End: 2020-02-19

## 2019-11-04 RX ORDER — VENLAFAXINE HYDROCHLORIDE 75 MG/1
TABLET, EXTENDED RELEASE ORAL DAILY
COMMUNITY
End: 2019-11-04 | Stop reason: SDUPTHER

## 2019-11-04 RX ORDER — VENLAFAXINE HYDROCHLORIDE 75 MG/1
75 TABLET, EXTENDED RELEASE ORAL DAILY
Qty: 30 TAB | Refills: 5 | Status: SHIPPED | OUTPATIENT
Start: 2019-11-04 | End: 2019-11-18
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
## 2019-11-05 ENCOUNTER — PATIENT MESSAGE (OUTPATIENT)
Dept: INTERNAL MEDICINE CLINIC | Age: 39
End: 2019-11-05

## 2019-11-05 DIAGNOSIS — E83.42 HYPOMAGNESEMIA: ICD-10-CM

## 2019-11-05 DIAGNOSIS — F41.0 PANIC DISORDER: ICD-10-CM

## 2019-11-05 DIAGNOSIS — F41.0 PANIC ATTACKS: ICD-10-CM

## 2019-11-05 DIAGNOSIS — F41.1 GAD (GENERALIZED ANXIETY DISORDER): ICD-10-CM

## 2019-11-05 DIAGNOSIS — E87.5 HYPERKALEMIA: Primary | ICD-10-CM

## 2019-11-05 LAB
25(OH)D3+25(OH)D2 SERPL-MCNC: 31.5 NG/ML (ref 30–100)
ALBUMIN SERPL-MCNC: 4.4 G/DL (ref 3.5–5.5)
ALBUMIN/GLOB SERPL: 1.3 {RATIO} (ref 1.2–2.2)
ALP SERPL-CCNC: 52 IU/L (ref 39–117)
ALT SERPL-CCNC: 12 IU/L (ref 0–32)
AST SERPL-CCNC: 17 IU/L (ref 0–40)
BASOPHILS # BLD AUTO: 0 X10E3/UL (ref 0–0.2)
BASOPHILS NFR BLD AUTO: 1 %
BILIRUB SERPL-MCNC: 0.3 MG/DL (ref 0–1.2)
BUN SERPL-MCNC: 7 MG/DL (ref 6–20)
BUN/CREAT SERPL: 8 (ref 9–23)
CALCIUM SERPL-MCNC: 5.5 MG/DL (ref 8.7–10.2)
CHLORIDE SERPL-SCNC: 103 MMOL/L (ref 96–106)
CO2 SERPL-SCNC: 15 MMOL/L (ref 20–29)
CREAT SERPL-MCNC: 0.84 MG/DL (ref 0.57–1)
EOSINOPHIL # BLD AUTO: 0.2 X10E3/UL (ref 0–0.4)
EOSINOPHIL NFR BLD AUTO: 4 %
ERYTHROCYTE [DISTWIDTH] IN BLOOD BY AUTOMATED COUNT: 12.9 % (ref 12.3–15.4)
ERYTHROCYTE [SEDIMENTATION RATE] IN BLOOD BY WESTERGREN METHOD: 48 MM/HR (ref 0–32)
FERRITIN SERPL-MCNC: 120 NG/ML (ref 15–150)
GLOBULIN SER CALC-MCNC: 3.5 G/DL (ref 1.5–4.5)
GLUCOSE SERPL-MCNC: 77 MG/DL (ref 65–99)
HCT VFR BLD AUTO: 40.9 % (ref 34–46.6)
HGB BLD-MCNC: 13.6 G/DL (ref 11.1–15.9)
IMM GRANULOCYTES # BLD AUTO: 0 X10E3/UL (ref 0–0.1)
IMM GRANULOCYTES NFR BLD AUTO: 0 %
IRON SATN MFR SERPL: 32 % (ref 15–55)
IRON SERPL-MCNC: 135 UG/DL (ref 27–159)
LYMPHOCYTES # BLD AUTO: 1.4 X10E3/UL (ref 0.7–3.1)
LYMPHOCYTES NFR BLD AUTO: 29 %
MAGNESIUM SERPL-MCNC: 0.4 MG/DL (ref 1.6–2.3)
MCH RBC QN AUTO: 27.7 PG (ref 26.6–33)
MCHC RBC AUTO-ENTMCNC: 33.3 G/DL (ref 31.5–35.7)
MCV RBC AUTO: 83 FL (ref 79–97)
MONOCYTES # BLD AUTO: 0.5 X10E3/UL (ref 0.1–0.9)
MONOCYTES NFR BLD AUTO: 10 %
NEUTROPHILS # BLD AUTO: 2.6 X10E3/UL (ref 1.4–7)
NEUTROPHILS NFR BLD AUTO: 56 %
PLATELET # BLD AUTO: 366 X10E3/UL (ref 150–450)
POTASSIUM SERPL-SCNC: 6.9 MMOL/L (ref 3.5–5.2)
PROT SERPL-MCNC: 7.9 G/DL (ref 6–8.5)
RBC # BLD AUTO: 4.91 X10E6/UL (ref 3.77–5.28)
SODIUM SERPL-SCNC: 141 MMOL/L (ref 134–144)
T4 FREE SERPL-MCNC: 1.19 NG/DL (ref 0.82–1.77)
TIBC SERPL-MCNC: 427 UG/DL (ref 250–450)
TSH SERPL DL<=0.005 MIU/L-ACNC: 2.23 UIU/ML (ref 0.45–4.5)
UIBC SERPL-MCNC: 292 UG/DL (ref 131–425)
VIT B12 SERPL-MCNC: 418 PG/ML (ref 232–1245)
WBC # BLD AUTO: 4.7 X10E3/UL (ref 3.4–10.8)

## 2019-11-05 NOTE — PROGRESS NOTES
Please notify pt of results     Potassium, calcium and magnesium are abnormal on her lab draw. These need to be repeated to clarify. Pt should return today or tomorrow to repeat these.
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
## 2019-11-05 NOTE — PROGRESS NOTES
Called patient.  Advised of lab results Patient scheduled for lab appointment tomorrow at 1:30pm to repeat labs

## 2019-11-05 NOTE — TELEPHONE ENCOUNTER
Please note that patient has already called and spoken with you today in regards to current issues. Thanks.
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
## 2019-11-05 NOTE — TELEPHONE ENCOUNTER
S/w patient about this when she was seen in office 11/4/19. She was advised to try to make scheduled appt, pt states she will try. Today pt called and states she did not make the appt.

## 2019-11-07 ENCOUNTER — HOSPITAL ENCOUNTER (OUTPATIENT)
Dept: CT IMAGING | Age: 39
Discharge: HOME | End: 2019-11-07
Attending: INTERNAL MEDICINE
Payer: MEDICAID

## 2019-11-07 ENCOUNTER — TELEPHONE (OUTPATIENT)
Dept: INTERNAL MEDICINE CLINIC | Age: 39
End: 2019-11-07

## 2019-11-07 DIAGNOSIS — R29.810 FACIAL DROOP: ICD-10-CM

## 2019-11-07 DIAGNOSIS — R29.810 FACIAL DROOP: Primary | ICD-10-CM

## 2019-11-07 DIAGNOSIS — R20.2 PARESTHESIA: ICD-10-CM

## 2019-11-07 DIAGNOSIS — G44.89 OTHER HEADACHE SYNDROME: ICD-10-CM

## 2019-11-07 PROCEDURE — 70450 CT HEAD/BRAIN W/O DYE: CPT

## 2019-11-07 NOTE — TELEPHONE ENCOUNTER
Pt called from [place] ED stating that she wanted to have  or  place a CT Scan with contrast.Writer spoke with Dior Yanes one of the nurse's,Sue was informed that  wanted the pt to have the CT without contrast.Sue voiced understanding.

## 2019-11-07 NOTE — TELEPHONE ENCOUNTER
Patient requesting to have CT of head due to the R side sharp head pain done as previously discussed in last visit.

## 2019-11-08 ENCOUNTER — TELEPHONE (OUTPATIENT)
Dept: INTERNAL MEDICINE CLINIC | Age: 39
End: 2019-11-08

## 2019-11-08 NOTE — TELEPHONE ENCOUNTER
Spoke with pt. Verified name and . She states she wasn't sure of the schedule of medications and how strict the time frame was to take her 3 meds. Instructed to take   Seroquel 1/2 tab twice daily- morning and bedtime  Effexor morning once daily  Xanax 1mg 3 times daily  Most importantly is that you take the correct rx'd amount and that it is in a reasonable time frame. Offered to pt that I would make her a med scheduled and mail it to her.   Pt states she would like that and was very thankful   Verbalized understanding of medication instructions

## 2019-11-08 NOTE — TELEPHONE ENCOUNTER
Pt called stating that she is confused as to when she is suppose to take her medication's,please call and advise pt's # [phone].

## 2019-11-08 NOTE — TELEPHONE ENCOUNTER
----- Message from [employer] Clarity sent at 11/8/2019  1:13 PM EST -----  Regarding: Dr. Jones Bermudian Message/Vendor Calls    Caller's first and last name:      Reason for call: Discuss how to take newly prescribed medications and for brain scan results.        Callback required yes/no and why: yes       Best contact number(s): [phone]      Details to clarify the request:      Real Clarity

## 2019-11-14 ENCOUNTER — TELEPHONE (OUTPATIENT)
Dept: INTERNAL MEDICINE CLINIC | Age: 39
End: 2019-11-14

## 2019-11-14 NOTE — TELEPHONE ENCOUNTER
----- Message from [address] sent at 11/14/2019  9:30 AM EST -----  Regarding: Dr. Becka Reardon first and last name: Bijan Pickett  Reason for call: requesting a callback from the nurse in regards to medication not working  Callback required yes/no and why: Yes  Best contact number(s): [phone]  Details to clarify the request:    [address]

## 2019-11-18 DIAGNOSIS — F41.8 DEPRESSION WITH ANXIETY: ICD-10-CM

## 2019-11-18 DIAGNOSIS — F43.10 PTSD (POST-TRAUMATIC STRESS DISORDER): ICD-10-CM

## 2019-11-18 DIAGNOSIS — F41.1 GAD (GENERALIZED ANXIETY DISORDER): Primary | ICD-10-CM

## 2019-11-18 RX ORDER — VENLAFAXINE HYDROCHLORIDE 150 MG/1
150 TABLET, EXTENDED RELEASE ORAL DAILY
Qty: 30 TAB | Refills: 5 | Status: SHIPPED | OUTPATIENT
Start: 2019-11-18 | End: 2019-12-04

## 2019-11-20 ENCOUNTER — TELEPHONE (OUTPATIENT)
Dept: INTERNAL MEDICINE CLINIC | Age: 39
End: 2019-11-20

## 2019-11-20 NOTE — TELEPHONE ENCOUNTER
Please call pt re: Abnormal lab results; pt wants to know if this could be the reason she 'feels bad'. Seroquel and Effexor - pt is not sure if these meds are working, pt states she is taking the meds as rx'd, pt states she feels funny when she wakes up, pt has sent My Chart messages describing how she feels. Pt was unable to make 11/19/19 appt, she was in court.   Please call pt - ph# [phone]

## 2019-11-21 NOTE — TELEPHONE ENCOUNTER
Pt calling lab results - concerned about her 'immunoglobulins', please call pt to review lab results. Also, pt states she saw GI 11/21/19, they have also ordered test (did not specify which test).   Please call pt - ph#: [phone]

## 2019-11-22 NOTE — TELEPHONE ENCOUNTER
MobiTV message re: medications was sent 11/18/19 yet not read yet by pt.    MobiTV message sent re: labs and prior message copied into the new message.

## 2019-11-26 ENCOUNTER — HOSPITAL ENCOUNTER (EMERGENCY)
Age: 39
Discharge: HOME | End: 2019-11-27
Attending: EMERGENCY MEDICINE
Payer: MEDICAID

## 2019-11-26 DIAGNOSIS — S82.891A CLOSED FRACTURE OF RIGHT ANKLE, INITIAL ENCOUNTER: ICD-10-CM

## 2019-11-26 DIAGNOSIS — T14.8XXA BRUISING: Primary | ICD-10-CM

## 2019-11-26 PROCEDURE — 99283 EMERGENCY DEPT VISIT LOW MDM: CPT

## 2019-11-27 ENCOUNTER — OFFICE VISIT (OUTPATIENT)
Dept: INTERNAL MEDICINE CLINIC | Age: 39
End: 2019-11-27

## 2019-11-27 ENCOUNTER — APPOINTMENT (OUTPATIENT)
Dept: GENERAL RADIOLOGY | Age: 39
End: 2019-11-27
Attending: EMERGENCY MEDICINE
Payer: MEDICAID

## 2019-11-27 VITALS
HEIGHT: 65 IN | SYSTOLIC BLOOD PRESSURE: 131 MMHG | OXYGEN SATURATION: 100 % | TEMPERATURE: 98.9 F | DIASTOLIC BLOOD PRESSURE: 89 MMHG | HEART RATE: 88 BPM | BODY MASS INDEX: 30.71 KG/M2 | RESPIRATION RATE: 24 BRPM

## 2019-11-27 VITALS
SYSTOLIC BLOOD PRESSURE: 119 MMHG | BODY MASS INDEX: 30.71 KG/M2 | OXYGEN SATURATION: 100 % | TEMPERATURE: 98.1 F | DIASTOLIC BLOOD PRESSURE: 82 MMHG | HEART RATE: 78 BPM | WEIGHT: 184.53 LBS | RESPIRATION RATE: 16 BRPM

## 2019-11-27 DIAGNOSIS — M25.571 ACUTE RIGHT ANKLE PAIN: ICD-10-CM

## 2019-11-27 DIAGNOSIS — S82.839A AVULSION FRACTURE OF DISTAL FIBULA: ICD-10-CM

## 2019-11-27 DIAGNOSIS — F43.10 PTSD (POST-TRAUMATIC STRESS DISORDER): ICD-10-CM

## 2019-11-27 DIAGNOSIS — F41.0 PANIC DISORDER: ICD-10-CM

## 2019-11-27 DIAGNOSIS — F41.0 PANIC DISORDER WITHOUT AGORAPHOBIA: ICD-10-CM

## 2019-11-27 DIAGNOSIS — F39 MOOD DISORDER (HCC): Primary | ICD-10-CM

## 2019-11-27 DIAGNOSIS — D64.9 ANEMIA, UNSPECIFIED TYPE: ICD-10-CM

## 2019-11-27 LAB
ANION GAP SERPL CALC-SCNC: 11 MMOL/L (ref 5–15)
APTT PPP: 24 SEC (ref 22.1–32)
BUN SERPL-MCNC: 11 MG/DL (ref 6–20)
BUN/CREAT SERPL: 14 (ref 12–20)
CALCIUM SERPL-MCNC: 8.5 MG/DL (ref 8.5–10.1)
CHLORIDE SERPL-SCNC: 103 MMOL/L (ref 97–108)
CO2 SERPL-SCNC: 24 MMOL/L (ref 21–32)
CREAT SERPL-MCNC: 0.8 MG/DL (ref 0.55–1.02)
ERYTHROCYTE [DISTWIDTH] IN BLOOD BY AUTOMATED COUNT: 14.4 % (ref 11.5–14.5)
GLUCOSE SERPL-MCNC: 75 MG/DL (ref 65–100)
HCT VFR BLD AUTO: 34.3 % (ref 35–47)
HGB BLD-MCNC: 10.9 G/DL (ref 11.5–16)
INR PPP: 0.9 (ref 0.9–1.1)
MAGNESIUM SERPL-MCNC: 1.8 MG/DL (ref 1.6–2.4)
MCH RBC QN AUTO: 27.5 PG (ref 26–34)
MCHC RBC AUTO-ENTMCNC: 31.8 G/DL (ref 30–36.5)
MCV RBC AUTO: 86.6 FL (ref 80–99)
NRBC # BLD: 0 K/UL (ref 0–0.01)
NRBC BLD-RTO: 0 PER 100 WBC
PLATELET # BLD AUTO: 281 K/UL (ref 150–400)
PMV BLD AUTO: 9.7 FL (ref 8.9–12.9)
POTASSIUM SERPL-SCNC: 3.6 MMOL/L (ref 3.5–5.1)
PROTHROMBIN TIME: 9 SEC (ref 9–11.1)
RBC # BLD AUTO: 3.96 M/UL (ref 3.8–5.2)
SODIUM SERPL-SCNC: 138 MMOL/L (ref 136–145)
THERAPEUTIC RANGE,PTTT: NORMAL SECS (ref 58–77)
WBC # BLD AUTO: 7 K/UL (ref 3.6–11)

## 2019-11-27 PROCEDURE — L1930 AFO PLASTIC: HCPCS

## 2019-11-27 PROCEDURE — 83735 ASSAY OF MAGNESIUM: CPT

## 2019-11-27 PROCEDURE — 36415 COLL VENOUS BLD VENIPUNCTURE: CPT

## 2019-11-27 PROCEDURE — 85610 PROTHROMBIN TIME: CPT

## 2019-11-27 PROCEDURE — 85027 COMPLETE CBC AUTOMATED: CPT

## 2019-11-27 PROCEDURE — 80048 BASIC METABOLIC PNL TOTAL CA: CPT

## 2019-11-27 PROCEDURE — 85730 THROMBOPLASTIN TIME PARTIAL: CPT

## 2019-11-27 PROCEDURE — 73610 X-RAY EXAM OF ANKLE: CPT

## 2019-11-27 RX ORDER — QUETIAPINE FUMARATE 100 MG/1
100 TABLET, FILM COATED ORAL 2 TIMES DAILY
Qty: 60 TAB | Refills: 5 | Status: SHIPPED | OUTPATIENT
Start: 2019-11-27 | End: 2020-03-26

## 2019-11-27 RX ORDER — PHENOL/SODIUM PHENOLATE
20 AEROSOL, SPRAY (ML) MUCOUS MEMBRANE DAILY
Qty: 30 TAB | Refills: 3 | Status: SHIPPED | OUTPATIENT
Start: 2019-11-27 | End: 2020-05-19

## 2019-11-27 RX ORDER — TRAMADOL HYDROCHLORIDE 50 MG/1
50 TABLET ORAL
Qty: 20 TAB | Refills: 0 | Status: SHIPPED | OUTPATIENT
Start: 2019-11-27 | End: 2019-12-16 | Stop reason: SDUPTHER

## 2019-11-27 RX ORDER — QUETIAPINE FUMARATE 50 MG/1
50 TABLET, FILM COATED ORAL 2 TIMES DAILY
Qty: 60 TAB | Refills: 0
Start: 2019-11-27 | End: 2019-11-27

## 2019-11-27 NOTE — PROGRESS NOTES
HPI:  Presents for f/u ER visit and med review    Pt had ankle injury - seen in ER at [facility]  Dx sprain and avulsion fracrture  Still having a lot of pain    Had been taking Advil for ankle pain for several days while in [facility] prior to ER eval.    Taking effexor XR at 75 mg - no dose titration to 150 mg  Also, reports taking seroquel at 50 mg bid    Pt reports feeling hyperactive and feeling really good  +anger   Then, significant depression. Recent hx nose bleeds    Not attending outpt program in the interim. Pt has appt in January with a psych provider. Past medical, Social, and Family history reviewed    Prior to Admission medications    Medication Sig Start Date End Date Taking? Authorizing Provider   Venlafaxine-ER 24 HR (EFFEXOR-ER) 150 mg tr24 tablet Take 1 Tab by mouth daily. 11/18/19  Yes García Herring MD   QUEtiapine (SEROQUEL) 50 mg tablet Take 0.5 Tabs by mouth two (2) times a day. 11/4/19  Yes García Herring MD   ALPRAZolam [place]) 1 mg tablet Take 1 Tab by mouth three (3) times daily. Max Daily Amount: 3 mg. 11/4/19  Yes García Herring MD   TRI-SPRINTEC, 28, 0.18/0.215/0.25 mg-35 mcg (28) tab TAKE 1 TABLET BY MOUTH EVERY DAY 9/13/19  Yes Epifanio Jones NP   zolpidem (AMBIEN) 10 mg tablet Take 1 Tab by mouth nightly as needed for Sleep. Max Daily Amount: 10 mg. 3/18/19  Yes García Herring MD   fluticasone [facility]) 50 mcg/actuation nasal spray 1 [place] by Both Nostrils route two (2) times daily as needed for Rhinitis. 1/11/19  Yes García Herring MD   ibuprofen (ADVIL) 200 mg tablet Take 200 mg by mouth two (2) times a day. Yes Provider, Historical   cetirizine (ZYRTEC) 10 mg tablet Take 1 Tab by mouth nightly. 10/7/19   García Herring MD   ergocalciferol (ERGOCALCIFEROL) 50,000 unit capsule TAKE 1 CAPSULE BY MOUTH EVERY SEVEN (7) DAYS FOR 12 DOSES. 6/7/19   García Herring MD   cholecalciferol (VITAMIN D3) 1,000 unit cap Take 2 Caps by mouth daily.  11/2/18 Fran Momin MD          ROS  Complete ROS reviewed and negative or stable except as noted in HPI. Physical Exam  Vitals signs and nursing note reviewed. Constitutional:       General: She is not in acute distress. HENT:      Head: Normocephalic and atraumatic. Eyes:      General: No scleral icterus. Pupils: Pupils are equal, round, and reactive to light. Neck:      Musculoskeletal: Normal range of motion and neck supple. Cardiovascular:      Rate and Rhythm: Normal rate and regular rhythm. Heart sounds: No murmur. No friction rub. No gallop. Pulmonary:      Effort: Pulmonary effort is normal. No respiratory distress. Breath sounds: Normal breath sounds. Abdominal:      General: There is no distension. Palpations: Abdomen is soft. Tenderness: There is no tenderness. Musculoskeletal: Normal range of motion. General: Swelling (right ankle) and tenderness (right ankle) present. Skin:     General: Skin is warm. Findings: Bruising (right thigh and left lower leg) present. No rash. Neurological:      Mental Status: She is alert and oriented to person, place, and time. Motor: No abnormal muscle tone. Psychiatric:         Mood and Affect: Mood is anxious. Behavior: Behavior is agitated. Judgment: Judgment is impulsive. Prior labs reviewed. Reviewed prior imaging with pt  Reviewed ER notes. CRP level is elevated      Assessment/Plan:  Concern for Bipolar d/o as active diagnosis    ICD-10-CM ICD-9-CM    1. Mood disorder (HCC) F39 296.90    2. Panic disorder without agoraphobia F41.0 300.01 QUEtiapine (SEROQUEL) 100 mg tablet      DISCONTINUED: QUEtiapine (SEROQUEL) 50 mg tablet   3. Acute right ankle pain M25.571 719.47 traMADol (ULTRAM) 50 mg tablet     338.19    4. Avulsion fracture of distal fibula S82.839A 824.8    5. PTSD (post-traumatic stress disorder) F43.10 309.81    6. Panic disorder F41.0 300.01    7.  Anemia, unspecified type D64.9 285.9      Follow-up and Dispositions    · Return in about 7 weeks (around 1/15/2020), or if symptoms worsen or fail to improve, for mood.         results and schedule of future studies reviewed with patient  reviewed diet, exercise and weight    reviewed medications and side effects in detail  Increase seroquel to 100 mg bid  Tramadol for pain in foot/ankle  May need hydroxyzine prn itch  PPI   Take an increased dose of effexor to 150 mg   Encouraged to attend day program.  See GI for endoscopy  See counselor as scheduled 1/8/20  Need a psychiatrist to assist with medication management      >40 minutes time spent with >50% in counseling and coordination of care

## 2019-11-27 NOTE — DISCHARGE INSTRUCTIONS
Patient Education        Broken Ankle: Care Instructions  Your Care Instructions    An ankle may break (fracture) during sports, a fall, or other accidents. Fractures can range from a small, hairline crack, to a bone or bones broken into two or more pieces. Your treatment depends on how bad the break is. Your doctor may have put your ankle in a splint or cast to allow it to heal or to keep it stable until you see another doctor. It may take weeks or months for your ankle to heal. You can help your ankle heal with some care at home. You heal best when you take good care of yourself. Eat a variety of healthy foods, and don't smoke. You may have had a sedative to help you relax. You may be unsteady after having sedation. It can take a few hours for the medicine's effects to wear off. Common side effects of sedation include nausea, vomiting, and feeling sleepy or tired. The doctor has checked you carefully, but problems can develop later. If you notice any problems or new symptoms,  get medical treatment right away. Follow-up care is a key part of your treatment and safety. Be sure to make and go to all appointments, and call your doctor if you are having problems. It's also a good idea to know your test results and keep a list of the medicines you take. How can you care for yourself at home? · If the doctor gave you a sedative:  ? For 24 hours, don't do anything that requires attention to detail, such as going to work, making important decisions, or signing any legal documents. It takes time for the medicine's effects to completely wear off.  ? For your safety, do not drive or operate any machinery that could be dangerous. Wait until the medicine wears off and you can think clearly and react easily. · Put ice or a cold pack on your ankle for 10 to 20 minutes at a time. Try to do this every 1 to 2 hours for the next 3 days (when you are awake). Put a thin cloth between the ice and your cast or splint.  Keep your cast or splint dry. · Follow the cast care instructions your doctor gives you. If you have a splint, do not take it off unless your doctor tells you to. · Be safe with medicines. Take pain medicines exactly as directed. ? If the doctor gave you a prescription medicine for pain, take it as prescribed. ? If you are not taking a prescription pain medicine, ask your doctor if you can take an over-the-counter medicine. · Prop up your leg on pillows in the first few days after the injury. Keep the ankle higher than the level of your heart. This will help reduce swelling. · Do not put weight on your ankle unless your doctor tells you to. Use crutches to walk. · Follow instructions for exercises to keep your leg strong. · Wiggle your toes often to reduce swelling and stiffness. When should you call for help? Call 911 anytime you think you may need emergency care. For example, call if:    · You have chest pain, are short of breath, or you cough up blood.     · You are very sleepy and you have trouble waking up.    Call your doctor now or seek immediate medical care if:    · You have new or worse nausea or vomiting.     · You have new or worse pain.     · Your foot is cool or pale or changes color.     · You have tingling, weakness, or numbness in your toes.     · Your cast or splint feels too tight.     · You have signs of a blood clot in your leg (called a deep vein thrombosis), such as:  ? Pain in your calf, back of the knee, thigh, or groin. ? Redness or swelling in your leg.    Watch closely for changes in your health, and be sure to contact your doctor if:    · You have a problem with your splint or cast.     · You do not get better as expected. Where can you learn more? Go to http://maria del carmen-maury.info/. Enter P763 in the search box to learn more about \"Broken Ankle: Care Instructions. \"  Current as of: June 26, 2019  Content Version: 12.2  © 5365-5817 [employer].  Care instructions adapted under license by [employer] (which disclaims liability or warranty for this information). If you have questions about a medical condition or this instruction, always ask your healthcare professional. [address] any warranty or liability for your use of this information. Patient Education        Ankle Fracture: Rehab Exercises  Introduction  Here are some examples of exercises for you to try. The exercises may be suggested for a condition or for rehabilitation. Start each exercise slowly. Ease off the exercises if you start to have pain. You will be told when to start these exercises and which ones will work best for you. How to do the exercises  Calf stretch (knee straight)    1. Sit with your affected leg straight and supported on the floor. Your other leg should be bent, with that foot flat on the floor. 2. Place a towel around your affected foot just under the toes. 3. Hold one end of the towel in each hand, with your hands above your knees. 4. Pull back gently with the towel so that your foot stretches toward you. 5. Hold the position for at least 15 to 30 seconds. 6. Repeat 2 to 4 times a session, up to 5 sessions a day. Calf stretch (knee bent)    1. Sit with your affected leg straight and supported on the floor. Your other leg should be bent, with that foot flat on the floor. 2. Place a pillow or foam roll under your affected leg. 3. Place a towel around your affected foot just under the toes. 4. Hold one end of the towel in each hand, with your hands above your knees. 5. Pull back gently with the towel so that your foot stretches toward you. 6. Hold the position for at least 15 to 30 seconds. 7. Repeat 2 to 4 times a session, up to 5 sessions a day. Ankle plantar flexion    1. Sit with your affected leg straight and supported on the floor. Your other leg should be bent, with that foot flat on the floor.   2. Keeping your affected leg straight, gently flex your foot downward so your toes are pointed away from your body. Then slowly relax your foot to the starting position. 3. Repeat 8 to 12 times. Ankle dorsiflexion    1. Sit with your affected leg straight and supported on the floor. Your other leg should be bent, with that foot flat on the floor. 2. Keeping your affected leg straight, gently flex your foot back toward your body so your toes point upward. Then slowly relax your foot to the starting position. 3. Repeat 8 to 12 times. Resisted ankle plantar flexion    1. Sit with your affected leg straight and supported on the floor. Your other leg should be bent, with that foot flat on the floor. 2. Place an elastic band around your affected foot just under the toes. 3. Hold each end of the band in each hand, with your hands above your knees. 4. Keeping your affected leg straight, gently flex your foot downward so your toes are pointed away from your body. Then slowly relax your foot to the starting position. 5. Repeat 8 to 12 times. Resisted ankle dorsiflexion    1. Tie the ends of an exercise band together to form a loop. Attach one end of the loop to a secure object, like a table leg, or shut a door on it to hold it in place. (Or you can have someone hold one end of the loop to provide resistance.)  2. While sitting on the floor or in a chair, loop the other end of the band over the top of your affected foot. 3. Keeping your knee and leg straight, slowly flex your foot toward you to pull back on the exercise band, and then slowly relax. 4. Repeat 8 to 12 times. Resisted ankle inversion    1. Sit on the floor with your good leg crossed over your other leg. 2. Hold both ends of an exercise band and loop the band around the inside of your affected foot. Then press your good foot against the band.   3. Keeping your legs crossed, slowly push your affected foot against the band so that foot moves away from your good foot. Then slowly relax. 4. Repeat 8 to 12 times. Resisted ankle eversion    1. Sit on the floor with your legs straight. 2. Hold both ends of an exercise band and loop the band around the outside of your affected foot. Then press your good foot against the band. 3. Keeping your leg straight, slowly push your affected foot outward against the band and away from your good foot without letting your leg rotate. Then slowly relax. 4. Repeat 8 to 12 times. Ankle alphabet    1. Sit in a chair with your feet flat on the floor. (You can also do this exercise lying on your back with your affected leg propped up on a pillow). 2. Lift the heel of your affected foot off the floor, and slowly trace the letters of the alphabet. Heel raises    1. Stand with your feet a few inches apart, with your hands lightly resting on a counter or chair in front of you. 2. Slowly raise your heels off the floor while keeping your knees straight. 3. Hold for about 6 seconds, then slowly lower your heels to the floor. 4. Do 8 to 12 repetitions several times during the day. Follow-up care is a key part of your treatment and safety. Be sure to make and go to all appointments, and call your doctor if you are having problems. It's also a good idea to know your test results and keep a list of the medicines you take. Where can you learn more? Go to http://maria del carmen-maury.info/. Enter T800 in the search box to learn more about \"Ankle Fracture: Rehab Exercises. \"  Current as of: June 26, 2019  Content Version: 12.2  © 9032-9115 [employer]. Care instructions adapted under license by [employer] (which disclaims liability or warranty for this information). If you have questions about a medical condition or this instruction, always ask your healthcare professional. [address] any warranty or liability for your use of this information.

## 2019-11-27 NOTE — ED NOTES
I have reviewed discharge instructions with the patient. The patient verbalized understanding. Patient wheeled to discharge by Medic in NAD.

## 2019-11-27 NOTE — ED NOTES
Blood work obtained and sent to lab. Patient resting with foot elevated and ice pack. Call bell in reach, no further needs expressed at this time.

## 2019-11-27 NOTE — ED PROVIDER NOTES
29-year-old female with past medical history significant for anemia, anxiety presents with complaints of right lateral ankle pain after inverting ankle approximately 12 hours ago. Patient also reports and complains of unexplained bruising on legs. Reports she received a phone call from her primary care physician telling her about abnormal blood work and that she was to follow-up with her primary care doctor. Denies fall. Denies head injury. Denies bleeding. No other complaints. Denies fever, chills, nausea, vomiting, chest pain, shortness of breath.     Former smoker  Denies drug and alcohol use  Primary care physician-Tino           Past Medical History:   Diagnosis Date    Anemia NEC     Anxiety attack     Asthma     B12 deficiency     Biliary dyskinesia 5/2/8443    Complicated grief     Constipation     Depression     Gastroparesis 7/18/2014    Heart abnormalities     right atria at [facility]% [facility]    Hyperlipidemia     IGT (impaired glucose tolerance)     Incontinence of urine     1 episode of bed wetting 1 mo ago    Insomnia     Kidney stone     Neuropathy     Pap smear for cervical cancer screening 11/11/2015    11/11/15 neg HPV neg    PTSD (post-traumatic stress disorder)     Stroke ([facility] [address] 2011    TIA    Urinary tract bacterial infections     Vaginal discharge 12/4/2013       Past Surgical History:   Procedure Laterality Date    EYE EXAM & TREATMENT      HX GI  may 2013    polyps removed,colonscopy         Family History:   Problem Relation Age of Onset    Diabetes Mother     Heart Disease Mother     Hypertension Mother     Stroke Mother     Liver Disease Mother     Dementia Mother     Kidney Disease Mother        Social History     Socioeconomic History    Marital status: SINGLE     Spouse name: Not on file    Number of children: Not on file    Years of education: Not on file    Highest education level: Not on file   Occupational History    Not on file   Social Needs    Financial resource strain: Not on file    Food insecurity:     Worry: Not on file     Inability: Not on file    Transportation needs:     Medical: Not on file     Non-medical: Not on file   Tobacco Use    Smoking status: Former Smoker     Types: Cigarettes    Smokeless tobacco: Never Used   Substance and Sexual Activity    Alcohol use: Not Currently     Alcohol/week: 0.0 standard drinks    Drug use: No    Sexual activity: Yes     Partners: Male     Birth control/protection: Pill   Lifestyle    Physical activity:     Days per week: Not on file     Minutes per session: Not on file    Stress: Not on file   Relationships    Social connections:     Talks on phone: Not on file     Gets together: Not on file     Attends Sabianism service: Not on file     Active member of club or organization: Not on file     Attends meetings of clubs or organizations: Not on file     Relationship status: Not on file    Intimate partner violence:     Fear of current or ex partner: Not on file     Emotionally abused: Not on file     Physically abused: Not on file     Forced sexual activity: Not on file   Other Topics Concern    Not on file   Social History Narrative    Never , no children. Lives alone. ALLERGIES: Amoxicillin; Biaxin [clarithromycin]; Flagyl [metronidazole]; and Hydroxyzine    Review of Systems   Constitutional: Negative for chills and fever. Respiratory: Negative for cough and shortness of breath. Cardiovascular: Negative for chest pain. Gastrointestinal: Negative for abdominal pain, nausea and vomiting. Genitourinary: Negative for dysuria and urgency. Musculoskeletal: Positive for arthralgias. Skin: Positive for wound. Neurological: Negative for seizures and headaches. Hematological: Bruises/bleeds easily.        Vitals:    11/27/19 0003   BP: 128/89   Pulse: 88   Resp: 18   Temp: 97.7 °F (36.5 °C)   SpO2: 100%   Weight: 83.7 kg (184 lb 8.4 oz)            Physical Exam  Constitutional:       Appearance: She is well-developed. HENT:      Head: Normocephalic and atraumatic. Neck:      Musculoskeletal: Normal range of motion. Cardiovascular:      Rate and Rhythm: Normal rate and regular rhythm. Heart sounds: Normal heart sounds. Pulmonary:      Effort: Pulmonary effort is normal. No respiratory distress. Breath sounds: Normal breath sounds. Musculoskeletal: Normal range of motion. Skin:     General: Skin is warm and dry. Findings: Bruising (3 small bruises to legs, each <3cm diameter) present. Neurological:      Mental Status: She is alert and oriented to person, place, and time. MDM  Number of Diagnoses or Management Options  Bruising:   Sprain of right ankle, unspecified ligament, initial encounter:   Diagnosis management comments: 73-year-old female presents with complaints of right lateral ankle pain after twisting earlier today. Patient is ambulatory in emergency department with normal weightbearing. Patient reports pain relief at home with Motrin. Also complains of unexplained bruising to legs. Patient is well-appearing, no acute distress, hemodynamically stable, R, 3 small bruises noted to lateral malleoli were tenderness to palpation without step-offs or crepitus, 3 small bruises noted on lower extremity thighs-unremarkable, afebrile, nontoxic. Patient also very anxious about phone call from primary care doctor regarding abnormal blood work. Plan- right ankle x-ray, CBC/coags/BMP, reassure patient, encourage follow-up with primary care physician. Amount and/or Complexity of Data Reviewed  Clinical lab tests: reviewed and ordered  Tests in the radiology section of CPT®: ordered and reviewed  Independent visualization of images, tracings, or specimens: yes           Procedures      2:02 AM  Patient's results have been reviewed with them.   Patient and/or family have verbally conveyed their understanding and agreement of the patient's signs, symptoms, diagnosis, treatment and prognosis and additionally agree to follow up as recommended or return to the Emergency Room should their condition change prior to follow-up. Discharge instructions have also been provided to the patient with some educational information regarding their diagnosis as well a list of reasons why they would want to return to the ER prior to their follow-up appointment should their condition change.

## 2019-11-27 NOTE — PROGRESS NOTES
Rm#[id number]    Chief Complaint   Patient presents with    Medication Evaluation     pt states that she feels her meds arent effective   [facility] Follow Up     [facility], 11-26-19, fractured RIGHT ankle    Abnormal Lab Results     elevated CPR- per [facility]     Bleeding/Bruising     bruising     Diarrhea     went to [facility]      1. Have you been to the ER, urgent care clinic since your last visit? Hospitalized since your last visit? Yes 11/26/19, [facility], fractured right ankle     2. Have you seen or consulted any other health care providers outside of the [address] [place] [address] since your last visit? Include any pap smears or colon screening.  Yes [facility]      Health Maintenance Due   Topic Date Due    DTaP/Tdap/Td series (1 - Tdap) 10/02/1992    PAP AKA CERVICAL CYTOLOGY  11/11/2018

## 2019-11-28 NOTE — ED NOTES
Chart accessed to patient phone call c/o frustration with receiving an aircast yesterday during her care. Spoke at length with patient about appropriateness of air cast, however patient continues to state she needed an boot. Burt Durbin MD spoke with patient on phone and patient to come  boot.

## 2019-12-04 DIAGNOSIS — F43.10 PTSD (POST-TRAUMATIC STRESS DISORDER): ICD-10-CM

## 2019-12-04 DIAGNOSIS — F41.1 GAD (GENERALIZED ANXIETY DISORDER): ICD-10-CM

## 2019-12-04 DIAGNOSIS — F41.8 DEPRESSION WITH ANXIETY: ICD-10-CM

## 2019-12-04 RX ORDER — VENLAFAXINE HYDROCHLORIDE 150 MG/1
150 CAPSULE, EXTENDED RELEASE ORAL DAILY
Qty: 30 CAP | Refills: 5 | Status: SHIPPED | OUTPATIENT
Start: 2019-12-04 | End: 2019-12-16 | Stop reason: SDUPTHER

## 2019-12-04 RX ORDER — VENLAFAXINE HYDROCHLORIDE 150 MG/1
150 TABLET, EXTENDED RELEASE ORAL DAILY
Qty: 30 TAB | Refills: 5 | Status: CANCELLED | OUTPATIENT
Start: 2019-12-04

## 2019-12-12 ENCOUNTER — HOSPITAL ENCOUNTER (EMERGENCY)
Age: 39
Discharge: HOME | End: 2019-12-12
Attending: EMERGENCY MEDICINE
Payer: MEDICAID

## 2019-12-12 VITALS
HEART RATE: 66 BPM | SYSTOLIC BLOOD PRESSURE: 122 MMHG | TEMPERATURE: 97.5 F | RESPIRATION RATE: 16 BRPM | OXYGEN SATURATION: 100 % | DIASTOLIC BLOOD PRESSURE: 76 MMHG

## 2019-12-12 DIAGNOSIS — S13.4XXA WHIPLASH INJURY TO NECK, INITIAL ENCOUNTER: ICD-10-CM
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
## 2019-12-12 LAB — HCG UR QL: NEGATIVE

## 2019-12-12 PROCEDURE — 74011000250 HC RX REV CODE- 250: Performed by: EMERGENCY MEDICINE

## 2019-12-12 PROCEDURE — 81025 URINE PREGNANCY TEST: CPT

## 2019-12-12 PROCEDURE — 99284 EMERGENCY DEPT VISIT MOD MDM: CPT

## 2019-12-12 PROCEDURE — 74011250637 HC RX REV CODE- 250/637: Performed by: EMERGENCY MEDICINE

## 2019-12-12 RX ORDER — LIDOCAINE 50 MG/G
1 PATCH TOPICAL EVERY 24 HOURS
Status: DISCONTINUED | OUTPATIENT
Start: 2019-12-12 | End: 2019-12-12 | Stop reason: HOSPADM

## 2019-12-12 RX ORDER — ACETAMINOPHEN 325 MG/1
650 TABLET ORAL
Status: COMPLETED | OUTPATIENT
Start: 2019-12-12 | End: 2019-12-12

## 2019-12-12 RX ORDER — IBUPROFEN 600 MG/1
600 TABLET ORAL
Qty: 20 TAB | Refills: 0 | Status: SHIPPED | OUTPATIENT
Start: 2019-12-12 | End: 2019-12-12

## 2019-12-12 RX ORDER — IBUPROFEN 600 MG/1
600 TABLET ORAL
Qty: 20 TAB | Refills: 0 | Status: SHIPPED | OUTPATIENT
Start: 2019-12-12 | End: 2021-07-26

## 2019-12-12 RX ORDER — IBUPROFEN 600 MG/1
600 TABLET ORAL
Status: COMPLETED | OUTPATIENT
Start: 2019-12-12 | End: 2019-12-12

## 2019-12-12 RX ORDER — ALPRAZOLAM 0.5 MG/1
0.5 TABLET ORAL
Status: COMPLETED | OUTPATIENT
Start: 2019-12-12 | End: 2019-12-12

## 2019-12-12 RX ORDER — LIDOCAINE 50 MG/G
PATCH TOPICAL
Qty: 10 EACH | Refills: 0 | Status: SHIPPED | OUTPATIENT
Start: 2019-12-12 | End: 2019-12-12

## 2019-12-12 RX ORDER — LIDOCAINE 50 MG/G
PATCH TOPICAL
Qty: 10 EACH | Refills: 0 | Status: SHIPPED | OUTPATIENT
Start: 2019-12-12 | End: 2020-05-19

## 2019-12-12 RX ADMIN — IBUPROFEN 600 MG: 600 TABLET, FILM COATED ORAL at 17:11

## 2019-12-12 RX ADMIN — ACETAMINOPHEN 650 MG: 325 TABLET ORAL at 15:20

## 2019-12-12 RX ADMIN — ALPRAZOLAM 0.5 MG: 0.5 TABLET ORAL at 15:20

## 2019-12-12 NOTE — DISCHARGE INSTRUCTIONS
Patient Education        Neck Strain: Care Instructions  Your Care Instructions    You have strained the muscles and ligaments in your neck. A sudden, awkward movement can strain the neck. This often occurs with falls or car accidents or during certain sports. Everyday activities like working on a computer or sleeping can also cause neck strain if they force you to hold your neck in an awkward position for a long time. It is common for neck pain to get worse for a day or two after an injury, but it should start to feel better after that. You may have more pain and stiffness for several days before it gets better. This is expected. It may take a few weeks or longer for it to heal completely. Good home treatment can help you get better faster and avoid future neck problems. Follow-up care is a key part of your treatment and safety. Be sure to make and go to all appointments, and call your doctor if you are having problems. It's also a good idea to know your test results and keep a list of the medicines you take. How can you care for yourself at home? · If you were given a neck brace (cervical collar) to limit neck motion, wear it as instructed for as many days as your doctor tells you to. Do not wear it longer than you were told to. Wearing a brace for too long can make neck stiffness worse and weaken the neck muscles. · You can try using heat or ice to see if it helps. ? Try using a heating pad on a low or medium setting for 15 to 20 minutes every 2 to 3 hours. Try a warm shower in place of one session with the heating pad. You can also buy single-use heat wraps that last up to 8 hours. ? You can also try an ice pack for 10 to 15 minutes every 2 to 3 hours. · Take pain medicines exactly as directed. ? If the doctor gave you a prescription medicine for pain, take it as prescribed. ? If you are not taking a prescription pain medicine, ask your doctor if you can take an over-the-counter medicine.   · Gently rub the area to relieve pain and help with blood flow. Do not massage the area if it hurts to do so. · Do not do anything that makes the pain worse. Take it easy for a couple of days. You can do your usual activities if they do not hurt your neck or put it at risk for more stress or injury. · Try sleeping on a special neck pillow. Place it under your neck, not under your head. Placing a tightly rolled-up towel under your neck while you sleep will also work. If you use a neck pillow or rolled towel, do not use your regular pillow at the same time. · To prevent future neck pain, do exercises to stretch and strengthen your neck and back. Learn how to use good posture, safe lifting techniques, and proper body mechanics. When should you call for help? Call 911 anytime you think you may need emergency care. For example, call if:    · You are unable to move an arm or a leg at all.   [facility] your doctor now or seek immediate medical care if:    · You have new or worse symptoms in your arms, legs, chest, belly, or buttocks. Symptoms may include:  ? Numbness or tingling. ? Weakness. ? Pain.     · You lose bladder or bowel control.    Watch closely for changes in your health, and be sure to contact your doctor if:    · You are not getting better as expected. Where can you learn more? Go to http://maria del carmen-maury.info/. Enter M253 in the search box to learn more about \"Neck Strain: Care Instructions. \"  Current as of: June 26, 2019  Content Version: 12.2  © 5068-1291 [employer]. Care instructions adapted under license by [employer] (which disclaims liability or warranty for this information). If you have questions about a medical condition or this instruction, always ask your healthcare professional. [address] any warranty or liability for your use of this information.
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
## 2019-12-16 DIAGNOSIS — M25.571 ACUTE RIGHT ANKLE PAIN: ICD-10-CM

## 2019-12-16 RX ORDER — TRAMADOL HYDROCHLORIDE 50 MG/1
50 TABLET ORAL
Qty: 28 TAB | Refills: 0 | Status: SHIPPED | OUTPATIENT
Start: 2019-12-16 | End: 2019-12-23

## 2019-12-16 RX ORDER — VENLAFAXINE HYDROCHLORIDE 75 MG/1
225 CAPSULE, EXTENDED RELEASE ORAL DAILY
Qty: 90 CAP | Refills: 5 | Status: SHIPPED | OUTPATIENT
Start: 2019-12-16 | End: 2020-04-24

## 2019-12-20 ENCOUNTER — TELEPHONE (OUTPATIENT)
Dept: INTERNAL MEDICINE CLINIC | Age: 39
End: 2019-12-20

## 2019-12-20 NOTE — TELEPHONE ENCOUNTER
She needs to continue the effexor XR and understand that it does not make her feel any different with each dose rather that medication has to be taken daily to reach a new steady state after each dose titration. We need to get her back into the day program at [facility] - partial hospitalization program - PHP.
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
## 2019-12-29 DIAGNOSIS — R51.9 HEADACHE, UNSPECIFIED HEADACHE TYPE: ICD-10-CM

## 2019-12-30 RX ORDER — NORGESTIMATE AND ETHINYL ESTRADIOL 7DAYSX3 28
KIT ORAL
Qty: 28 TAB | Refills: 1 | Status: SHIPPED | OUTPATIENT
Start: 2019-12-30 | End: 2021-07-26 | Stop reason: SDUPTHER

## 2019-12-31 ENCOUNTER — DOCUMENTATION ONLY (OUTPATIENT)
Dept: INTERNAL MEDICINE CLINIC | Age: 39
End: 2019-12-31

## 2019-12-31 DIAGNOSIS — M25.571 ACUTE RIGHT ANKLE PAIN: ICD-10-CM

## 2019-12-31 RX ORDER — NALOXONE HYDROCHLORIDE 4 MG/.1ML
SPRAY NASAL
Qty: 2 EACH | Refills: 1 | Status: SHIPPED | OUTPATIENT
Start: 2019-12-31 | End: 2020-05-19

## 2019-12-31 NOTE — PROGRESS NOTES
Forms faxed to [employer]. Confirmation received. Document placed in bin for centralized scanning.     Fax # [phone]

## 2020-03-10 ENCOUNTER — TELEPHONE (OUTPATIENT)
Dept: INTERNAL MEDICINE CLINIC | Age: 40
End: 2020-03-10

## 2020-03-10 NOTE — TELEPHONE ENCOUNTER
Patient called back and transferred through [facility]. Patient becoming very anxious because after calling the office earlier, she called her counselor Juan Asif with [facility] because of her anxiety that she is feeling and after speaking with Matti Quezada, patient was told she needs to find counseling elsewhere because she needs more intensive therapy where she is seen 1-2x's a week at least instead of once a month. Patient felt like this counselor was going to help her but now feels abandoned again and back at square one. Per patient \"counselor stated that her schedule is too booked up to accommodate this and when patient asked about other counselors in the practice the same thing was said. \"     Patient also concerned about urine and constipation issues. Only urinating twice a day although drinking and staying hydrated and looks like apple juice she states. Patient also states that she has not had a BM in 2 weeks. Patient fears that these are the same things that happened before mother passed. Patient originally scheduled for 3/23 was able to have patient come into the office tomorrow with Dr. [facility] at 11:15. Patient very appreciative.

## 2020-03-10 NOTE — TELEPHONE ENCOUNTER
PT has not had a BM in 3 weeks. And is bloated. Is currently taking [place] with no relief. Pt is only voiding twice day. Urine is dark (\"looks like apple juice)  and its burning when she urinates. Skin is a grey color/ pale color. Pt was sent to [facility]. Gastro ordered a CT. They Rescheduled the CT scan for tomorrow at 3pm, but want her to have labs done prior to the scan. She told the gastro office that she wanted Juaquin Child to order the Labs, and [facility]. According to Pt she needs the following labs:    BUN  Creatinine Level   Full Panel       Advised Pt to call gastro and get them to order the lab and go where they tell her to go to get those labs so that they can get the results in a timely manner. I explained it was important to get the labs taken care of so that she can get the CT scan. The sooner she gets the labs and CT the sooner the can figure out what's wrong. Pt stated she was going to call gastro about the labs. I told her I would be letting Doctor John Child nurse know what was going on and someone would reach out to her.

## 2020-03-11 ENCOUNTER — TELEPHONE (OUTPATIENT)
Dept: INTERNAL MEDICINE CLINIC | Age: 40
End: 2020-03-11

## 2020-03-11 ENCOUNTER — OFFICE VISIT (OUTPATIENT)
Dept: INTERNAL MEDICINE CLINIC | Age: 40
End: 2020-03-11

## 2020-03-11 VITALS
HEART RATE: 116 BPM | HEIGHT: 65 IN | RESPIRATION RATE: 16 BRPM | BODY MASS INDEX: 33.09 KG/M2 | WEIGHT: 198.6 LBS | TEMPERATURE: 98.7 F | OXYGEN SATURATION: 98 % | DIASTOLIC BLOOD PRESSURE: 90 MMHG | SYSTOLIC BLOOD PRESSURE: 135 MMHG

## 2020-03-11 DIAGNOSIS — R10.10 PAIN OF UPPER ABDOMEN: Primary | ICD-10-CM

## 2020-03-11 DIAGNOSIS — N39.0 URINARY TRACT INFECTION WITH HEMATURIA, SITE UNSPECIFIED: ICD-10-CM

## 2020-03-11 DIAGNOSIS — J40 BRONCHITIS: ICD-10-CM

## 2020-03-11 DIAGNOSIS — R82.90 ABNORMAL URINALYSIS: ICD-10-CM

## 2020-03-11 DIAGNOSIS — F41.1 GAD (GENERALIZED ANXIETY DISORDER): ICD-10-CM

## 2020-03-11 DIAGNOSIS — E55.9 VITAMIN D DEFICIENCY: ICD-10-CM

## 2020-03-11 DIAGNOSIS — R73.02 IGT (IMPAIRED GLUCOSE TOLERANCE): ICD-10-CM

## 2020-03-11 DIAGNOSIS — R30.0 DYSURIA: ICD-10-CM

## 2020-03-11 DIAGNOSIS — E03.9 ACQUIRED HYPOTHYROIDISM: ICD-10-CM

## 2020-03-11 DIAGNOSIS — B37.31 VAGINAL YEAST INFECTION: ICD-10-CM

## 2020-03-11 DIAGNOSIS — R31.9 URINARY TRACT INFECTION WITH HEMATURIA, SITE UNSPECIFIED: ICD-10-CM

## 2020-03-11 DIAGNOSIS — E53.8 B12 DEFICIENCY: ICD-10-CM

## 2020-03-11 DIAGNOSIS — K31.84 GASTROPARESIS: ICD-10-CM

## 2020-03-11 DIAGNOSIS — R73.9 HYPERGLYCEMIA: ICD-10-CM

## 2020-03-11 DIAGNOSIS — K59.00 CONSTIPATION, UNSPECIFIED CONSTIPATION TYPE: ICD-10-CM

## 2020-03-11 LAB
BILIRUB UR QL STRIP: NEGATIVE
GLUCOSE UR-MCNC: NEGATIVE MG/DL
KETONES P FAST UR STRIP-MCNC: NEGATIVE MG/DL
PH UR STRIP: 7 [PH] (ref 4.6–8)
PROT UR QL STRIP: NEGATIVE
SP GR UR STRIP: 1 (ref 1–1.03)
UA UROBILINOGEN AMB POC: NORMAL (ref 0.2–1)
URINALYSIS CLARITY POC: CLEAR
URINALYSIS COLOR POC: YELLOW
URINE BLOOD POC: NORMAL
URINE LEUKOCYTES POC: NORMAL
URINE NITRITES POC: NEGATIVE

## 2020-03-11 RX ORDER — FLUCONAZOLE 150 MG/1
150 TABLET ORAL DAILY
Qty: 1 TAB | Refills: 0 | Status: SHIPPED | OUTPATIENT
Start: 2020-03-11 | End: 2020-03-12

## 2020-03-11 RX ORDER — SULFAMETHOXAZOLE AND TRIMETHOPRIM 800; 160 MG/1; MG/1
1 TABLET ORAL 2 TIMES DAILY
Qty: 14 TAB | Refills: 0 | Status: SHIPPED | OUTPATIENT
Start: 2020-03-11 | End: 2020-03-18

## 2020-03-11 NOTE — PROGRESS NOTES
Rm 13    Chief Complaint   Patient presents with    Abdominal Pain     ongoing for about 3 weeks, some abdominal bulging. pt states she has not had a BM in weeks    Urinary Burning     pt states it take a while to come out. she states she has to force herself to urinate   Pt states that her GYN provider told her she had an yeast infection back in January, she states she went to pharmacy to get medication and states they gave her all meds besides the diflucan. Pt is requesting another script. 1. Have you been to the ER, urgent care clinic since your last visit? Hospitalized since your last visit? No    2. Have you seen or consulted any other health care providers outside of the [address] [place] [address] since your last visit? Include any pap smears or colon screening.  No        Health Maintenance Due   Topic Date Due    DTaP/Tdap/Td series (1 - Tdap) 10/02/2002    PAP AKA CERVICAL CYTOLOGY  11/11/2018   Pap smear done January 2020        3 most recent PHQ Screens 9/19/2019   PHQ Not Done -   Little interest or pleasure in doing things Several days   Feeling down, depressed, irritable, or hopeless Several days   Total Score PHQ 2 2   Trouble falling or staying asleep, or sleeping too much Not at all   Feeling tired or having little energy Not at all   Poor appetite, weight loss, or overeating Nearly every day   Feeling bad about yourself - or that you are a failure or have let yourself or your family down Nearly every day   Trouble concentrating on things such as school, work, reading, or watching TV Not at all   Moving or speaking so slowly that other people could have noticed; or the opposite being so fidgety that others notice Not at all   Thoughts of being better off dead, or hurting yourself in some way Not at all   PHQ 9 Score 8   How difficult have these problems made it for you to do your work, take care of your home and get along with others Very difficult           Learning Assessment 8/28/2019 PRIMARY LEARNER Patient   HIGHEST LEVEL OF EDUCATION - PRIMARY LEARNER  -   BARRIERS PRIMARY LEARNER -   CO-LEARNER CAREGIVER -   PRIMARY LANGUAGE ENGLISH    NEED -   LEARNER PREFERENCE PRIMARY OTHER (COMMENT)     -     -     -     -   LEARNING SPECIAL TOPICS -   ANSWERED BY patient   RELATIONSHIP SELF   ASSESSMENT COMMENT -

## 2020-03-11 NOTE — TELEPHONE ENCOUNTER
There are no different medications specific for anger. The seroquel is supposed to help and the xanax will calm her down from both anger and anxiety. So, she should continue her seroquel and take the xanax up to tid for either anger or anxiety.

## 2020-03-11 NOTE — PROGRESS NOTES
HPI:  Presents for f/u abd pain, urinary sx, anxiety    Pt has been seeing GI for abd pain and absent BM reported x 2-3 weeks  C/o abd bloating  Pt feels like food or liquids just sits in her upper abdomen    CT scheduled, then cancelled since pt had not had labs ahead of time     Pt was Rx'd an abx per GYN  Diflucan empirically Rx'd but not included in what she picked up  Pt denies itch or discharge  But, +dysuria, bladder pressure, urinary hesitancy    Pt interested in other labs as well - thyroid, vit D, HgbA1C    Mood stable as long as she smokes  But, pt stopped smoking due to desires a job and possible drug testing    Taking seroquel 100 mg bid, xanax prn while not smoking. Not on effexor XR - cites it did not help. Pt reports alopecia, concern related to thyroid. Past medical, Social, and Family history reviewed    Prior to Admission medications    Medication Sig Start Date End Date Taking? Authorizing Provider   fluconazole (DIFLUCAN) 150 mg tablet Take 1 Tab by mouth daily for 1 day. FDA advises cautious prescribing of oral fluconazole in pregnancy. 3/11/20 3/12/20 Yes Jose Walton MD   trimethoprim-sulfamethoxazole (Bactrim DS) 160-800 mg per tablet Take 1 Tab by mouth two (2) times a day for 7 days. 3/11/20 3/18/20 Yes Jose Walton MD   ALPRAZolam Leonorjeannette Kruse) 1 mg tablet TAKE ONE TABLET BY MOUTH THREE TIMES A DAY. MAX DAILY DOSE IS 3 TABLETS 2/19/20  Yes Macho Domingo NP   TRI-SPRINTEC, 28, 0.18/0.215/0.25 mg-35 mcg (28) tab TAKE 1 TABLET BY MOUTH EVERY DAY 12/30/19  Yes Aman CROSS NP   ibuprofen (MOTRIN) 600 mg tablet Take 1 Tab by mouth every six (6) hours as needed for Pain. 12/12/19  Yes David Robles MD   QUEtiapine (SEROQUEL) 100 mg tablet Take 1 Tab by mouth two (2) times a day. 11/27/19  Yes Jose Walton MD   cetirizine (ZYRTEC) 10 mg tablet Take 1 Tab by mouth nightly.  10/7/19  Yes Jose Walton MD   zolpidem (AMBIEN) 10 mg tablet Take 1 Tab by mouth nightly as needed for Sleep. Max Daily Amount: 10 mg. 3/18/19  Yes Diane Barry MD   fluticasone [facility]) 50 mcg/actuation nasal spray 1 [place] by Both Nostrils route two (2) times daily as needed for Rhinitis. 1/11/19  Yes Diane Barry MD   ibuprofen (ADVIL) 200 mg tablet Take 200 mg by mouth two (2) times a day. Yes Provider, Historical   naloxone (NARCAN) 4 mg/actuation nasal spray Use 1 spray intranasally, then discard. Repeat with new spray every 2 min as needed for opioid overdose symptoms, alternating nostrils. 12/31/19   Diane Barry MD   venlafaxine-SR [facility] 75 mg capsule Take 3 Caps by mouth daily. 12/16/19   Diane Barry MD   lidocaine (LIDODERM) 5 % Apply patch to the affected area for 12 hours a day and remove for 12 hours a day. 12/12/19   Julianna Jenkins MD   Omeprazole delayed release (PRILOSEC D/R) 20 mg tablet Take 1 Tab by mouth daily. 11/27/19   Diane Barry MD   ergocalciferol (ERGOCALCIFEROL) 50,000 unit capsule TAKE 1 CAPSULE BY MOUTH EVERY SEVEN (7) DAYS FOR 12 DOSES. 6/7/19   Diane Barry MD   cholecalciferol (VITAMIN D3) 1,000 unit cap Take 2 Caps by mouth daily. 11/2/18   Diane Barry MD          ROS  Complete ROS reviewed and negative or stable except as noted in HPI. Physical Exam  Vitals signs and nursing note reviewed. Constitutional:       General: She is not in acute distress. HENT:      Head: Normocephalic and atraumatic. Eyes:      General: No scleral icterus. Pupils: Pupils are equal, round, and reactive to light. Neck:      Musculoskeletal: Normal range of motion and neck supple. Cardiovascular:      Comments: Tachycardic   Pulmonary:      Effort: Pulmonary effort is normal. No respiratory distress. Abdominal:      General: There is distension (mild). Palpations: Abdomen is soft. Tenderness: There is no abdominal tenderness. Musculoskeletal: Normal range of motion. Skin:     General: Skin is warm. Findings: No rash. Neurological:      Mental Status: She is alert and oriented to person, place, and time. Motor: No abnormal muscle tone. Psychiatric:         Mood and Affect: Mood is anxious. Behavior: Behavior is agitated. Judgment: Judgment is impulsive. Prior labs reviewed. Reviewed prior imaging reports      Assessment/Plan:    ICD-10-CM ICD-9-CM    1. Pain of upper abdomen R10.10 789.09 CBC WITH AUTOMATED DIFF      METABOLIC PANEL, COMPREHENSIVE      LIPASE   2. Dysuria R30.0 788.1 AMB POC URINALYSIS DIP STICK AUTO W/O MICRO      CULTURE, URINE      URINALYSIS W/ RFLX MICROSCOPIC   3. Abnormal urinalysis R82.90 791.9 CULTURE, URINE      URINALYSIS W/ RFLX MICROSCOPIC   4. Vaginal yeast infection B37.3 112.1 fluconazole (DIFLUCAN) 150 mg tablet   5. Bronchitis J40 490    6. Urinary tract infection with hematuria, site unspecified N39.0 599.0 trimethoprim-sulfamethoxazole (Bactrim DS) 160-800 mg per tablet    R31.9 599.70    7. Hyperglycemia R73.9 790.29 HEMOGLOBIN A1C WITH EAG   8. Acquired hypothyroidism E03.9 244.9 T4, FREE      TSH 3RD GENERATION   9. Constipation, unspecified constipation type K59.00 564.00    10. Gastroparesis K31.84 536.3    11. B12 deficiency E53.8 266.2 VITAMIN B12   12. IGT (impaired glucose tolerance) R73.02 790.22    13. Vitamin D deficiency E55.9 268.9 VITAMIN D, 25 HYDROXY   14. DEEPA (generalized anxiety disorder) F41.1 300.02      Follow-up and Dispositions    · Return in about 3 months (around 6/11/2020), or if symptoms worsen or fail to improve, for anxiety. results and schedule of future studies reviewed with patient  reviewed diet, exercise and weight   reviewed medications and side effects in detail  Check labs pre-CT  Check thyroid, B12, other - address as indicated. Agree that pt smoke, unfortunately the structure is not in place to support her use.   Empirically tx UTI  Diflucan due to abx use  Once clarify renal function, then consider SuPrep for bowel clearance/regimen      >40 minutes time spent with >50% in counseling and coordination of care

## 2020-03-12 LAB
25(OH)D3+25(OH)D2 SERPL-MCNC: 24.2 NG/ML (ref 30–100)
ALBUMIN SERPL-MCNC: 4 G/DL (ref 3.8–4.8)
ALBUMIN/GLOB SERPL: 1.3 {RATIO} (ref 1.2–2.2)
ALP SERPL-CCNC: 45 IU/L (ref 39–117)
ALT SERPL-CCNC: 6 IU/L (ref 0–32)
APPEARANCE UR: CLEAR
AST SERPL-CCNC: 12 IU/L (ref 0–40)
BACTERIA #/AREA URNS HPF: NORMAL /[HPF]
BASOPHILS # BLD AUTO: 0 X10E3/UL (ref 0–0.2)
BASOPHILS NFR BLD AUTO: 1 %
BILIRUB SERPL-MCNC: 0.3 MG/DL (ref 0–1.2)
BILIRUB UR QL STRIP: NEGATIVE
BUN SERPL-MCNC: 5 MG/DL (ref 6–20)
BUN/CREAT SERPL: 7 (ref 9–23)
CALCIUM SERPL-MCNC: 8.6 MG/DL (ref 8.7–10.2)
CASTS URNS QL MICRO: NORMAL /LPF
CHLORIDE SERPL-SCNC: 104 MMOL/L (ref 96–106)
CO2 SERPL-SCNC: 18 MMOL/L (ref 20–29)
COLOR UR: YELLOW
CREAT SERPL-MCNC: 0.75 MG/DL (ref 0.57–1)
EOSINOPHIL # BLD AUTO: 0.1 X10E3/UL (ref 0–0.4)
EOSINOPHIL NFR BLD AUTO: 1 %
EPI CELLS #/AREA URNS HPF: NORMAL /HPF (ref 0–10)
ERYTHROCYTE [DISTWIDTH] IN BLOOD BY AUTOMATED COUNT: 12.9 % (ref 11.7–15.4)
EST. AVERAGE GLUCOSE BLD GHB EST-MCNC: 114 MG/DL
GLOBULIN SER CALC-MCNC: 3.1 G/DL (ref 1.5–4.5)
GLUCOSE SERPL-MCNC: 78 MG/DL (ref 65–99)
GLUCOSE UR QL: NEGATIVE
HBA1C MFR BLD: 5.6 % (ref 4.8–5.6)
HCT VFR BLD AUTO: 35.1 % (ref 34–46.6)
HGB BLD-MCNC: 11.5 G/DL (ref 11.1–15.9)
HGB UR QL STRIP: ABNORMAL
IMM GRANULOCYTES # BLD AUTO: 0 X10E3/UL (ref 0–0.1)
IMM GRANULOCYTES NFR BLD AUTO: 0 %
KETONES UR QL STRIP: NEGATIVE
LEUKOCYTE ESTERASE UR QL STRIP: ABNORMAL
LIPASE SERPL-CCNC: 10 U/L (ref 14–72)
LYMPHOCYTES # BLD AUTO: 1.2 X10E3/UL (ref 0.7–3.1)
LYMPHOCYTES NFR BLD AUTO: 19 %
MCH RBC QN AUTO: 28 PG (ref 26.6–33)
MCHC RBC AUTO-ENTMCNC: 32.8 G/DL (ref 31.5–35.7)
MCV RBC AUTO: 85 FL (ref 79–97)
MICRO URNS: ABNORMAL
MONOCYTES # BLD AUTO: 0.7 X10E3/UL (ref 0.1–0.9)
MONOCYTES NFR BLD AUTO: 10 %
MUCOUS THREADS URNS QL MICRO: PRESENT
NEUTROPHILS # BLD AUTO: 4.3 X10E3/UL (ref 1.4–7)
NEUTROPHILS NFR BLD AUTO: 69 %
NITRITE UR QL STRIP: NEGATIVE
PH UR STRIP: 7 [PH] (ref 5–7.5)
PLATELET # BLD AUTO: 301 X10E3/UL (ref 150–450)
POTASSIUM SERPL-SCNC: 3.8 MMOL/L (ref 3.5–5.2)
PROT SERPL-MCNC: 7.1 G/DL (ref 6–8.5)
PROT UR QL STRIP: NEGATIVE
RBC # BLD AUTO: 4.11 X10E6/UL (ref 3.77–5.28)
RBC #/AREA URNS HPF: NORMAL /HPF (ref 0–2)
SODIUM SERPL-SCNC: 140 MMOL/L (ref 134–144)
SP GR UR: <=1.005 (ref 1–1.03)
SP GR UR: NORMAL
T4 FREE SERPL-MCNC: 1.1 NG/DL (ref 0.82–1.77)
TSH SERPL DL<=0.005 MIU/L-ACNC: 1.42 UIU/ML (ref 0.45–4.5)
UROBILINOGEN UR STRIP-MCNC: 0.2 MG/DL (ref 0.2–1)
VIT B12 SERPL-MCNC: 254 PG/ML (ref 232–1245)
WBC # BLD AUTO: 6.3 X10E3/UL (ref 3.4–10.8)
WBC #/AREA URNS HPF: NORMAL /HPF (ref 0–5)

## 2020-03-12 NOTE — PROGRESS NOTES
Kidney function (creatinine) is normal.  It is safe for your to have the CT scan and to use the SuPrep for her constipation. Vitamin D is a little low - take 1000 units daily vitamin D  HgbA1C is normal.  Other labs are OK.

## 2020-03-12 NOTE — TELEPHONE ENCOUNTER
Pt came into the office w/ her boyfriend. Pt added boyfriend to [address] [place] [address] Because she wants someone to explain to him her conditions. Pt was for the most part communicating w/ writer in a  pretty calm manner. however she was visibly shakey and a bit out of breath. Pt states that she is being told she is crazy and overreacts and gets into fits of rage. Then boyfriend came up trying to (calmly) speak over pt saying her medication was not working and she's \" he would understand if it was helping her but she not being cured, she lashes out, panics and doesn't listen when he tries to help her\" Writer asked gentlemen to have a seat and to let me speak w/ that pt. She responded be saying how is she supposed to calm down when shes in a panic attack and has someone saying negative things to hear. She also shared that she keeps stuttering and is very repetitive which she was told was because of her anxiety from her counselor.  Writer was able to keep pt relatively calm and assured her that a nurse would be contacting her as soon as possible to discuss her medications and inform her of Dr. Jackie Mackey recommendation/ Pt was very appreciative and stated she would prefer a call over a mychart message and will be waiting on call from nurse

## 2020-03-12 NOTE — TELEPHONE ENCOUNTER
Pt called very agitated and frustrated pt's boyfriend is talking in the back ground calmly and she is being argumentative and yelling. Writer informed pt that I cannot discuss any medical issue's with her boyfriend since he is not on the PHI. In the back ground her boyfriend state's that he feel's that she is not getting any better and that the current medication's aren't working and he feel's that she is not getting any better.

## 2020-03-13 LAB — BACTERIA UR CULT: NORMAL

## 2020-03-13 NOTE — TELEPHONE ENCOUNTER
Returned call to pt. Verified name and . Pt states her and her boyfriend have been arguing about her conditions and wanted him to be educated on her situation. I have informed pt several times previously and again today that we would need them to schedule an ov to discuss in person the issues. Pt and boyfriend were arguing back and forth over the phone. Stating her medicines do not work, she is very angry. And pt states her boyfriend states that she is worse when taking medicine. Once again reiterated to pt per provider to continue taking her xanax and Seroquel as prescribed. She verbalized understanding. Scheduled pt for 4pm Monday 3-16-20 w/ dr Valorie Ragsdale to come w/ boyfriend to discuss issues.

## 2020-03-15 ENCOUNTER — HOSPITAL ENCOUNTER (OUTPATIENT)
Dept: CT IMAGING | Age: 40
Discharge: HOME | End: 2020-03-15
Attending: INTERNAL MEDICINE
Payer: MEDICAID

## 2020-03-15 DIAGNOSIS — R10.13 EPIGASTRIC PAIN: ICD-10-CM

## 2020-03-15 DIAGNOSIS — Z86.010 PERSONAL HISTORY OF COLONIC POLYPS: ICD-10-CM

## 2020-03-15 DIAGNOSIS — R19.7 DIARRHEA: ICD-10-CM

## 2020-03-15 DIAGNOSIS — R63.4 UNINTENTIONAL WEIGHT LOSS: ICD-10-CM

## 2020-03-15 PROCEDURE — 74011636320 HC RX REV CODE- 636/320

## 2020-03-15 PROCEDURE — 74011250636 HC RX REV CODE- 250/636: Performed by: INTERNAL MEDICINE

## 2020-03-15 PROCEDURE — 74177 CT ABD & PELVIS W/CONTRAST: CPT

## 2020-03-15 PROCEDURE — 74011000255 HC RX REV CODE- 255: Performed by: INTERNAL MEDICINE

## 2020-03-15 RX ORDER — BARIUM SULFATE 20 MG/ML
900 SUSPENSION ORAL
Status: COMPLETED | OUTPATIENT
Start: 2020-03-15 | End: 2020-03-15

## 2020-03-15 RX ORDER — SODIUM CHLORIDE 9 MG/ML
50 INJECTION, SOLUTION INTRAVENOUS
Status: COMPLETED | OUTPATIENT
Start: 2020-03-15 | End: 2020-03-15

## 2020-03-15 RX ORDER — SODIUM CHLORIDE 0.9 % (FLUSH) 0.9 %
10 SYRINGE (ML) INJECTION
Status: COMPLETED | OUTPATIENT
Start: 2020-03-15 | End: 2020-03-15

## 2020-03-15 RX ADMIN — IOPAMIDOL 100 ML: 755 INJECTION, SOLUTION INTRAVENOUS at 11:56

## 2020-03-15 RX ADMIN — SODIUM CHLORIDE 50 ML/HR: 900 INJECTION, SOLUTION INTRAVENOUS at 11:56

## 2020-03-15 RX ADMIN — BARIUM SULFATE 900 ML: 20 SUSPENSION ORAL at 11:57

## 2020-03-15 RX ADMIN — Medication 10 ML: at 11:56

## 2020-03-25 ENCOUNTER — TELEPHONE (OUTPATIENT)
Dept: INTERNAL MEDICINE CLINIC | Age: 40
End: 2020-03-25

## 2020-03-25 DIAGNOSIS — F41.0 PANIC DISORDER WITHOUT AGORAPHOBIA: ICD-10-CM

## 2020-03-25 NOTE — TELEPHONE ENCOUNTER
Pt called for stronger or different mediation to help her. She states shes taking the mediation as directed but its not helping.      [phone]

## 2020-03-26 RX ORDER — QUETIAPINE FUMARATE 200 MG/1
200 TABLET, FILM COATED ORAL 2 TIMES DAILY
Qty: 60 TAB | Refills: 3 | Status: SHIPPED | OUTPATIENT
Start: 2020-03-26 | End: 2020-04-18 | Stop reason: SDUPTHER

## 2020-03-26 NOTE — TELEPHONE ENCOUNTER
In review of the dosing recommendations and indications for seroquel, it is reasonable to increase the dose to 200mg bid. I have eRx'd a new Rx to her pharmacy. Pt should monitor for potential sedative effects of the seroquel at the increased dose. She should schedule an appt with a mental health provider, ie [address] [place] [address] Pkwy, even if the appt is months away. Ultimately, she needs to be managed by a mental health provider given the severity and complexity of her illness.

## 2020-03-26 NOTE — TELEPHONE ENCOUNTER
Please see on call note from Dr. Anh Strauss as to what has been discussed. Please advise if there is any further help that we can offer to this patient before call is returned.

## 2020-03-26 NOTE — TELEPHONE ENCOUNTER
----- Message from Key Glasgow sent at 3/26/2020  9:34 AM EDT -----  Regarding: Dr Lidya Elizondo is calling back to let the nurse know she is still waiting on a call back regarding getting her medication change, need something stronger, please call pt back today at [phone], this is pt 2nd call.

## 2020-03-26 NOTE — TELEPHONE ENCOUNTER
Called pt. Verified name and . Informed of providers message noted below. Pt verbalized understanding. In review of the dosing recommendations and indications for seroquel, it is reasonable to increase the dose to 200mg bid.     I have eRx'd a new Rx to her pharmacy.     Pt should monitor for potential sedative effects of the seroquel at the increased dose.     She should schedule an appt with a mental health provider, ie [address] [place] [address], even if the appt is months away. Ultimately, she needs to be managed by a mental health provider given the severity and complexity of her illness.

## 2020-04-03 ENCOUNTER — PATIENT MESSAGE (OUTPATIENT)
Dept: OTHER | Age: 40
End: 2020-04-03

## 2020-04-18 DIAGNOSIS — F41.0 PANIC DISORDER WITHOUT AGORAPHOBIA: ICD-10-CM

## 2020-04-18 DIAGNOSIS — T78.40XA ALLERGIC STATE, INITIAL ENCOUNTER: Primary | ICD-10-CM

## 2020-04-18 DIAGNOSIS — F41.0 PANIC ATTACKS: ICD-10-CM

## 2020-04-18 DIAGNOSIS — F41.0 PANIC DISORDER: ICD-10-CM

## 2020-04-18 DIAGNOSIS — F41.1 GAD (GENERALIZED ANXIETY DISORDER): ICD-10-CM

## 2020-04-18 RX ORDER — ALPRAZOLAM 1 MG/1
TABLET ORAL
Qty: 90 TAB | Refills: 0 | OUTPATIENT
Start: 2020-04-18

## 2020-04-18 RX ORDER — HYDROXYZINE 25 MG/1
25 TABLET, FILM COATED ORAL
Qty: 30 TAB | Refills: 0 | Status: SHIPPED | OUTPATIENT
Start: 2020-04-18 | End: 2020-04-28

## 2020-04-18 RX ORDER — QUETIAPINE FUMARATE 200 MG/1
200 TABLET, FILM COATED ORAL 2 TIMES DAILY
Qty: 60 TAB | Refills: 0 | Status: SHIPPED | OUTPATIENT
Start: 2020-04-18 | End: 2020-04-24 | Stop reason: SDUPTHER

## 2020-04-18 NOTE — TELEPHONE ENCOUNTER
On-Call Note  Received call from [facility] at 17:05. Returned call, she is requesting a refill of:    - xanax   - seroquel,    - vistaril (for allergies)    - ibuprofen. Would like this sent to the [facility]. I advised her I need to open her chart and after doing this may call her back if ongoing questions. After opening chart, I did have further questions. Called patient 4 times and she did not answer phone. Continued to call back periodically with the last call at 6:15 pm    At this time will send a curtesy rx to [facility] for seroquel 200 mg BID. Will not refill xanax as controlled substances should not be requested on weekend. Will also send curtesy vistril, but not ibuprofen since this is available over the counter, also patient did not specify a source of pain or reason for med. Attempted to call patient, no answer. Attempted to call pharmacy, [facility] pharmacy is currently closed (6:20 pm on Saturday)    Will plan to try to coordinate plan for benzodiazepine with colleagues since patient's prescribing physician is out of office. Needs drug screen. And other issues. See chart review below.  shows patient has been receiving xanax from our office at a rate of 3 x1 mg tablets per day  Last fill on 3/4/2020. Tamara. There was a period of time this past fall that she was alos receiving scripts from other office, suspect psychiatrist... there are overlapping dates on this. She has used 3 pharmacies in the past 12 months. Her last drug screen was completed 10/2019. + for xanax as well as marijuana. She has an active prescription for seroquel at [facility].   Good kidney function. Though unclear what she uses the ibuprofen for.      Lindsay Aguilar MD

## 2020-04-20 ENCOUNTER — TELEPHONE (OUTPATIENT)
Dept: INTERNAL MEDICINE CLINIC | Age: 40
End: 2020-04-20

## 2020-04-20 DIAGNOSIS — F41.9 ANXIETY: ICD-10-CM

## 2020-04-20 DIAGNOSIS — F13.20 BENZODIAZEPINE DEPENDENCE (HCC): Primary | ICD-10-CM

## 2020-04-20 NOTE — TELEPHONE ENCOUNTER
Patient needs schedule virtual visit with dr. Claire Forman to evaluate her medications and for referral to psychiatrist. Needs to go to [facility] to complete urine drug screen testing prior to virtual appointment. Left voice mail for patient to call the office.

## 2020-04-20 NOTE — TELEPHONE ENCOUNTER
Great, glad to hear she is ready to complete UDS and schedule virtual visit.      Joseline Madsen MD

## 2020-04-20 NOTE — TELEPHONE ENCOUNTER
Please call and schedule patient virtual clinical visit with me. She should plan to go for a urine drug test prior to this visit if possible.  (lab ordered and sent to [address] [place] [address])    Catherine Dumont MD

## 2020-04-20 NOTE — TELEPHONE ENCOUNTER
Reviewed plan of care for patient's anxiety while primary provider is out of office with A Tonie Rivas and Danisha Raygoza. Since managment of current psychiatric are is under coverage of above individuals while  primary clinican is out of office. Current plan:    - urine drug screen   - referral to psychiatrist.    - 3 month taper of xanax. - continue curtesy refills of seroquel until established with new psychiatrist.    - if urine drug screen positive for drugs of abuse, pan for more rapid xanax taper and change to clonazepam.    - offer referral to LCSW resources in our system    I will plan to meet with patient virtually to discuss. Will forward to primary provider in event that he would prefer a different approach.      Jessa Tolbert MD

## 2020-04-20 NOTE — TELEPHONE ENCOUNTER
----- Message from Karina Londono sent at 4/20/2020 10:08 AM EDT -----  Regarding: Dr. Gerber Woodall  Pt request for a call back from the practice in regards to discussing her Anxiety medication with practice. She states that her medication was changed with out prior discussion and would like to speak to someone about the situation. Best contact number is [phone].

## 2020-04-20 NOTE — TELEPHONE ENCOUNTER
Spoke with patient and reinforced previous conversation from [facility] earlier today. Patient felt that because it was a different provider/nurse that no one was looking in the chart to see if she had a drug screen and that they were \"playing with her medications. \" Patient was under the impression that when she went to ED and when here in March that a drug screen was done. Advised patient that provider is following proper guidelines by ordering this and since this is a different PCP it is very reasonable for her to require a virtual visit. Patient finally agreed. Advised her that order and [facility] locations were sent in the mail. Once she goes and has the test she will call for a visit.

## 2020-04-20 NOTE — TELEPHONE ENCOUNTER
Patient advised Dr. Srinivasan Brown has provided refill for 2 of her medications however to refill the xanax patient needs to complete ordered urine drug screen at local [facility] and then have virtual visit with Dr. Srinivasan Brown to refill the medication as it is a controlled substance and her regular primary care doctor is not in the office. Patient not pleased. Did not want to complete urine drug test. Patient advised this is required before doctor Radha Nur will refill her prescription xanax. Patient became increasing irrattional and angered raising her voice. Patient stated she does CBD oil. After several attempts to calm the situation and explain the answers to the patients questions. I ended the call when it became clear we were no longer moving forward. Patient is aware needs urine drug screen followed by appointment with Dr. Srinivasan Brown to refill her controlled substance. I advised the patient if she has any further questions or concerns I would be happy to discuss them with her when she is ready to talk and have a respectable discussion. I sent a copy of the lab slip with a copy of the local [facility] locations to the patient home address.

## 2020-04-23 ENCOUNTER — TELEPHONE (OUTPATIENT)
Dept: INTERNAL MEDICINE CLINIC | Age: 40
End: 2020-04-23

## 2020-04-23 NOTE — TELEPHONE ENCOUNTER
Patient called in very anxious stating that she has been unable to go inside a [facility] due to her high anxiety level and fear of Covid. Patient is watching tv reports constantly and extremely fearful due to not have a N95 mask. Patient was asking if the office could order one for her due to being health care workers. Advised patient to calm down and for now we will just schedule a virtual appointment (per Dr. Jet Snell) and make a plan from there. Patient agreed and is scheduled for tomorrow at 10:30.

## 2020-04-24 ENCOUNTER — TELEPHONE (OUTPATIENT)
Dept: INTERNAL MEDICINE CLINIC | Age: 40
End: 2020-04-24

## 2020-04-24 ENCOUNTER — VIRTUAL VISIT (OUTPATIENT)
Dept: INTERNAL MEDICINE CLINIC | Age: 40
End: 2020-04-24

## 2020-04-24 DIAGNOSIS — F40.00 AGORAPHOBIA: ICD-10-CM

## 2020-04-24 DIAGNOSIS — F39 MOOD DISORDER (HCC): Primary | ICD-10-CM

## 2020-04-24 DIAGNOSIS — F41.0 PANIC DISORDER WITHOUT AGORAPHOBIA: ICD-10-CM

## 2020-04-24 DIAGNOSIS — Z79.899 CHRONIC PRESCRIPTION BENZODIAZEPINE USE: ICD-10-CM

## 2020-04-24 DIAGNOSIS — F41.0 PANIC ATTACKS: ICD-10-CM

## 2020-04-24 RX ORDER — QUETIAPINE FUMARATE 200 MG/1
200 TABLET, FILM COATED ORAL 2 TIMES DAILY
Qty: 60 TAB | Refills: 1 | Status: SHIPPED | OUTPATIENT
Start: 2020-05-15 | End: 2020-05-28 | Stop reason: SDUPTHER

## 2020-04-24 RX ORDER — CLONAZEPAM 0.5 MG/1
0.5 TABLET ORAL 2 TIMES DAILY
Qty: 30 TAB | Refills: 1 | Status: SHIPPED | OUTPATIENT
Start: 2020-04-24 | End: 2020-05-19

## 2020-04-24 NOTE — PROGRESS NOTES
No chief complaint on file. 1. Have you been to the ER, urgent care clinic since your last visit? Hospitalized since your last visit? No    2. Have you seen or consulted any other health care providers outside of the [address] [place] [address] since your last visit? Include any pap smears or colon screening.  No    Health Maintenance Due   Topic Date Due    DTaP/Tdap/Td series (1 - Tdap) 10/02/2002    PAP AKA CERVICAL CYTOLOGY  11/11/2018       Learning Assessment 8/28/2019   PRIMARY LEARNER Patient   HIGHEST LEVEL OF EDUCATION - PRIMARY LEARNER  -   BARRIERS PRIMARY LEARNER -   CO-LEARNER CAREGIVER -   PRIMARY LANGUAGE ENGLISH    NEED -   LEARNER PREFERENCE PRIMARY OTHER (COMMENT)     -     -     -     -   LEARNING SPECIAL TOPICS -   ANSWERED BY patient   RELATIONSHIP SELF   ASSESSMENT COMMENT -

## 2020-04-24 NOTE — TELEPHONE ENCOUNTER
Please call and schedule 2 week follow-up virtual visit for patient. AVS printed and [employer] message also sent.      Ange Toro MD

## 2020-04-24 NOTE — PATIENT INSTRUCTIONS
Ms. Princess Hurtado,  
 
I am glad we were able to meet earlier today. I believe that this time is hard for you, but I also believe that things will get better for you. Below is a summary of the plans we discussed. Thank you for trusting me in helping you today. I look forward to our next meeting.  
 
 
 - replace short acting xanax with longer acting clonazepam at 0.5 mg 2 times daily. This will help to avoid \"ups and downs\" in anxiety that come with xanax entering and leaving your body. -  Prescriptions for 2 week supply of clonazepam and 1 refill sent to pharmacy. - continue Seroquel at 200mg, 2 times daily. - you will be called to schedule [facility]. The main reason for this relationship is to help facilitate starting a longer and more stable relationship with psychiatrist and therapist.  This therapist may also be able to provide some short term support while you are getting more connected. - I look forward to meeting with you again in 2 weeks so we can continue to support you.  
 - Once you are established with the psychiatrist, I anticipate medications for anxiety will come from this doctor. In the long term I fully believe your anxiety can be controlled enough to help you feel empowered to be \"more yourself\". While the process may be slow, building your medical support team and allowing a psychiatrist to manage your medications will be important steps. Please do not hesitate to contact the office if any new questions or concerns. All the best,  
  
   Dr. Jesus Walters

## 2020-04-24 NOTE — PROGRESS NOTES
Nelli Duran is a 46 y.o. female who was seen by synchronous (real-time) audio-video technology on 4/24/2020. Pursuant to the emergency declaration under the [address] and [place] [address] waiver authority and the [employer] Act, this Virtual Visit was conducted, with patient's consent, to reduce the patient's risk of exposure to COVID-19 and provide continuity of care for an established patient. Services were provided through a video synchronous discussion virtually to substitute for in-person appointment. Consent:  She and/or her healthcare decision maker is aware that this patient-initiated Telehealth encounter is a billable service, with coverage as determined by her insurance carrier. She is aware that she may receive a bill and has provided verbal consent to proceed: Yes    I was in the office while conducting this encounter. Assessment & Plan:   Diagnoses and all orders for this visit:    1. Mood disorder (HCC)  -     REFERRAL TO PSYCHIATRY  -     REFERRAL TO BEHAVIORAL HEALTH  -     clonazePAM (KlonoPIN) 0.5 mg tablet; Take 1 Tab by mouth two (2) times a day. Max Daily Amount: 1 mg.    2. Panic attacks  -     REFERRAL TO PSYCHIATRY  -     REFERRAL TO BEHAVIORAL HEALTH  -     clonazePAM (KlonoPIN) 0.5 mg tablet; Take 1 Tab by mouth two (2) times a day. Max Daily Amount: 1 mg.    3. Agoraphobia  -     REFERRAL TO PSYCHIATRY  -     REFERRAL TO BEHAVIORAL HEALTH  -     clonazePAM (KlonoPIN) 0.5 mg tablet; Take 1 Tab by mouth two (2) times a day. Max Daily Amount: 1 mg.    4. Chronic prescription benzodiazepine use  -     REFERRAL TO PSYCHIATRY  -     REFERRAL TO BEHAVIORAL HEALTH  -     clonazePAM (KlonoPIN) 0.5 mg tablet; Take 1 Tab by mouth two (2) times a day. Max Daily Amount: 1 mg.    5. Panic disorder without agoraphobia  -     QUEtiapine (SEROquel) 200 mg tablet;  Take 1 Tab by mouth two (2) times a day.        Patient with complex anxiety history. She has history of panic attacks and histrionic behaviors. (including calling clinic in panic and tears with some frequency often on Friday afternoon) Has been taking xanax BID to TID for a long time. Otherwise only on seroquel. Seen by Dr. Bello Montero last fall, then lost to follow-up. Today I met with patient for first time. She is agreeable to me making referral to [employer] behavioral health for the purpose of therapy support as well as transition into psychiatric care. I advised her I will not refill xanax due to it causing roller-coaster effects on anxiety. Instead rx for low dose clonazepam written. Plan:    - assist in referral to psychiatrist with clarity that she will not continue to get curtesy medication fills for psychiatric meds from our clinic but instead will need to work with psychiatric doctor. - discussed stopping xanax and moving away from all benzodiazpeines in the long term, unless otherwise advised by pyschiatry. - continue seroquel.    - clonazepam x 2 weeks with refill   [facility]   - close follow-up with me in 2 weeks. Follow-up and Dispositions    · Return in about 2 weeks (around 5/8/2020) for follow-up anxiety, referrals and medication. \"Ms. Iesha Payton,     I am glad we were able to meet earlier today. I believe that this time is hard for you, but I also believe that things will get better for you. Below is a summary of the plans we discussed. Thank you for trusting me in helping you today. I look forward to our next meeting.        - replace short acting xanax with longer acting clonazepam at 0.5 mg 2 times daily. This will help to avoid \"ups and downs\" in anxiety that come with xanax entering and leaving your body. -  Prescriptions for 2 week supply of clonazepam and 1 refill sent to pharmacy. - continue Seroquel at 200mg, 2 times daily. - you will be [facility]  The main reason for this relationship is to help facilitate starting a longer and more stable relationship with psychiatrist and therapist.  This therapist may also be able to provide some short term support while you are getting more connected. - I look forward to meeting with you again in 2 weeks so we can continue to support you.    - Once you are established with the psychiatrist, I anticipate medications for anxiety will come from this doctor. In the long term I fully believe your anxiety can be controlled enough to help you feel empowered to be \"more yourself\". While the process may be slow, building your medical support team and allowing a psychiatrist to manage your medications will be important steps. Please do not hesitate to contact the office if any new questions or concerns. All the best,         Dr. Josse Cash \"  Subjective:   Giovanny Perea was seen for Anxiety and Medication Evaluation    Reports that she is frustrated. Feels like she ran short on this medication. Is taking her seroquel as prescribed at 200mg BID. Reports when she was in the hospital she was advised to \"stretch her medications\". Reports that she stopped taking effexor. nr Maryann Fernando. Patient is unable to state how many days since she last took xanax. \"I really don't want to be on anything\". \"the main thing that is messing me up is anxiety\". Dr. Jane Butcher. Specializes in trauma. Was advised that she can't help her. Prior to Admission medications    Medication Sig Start Date End Date Taking? Authorizing Provider   clonazePAM (KlonoPIN) 0.5 mg tablet Take 1 Tab by mouth two (2) times a day. Max Daily Amount: 1 mg. 4/24/20  Yes Esperanza Keys MD   QUEtiapine (SEROquel) 200 mg tablet Take 1 Tab by mouth two (2) times a day. 5/15/20  Yes Esperanza Keys MD   hydrOXYzine HCL (ATARAX) 25 mg tablet Take 1 Tab by mouth three (3) times daily as needed for Itching for up to 10 days. 4/18/20 4/28/20 Yes Birgit Quinonez MD   TRI-SPRINTEC, 28, 0.18/0.215/0.25 mg-35 mcg (28) tab TAKE 1 TABLET BY MOUTH EVERY DAY 12/30/19  Yes Lesly Posada NP   ibuprofen (MOTRIN) 600 mg tablet Take 1 Tab by mouth every six (6) hours as needed for Pain. 12/12/19  Yes Liudmila Thomas MD   fluticasone (FLONASE) 50 mcg/actuation nasal spray 1 [place] by Both Nostrils route two (2) times daily as needed for Rhinitis. 1/11/19  Yes Hector Munoz MD   cholecalciferol (VITAMIN D3) 1,000 unit cap Take 2 Caps by mouth daily. 11/2/18  Yes Hector Munoz MD   QUEtiapine (SEROquel) 200 mg tablet Take 1 Tab by mouth two (2) times a day. 4/18/20 4/24/20  Birgit Quinonez MD   ALPRAZolam Carmela Pies) 1 mg tablet TAKE ONE TABLET BY MOUTH THREE TIMES A DAY. MAX DAILY DOSE IS 3 TABLETS 2/19/20 4/24/20  Hal CROSS NP   naloxone [facility]) 4 mg/actuation nasal spray Use 1 spray intranasally, then discard. Repeat with new spray every 2 min as needed for opioid overdose symptoms, alternating nostrils. 12/31/19   Hector Munoz MD   venlafaxine-SR [facility] 75 mg capsule Take 3 Caps by mouth daily. 12/16/19 4/24/20  Hector Munoz MD   lidocaine (LIDODERM) 5 % Apply patch to the affected area for 12 hours a day and remove for 12 hours a day. 12/12/19   Liudmila Thomas MD   Omeprazole delayed release (PRILOSEC D/R) 20 mg tablet Take 1 Tab by mouth daily. 11/27/19   Hector Munoz MD   cetirizine (ZYRTEC) 10 mg tablet Take 1 Tab by mouth nightly. 10/7/19   Hector Munoz MD   ergocalciferol (ERGOCALCIFEROL) 50,000 unit capsule TAKE 1 CAPSULE BY MOUTH EVERY SEVEN (7) DAYS FOR 12 DOSES. 6/7/19   Hector Munoz MD   zolpidem (AMBIEN) 10 mg tablet Take 1 Tab by mouth nightly as needed for Sleep. Max Daily Amount: 10 mg. 3/18/19 4/24/20  Hector Munoz MD   ibuprofen (ADVIL) 200 mg tablet Take 200 mg by mouth two (2) times a day.     Provider, Historical     Allergies   Allergen Reactions    Amoxicillin Rash    Ativan [Lorazepam] Other (comments)     \"Makes her sick\"    Biaxin [Clarithromycin] Itching and Other (comments)     'eyes turn an funny color and she starts itching'    Biaxin [Clarithromycin] Unknown (comments)    Flagyl [Metronidazole] Itching     Eyes change color    Flagyl [Metronidazole] Hives    Hydroxyzine Diarrhea     Past Medical History:   Diagnosis Date    Anemia NEC     Anxiety     Anxiety attack     Asthma     Avulsion fracture of lateral malleolus of right fibula     B12 deficiency     Biliary dyskinesia 0/3/9784    Complicated grief     Constipation     Depression     Gastroparesis 7/18/2014    Heart abnormalities     right atria at 30% per [facility]    Hyperlipidemia     IGT (impaired glucose tolerance)     Incontinence of urine     1 episode of bed wetting 1 mo ago    Insomnia     Kidney stone     Neuropathy     Pap smear for cervical cancer screening 11/11/2015    11/11/15 neg HPV neg    PTSD (post-traumatic stress disorder)     Stroke ([facility] [address] 2011    TIA    Urinary tract bacterial infections     Vaginal discharge 12/4/2013     Review of Systems   Constitutional: Negative for chills, fever and malaise/fatigue. Respiratory: Negative for shortness of breath. Cardiovascular: Negative for chest pain. Psychiatric/Behavioral: Negative for suicidal ideas. The patient is nervous/anxious and has insomnia. PHYSICAL EXAMINATION:    Vital Signs: (As obtained by patient/caregiver at home)  There were no vitals taken for this visit.      Constitutional: [x] Appears well-developed and well-nourished     Mental status: [x] Alert and awake  [x] Oriented to person/place/time [x] Able to follow commands      Eyes:   EOM    [x]  Normal      Sclera  [x]  Normal              Discharge [x]  None visible       HENT: [x] Normocephalic, atraumatic    [x] Mouth/Throat: Mucous membranes are moist    External Ears [x] Normal      Neck: [x] No visualized mass Pulmonary/Chest: [x] Respiratory effort normal   [x] No visualized signs of difficulty breathing or respiratory distress         Musculoskeletal:   [x] Normal range of motion of neck        Neurological:        [x] No Facial Asymmetry (Cranial nerve 7 motor function) (limited exam due to video visit)          [x] No gaze palsy              Skin:        [x] No significant exanthematous lesions or discoloration noted on facial skin              Psychiatric:       [x] affect tearful, then calm, then agitated. [x] intermittently becomes aggitated voicing concerns about past events. Other pertinent observable physical exam findings:- none    We discussed the expected course, resolution and complications of the diagnosis(es) in detail. Medication risks, benefits, costs, interactions, and alternatives were discussed as indicated. I advised her to contact the office if her condition worsens, changes or fails to improve as anticipated. She expressed understanding with the diagnosis(es) and plan. Pursuant to the emergency declaration under the [facility], [facility] waiver authority and the [employer] Act, this Virtual  Visit was conducted, with patient's consent, to reduce the patient's risk of exposure to COVID-19 and provide continuity of care for an established patient. Services were provided through a video synchronous discussion virtually to substitute for in-person clinic visit.     Kristie Gaston MD

## 2020-04-27 NOTE — TELEPHONE ENCOUNTER
Patient scheduled for 2 week follow up. Patient reports having headaches and chills. Patient does no have home thermometer. Patient advised to get thermometer OTC at local drug store and monitor temperature. Patient advised take tylenol to control any discomfort or fevers she may have. Patient denies any other symptoms at this time. Patient advised to monitor her symptoms at home, drink lots of water, rest, and call us if anything worsens or fails to improve.  Patient stated understanding

## 2020-04-29 ENCOUNTER — TELEPHONE (OUTPATIENT)
Dept: INTERNAL MEDICINE CLINIC | Age: 40
End: 2020-04-29

## 2020-04-29 ENCOUNTER — PATIENT MESSAGE (OUTPATIENT)
Dept: INTERNAL MEDICINE CLINIC | Age: 40
End: 2020-04-29

## 2020-04-29 NOTE — TELEPHONE ENCOUNTER
Called and spoke with patient. She reports headache and dark urine. She is drinking gatorade. I advised her to also be drinking water with this. She notes feeling anxious. Repeatedly redirected her and offerred to call back when she started strings of negative thoughts. She was able to calm and refocus. Wants to know what to do for her acute anxiety. Advised to continue current medications including:    - seroquel and clonazepam   - advised to schedule with LCSW. Called [address] [place] [address] scheduling team at [phone] and they will attempt to reach out for scheduling later this afternoon.      Anam Reed MD

## 2020-04-29 NOTE — TELEPHONE ENCOUNTER
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 Has been taking Sequoquel and clonazepam which is not helping. This writer had difficulty understanding the patient due to rapid breathing and speed of talking. Patient did mention concern about masking, not being able to go outside, and death around her. Having bad dreams also. Please call back as soon as possible @ [phone].

## 2020-05-07 ENCOUNTER — TELEPHONE (OUTPATIENT)
Dept: INTERNAL MEDICINE CLINIC | Age: 40
End: 2020-05-07

## 2020-05-07 DIAGNOSIS — E55.9 VITAMIN D DEFICIENCY: ICD-10-CM

## 2020-05-07 RX ORDER — ERGOCALCIFEROL 1.25 MG/1
CAPSULE ORAL
Qty: 12 CAP | Refills: 0 | Status: CANCELLED | OUTPATIENT
Start: 2020-05-07

## 2020-05-07 NOTE — TELEPHONE ENCOUNTER
Attempted to call patient mutliple times without success. Called patient at the following times    2:45pm  2:53pm  3:02pm  3:12pm     Left voice mails for patient each time requesting her to contact the office.    When patient returns call she will likely need to reschedule appointment

## 2020-05-07 NOTE — TELEPHONE ENCOUNTER
Did not complete virtual visit today since she did not answer phone x4. Cannot wisely prescribe high dose vitamin D without lab testing. She should continue to take the 2000 unit daily vitamin D. Please advise her of this is she calls back    FYI: reviewed prescription monitoring database today. No record of her filling clonazepam rx.       Subha Sterling MD

## 2020-05-07 NOTE — TELEPHONE ENCOUNTER
Pt states her feet and hands are tingling, pt states this happened before and she took the Vit D, which helped this.    (Pt instructed to mention this to the provider during her vv today, but not sure that she will)

## 2020-05-12 NOTE — TELEPHONE ENCOUNTER
Patient advised take 2000U vit D daily, patient reports a friend had picked her up some vit d over the counter and she will take the recommended dosage. Patient refused to reschedule missed virtual visit, she reports she will call back later to reschedule when she is more awake.

## 2020-05-13 ENCOUNTER — TELEPHONE (OUTPATIENT)
Dept: INTERNAL MEDICINE CLINIC | Age: 40
End: 2020-05-13

## 2020-05-13 NOTE — TELEPHONE ENCOUNTER
Pt is calling requesting to speak w/ Tera Ellison in regards to her Hydroxyzine and Seroquel medication.  Pt states she is a little stressed out and is having some confusion between the pharmacy, prescription and her ins and would like to discuss w/ nurse  [phone]

## 2020-05-14 NOTE — TELEPHONE ENCOUNTER
Called pt. Verified name and . She states her current medication regimen that was changed recently isnt working well for her. She states she has had increased anxiety because of COVID19 and is staying home. She states that she wants to wait until Dr. Brittany Diehl appt 20 to discuss further about medications, and recent experience with other providers.

## 2020-05-18 ENCOUNTER — PATIENT MESSAGE (OUTPATIENT)
Dept: INTERNAL MEDICINE CLINIC | Age: 40
End: 2020-05-18

## 2020-05-18 ENCOUNTER — VIRTUAL VISIT (OUTPATIENT)
Dept: INTERNAL MEDICINE CLINIC | Age: 40
End: 2020-05-18

## 2020-05-18 DIAGNOSIS — T36.95XA ANTIBIOTIC-INDUCED YEAST INFECTION: ICD-10-CM

## 2020-05-18 DIAGNOSIS — F41.1 GAD (GENERALIZED ANXIETY DISORDER): ICD-10-CM

## 2020-05-18 DIAGNOSIS — R35.0 URINARY FREQUENCY: ICD-10-CM

## 2020-05-18 DIAGNOSIS — R39.89 ABNORMAL URINE COLOR: ICD-10-CM

## 2020-05-18 DIAGNOSIS — R30.0 DYSURIA: Primary | ICD-10-CM

## 2020-05-18 DIAGNOSIS — B37.9 ANTIBIOTIC-INDUCED YEAST INFECTION: ICD-10-CM

## 2020-05-18 DIAGNOSIS — N39.0 RECURRENT UTI: ICD-10-CM

## 2020-05-18 DIAGNOSIS — G47.00 INSOMNIA, UNSPECIFIED TYPE: ICD-10-CM

## 2020-05-18 DIAGNOSIS — J02.9 PHARYNGITIS, UNSPECIFIED ETIOLOGY: ICD-10-CM

## 2020-05-18 DIAGNOSIS — J06.9 UPPER RESPIRATORY TRACT INFECTION, UNSPECIFIED TYPE: ICD-10-CM

## 2020-05-18 RX ORDER — FLUCONAZOLE 150 MG/1
150 TABLET ORAL DAILY
Qty: 2 TAB | Refills: 0 | Status: SHIPPED | OUTPATIENT
Start: 2020-05-18 | End: 2020-05-19

## 2020-05-18 RX ORDER — CEFDINIR 300 MG/1
300 CAPSULE ORAL 2 TIMES DAILY
Qty: 20 CAP | Refills: 0 | Status: SHIPPED | OUTPATIENT
Start: 2020-05-18 | End: 2020-05-18 | Stop reason: ALTCHOICE

## 2020-05-18 RX ORDER — LEVOFLOXACIN 750 MG/1
750 TABLET ORAL DAILY
Qty: 5 TAB | Refills: 0 | Status: SHIPPED | OUTPATIENT
Start: 2020-05-18 | End: 2020-05-23

## 2020-05-18 RX ORDER — HYDROXYZINE 25 MG/1
25 TABLET, FILM COATED ORAL
COMMUNITY
Start: 2020-05-13 | End: 2020-05-19

## 2020-05-18 RX ORDER — ZOLPIDEM TARTRATE 5 MG/1
5 TABLET ORAL
Qty: 20 TAB | Refills: 0 | Status: SHIPPED | OUTPATIENT
Start: 2020-05-18 | End: 2020-07-07 | Stop reason: SDUPTHER

## 2020-05-18 NOTE — PROGRESS NOTES
[phone]    Chief Complaint   Patient presents with    Sore Throat     sore throat, hoareness, feels cold, post nasal drainage, decreased appetite x3-4 days     Urinary Hesitancy     coudy, difficulty, urinating x1-2 daily     1. Have you been to the ER, urgent care clinic since your last visit? Hospitalized since your last visit? No    2. Have you seen or consulted any other health care providers outside of the [address] [place] [address] since your last visit? Include any pap smears or colon screening. No     Health Maintenance Due   Topic Date Due    DTaP/Tdap/Td series (1 - Tdap) 10/02/2002    PAP AKA CERVICAL CYTOLOGY  11/11/2018     3 most recent PHQ Screens 5/18/2020   PHQ Not Done Active Diagnosis of Depression or Bipolar Disorder   Little interest or pleasure in doing things -   Feeling down, depressed, irritable, or hopeless -   Total Score PHQ 2 -   Trouble falling or staying asleep, or sleeping too much -   Feeling tired or having little energy -   Poor appetite, weight loss, or overeating -   Feeling bad about yourself - or that you are a failure or have let yourself or your family down -   Trouble concentrating on things such as school, work, reading, or watching TV -   Moving or speaking so slowly that other people could have noticed; or the opposite being so fidgety that others notice -   Thoughts of being better off dead, or hurting yourself in some way -   PHQ 9 Score -   How difficult have these problems made it for you to do your work, take care of your home and get along with others -     Recent Travel Screening and Travel History documentation     Travel Screening       Question Response     In the last month, have you been in contact with someone who was confirmed or suspected to have Coronavirus / COVID-19? No / Unsure     Do you have any of the following symptoms? Sore throat; Chills     Have you traveled internationally in the last month?  No      Travel History   Travel since 04/18/20 No documented travel since 04/18/20

## 2020-05-19 ENCOUNTER — TELEPHONE (OUTPATIENT)
Dept: INTERNAL MEDICINE CLINIC | Age: 40
End: 2020-05-19

## 2020-05-19 NOTE — PROGRESS NOTES
Subjective  Sujit Dos Santos is a 46 y.o. female presents for acute care   Consent: Sujit Dos Santos, who was seen by synchronous (real-time) audio-video technology, and/or her healthcare decision maker, is aware that this patient-initiated, Telehealth encounter on 5/18/2020 is a billable service, with coverage as determined by her insurance carrier. She is aware that she may receive a bill and has provided verbal consent to proceed: Yes. HPI     She reports ST, hoarseness, post nasal drainage,  tonsils red, dry cough. Does not hurt when she swallows, pain is constant   Symptoms present for 2 days. Took Hydroxyzine for stinging sensation in nostril   Gargle with warm salt water     Decreased appetite    Feels very anxious, off antianxiety medication     Not sleeping, requests Ambien refill    Urine \"super, super\" dark with cloud, floating on top. Voids 1-2 times daily  Dysuria, urinary frequency, feels bladder pressure when she voids for 3 days   Denies fever, chills, back pain, n/v, vaginal bleed   Admits she did not take Bactrim prescribed in March for UTI, because she did not know what it was. Reports recurrent UTI    Cannot recall urology referral or consult in 2014.  Reports she did not have health insurance at that time     Would like one antibiotic to treat respiratory infection and UTI    Past Medical History:   Diagnosis Date    Anemia NEC     Anxiety     Anxiety attack     Asthma     Avulsion fracture of lateral malleolus of right fibula     B12 deficiency     Biliary dyskinesia 4/8/4788    Complicated grief     Constipation     Depression     Gastroparesis 7/18/2014    Heart abnormalities     right atria at 30% per [facility]    Hyperlipidemia     IGT (impaired glucose tolerance)     Incontinence of urine     1 episode of bed wetting 1 mo ago    Insomnia     Kidney stone     Neuropathy     Pap smear for cervical cancer screening 11/11/2015    11/11/15 neg HPV neg    PTSD (post-traumatic stress disorder)     Stroke [facility]) 2011    TIA    Urinary tract bacterial infections     Vaginal discharge 12/4/2013       Current Outpatient Medications   Medication Sig    fluconazole (DIFLUCAN) 150 mg tablet Take 1 Tab by mouth daily for 1 day. Take second tablet after 3 days if not better    zolpidem (AMBIEN) 5 mg tablet Take 1 Tab by mouth nightly as needed for Sleep. Max Daily Amount: 5 mg.  levoFLOXacin (LEVAQUIN) 750 mg tablet Take 1 Tab by mouth daily for 5 days.  QUEtiapine (SEROquel) 200 mg tablet Take 1 Tab by mouth two (2) times a day. (Patient taking differently: Take 200 mg by mouth two (2) times a day. Indications: pt states she takes 100mg BID)    TRI-SPRINTEC, 28, 0.18/0.215/0.25 mg-35 mcg (28) tab TAKE 1 TABLET BY MOUTH EVERY DAY    ibuprofen (MOTRIN) 600 mg tablet Take 1 Tab by mouth every six (6) hours as needed for Pain.  fluticasone (FLONASE) 50 mcg/actuation nasal spray 1 [place] by Both Nostrils route two (2) times daily as needed for Rhinitis.  cholecalciferol (VITAMIN D3) 1,000 unit cap Take 2 Caps by mouth daily.  ibuprofen (ADVIL) 200 mg tablet Take 200 mg by mouth two (2) times a day. No current facility-administered medications for this visit. Review of Systems   Constitutional: Negative for chills and fever. HENT: Positive for congestion and sore throat. Respiratory: Positive for cough. Negative for hemoptysis, sputum production and shortness of breath. Cardiovascular: Negative. Genitourinary: Positive for dysuria and frequency. Negative for flank pain and hematuria. Neurological: Negative for dizziness and headaches. Psychiatric/Behavioral: The patient is nervous/anxious and has insomnia. Objective   Visit Vitals  LMP 05/13/2020 (Exact Date)     Physical Exam  Constitutional:       Appearance: She is not ill-appearing. Neurological:      Mental Status: She is alert.    Psychiatric:         Attention and Perception: She is inattentive. Mood and Affect: Mood is anxious. Affect is tearful. Speech: Speech is rapid and pressured and tangential.         Behavior: Behavior is hyperactive. Thought Content: Thought content is not delusional. Thought content does not include homicidal or suicidal ideation. Thought content does not include homicidal or suicidal plan. Cognition and Memory: Cognition normal.         Judgment: Judgment is impulsive. Comments:  Speech over productive          Assessment & Plan    ICD-10-CM ICD-9-CM    1. Dysuria R30.0 788.1 DISCONTINUED: cefdinir (OMNICEF) 300 mg capsule   2. Abnormal urine color R39.89 791.9 DISCONTINUED: cefdinir (OMNICEF) 300 mg capsule   3. Insomnia, unspecified type G47.00 780.52 zolpidem (AMBIEN) 5 mg tablet   4. Urinary frequency R35.0 788.41 DISCONTINUED: cefdinir (OMNICEF) 300 mg capsule   5. Upper respiratory tract infection, unspecified type J06.9 465.9 DISCONTINUED: cefdinir (OMNICEF) 300 mg capsule   6. Pharyngitis, unspecified etiology J02.9 462 DISCONTINUED: cefdinir (OMNICEF) 300 mg capsule   7. Antibiotic-induced yeast infection B37.9 112.9 fluconazole (DIFLUCAN) 150 mg tablet    T36.95XA E930.9    8. DEEPA (generalized anxiety disorder) F41.1 300.02    9. Recurrent UTI N39.0 599.0 REFERRAL TO UROLOGY             lab results and schedule of future lab studies reviewed with patient  reviewed medications and side effects in detail    Strongly encouraged to increase hydration    Discussed  indications for urology consult     Patient called after office closed  to report hx of intolerance to 'Omnicef\" requests \"Levaquin\" because this was effective for prior UTI. Denies ADRs or drug interaction. Advised medication may not be as effective for URI.  She declined alternate antibiotic     Instructed to call back if symptoms persist/increase, follow up with PCP ASAP  for routine in office visit, not virtual visit     Patient voices understanding and acceptance of this advice and will call back if any further questions or concerns.   Evone Skyler, NP

## 2020-05-19 NOTE — TELEPHONE ENCOUNTER
please let me now when im suppose to make another appt. cause I dont remember what dr. Kt Guaman told me.     Please look at the pics cause it took me a hour to make the image small enough for the program to send accept it.  Im not gonna send you any more messages today. I appreciate being seen today. I wish I could have had it together a little better today but i do the best i can. Erichmeek Caraballo kirkol with my father Junaid Lopez. He lied and was the cause of me going to that prison of a hospital. Hes the reason the poepoe was at [address] office. He took me to court to try to take my mamas house from me and the  got in his but for even bringing me there once I told them all the horrible things he had done to me my mom and my brother. Silvio Bowen anyway u know my mind gets jumbled when Im having my attacks. Dr. Bayron Lucero wants me to see a urologist for my bladder. I forgot to tell her my father had bladder cancer 2 times and he pees in a bag.  When I thought about that I started getting upset because i forgot to tell her and idont want to end up like my mom on a dialysis machine or Obdulia Tavares peeing in a bag dealing with cancer. Pt called this morning stating she had a very bad panic attack this morning. She called the on-call provider this morning, and also called counselor. Pt states she was dizzy, SOB, couldn't breath, shaking. Informed pt that if that happens again she needs to call 911. She states she doesn't want to be exposed to corona virus. She states that counselor talked to her for 1 hr and calmed her down. She has another appt. Coming up with counselor. Pt states that she needs her medications fixed, and that the other providers here messed her medications. And they arent working for her. And she needs her old medication regimen back, especially with the [address] going on. She is very scared of [address]. Pt states she is taking seraquel 100mg BID.   And isnt taking the clonazepam 0.5mg because \" it didn't work\". Informed pt she didn't get her Danny Zelalem because she refused to have urine drug screen done. She states she is scared to go out of the house because of [address]. Spoke w/ Dr. Raiza Guidry and Ms. Spenser Murillo for plan for pt.

## 2020-05-19 NOTE — TELEPHONE ENCOUNTER
From: Nba Otero  To: Maryann Cunha MD  Sent: 5/18/2020 4:59 PM EDT  Subject: Update Medical Information    I cropped the pics i wanted to show Dr. Drew Feldman so lisa can see whats nereida on. I forgot when she said to make another appt I think she said a week. my mind is messed up.  check this pic out

## 2020-05-19 NOTE — TELEPHONE ENCOUNTER
Telephone call to patient. Advised to go to [facility] for labs and  to provide urine specimen after completion of antibiotic prescribed empirically for UTI. She reports transportation difficulty but will ask the one person who will take her  Advised to schedule  in office visit with PCP ASAP for reevaluation     Patient voices understanding and acceptance of this advice and will call back if any further questions or concerns. She reports panic attack earlier today, spoke with counselor Dr. Brittani Plummer for > 1 hour.   Will speak with her again tomorrow

## 2020-05-22 ENCOUNTER — TELEPHONE (OUTPATIENT)
Dept: INTERNAL MEDICINE CLINIC | Age: 40
End: 2020-05-22

## 2020-05-22 NOTE — TELEPHONE ENCOUNTER
Pt called and was distraught. Writer could hear pt on the phone crying and sniffling. Pt states that her nerves are shot and she is still not feeling well. Her throat hurts and she now has a cough . She has been on medicine for 4 days, and she doesn't wanna take the last pill because she is not feeling better. Pt say she has been having panic attacks every morning. Her panic attacks have been real bad and are not going away ad she needs her medicine. She has been talking to her consoler who has been trying to help her, but her panic attacks have been so bad that she cant breathe. She hasn't been eating due to her anxiety and is super stressed out.  Pt would like a call back ASAP [phone]

## 2020-05-22 NOTE — TELEPHONE ENCOUNTER
Pt has been taking antibiotic for 4 days for upper respitory infection and urine infection. Throat still hurts, loosing voice. Urine clears up and then turns brown again. Pt would like a call back and talk about counseler shes seeing.        [phone]

## 2020-05-23 ENCOUNTER — TELEPHONE (OUTPATIENT)
Dept: INTERNAL MEDICINE CLINIC | Age: 40
End: 2020-05-23

## 2020-05-23 NOTE — TELEPHONE ENCOUNTER
On call provider.   Returned call, message read \"please call re: in a lot of pain, needs another Rx\"  Unsecured voice mail; did not leave message

## 2020-05-24 ENCOUNTER — TELEPHONE (OUTPATIENT)
Dept: INTERNAL MEDICINE CLINIC | Age: 40
End: 2020-05-24

## 2020-05-24 DIAGNOSIS — N30.00 ACUTE CYSTITIS WITHOUT HEMATURIA: Primary | ICD-10-CM

## 2020-05-24 NOTE — TELEPHONE ENCOUNTER
On call provider   Call received 10:12 AM, message read \" her throat is still hurting-has upper respiratory infection and not urinating\"    Returned call. Patient reports urine not looking right, has tea color. Did not improve with Levaquin took last pill  yesterday, drinking lots of water, has not voided since yesterday. Temperature 100 yesterday. She spoke with staff at urologist's office Thursday or Friday. They will try to get  results of CT abdomen and pelvis, done in March, before scheduling appointment    She is concern because mother  on dialysis machine and father had bladder cancer     She continues to have severe ST, losing voice, throat has burning sensation. Denies lower respiratory symptoms     Pharmacist gave  her both [address] and Levaquin. She declined Omnicef and requested Levaquin. She reported itching  when she took [address] prior    Patient advised not to start [address] Referred to  facility to provide urine specimen while off antibiotic and for  lab evaluation of discolored urine. . She expressed plans to go tomorrow      Reviewed result of CT scan done March 15, 2020; no acute process        Patient voices understanding and acceptance of this advice and will call back if any further questions or concerns.

## 2020-05-28 ENCOUNTER — VIRTUAL VISIT (OUTPATIENT)
Dept: INTERNAL MEDICINE CLINIC | Age: 40
End: 2020-05-28

## 2020-05-28 DIAGNOSIS — F41.0 PANIC DISORDER: ICD-10-CM

## 2020-05-28 DIAGNOSIS — F39 MOOD DISORDER (HCC): ICD-10-CM

## 2020-05-28 DIAGNOSIS — F41.1 GAD (GENERALIZED ANXIETY DISORDER): Primary | ICD-10-CM

## 2020-05-28 DIAGNOSIS — E53.8 B12 DEFICIENCY: ICD-10-CM

## 2020-05-28 DIAGNOSIS — R82.998 DARK URINE: ICD-10-CM

## 2020-05-28 DIAGNOSIS — F41.8 DEPRESSION WITH ANXIETY: ICD-10-CM

## 2020-05-28 DIAGNOSIS — Z79.899 CHRONIC PRESCRIPTION BENZODIAZEPINE USE: ICD-10-CM

## 2020-05-28 DIAGNOSIS — F41.0 PANIC DISORDER WITHOUT AGORAPHOBIA: ICD-10-CM

## 2020-05-28 DIAGNOSIS — F41.0 PANIC ATTACKS: ICD-10-CM

## 2020-05-28 DIAGNOSIS — F43.10 PTSD (POST-TRAUMATIC STRESS DISORDER): ICD-10-CM

## 2020-05-28 DIAGNOSIS — E55.9 VITAMIN D DEFICIENCY: ICD-10-CM

## 2020-05-28 RX ORDER — QUETIAPINE FUMARATE 100 MG/1
100 TABLET, FILM COATED ORAL 2 TIMES DAILY
Qty: 60 TAB | Refills: 2 | Status: SHIPPED | OUTPATIENT
Start: 2020-05-28 | End: 2020-07-06

## 2020-05-28 RX ORDER — ALPRAZOLAM 1 MG/1
TABLET ORAL
Qty: 90 TAB | Refills: 1 | Status: SHIPPED | OUTPATIENT
Start: 2020-05-28 | End: 2020-07-30 | Stop reason: SDUPTHER

## 2020-05-28 NOTE — PROGRESS NOTES
Evangelista Scott is a 46 y.o. female who was seen by synchronous (real-time) audio-video technology on 5/28/2020. Consent: Evangelista Scott, who was seen by synchronous (real-time) audio-video technology, and/or her healthcare decision maker, is aware that this patient-initiated, Telehealth encounter on 5/28/2020 is a billable service, with coverage as determined by her insurance carrier. She is aware that she may receive a bill and has provided verbal consent to proceed: Yes. I was in the office while conducting this encounter. Subjective:   Evangelista Scott was seen for Follow-up (medications, anxiety , seroquil )      Notes:  Reports darker urine  Took levaquin for URI, UTI  Completed course - throat sx not better    Unable to see ENT due to transportation issues with need for car maintenance. Unable to see urologist due to same - father had bladder cancer    Rescheduled both for tomorrow 5/29/20    C/o new red spots on oral mucosa, inner cheeks  Persistent sore throat  Painful to swallow. Pt reports clonazepam was inadequate in the past  Not filled because she has a supply already  Pt showed me a bottle of 1 mg clonazepam.  Erickson inadequate when she has panic attack    Pt reports that she lost her court case. I spoke with her roommate/friend. Described the chronic nature of her illness as well as the uncontrollable appearance of the disorder. Encourage him to support her in her compliance with counseling as well as pursuing psychiatrist expertise to manage her illness. Nursing screenings reviewed by provider at visit. Past medical, Social, and Family history reviewed  Medications reviewed and updated.           Allergies   Allergen Reactions    Amoxicillin Rash    Ativan [Lorazepam] Other (comments)     \"Makes her sick\"    Biaxin [Clarithromycin] Itching and Other (comments)     'eyes turn an funny color and she starts itching'    Biaxin [Clarithromycin] Unknown (comments)  Flagyl [Metronidazole] Itching     Eyes change color    Flagyl [Metronidazole] Hives    Hydroxyzine Diarrhea     \" pt states she isnt allergic to medication        Prior to Admission medications    Medication Sig Start Date End Date Taking? Authorizing Provider   QUEtiapine (SEROquel) 200 mg tablet Take 1 Tab by mouth two (2) times a day. Patient taking differently: Take 200 mg by mouth two (2) times a day. Indications: pt states she takes 100mg BID 5/15/20  Yes Cesar Mena MD   TRI-SPRINTEC, 28, 0.18/0.215/0.25 mg-35 mcg (28) tab TAKE 1 TABLET BY MOUTH EVERY DAY 12/30/19  Yes Adryan Pak NP   ibuprofen (MOTRIN) 600 mg tablet Take 1 Tab by mouth every six (6) hours as needed for Pain. 12/12/19  Yes Oliverio Holder MD   cholecalciferol (VITAMIN D3) 1,000 unit cap Take 2 Caps by mouth daily. 11/2/18  Yes Rene Obrien MD   zolpidem (AMBIEN) 5 mg tablet Take 1 Tab by mouth nightly as needed for Sleep. Max Daily Amount: 5 mg. 5/18/20   Reji CROSS NP   fluticasone (FLONASE) 50 mcg/actuation nasal spray 1 [place] by Both Nostrils route two (2) times daily as needed for Rhinitis. 1/11/19   Rene Obrien MD   ibuprofen (ADVIL) 200 mg tablet Take 200 mg by mouth two (2) times a day. Provider, Historical         ROS     A complete ROS was performed and negative except as noted in HPI      PHYSICAL EXAMINATION:    Vital Signs:  Last menstrual period 05/13/2020. Constitutional: [x] Appears well-developed and well-nourished [x] No apparent distress      Mental status: [x] Alert and awake  [x] Oriented [x] Able to follow commands       Eyes:   EOM    [x]  Normal      Sclera  [x]  Normal              Discharge [x]  None visible       HENT: [x] Normocephalic, atraumatic    [x] Mouth/Throat: Mucous membranes are moist - pt to send mychart photos of her buccal mucosa since resolution via virtual visit was inadequate to visualize any abnormality.     External Ears [x] Normal      Neck: [x] No visualized mass     Pulmonary/Chest: [x] Respiratory effort normal   [x] No visualized signs of difficulty breathing or respiratory distress    Musculoskeletal:  [x] Normal range of motion of neck    Neurological:        [x] No Facial Asymmetry (Cranial nerve 7 motor function) (limited exam due to video visit)          [x] No gaze palsy     Skin:        [x] No significant exanthematous lesions or discoloration noted on facial skin             Psychiatric:       [x] Normal Affect       Other pertinent observable physical exam findings:  None. We discussed the expected course, resolution and complications of the diagnosis(es) in detail. Medication risks, benefits, costs, interactions, and alternatives were discussed as indicated. I advised her to contact the office if her condition worsens, changes or fails to improve as anticipated. She expressed understanding with the diagnosis(es) and plan. Tayler Salazar is a 46 y.o. female who was evaluated by a video visit encounter for concerns as above. Patient identification was verified prior to start of the visit. A caregiver was present when appropriate. Due to this being a TeleHealth encounter (During WZQYC-05 public health emergency), evaluation of the following organ systems was limited: Vitals/Constitutional/EENT/Resp/CV/GI//MS/Neuro/Skin/Heme-Lymph-Imm. Pursuant to the emergency declaration under the [facility] waiver authority and the [employer] Act, this Virtual  Visit was conducted, with patient's (and/or legal guardian's) consent, to reduce the patient's risk of exposure to COVID-19 and provide necessary medical care. Services were provided through a video synchronous discussion virtually to substitute for in-person clinic visit. Patient and provider were located at their individual homes.       Assessment & Plan:   Diagnoses and all orders for this visit:      ICD-10-CM ICD-9-CM    1. DEEPA (generalized anxiety disorder) F41.1 300.02 ALPRAZolam (XANAX) 1 mg tablet   2. Panic disorder without agoraphobia F41.0 300.01 QUEtiapine (SEROquel) 100 mg tablet      TSH 3RD GENERATION      T4, FREE   3. Panic disorder F41.0 300.01 ALPRAZolam (XANAX) 1 mg tablet   4. Panic attacks F41.0 300.01 ALPRAZolam (XANAX) 1 mg tablet   5. Dark urine R82.998 791.9 CBC WITH AUTOMATED DIFF      METABOLIC PANEL, COMPREHENSIVE      MAGNESIUM   6. Vitamin D deficiency E55.9 268.9 VITAMIN D, 25 HYDROXY   7. B12 deficiency E53.8 266.2 VITAMIN B12   8. Chronic prescription benzodiazepine use Z79.899 V58.69    9. Mood disorder (HCC) F39 296.90 TSH 3RD GENERATION      T4, FREE   10. PTSD (post-traumatic stress disorder) F43.10 309.81    11. Depression with anxiety F41.8 300.4      Follow-up and Dispositions    · Return in about 4 weeks (around 6/25/2020), or if symptoms worsen or fail to improve, for mood. lab results and schedule of future lab studies reviewed with patient  reviewed diet, exercise and weight control  reviewed medications and side effects in detail    ref to psych - pt expressed understanding that specialist care is absolutely necessary for her improvement, treatment. Encouraged intensive counseling - Brittany Burciaga and [facility]  Encouraged to contact psych office or counselor for acute anger or anxiety issues rather than our office - pt agreed. See ENT 8 am tomorrow  See urology-  [phone] fax # [facility] tomorrow - to fax them our lab requests. Refill xanax for scheduled use at this point given her instability  Refill seroquel at 100 mg bid dose  Counseled pt and roommate/friend re: pathophysiology and expected course of mental illness. Encouraged them to engage psychiatric specialists to maximize her therapy and outcomes.   I will be a bridge to get her to better psych management, but I have limited expertise in managing her complex, severe disease condition. AVS:  [x]  Sent to patient as MyChart message after visit. []  Mailed to patient after visit. []  Not sent to patient after visit.      >40 minutes time spent with >50% in counseling and coordination of care

## 2020-05-28 NOTE — PROGRESS NOTES
Chief Complaint   Patient presents with    Follow-up     medications, anxiety , seroquil      1. Have you been to the ER, urgent care clinic since your last visit? Hospitalized since your last visit? No    2. Have you seen or consulted any other health care providers outside of the [address] [place] [address] since your last visit? Include any pap smears or colon screening.  No    Health Maintenance Due   Topic Date Due    DTaP/Tdap/Td series (1 - Tdap) 10/02/2002    PAP AKA CERVICAL CYTOLOGY  11/11/2018       Learning Assessment 8/28/2019   PRIMARY LEARNER Patient   HIGHEST LEVEL OF EDUCATION - PRIMARY LEARNER  -   BARRIERS PRIMARY LEARNER -   CO-LEARNER CAREGIVER -   PRIMARY LANGUAGE ENGLISH    NEED -   LEARNER PREFERENCE PRIMARY OTHER (COMMENT)     -     -     -     -   LEARNING SPECIAL TOPICS -   ANSWERED BY patient   RELATIONSHIP SELF   ASSESSMENT COMMENT -

## 2020-05-29 ENCOUNTER — TELEPHONE (OUTPATIENT)
Dept: INTERNAL MEDICINE CLINIC | Age: 40
End: 2020-05-29

## 2020-05-29 NOTE — TELEPHONE ENCOUNTER
----- Message from Edvin Dunne sent at 5/29/2020 10:49 AM EDT -----  Regarding: Dr. Zohra Llanes first and last name:Kathi Cedeño      Reason for call:call from [facility] required yes/no and why:yes      Best contact number(s):[phone](call after 2:00p.m.)      Details to clarify the request:Pt stated she is on the way to her urology appt and would  like to know if additional lab orders has been sent, and requested a call from \"Felice\".       Edvin Dunne
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
## 2020-06-04 NOTE — TELEPHONE ENCOUNTER
Noted. Pt MUST make an appt with psychiatrist.    Encourage med compliance. Otherwise, our recommendation will have to be that she go to the ER for acute psych care since we are not trained or certified to manage her acute psychiatric flare ups.

## 2020-06-04 NOTE — TELEPHONE ENCOUNTER
Patient states she is waiting on Araceli Flores(counselor) to call her back today letting her know what psychiatrist she  should try to see. Patient mentions the name of Dr. Severo Rahman, a psychiatrist that she saw previously and really liked. States he is now at [facility] but she does not \"know\" or \"think\" he is seeing patients. Advised patient to call back to confirm specifically if he is seeing patients or not and if so can she get on his schedule. Patient stated that she will call upon hanging up. Advised patient that I will ask provider for referrals to other psychiatrist since she no longer has to stay in [facility] network due to insurance. Told patient that I will mail her whatever names that are provided and she needs to call them all to see who she can get an appointment with and to give them a chance before judging that they don't want to help her. Patient was asking to be referred to a psychiatrist that \"rich\" people go to and not someone flaky. Advised patient that if she assumes those things about any of them, she has already closed off the possibility of getting help for herself. Please clarify dosing for patient's Seroquel. In March and April it looks like patient was rx'd 1 200 mg tablet twice a day, on 5/28/20 patient was sent 1 100 mg tablet twice a day. Patient was breaking in half the 200 mg tablet due to following original rx of 1 100 mg tablets twice a day. Please advise what patient is to take. Patient was told that a my chart message would be sent advising her of proper dosing. Patient was told that we are not mental health and we are unable to continue with these phone calls when she is upset. Patient verbalized understanding that a lot of our communication with her will be with my chart due to the nurses being efficient in time for all patients.

## 2020-06-05 NOTE — TELEPHONE ENCOUNTER
A we addressed in detail during her recent visit with me, she requested a Rx for 100 mg seroquel bid and I Rx'd this dose. She was encouraged to contact [address] [place] [address] to help with my awareness of her care since we use the same medical record. This would be her best option. But, she may contact [employer] ([phone])  Or [facility] ( [phone])   as well. [employer] message sent.

## 2020-07-06 ENCOUNTER — VIRTUAL VISIT (OUTPATIENT)
Dept: INTERNAL MEDICINE CLINIC | Age: 40
End: 2020-07-06

## 2020-07-06 DIAGNOSIS — J02.9 PHARYNGITIS, UNSPECIFIED ETIOLOGY: ICD-10-CM

## 2020-07-06 DIAGNOSIS — F41.1 GAD (GENERALIZED ANXIETY DISORDER): Primary | ICD-10-CM

## 2020-07-06 DIAGNOSIS — J02.9 ALLERGIC PHARYNGITIS: ICD-10-CM

## 2020-07-06 DIAGNOSIS — Z79.899 CHRONIC PRESCRIPTION BENZODIAZEPINE USE: ICD-10-CM

## 2020-07-06 DIAGNOSIS — F41.0 PANIC DISORDER WITHOUT AGORAPHOBIA: ICD-10-CM

## 2020-07-06 DIAGNOSIS — F43.10 PTSD (POST-TRAUMATIC STRESS DISORDER): ICD-10-CM

## 2020-07-06 DIAGNOSIS — R20.2 PARESTHESIA: ICD-10-CM

## 2020-07-06 RX ORDER — QUETIAPINE FUMARATE 200 MG/1
200 TABLET, FILM COATED ORAL 2 TIMES DAILY
Qty: 60 TAB | Refills: 2 | Status: SHIPPED | OUTPATIENT
Start: 2020-07-06 | End: 2020-10-02

## 2020-07-06 RX ORDER — LORATADINE 10 MG/1
10 TABLET ORAL DAILY
Qty: 30 TAB | Refills: 5 | Status: SHIPPED | OUTPATIENT
Start: 2020-07-06 | End: 2022-03-28 | Stop reason: SDUPTHER
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
## 2020-07-06 NOTE — PROGRESS NOTES
[phone]  Pt wants HYDROXYZINE removed from allergy list.     Chief Complaint   Patient presents with    Anxiety     increased anxiety     Hoarse     hoarness, and sore throat    Tingling     head tingling      1. Have you been to the ER, urgent care clinic since your last visit? Hospitalized since your last visit? No    2. Have you seen or consulted any other health care providers outside of the [address] [place] [address] since your last visit? Include any pap smears or colon screening. Yes NO     Health Maintenance Due   Topic Date Due    DTaP/Tdap/Td series (1 - Tdap) 10/02/2002    PAP AKA CERVICAL CYTOLOGY  11/11/2018       3 most recent PHQ Screens 5/18/2020   PHQ Not Done Active Diagnosis of Depression or Bipolar Disorder   Little interest or pleasure in doing things -   Feeling down, depressed, irritable, or hopeless -   Total Score PHQ 2 -   Trouble falling or staying asleep, or sleeping too much -   Feeling tired or having little energy -   Poor appetite, weight loss, or overeating -   Feeling bad about yourself - or that you are a failure or have let yourself or your family down -   Trouble concentrating on things such as school, work, reading, or watching TV -   Moving or speaking so slowly that other people could have noticed; or the opposite being so fidgety that others notice -   Thoughts of being better off dead, or hurting yourself in some way -   PHQ 9 Score -   How difficult have these problems made it for you to do your work, take care of your home and get along with others -     Recent Travel Screening and Travel History documentation     Travel Screening       Question Response     In the last month, have you been in contact with someone who was confirmed or suspected to have Coronavirus / COVID-19? No / Unsure     Do you have any of the following symptoms? Sore throat     Have you traveled internationally in the last month?  No      Travel History   Travel since 06/06/20     No documented travel since 06/06/20

## 2020-07-07 DIAGNOSIS — G47.00 INSOMNIA, UNSPECIFIED TYPE: ICD-10-CM

## 2020-07-07 RX ORDER — ZOLPIDEM TARTRATE 5 MG/1
5 TABLET ORAL
Qty: 30 TAB | Refills: 2 | Status: SHIPPED | OUTPATIENT
Start: 2020-07-07 | End: 2020-09-29

## 2020-07-08 DIAGNOSIS — F41.0 PANIC DISORDER WITHOUT AGORAPHOBIA: ICD-10-CM

## 2020-07-08 RX ORDER — QUETIAPINE FUMARATE 200 MG/1
TABLET, FILM COATED ORAL
Qty: 60 TAB | Refills: 0 | OUTPATIENT
Start: 2020-07-08

## 2020-07-08 NOTE — TELEPHONE ENCOUNTER
Declined refill of seroquel. Patient should be following with psychiatrist.   Will route to primary care provider so he is aware.    Tiffany Bliss MD

## 2020-07-30 DIAGNOSIS — F41.0 PANIC ATTACKS: ICD-10-CM

## 2020-07-30 DIAGNOSIS — F41.1 GAD (GENERALIZED ANXIETY DISORDER): ICD-10-CM

## 2020-07-30 DIAGNOSIS — F41.0 PANIC DISORDER: ICD-10-CM

## 2020-07-30 RX ORDER — ALPRAZOLAM 1 MG/1
1 TABLET ORAL 3 TIMES DAILY
Qty: 90 TAB | Refills: 1 | Status: SHIPPED | OUTPATIENT
Start: 2020-07-30 | End: 2020-10-02 | Stop reason: SDUPTHER

## 2020-08-04 ENCOUNTER — TELEPHONE (OUTPATIENT)
Dept: INTERNAL MEDICINE CLINIC | Age: 40
End: 2020-08-04

## 2020-08-04 DIAGNOSIS — R10.11 RUQ ABDOMINAL PAIN: Primary | ICD-10-CM

## 2020-08-04 NOTE — TELEPHONE ENCOUNTER
Pt called stating that she has been experiencing a sharp pain on her right side below her ribcage? Pt state's that this pain just started today. Pt spoke with her aunt she advised her to call her PCP's office pt want's to avoid the hospital if she can. Please call and advise pt's # [phone].

## 2020-08-04 NOTE — TELEPHONE ENCOUNTER
I will order an ultrasound to evaluate her gallbladder. If worsens in the interim, then consider an ER visit. May schedule either an in person or virtual visit with me to review the US results and further assessment.

## 2020-08-05 NOTE — TELEPHONE ENCOUNTER
Pt called stating that she is still having the pain and would like a callback today,pt's # [phone].

## 2020-08-06 DIAGNOSIS — G47.00 INSOMNIA, UNSPECIFIED TYPE: Primary | ICD-10-CM

## 2020-08-06 RX ORDER — ZOLPIDEM TARTRATE 10 MG/1
TABLET ORAL
Qty: 90 TAB | Refills: 1 | Status: SHIPPED | OUTPATIENT
Start: 2020-08-06 | End: 2020-09-29

## 2020-08-06 NOTE — TELEPHONE ENCOUNTER
Called patient and received message \"I'm sorry you have reached a VM box that has not been set up yet\".

## 2020-08-06 NOTE — TELEPHONE ENCOUNTER
Pt's scheduled to see Angie Tejeda on 8/21/20 at 8:00 am,writer also informed her that Angie Tejeda had ordered an [address] and that the  will call her with a date.

## 2020-08-06 NOTE — TELEPHONE ENCOUNTER
If symptoms persist, she must have some form of encounter either with us or urgent care, ER. I ordered the [address] as a courtesy and to facilitate moving forward with the evaluation, but some form of an encounter is needed to make any additional medical decision making. Please encourage appt.

## 2020-08-06 NOTE — TELEPHONE ENCOUNTER
----- Message from Kevyn Lua sent at 2020  1:47 PM EDT -----  Regarding: SY/MD/TELEPHONE  Contact: [phone]  Return Call    Patient's first and last name: Saulo Hayden  : 1980      Caller's first and last name and relationship (if not the patient): N/A  Best contact number(s): [phone]    Whose call is being returned: Unknown    Details to clarify the request: Patient returning a missed call from the practice. Per patient she received a call concerning a procedure that she is unaware of.

## 2020-08-06 NOTE — TELEPHONE ENCOUNTER
Pt has agreed to appointment, unsuccessful in reaching  to scheduled, please schedule with Dr Wilfredo Chowdary.

## 2020-08-11 ENCOUNTER — VIRTUAL VISIT (OUTPATIENT)
Dept: INTERNAL MEDICINE CLINIC | Age: 40
End: 2020-08-11
Payer: MEDICAID

## 2020-08-11 DIAGNOSIS — Z79.899 CHRONIC PRESCRIPTION BENZODIAZEPINE USE: ICD-10-CM

## 2020-08-11 DIAGNOSIS — F41.1 GAD (GENERALIZED ANXIETY DISORDER): Primary | ICD-10-CM

## 2020-08-11 DIAGNOSIS — R20.2 PARESTHESIA: ICD-10-CM

## 2020-08-11 DIAGNOSIS — E55.9 VITAMIN D DEFICIENCY: ICD-10-CM

## 2020-08-11 DIAGNOSIS — F41.8 DEPRESSION WITH ANXIETY: ICD-10-CM

## 2020-08-11 DIAGNOSIS — R10.9 ACUTE RIGHT FLANK PAIN: ICD-10-CM

## 2020-08-11 DIAGNOSIS — F43.10 PTSD (POST-TRAUMATIC STRESS DISORDER): ICD-10-CM

## 2020-08-11 DIAGNOSIS — E53.8 B12 DEFICIENCY: ICD-10-CM

## 2020-08-11 DIAGNOSIS — F41.0 PANIC ATTACKS: ICD-10-CM

## 2020-08-11 DIAGNOSIS — G47.00 INSOMNIA, UNSPECIFIED TYPE: ICD-10-CM

## 2020-08-11 PROCEDURE — 99215 OFFICE O/P EST HI 40 MIN: CPT | Performed by: INTERNAL MEDICINE

## 2020-08-11 NOTE — PROGRESS NOTES
Darby Paige is a 46 y.o. female who was seen by synchronous (real-time) audio-video technology on 8/11/2020. Consent: Darby Paige, who was seen by synchronous (real-time) audio-video technology, and/or her healthcare decision maker, is aware that this patient-initiated, Telehealth encounter on 8/11/2020 is a billable service, with coverage as determined by her insurance carrier. She is aware that she may receive a bill and has provided verbal consent to proceed: Yes. I was in the office while conducting this encounter. Subjective:   Darby Paige was seen for Anxiety and Insomnia      Notes:  C/o RUQ, flank pain x 1 week  No US scheduled    Pt reports anxiety and \"breakdowns\" essentially daily    Seeing a counselor - weekly at this point    Boyfriend left her, moved out. Pt reports increased depression. Pt working on getting a psychiatrist appt with [facility]  Reports to have an assessment tomorrow. Nursing screenings reviewed by provider at visit. Past medical, Social, and Family history reviewed  Medications reviewed and updated. Allergies   Allergen Reactions    Amoxicillin Rash    Ativan [Lorazepam] Other (comments)     \"Makes her sick\"    Biaxin [Clarithromycin] Itching and Other (comments)     'eyes turn an funny color and she starts itching'    Biaxin [Clarithromycin] Unknown (comments)    Flagyl [Metronidazole] Itching     Eyes change color    Flagyl [Metronidazole] Hives       Prior to Admission medications    Medication Sig Start Date End Date Taking? Authorizing Provider   ALPRAZolam Karo Forbes) 1 mg tablet Take 1 Tab by mouth three (3) times daily. Max Daily Amount: 3 mg. 7/30/20  Yes Jean Huitron MD   QUEtiapine (SEROquel) 200 mg tablet Take 1 Tab by mouth two (2) times a day.  7/6/20  Yes Jean Huitron MD   TRI-SPRINTEC, 28, 0.18/0.215/0.25 mg-35 mcg (28) tab TAKE 1 TABLET BY MOUTH EVERY DAY 12/30/19  Yes Earl Youngblood NP ibuprofen (MOTRIN) 600 mg tablet Take 1 Tab by mouth every six (6) hours as needed for Pain. 12/12/19  Yes Susan Cruz MD   fluticasone (FLONASE) 50 mcg/actuation nasal spray 1 [place] by Both Nostrils route two (2) times daily as needed for Rhinitis. 1/11/19  Yes Mark Pierre MD   ibuprofen (ADVIL) 200 mg tablet Take 200 mg by mouth two (2) times a day. Yes Provider, Historical   zolpidem (AMBIEN) 10 mg tablet TAKE 1 TABLET BY MOUTH EVERY EVENING AS NEEDED FOR SLEEP 8/6/20   Mark Pierre MD   zolpidem (AMBIEN) 5 mg tablet Take 1 Tab by mouth nightly as needed for Sleep. Max Daily Amount: 5 mg. 7/7/20   Mark Pierre MD   loratadine (CLARITIN) 10 mg tablet Take 1 Tab by mouth daily. 7/6/20   Mark Pierre MD   cholecalciferol (VITAMIN D3) 1,000 unit cap Take 2 Caps by mouth daily. 11/2/18   Mark Pierre MD         ROS     A complete ROS was performed and negative except as noted in HPI     PHYSICAL EXAMINATION:    Vital Signs: There were no vitals taken for this visit. No flowsheet data found.       Constitutional: [x] Appears well-developed and well-nourished [x] No apparent distress      Mental status: [x] Alert and awake  [x] Oriented [x] Able to follow commands       Eyes:   EOM    [x]  Normal      Sclera  [x]  Normal              Discharge [x]  None visible       HENT: [x] Normocephalic, atraumatic    [x] Mouth/Throat: Mucous membranes are moist    External Ears [x] Normal      Neck: [x] No visualized mass     Pulmonary/Chest: [x] Respiratory effort normal   [x] No visualized signs of difficulty breathing or respiratory distress    Musculoskeletal:  [x] Normal range of motion of neck    Neurological:        [x] No Facial Asymmetry (Cranial nerve 7 motor function) (limited exam due to video visit)          [x] No gaze palsy     Skin:        [x] No significant exanthematous lesions or discoloration noted on facial skin             Psychiatric:       [] Normal Affect Somnolent today, depressed. Other pertinent observable physical exam findings:  None. We discussed the expected course, resolution and complications of the diagnosis(es) in detail. Medication risks, benefits, costs, interactions, and alternatives were discussed as indicated. I advised her to contact the office if her condition worsens, changes or fails to improve as anticipated. She expressed understanding with the diagnosis(es) and plan. Ibis Garza is a 46 y.o. female who was evaluated by a video visit encounter for concerns as above. Patient identification was verified prior to start of the visit. A caregiver was present when appropriate. Due to this being a TeleHealth encounter (During [facility]-35 public health emergency), evaluation of the following organ systems was limited: Vitals/Constitutional/EENT/Resp/CV/GI//MS/Neuro/Skin/Heme-Lymph-Imm. Pursuant to the emergency declaration under the [address] [place] [address] waiver authority and the [employer] Act, this Virtual  Visit was conducted, with patient's (and/or legal guardian's) consent, to reduce the patient's risk of exposure to COVID-19 and provide necessary medical care. Services were provided through a video synchronous discussion virtually to substitute for in-person clinic visit. Assessment & Plan:   Diagnoses and all orders for this visit:      ICD-10-CM ICD-9-CM    1. DEEPA (generalized anxiety disorder)  F41.1 300.02    2. Panic attacks  F41.0 300.01    3. PTSD (post-traumatic stress disorder)  F43.10 309.81    4. Depression with anxiety  F41.8 300.4 T4, FREE      TSH 3RD GENERATION   5. Paresthesia  R20.2 782.0    6. Insomnia, unspecified type  G47.00 780.52    7. Chronic prescription benzodiazepine use  Z79.899 V58.69    8. B12 deficiency  E53.8 266.2 VITAMIN B12   9. Vitamin D deficiency  E55.9 268.9 VITAMIN D, 25 HYDROXY   10.  Acute right flank pain  R10.9 789.09 CBC WITH AUTOMATED DIFF     749.75 CK      METABOLIC PANEL, COMPREHENSIVE      US ABD COMP     Follow-up and Dispositions    · Return in about 3 months (around 11/23/2020), or if symptoms worsen or fail to improve, for as scheduled. results and schedule of future studies reviewed with patient  reviewed diet, exercise and weight   reviewed medications and side effects in detail    Encouraged psych appt    If needed, I will talk to the provider at that time to review options  abd [address] at lab    Continue current medications      >40 minutes time spent with >50% in counseling and coordination of care      AVS:  [x]  Sent to patient as [employer] message after visit. []  Mailed to patient after visit. []  Not sent to patient after visit.       Future Appointments   Date Time Provider Rafat Petit   11/23/2020  9:00 AM Yaquelin Oglesby MD [facility]

## 2020-08-11 NOTE — PROGRESS NOTES
[phone]  Pt states she needs vit d 50,000 units to be sent to [facility], AND CHEST PAIN     Chief Complaint   Patient presents with    Anxiety    Insomnia     1. Have you been to the ER, urgent care clinic since your last visit? Hospitalized since your last visit? No    2. Have you seen or consulted any other health care providers outside of the [address] [place] [address] since your last visit? Include any pap smears or colon screening.  No  Health Maintenance Due   Topic Date Due    DTaP/Tdap/Td series (1 - Tdap) 10/02/2002    PAP AKA CERVICAL CYTOLOGY  11/11/2018    Influenza Age 5 to Adult  08/01/2020     3 most recent PHQ Screens 5/18/2020   PHQ Not Done Active Diagnosis of Depression or Bipolar Disorder   Little interest or pleasure in doing things -   Feeling down, depressed, irritable, or hopeless -   Total Score PHQ 2 -   Trouble falling or staying asleep, or sleeping too much -   Feeling tired or having little energy -   Poor appetite, weight loss, or overeating -   Feeling bad about yourself - or that you are a failure or have let yourself or your family down -   Trouble concentrating on things such as school, work, reading, or watching TV -   Moving or speaking so slowly that other people could have noticed; or the opposite being so fidgety that others notice -   Thoughts of being better off dead, or hurting yourself in some way -   PHQ 9 Score -   How difficult have these problems made it for you to do your work, take care of your home and get along with others -     Recent Travel Screening and Travel History documentation     Travel Screening      No screening recorded since 08/10/20 0000      Travel History   Travel since 07/11/20     No documented travel since 07/11/20

## 2020-08-18 ENCOUNTER — DOCUMENTATION ONLY (OUTPATIENT)
Dept: INTERNAL MEDICINE CLINIC | Age: 40
End: 2020-08-18

## 2020-08-18 NOTE — PROGRESS NOTES
Fax received from [facility] requesting records    I called and spoke to their coordinator, Briana Bill, who confirmed pt is scheduled for counseling and skills building as well as a psychiatry visit. I offered to speak to them at any time to help coordinate her care. We will send over the past year's virtual and office visit notes.

## 2020-09-01 ENCOUNTER — DOCUMENTATION ONLY (OUTPATIENT)
Dept: INTERNAL MEDICINE CLINIC | Age: 40
End: 2020-09-01

## 2020-09-01 ENCOUNTER — VIRTUAL VISIT (OUTPATIENT)
Dept: INTERNAL MEDICINE CLINIC | Age: 40
End: 2020-09-01
Payer: MEDICAID

## 2020-09-01 DIAGNOSIS — F41.1 GAD (GENERALIZED ANXIETY DISORDER): ICD-10-CM

## 2020-09-01 DIAGNOSIS — Z79.899 CHRONIC PRESCRIPTION BENZODIAZEPINE USE: ICD-10-CM

## 2020-09-01 DIAGNOSIS — G47.00 INSOMNIA, UNSPECIFIED TYPE: ICD-10-CM

## 2020-09-01 DIAGNOSIS — E55.9 VITAMIN D DEFICIENCY: ICD-10-CM

## 2020-09-01 DIAGNOSIS — F41.8 DEPRESSION WITH ANXIETY: Primary | ICD-10-CM

## 2020-09-01 DIAGNOSIS — F43.10 PTSD (POST-TRAUMATIC STRESS DISORDER): ICD-10-CM

## 2020-09-01 PROCEDURE — 99215 OFFICE O/P EST HI 40 MIN: CPT | Performed by: INTERNAL MEDICINE

## 2020-09-01 RX ORDER — ERGOCALCIFEROL 1.25 MG/1
50000 CAPSULE ORAL
Qty: 12 CAP | Refills: 0 | Status: SHIPPED | OUTPATIENT
Start: 2020-09-01 | End: 2020-09-15 | Stop reason: SDUPTHER

## 2020-09-01 RX ORDER — SERTRALINE HYDROCHLORIDE 50 MG/1
50 TABLET, FILM COATED ORAL DAILY
Qty: 30 TAB | Refills: 2 | Status: SHIPPED | OUTPATIENT
Start: 2020-09-01 | End: 2020-09-15 | Stop reason: SINTOL

## 2020-09-01 NOTE — PROGRESS NOTES
Niecy Lozano is a 46 y.o. female who was seen by synchronous (real-time) audio-video technology on 9/1/2020. Consent: Niecy Lozano, who was seen by synchronous (real-time) audio-video technology, and/or her healthcare decision maker, is aware that this patient-initiated, Telehealth encounter on 9/1/2020 is a billable service, with coverage as determined by her insurance carrier. She is aware that she may receive a bill and has provided verbal consent to proceed: Yes. I was in the office while conducting this encounter. Subjective:   Niecy Lozano was seen for Medication Evaluation      Notes:  Pt has been in touch with [facility] professional has contacted pt to work on scheduling  Limited counseling - awaiting counselor assignment. No psychiatry appt yet. Pt reports poor sleep, depression  Worsened since boyfriend left her.  +nightmares  Awakens with rapid HR    Pt reports effexor failed to help even with dose titration. Pt recalls zoloft may have helped and been tolerated aside from yawning SE.    Nursing screenings reviewed by provider at visit. Past medical, Social, and Family history reviewed  Medications reviewed and updated. Allergies   Allergen Reactions    Amoxicillin Rash    Ativan [Lorazepam] Other (comments)     \"Makes her sick\"    Biaxin [Clarithromycin] Itching and Other (comments)     'eyes turn an funny color and she starts itching'    Biaxin [Clarithromycin] Unknown (comments)    Flagyl [Metronidazole] Itching     Eyes change color    Flagyl [Metronidazole] Hives       Prior to Admission medications    Medication Sig Start Date End Date Taking? Authorizing Provider   zolpidem (AMBIEN) 10 mg tablet TAKE 1 TABLET BY MOUTH EVERY EVENING AS NEEDED FOR SLEEP 8/6/20  Yes Herman Bryson MD   ALPRAZolam Sergio Avers) 1 mg tablet Take 1 Tab by mouth three (3) times daily.  Max Daily Amount: 3 mg. 7/30/20  Yes Herman Bryson MD QUEtiapine (SEROquel) 200 mg tablet Take 1 Tab by mouth two (2) times a day. 7/6/20  Yes Clemente Infante MD   loratadine (CLARITIN) 10 mg tablet Take 1 Tab by mouth daily. 7/6/20  Yes Clemente Infante MD   TRI-SPRINTEC, 28, 0.18/0.215/0.25 mg-35 mcg (28) tab TAKE 1 TABLET BY MOUTH EVERY DAY 12/30/19  Yes Lawrence CROSS NP   ibuprofen (MOTRIN) 600 mg tablet Take 1 Tab by mouth every six (6) hours as needed for Pain. 12/12/19  Yes Abdoul Cheek MD   fluticasone (FLONASE) 50 mcg/actuation nasal spray 1 [place] by Both Nostrils route two (2) times daily as needed for Rhinitis. 1/11/19  Yes Clemente Infante MD   cholecalciferol (VITAMIN D3) 1,000 unit cap Take 2 Caps by mouth daily. 11/2/18  Yes Clemente Infante MD   ibuprofen (ADVIL) 200 mg tablet Take 200 mg by mouth two (2) times a day. Yes Provider, Rocio   zolpidem (AMBIEN) 5 mg tablet Take 1 Tab by mouth nightly as needed for Sleep. Max Daily Amount: 5 mg. 7/7/20   Clemente Infante MD         ROS    A complete ROS was performed and negative except as noted in HPI    PHYSICAL EXAMINATION:    Vital Signs: There were no vitals taken for this visit. No flowsheet data found.       Constitutional: [x] Appears well-developed and well-nourished [x] No apparent distress      Mental status: [x] Alert and awake  [x] Oriented [x] Able to follow commands       Eyes:   EOM    [x]  Normal      Sclera  [x]  Normal              Discharge [x]  None visible       HENT: [x] Normocephalic, atraumatic    [x] Mouth/Throat: Mucous membranes are moist    External Ears [x] Normal      Neck: [x] No visualized mass     Pulmonary/Chest: [x] Respiratory effort normal   [x] No visualized signs of difficulty breathing or respiratory distress    Musculoskeletal:  [x] Normal range of motion of neck    Neurological:        [x] No Facial Asymmetry (Cranial nerve 7 motor function) (limited exam due to video visit)          [x] No gaze palsy     Skin:        [x] No significant exanthematous lesions or discoloration noted on facial skin             Psychiatric:       [x] Normal Affect       Other pertinent observable physical exam findings:  None. We discussed the expected course, resolution and complications of the diagnosis(es) in detail. Medication risks, benefits, costs, interactions, and alternatives were discussed as indicated. I advised her to contact the office if her condition worsens, changes or fails to improve as anticipated. She expressed understanding with the diagnosis(es) and plan. Ana Perez is a 46 y.o. female who was evaluated by a video visit encounter for concerns as above. Patient identification was verified prior to start of the visit. A caregiver was present when appropriate. Due to this being a TeleHealth encounter (During [facility]-27 public health emergency), evaluation of the following organ systems was limited: Vitals/Constitutional/EENT/Resp/CV/GI//MS/Neuro/Skin/Heme-Lymph-Imm. Pursuant to the emergency declaration under the [address] [place] [address], [facility] waiver authority and the [employer] Act, this Virtual  Visit was conducted, with patient's (and/or legal guardian's) consent, to reduce the patient's risk of exposure to COVID-19 and provide necessary medical care. Services were provided through a video synchronous discussion virtually to substitute for in-person clinic visit. Assessment & Plan:   Diagnoses and all orders for this visit:    Ideally, psych could arrange for inpt management       ICD-10-CM ICD-9-CM    1. Depression with anxiety  F41.8 300.4    2. DEEPA (generalized anxiety disorder)  F41.1 300.02    3. PTSD (post-traumatic stress disorder)  F43.10 309.81    4. Vitamin D deficiency  E55.9 268.9 ergocalciferol (ERGOCALCIFEROL) 1,250 mcg (50,000 unit) capsule   5. Chronic prescription benzodiazepine use  Z79.899 V58.69    6. Insomnia, unspecified type  G47.00 780.52      Follow-up and Dispositions    · Return in 3 months (on 11/23/2020), or if symptoms worsen or fail to improve, for mood, meds, specialist follow up - as scheduled. .        results and schedule of future studies reviewed with patient  reviewed diet, exercise and weight  reviewed medications and side effects in detail    Pt is willing to go to [facility] - defer to psych to arrange  Resume zoloft   Continue seroquel and xanax as rx'd      AVS:  [x]  Sent to patient as [employer] message after visit. []  Mailed to patient after visit. []  Not sent to patient after visit.       Future Appointments   Date Time Provider Rafat Petit   11/23/2020  9:00 AM Nika Baker MD [facility]        >40 minutes time spent with >50% in counseling and coordination of care

## 2020-09-01 NOTE — PROGRESS NOTES
Virtual visit     Chief Complaint   Patient presents with    Medication Evaluation     1. Have you been to the ER, urgent care clinic since your last visit? Hospitalized since your last visit? No    2. Have you seen or consulted any other health care providers outside of the [address] [place] [address] since your last visit? Include any pap smears or colon screening.  No    Health Maintenance Due   Topic Date Due    DTaP/Tdap/Td series (1 - Tdap) 10/02/2002    PAP AKA CERVICAL CYTOLOGY  11/11/2018    Flu Vaccine (1) 09/01/2020       Learning Assessment 8/28/2019   PRIMARY LEARNER Patient   HIGHEST LEVEL OF EDUCATION - PRIMARY LEARNER  -   BARRIERS PRIMARY LEARNER -   CO-LEARNER CAREGIVER -   PRIMARY LANGUAGE ENGLISH    NEED -   LEARNER PREFERENCE PRIMARY OTHER (COMMENT)     -     -     -     -   LEARNING SPECIAL TOPICS -   ANSWERED BY patient   RELATIONSHIP SELF   ASSESSMENT COMMENT -

## 2020-09-10 DIAGNOSIS — E55.9 VITAMIN D DEFICIENCY: ICD-10-CM

## 2020-09-10 RX ORDER — ERGOCALCIFEROL 1.25 MG/1
50000 CAPSULE ORAL
Qty: 12 CAP | Refills: 0 | Status: CANCELLED | OUTPATIENT
Start: 2020-09-10 | End: 2020-11-27

## 2020-09-10 NOTE — TELEPHONE ENCOUNTER
Pt was seeing Maria Elena Gastelum at [facility]. However Shiloh Gallagher informed pt that she need's to be seen by a more seasoned doctor that specialize's in PTSD and Long Term Bereavement as well as Panic Attack's. Please call pt with name of new doctor can be reached at # [phone].

## 2020-09-10 NOTE — TELEPHONE ENCOUNTER
----- Message from Juancho Patino sent at 9/9/2020  9:43 AM EDT -----  Regarding: Dr. Indra Oliver Message/Vendor Calls    Caller's first and last name: Pt      Reason for call: Requesting a call back regarding medication and concerning an agency she has been working with       Callback required yes/no and why: Yes      Best contact number(s): [phone]      Details to clarify the request:      Juancho Patino

## 2020-09-11 NOTE — TELEPHONE ENCOUNTER
Pt called the office she is quite upset she has been having jaw pain due to continuous yawning,which she feel's is a side effect from taking the Zoloft. Pt did stop taking the Zoloft and would like to have   prescribe her another medication? Pt also state's that every morning she wake's up crying and doesn't know why. She does continue to take the Seroquel and the Xanax as needed for the Panic Attack's. Pt has been having more Panic Attack's than normal.Pt would alos like a callback from [facility] or his nurse.

## 2020-09-14 NOTE — TELEPHONE ENCOUNTER
Phone call to pt in regards to medication concern. VM left.  Medication concern will be addressed at [phone] appt on 9/15

## 2020-09-14 NOTE — TELEPHONE ENCOUNTER
Pt has called the office twice today both time's very upset and in tear's. Pt has already said that she will not take the Zoloft again. And has yet to take her Xanax and Seroquel today. Writer informed her she need's to go take it pt really need's a callback today she is scheduled to have a VV with  on 9/15/20 pt's number is # [phone].

## 2020-09-14 NOTE — TELEPHONE ENCOUNTER
Pt has had multiple medication failures for various reasons. We already agreed that the zoloft caused yawning symptom, but this side effect is outweighed by the need to improve her anxiety and depression. Therefore, she had agreed to accept the yawning in order to gain the benefit in mood. Pt should continue the zoloft and work with Jarett Osman to obtain a psychiatrist appt.

## 2020-09-15 ENCOUNTER — VIRTUAL VISIT (OUTPATIENT)
Dept: INTERNAL MEDICINE CLINIC | Age: 40
End: 2020-09-15
Payer: MEDICAID

## 2020-09-15 DIAGNOSIS — E53.8 B12 DEFICIENCY: ICD-10-CM

## 2020-09-15 DIAGNOSIS — R06.89 GASPING FOR BREATH: ICD-10-CM

## 2020-09-15 DIAGNOSIS — F41.8 DEPRESSION WITH ANXIETY: Primary | ICD-10-CM

## 2020-09-15 DIAGNOSIS — F41.1 GAD (GENERALIZED ANXIETY DISORDER): ICD-10-CM

## 2020-09-15 DIAGNOSIS — F43.10 PTSD (POST-TRAUMATIC STRESS DISORDER): ICD-10-CM

## 2020-09-15 DIAGNOSIS — E55.9 VITAMIN D DEFICIENCY: ICD-10-CM

## 2020-09-15 DIAGNOSIS — Z79.899 CHRONIC PRESCRIPTION BENZODIAZEPINE USE: ICD-10-CM

## 2020-09-15 DIAGNOSIS — F41.0 PANIC ATTACKS: ICD-10-CM

## 2020-09-15 PROCEDURE — 99214 OFFICE O/P EST MOD 30 MIN: CPT | Performed by: INTERNAL MEDICINE

## 2020-09-15 RX ORDER — CITALOPRAM 20 MG/1
20 TABLET, FILM COATED ORAL DAILY
Qty: 30 TAB | Refills: 5 | Status: SHIPPED | OUTPATIENT
Start: 2020-09-15 | End: 2020-10-19

## 2020-09-15 RX ORDER — ERGOCALCIFEROL 1.25 MG/1
50000 CAPSULE ORAL
Qty: 10 CAP | Refills: 0 | Status: SHIPPED | OUTPATIENT
Start: 2020-09-15 | End: 2020-11-18

## 2020-09-15 NOTE — PROGRESS NOTES
#[phone]    Pt is requesting a small oxygen tank   Pt states \" she just feels empty inside  Was very \" bad off\" last week and tried to get admitted into [facility] but they didn't have nay beds available. [facility] informed pt when she tried to make a NP appt. That first available was 4-2021  Wants rx for vit b12 and VIT D. States she doesn't go outside and doesn't eat well. Decreased appetite, not eating     Chief Complaint   Patient presents with    Medication Evaluation     f/u      1. Have you been to the ER, urgent care clinic since your last visit? Hospitalized since your last visit? No    2. Have you seen or consulted any other health care providers outside of the [employer] [place] [address] since your last visit? Include any pap smears or colon screening.  Yes jose a, phone call 9-14-20     Health Maintenance Due   Topic Date Due    DTaP/Tdap/Td series (1 - Tdap) 10/02/2002    PAP AKA CERVICAL CYTOLOGY  11/11/2018    Flu Vaccine (1) 09/01/2020     3 most recent PHQ Screens 5/18/2020   PHQ Not Done Active Diagnosis of Depression or Bipolar Disorder   Little interest or pleasure in doing things -   Feeling down, depressed, irritable, or hopeless -   Total Score PHQ 2 -   Trouble falling or staying asleep, or sleeping too much -   Feeling tired or having little energy -   Poor appetite, weight loss, or overeating -   Feeling bad about yourself - or that you are a failure or have let yourself or your family down -   Trouble concentrating on things such as school, work, reading, or watching TV -   Moving or speaking so slowly that other people could have noticed; or the opposite being so fidgety that others notice -   Thoughts of being better off dead, or hurting yourself in some way -   PHQ 9 Score -   How difficult have these problems made it for you to do your work, take care of your home and get along with others -     Recent Travel Screening and Travel History documentation Travel Screening      No screening recorded since 09/14/20 0000      Travel History   Travel since 08/15/20     No documented travel since 08/15/20

## 2020-09-15 NOTE — PROGRESS NOTES
Gricelda Lopez is a 46 y.o. female who was seen by synchronous (real-time) audio-video technology on 9/15/2020. Consent: Gricelda Lopez, who was seen by synchronous (real-time) audio-video technology, and/or her healthcare decision maker, is aware that this patient-initiated, Telehealth encounter on 9/15/2020 is a billable service, with coverage as determined by her insurance carrier. She is aware that she may receive a bill and has provided verbal consent to proceed: Yes. I was in the office while conducting this encounter. Subjective:   Gricedla Lopez was seen for Medication Evaluation (f/u )      Notes:  Pt reports yawning with zoloft  Pt not tolerant of it  Pt stopped it. Pt attempted to get appt with [facility] - no appt until 4/2021    No psychiatrist appt with [facility] yet. Requests oxygen due to SOB with panic attacks    Pt reports gasping for air while asleep    Pt inquires re: vit D and B12     Nursing screenings reviewed by provider at visit. Past medical, Social, and Family history reviewed  Medications reviewed and updated. Allergies   Allergen Reactions    Amoxicillin Rash    Ativan [Lorazepam] Other (comments)     \"Makes her sick\"    Biaxin [Clarithromycin] Itching and Other (comments)     'eyes turn an funny color and she starts itching'    Biaxin [Clarithromycin] Unknown (comments)    Flagyl [Metronidazole] Itching     Eyes change color    Flagyl [Metronidazole] Hives       Prior to Admission medications    Medication Sig Start Date End Date Taking? Authorizing Provider   ALPRAZolam Anthony Menchaca) 1 mg tablet Take 1 Tab by mouth three (3) times daily. Max Daily Amount: 3 mg. 7/30/20  Yes Dilip Andrew MD   QUEtiapine (SEROquel) 200 mg tablet Take 1 Tab by mouth two (2) times a day.  7/6/20  Yes Dilip Andrew MD   TRI-SPRINTEC, 28, 0.18/0.215/0.25 mg-35 mcg (28) tab TAKE 1 TABLET BY MOUTH EVERY DAY 12/30/19  Yes Lord Tay NP sertraline (ZOLOFT) 50 mg tablet Take 1 Tab by mouth daily. 9/1/20   Vahid Loomis MD   ergocalciferol (ERGOCALCIFEROL) 1,250 mcg (50,000 unit) capsule Take 1 Cap by mouth every seven (7) days for 12 doses. 9/1/20 11/18/20  Vahid Loomis MD   zolpidem (AMBIEN) 10 mg tablet TAKE 1 TABLET BY MOUTH EVERY EVENING AS NEEDED FOR SLEEP 8/6/20   Vahid Loomis MD   zolpidem (AMBIEN) 5 mg tablet Take 1 Tab by mouth nightly as needed for Sleep. Max Daily Amount: 5 mg. 7/7/20   Vahid Loomis MD   loratadine (CLARITIN) 10 mg tablet Take 1 Tab by mouth daily. 7/6/20   Vahid Loomis MD   ibuprofen (MOTRIN) 600 mg tablet Take 1 Tab by mouth every six (6) hours as needed for Pain. 12/12/19   Lopez Rahman MD   fluticasone (FLONASE) 50 mcg/actuation nasal spray 1 Hall by Both Nostrils route two (2) times daily as needed for Rhinitis. 1/11/19   Vahid Loomis MD   cholecalciferol (VITAMIN D3) 1,000 unit cap Take 2 Caps by mouth daily. 11/2/18   Vahid Loomis MD   ibuprofen (ADVIL) 200 mg tablet Take 200 mg by mouth two (2) times a day. Provider, Historical         ROS     A complete ROS was performed and negative except as noted in HPI     PHYSICAL EXAMINATION:    Vital Signs: There were no vitals taken for this visit. No flowsheet data found.       Constitutional: [x] Appears well-developed and well-nourished [x] No apparent distress      Mental status: [x] Alert and awake  [x] Oriented [x] Able to follow commands       Eyes:   EOM    [x]  Normal      Sclera  [x]  Normal              Discharge [x]  None visible       HENT: [x] Normocephalic, atraumatic    [x] Mouth/Throat: Mucous membranes are moist    External Ears [x] Normal      Neck: [x] No visualized mass     Pulmonary/Chest: [x] Respiratory effort normal   [x] No visualized signs of difficulty breathing or respiratory distress    Musculoskeletal:  [x] Normal range of motion of neck    Neurological:        [x] No Facial Asymmetry (Cranial nerve 7 motor function) (limited exam due to video visit)          [x] No gaze palsy     Skin:        [x] No significant exanthematous lesions or discoloration noted on facial skin             Psychiatric:       [x] Normal Affect       Other pertinent observable physical exam findings:  None. We discussed the expected course, resolution and complications of the diagnosis(es) in detail. Medication risks, benefits, costs, interactions, and alternatives were discussed as indicated. I advised her to contact the office if her condition worsens, changes or fails to improve as anticipated. She expressed understanding with the diagnosis(es) and plan. Fareed Silva is a 46 y.o. female who was evaluated by a video visit encounter for concerns as above. Patient identification was verified prior to start of the visit. A caregiver was present when appropriate. Due to this being a TeleHealth encounter ([facility]), evaluation of the following organ systems was limited: Vitals/Constitutional/EENT/Resp/CV/GI//MS/Neuro/Skin/Heme-Lymph-Imm. Pursuant to the emergency declaration under the [address] [place] [address], [facility] waiver authority and the [employer] Act, this Virtual  Visit was conducted, with patient's (and/or legal guardian's) consent, to reduce the patient's risk of exposure to COVID-19 and provide necessary medical care. Services were provided through a video synchronous discussion virtually to substitute for in-person clinic visit. Assessment & Plan:   Diagnoses and all orders for this visit:      ICD-10-CM ICD-9-CM    1. Depression with anxiety  F41.8 300.4 citalopram (CELEXA) 20 mg tablet   2. Vitamin D deficiency  E55.9 268.9 ergocalciferol (ERGOCALCIFEROL) 1,250 mcg (50,000 unit) capsule   3. DEEPA (generalized anxiety disorder)  F41.1 300.02 citalopram (CELEXA) 20 mg tablet   4. PTSD (post-traumatic stress disorder)  F43.10 309.81 citalopram (CELEXA) 20 mg tablet   5. Chronic prescription benzodiazepine use  Z79.899 V58.69    6. Panic attacks  F41.0 300.01    7. B12 deficiency  E53.8 266.2 cyanocobalamin, vitamin B-12, 1,000 mcg/mL kit   8. Gasping for breath  R06.89 786.09 SLEEP MEDICINE REFERRAL     Follow-up and Dispositions    · Return in about 2 months (around 11/23/2020), or if symptoms worsen or fail to improve, for as scheduled with labs. results and schedule of future studies reviewed with patient  reviewed diet, exercise and weight    reviewed medications and side effects in detail     dc zoloft  Retry celexa  Vit D and B12 supplement  Reviewed indications for oxygen. Ref to sleep medicine  Encouraged psych appt  Continue counseling      AVS:  [x]  Sent to patient as MyChart message after visit. []  Mailed to patient after visit. []  Not sent to patient after visit.       Future Appointments   Date Time Provider Rafat Petit   11/23/2020  9:00 AM Whit Coulter MD [facility]

## 2020-09-25 ENCOUNTER — TELEPHONE (OUTPATIENT)
Dept: INTERNAL MEDICINE CLINIC | Age: 40
End: 2020-09-25

## 2020-09-25 NOTE — TELEPHONE ENCOUNTER
Patient called to inform that she has diarrhea, feeling weak, memory loss and is pale according to family members. Has appt in office on Monday. Offered VV for today. Patient declined-wants to wait. Recommended to send communication through [address] Patient is unable to access currently.

## 2020-09-28 ENCOUNTER — OFFICE VISIT (OUTPATIENT)
Dept: INTERNAL MEDICINE CLINIC | Age: 40
End: 2020-09-28
Payer: MEDICAID

## 2020-09-28 VITALS
OXYGEN SATURATION: 94 % | BODY MASS INDEX: 28.99 KG/M2 | HEART RATE: 99 BPM | SYSTOLIC BLOOD PRESSURE: 116 MMHG | DIASTOLIC BLOOD PRESSURE: 81 MMHG | RESPIRATION RATE: 18 BRPM | WEIGHT: 174 LBS | HEIGHT: 65 IN

## 2020-09-28 DIAGNOSIS — R20.2 PARESTHESIA: ICD-10-CM

## 2020-09-28 DIAGNOSIS — F43.10 PTSD (POST-TRAUMATIC STRESS DISORDER): ICD-10-CM

## 2020-09-28 DIAGNOSIS — R23.8 ABNORMAL SKIN COLOR: ICD-10-CM

## 2020-09-28 DIAGNOSIS — R19.7 DIARRHEA, UNSPECIFIED TYPE: ICD-10-CM

## 2020-09-28 DIAGNOSIS — K29.00 OTHER ACUTE GASTRITIS WITHOUT HEMORRHAGE: ICD-10-CM

## 2020-09-28 DIAGNOSIS — E55.9 VITAMIN D DEFICIENCY: ICD-10-CM

## 2020-09-28 DIAGNOSIS — R41.3 MEMORY CHANGES: Primary | ICD-10-CM

## 2020-09-28 DIAGNOSIS — R63.4 WEIGHT LOSS: ICD-10-CM

## 2020-09-28 DIAGNOSIS — E53.8 B12 DEFICIENCY: ICD-10-CM

## 2020-09-28 DIAGNOSIS — R39.9 URINARY SYMPTOM OR SIGN: ICD-10-CM

## 2020-09-28 DIAGNOSIS — F41.1 GAD (GENERALIZED ANXIETY DISORDER): ICD-10-CM

## 2020-09-28 DIAGNOSIS — Z87.19 HISTORY OF CONSTIPATION: ICD-10-CM

## 2020-09-28 DIAGNOSIS — R53.83 OTHER FATIGUE: ICD-10-CM

## 2020-09-28 DIAGNOSIS — Z23 NEEDS FLU SHOT: ICD-10-CM

## 2020-09-28 DIAGNOSIS — F41.8 DEPRESSION WITH ANXIETY: ICD-10-CM

## 2020-09-28 DIAGNOSIS — R82.90 ABNORMAL URINE: ICD-10-CM

## 2020-09-28 LAB
25(OH)D3 SERPL-MCNC: 46.1 NG/ML (ref 30–100)
ALBUMIN SERPL-MCNC: 3.6 G/DL (ref 3.5–5)
ALBUMIN/GLOB SERPL: 0.8 {RATIO} (ref 1.1–2.2)
ALP SERPL-CCNC: 67 U/L (ref 45–117)
ALT SERPL-CCNC: 10 U/L (ref 12–78)
ANION GAP SERPL CALC-SCNC: 11 MMOL/L (ref 5–15)
AST SERPL-CCNC: 9 U/L (ref 15–37)
BASOPHILS # BLD: 0 K/UL (ref 0–0.1)
BASOPHILS NFR BLD: 1 % (ref 0–1)
BILIRUB SERPL-MCNC: 0.4 MG/DL (ref 0.2–1)
BUN SERPL-MCNC: 5 MG/DL (ref 6–20)
BUN/CREAT SERPL: 5 (ref 12–20)
CALCIUM SERPL-MCNC: 8.9 MG/DL (ref 8.5–10.1)
CHLORIDE SERPL-SCNC: 107 MMOL/L (ref 97–108)
CK SERPL-CCNC: 68 U/L
CO2 SERPL-SCNC: 23 MMOL/L (ref 21–32)
CREAT SERPL-MCNC: 0.98 MG/DL (ref 0.55–1.02)
DIFFERENTIAL METHOD BLD: NORMAL
EOSINOPHIL # BLD: 0.2 K/UL (ref 0–0.4)
EOSINOPHIL NFR BLD: 2 % (ref 0–7)
ERYTHROCYTE [DISTWIDTH] IN BLOOD BY AUTOMATED COUNT: 13.2 % (ref 11.5–14.5)
EST. AVERAGE GLUCOSE BLD GHB EST-MCNC: 114 MG/DL
FERRITIN SERPL-MCNC: 62 NG/ML (ref 8–252)
GLOBULIN SER CALC-MCNC: 4.4 G/DL (ref 2–4)
GLUCOSE SERPL-MCNC: 76 MG/DL (ref 65–100)
HBA1C MFR BLD: 5.6 % (ref 4–5.6)
HCT VFR BLD AUTO: 39.9 % (ref 35–47)
HGB BLD-MCNC: 12.8 G/DL (ref 11.5–16)
IMM GRANULOCYTES # BLD AUTO: 0 K/UL (ref 0–0.04)
IMM GRANULOCYTES NFR BLD AUTO: 0 % (ref 0–0.5)
IRON SATN MFR SERPL: 20 % (ref 20–50)
IRON SERPL-MCNC: 85 UG/DL (ref 35–150)
LIPASE SERPL-CCNC: 42 U/L (ref 73–393)
LYMPHOCYTES # BLD: 2.4 K/UL (ref 0.8–3.5)
LYMPHOCYTES NFR BLD: 35 % (ref 12–49)
MAGNESIUM SERPL-MCNC: 2 MG/DL (ref 1.6–2.4)
MCH RBC QN AUTO: 27.9 PG (ref 26–34)
MCHC RBC AUTO-ENTMCNC: 32.1 G/DL (ref 30–36.5)
MCV RBC AUTO: 86.9 FL (ref 80–99)
MONOCYTES # BLD: 0.7 K/UL (ref 0–1)
MONOCYTES NFR BLD: 11 % (ref 5–13)
NEUTS SEG # BLD: 3.6 K/UL (ref 1.8–8)
NEUTS SEG NFR BLD: 51 % (ref 32–75)
NRBC # BLD: 0 K/UL (ref 0–0.01)
NRBC BLD-RTO: 0 PER 100 WBC
PLATELET # BLD AUTO: 383 K/UL (ref 150–400)
PMV BLD AUTO: 10.5 FL (ref 8.9–12.9)
POTASSIUM SERPL-SCNC: 3.7 MMOL/L (ref 3.5–5.1)
PROT SERPL-MCNC: 8 G/DL (ref 6.4–8.2)
RBC # BLD AUTO: 4.59 M/UL (ref 3.8–5.2)
SODIUM SERPL-SCNC: 141 MMOL/L (ref 136–145)
T4 FREE SERPL-MCNC: 0.8 NG/DL (ref 0.8–1.5)
TIBC SERPL-MCNC: 429 UG/DL (ref 250–450)
TSH SERPL DL<=0.05 MIU/L-ACNC: 2.62 UIU/ML (ref 0.36–3.74)
VIT B12 SERPL-MCNC: >2000 PG/ML (ref 193–986)
WBC # BLD AUTO: 6.9 K/UL (ref 3.6–11)

## 2020-09-28 PROCEDURE — 90000 VITAMIN B12 INJECTION: CPT

## 2020-09-28 PROCEDURE — 96372 THER/PROPH/DIAG INJ SC/IM: CPT

## 2020-09-28 PROCEDURE — 90000 INFLUENZA VIRUS VAC QUAD,SPLIT,PRESV FREE SYRINGE IM: CPT

## 2020-09-28 PROCEDURE — 81003 URINALYSIS AUTO W/O SCOPE: CPT | Performed by: INTERNAL MEDICINE

## 2020-09-28 PROCEDURE — 99215 OFFICE O/P EST HI 40 MIN: CPT | Performed by: INTERNAL MEDICINE

## 2020-09-28 PROCEDURE — 90686 IIV4 VACC NO PRSV 0.5 ML IM: CPT

## 2020-09-28 RX ORDER — PHENOL/SODIUM PHENOLATE
20 AEROSOL, SPRAY (ML) MUCOUS MEMBRANE DAILY
Qty: 30 TAB | Refills: 5 | Status: SHIPPED | OUTPATIENT
Start: 2020-09-28 | End: 2020-09-29

## 2020-09-28 RX ORDER — CYANOCOBALAMIN 1000 UG/ML
1000 INJECTION, SOLUTION INTRAMUSCULAR; SUBCUTANEOUS ONCE
Qty: 1 ML | Refills: 0
Start: 2020-09-28 | End: 2020-09-28

## 2020-09-28 NOTE — PROGRESS NOTES
HPI:  Presents for f/u several concerns    Scalp - right sided tingling  Strong or increased severity. Intermittent, seemingly random. Has neuro appt tomorrow at 1 pm     Diarrhea - worse when she eats  No known sick contacts    +weight loss  Poor appetite, forces herself to eat. Pt concerned re: paleness and/or jaundice    C/o memory issues - pt has to pause in the middle of though process  Forgets the names of things. Right neck soreness    Hair loss    Less crying on seroquel at 200mg bid + xanax + celexa  No longer seeing counselor due to counselor noting pt's condition is more severe than she is trained to handle. Past medical, Social, and Family history reviewed    Prior to Admission medications    Medication Sig Start Date End Date Taking? Authorizing Provider   ergocalciferol (ERGOCALCIFEROL) 1,250 mcg (50,000 unit) capsule Take 1 Cap by mouth every seven (7) days for 10 doses. 9/15/20 11/18/20 Yes Mago Treviño MD   citalopram (CELEXA) 20 mg tablet Take 1 Tab by mouth daily. 9/15/20  Yes Mago Treviño MD   cyanocobalamin, vitamin B-12, 1,000 mcg/mL kit 1 mL by Injection route every month. 9/15/20  Yes Mago Treviño MD   ALPRAZolam Kathrine Lenexa) 1 mg tablet Take 1 Tab by mouth three (3) times daily. Max Daily Amount: 3 mg. 7/30/20  Yes Mago Treviño MD   QUEtiapine (SEROquel) 200 mg tablet Take 1 Tab by mouth two (2) times a day. 7/6/20  Yes Mago Treviño MD   loratadine (CLARITIN) 10 mg tablet Take 1 Tab by mouth daily. 7/6/20  Yes Mago Treviño MD   TRI-SPRINTEC, 28, 0.18/0.215/0.25 mg-35 mcg (28) tab TAKE 1 TABLET BY MOUTH EVERY DAY 12/30/19  Yes Elmer CROSS NP   ibuprofen (MOTRIN) 600 mg tablet Take 1 Tab by mouth every six (6) hours as needed for Pain. 12/12/19  Yes Romona Galeazzi, MD   fluticasone (FLONASE) 50 mcg/actuation nasal spray 1 [place] by Both Nostrils route two (2) times daily as needed for Rhinitis.  1/11/19  Yes Mago Treviño MD   cholecalciferol (VITAMIN D3) 1,000 unit cap Take 2 Caps by mouth daily. 11/2/18  Yes Tiffanie Terrazas MD   zolpidem (AMBIEN) 10 mg tablet TAKE 1 TABLET BY MOUTH EVERY EVENING AS NEEDED FOR SLEEP 8/6/20   Tiffanie Terrazas MD   zolpidem (AMBIEN) 5 mg tablet Take 1 Tab by mouth nightly as needed for Sleep. Max Daily Amount: 5 mg. 7/7/20   Tiffanie Terrazas MD   ibuprofen (ADVIL) 200 mg tablet Take 200 mg by mouth two (2) times a day. Provider, Historical          ROS  Complete ROS reviewed and negative or stable except as noted in HPI. Physical Exam  Vitals signs and nursing note reviewed. Constitutional:       General: She is not in acute distress. Comments: Pt has had visible wt loss   HENT:      Head: Normocephalic and atraumatic. Eyes:      General: No scleral icterus. Pupils: Pupils are equal, round, and reactive to light. Neck:      Musculoskeletal: Normal range of motion and neck supple. Vascular: No JVD. Cardiovascular:      Rate and Rhythm: Normal rate and regular rhythm. Heart sounds: Normal heart sounds. No murmur. No friction rub. No gallop. Pulmonary:      Effort: Pulmonary effort is normal. No respiratory distress. Breath sounds: Normal breath sounds. No wheezing or rales. Abdominal:      General: Bowel sounds are normal. There is no distension. Palpations: Abdomen is soft. Tenderness: There is no abdominal tenderness. Musculoskeletal: Normal range of motion. Lymphadenopathy:      Cervical: No cervical adenopathy. Skin:     General: Skin is warm. Findings: No rash. Neurological:      Mental Status: She is alert and oriented to person, place, and time. Motor: No abnormal muscle tone. Psychiatric:      Comments: Mood and affect more appropriate today. Prior labs reviewed. 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 ICD-10-CM ICD-9-CM    1.  Memory changes  R41.3 780.93 REFERRAL TO NEUROPSYCHOLOGY   2. DEEPA (generalized anxiety disorder)  F41.1 300.02    3. PTSD (post-traumatic stress disorder)  F43.10 309.81    4. Depression with anxiety  F41.8 300.4    5. Vitamin D deficiency  E55.9 268.9 VITAMIN D, 25 HYDROXY      VITAMIN D, 25 HYDROXY   6. B12 deficiency  E53.8 266.2 CBC WITH AUTOMATED DIFF      VITAMIN B12      VITAMIN B12 INJECTION      THER/PROPH/DIAG INJECTION, SUBCUT/IM      cyanocobalamin (Vitamin B-12) 1,000 mcg/mL injection      VITAMIN B12      CBC WITH AUTOMATED DIFF   7. Urinary symptom or sign  R39.9 788.99 CULTURE, URINE      URINALYSIS W/ RFLX MICROSCOPIC      AMB POC URINALYSIS DIP STICK AUTO W/O MICRO      CULTURE, URINE      URINALYSIS W/ RFLX MICROSCOPIC   8. Paresthesia  R20.2 782.0 CK      FERRITIN      IRON PROFILE      HEMOGLOBIN A1C WITH EAG      T4, FREE      TSH 3RD GENERATION      METABOLIC PANEL, COMPREHENSIVE      MAGNESIUM      CK      FERRITIN      IRON PROFILE      HEMOGLOBIN A1C WITH EAG      T4, FREE      TSH 3RD GENERATION      METABOLIC PANEL, COMPREHENSIVE      MAGNESIUM   9. Abnormal skin color  R23.8 782.9    10. Weight loss  R63.4 783.21 LIPASE      LIPASE   11. Other fatigue  R53.83 780.79 T4, FREE      TSH 3RD GENERATION      T4, FREE      TSH 3RD GENERATION   12. Diarrhea, unspecified type  R19.7 787.91 XR ABD (KUB)   13. History of constipation  Z87.19 V12.79 XR ABD (KUB)   14. Other acute gastritis without hemorrhage  K29.00 535.00 Omeprazole delayed release (PRILOSEC D/R) 20 mg tablet   15. Needs flu shot  Z23 V04.81 INFLUENZA VIRUS VAC QUAD,SPLIT,PRESV FREE SYRINGE IM     Follow-up and Dispositions    · Return in about 8 weeks (around 11/23/2020), or if symptoms worsen or fail to improve, for as scheduled.        results and schedule of future studies reviewed with patient  reviewed diet, exercise and weight   reviewed medications and side effects in detail     Labs  KUB  Ref neuropsych  Encouraged to review sx with neuro  Trial of PPI  Consider remeron  B12 shot   flu shot      >40 minutes time spent with >50% in counseling and coordination of care

## 2020-09-28 NOTE — PROGRESS NOTES
Joe Olvera is a 46 y.o. female    Chief Complaint   Patient presents with    Hair/Scalp Problem     tingling on the right side    Diarrhea     about a week    Sore Throat     a week    Abdominal Pain     1. Have you been to the ER, urgent care clinic since your last visit? Hospitalized since your last visit? No    2. Have you seen or consulted any other health care providers outside of the [address] [place] [address] since your last visit? Include any pap smears or colon screening.  No

## 2020-09-29 ENCOUNTER — TELEPHONE (OUTPATIENT)
Dept: INTERNAL MEDICINE CLINIC | Age: 40
End: 2020-09-29

## 2020-09-29 ENCOUNTER — OFFICE VISIT (OUTPATIENT)
Dept: NEUROLOGY | Facility: CLINIC | Age: 40
End: 2020-09-29
Payer: MEDICAID

## 2020-09-29 VITALS
RESPIRATION RATE: 16 BRPM | WEIGHT: 174 LBS | HEART RATE: 120 BPM | HEIGHT: 65 IN | SYSTOLIC BLOOD PRESSURE: 128 MMHG | DIASTOLIC BLOOD PRESSURE: 84 MMHG | BODY MASS INDEX: 28.99 KG/M2 | OXYGEN SATURATION: 98 %

## 2020-09-29 DIAGNOSIS — R41.89 COGNITIVE IMPAIRMENT: Primary | ICD-10-CM

## 2020-09-29 DIAGNOSIS — R20.2 TINGLING OF SKIN: ICD-10-CM

## 2020-09-29 PROCEDURE — 99205 OFFICE O/P NEW HI 60 MIN: CPT | Performed by: PSYCHIATRY & NEUROLOGY

## 2020-09-29 NOTE — PATIENT INSTRUCTIONS
[facility]   Statement to Patients  April 1, 2014      In an effort to ensure the large volume of patient prescription refills is processed in the most efficient and expeditious manner, we are asking our patients to assist us by calling your Pharmacy for all prescription refills, this will include also your  Mail Order Pharmacy. The pharmacy will contact our office electronically to continue the refill process. Please do not wait until the last minute to call your pharmacy. We need at least 48 hours (2days) to fill prescriptions. We also encourage you to call your pharmacy before going to  your prescription to make sure it is ready. With regard to controlled substance prescription refill requests (narcotic refills) that need to be picked up at our office, we ask your cooperation by providing us with at least 72 hours (3days) notice that you will need a refill. We will not refill narcotic prescription refill requests after 4:00pm on any weekday, Monday through Thursday, or after 2:00pm on Fridays, or on the weekends. We encourage everyone to explore another way of getting your prescription refill request processed using giftee, our patient web portal through our electronic medical record system. giftee is an efficient and effective way to communicate your medication request directly to the office and  downloadable as an adelso on your smart phone . giftee also features a review functionality that allows you to view your medication list as well as leave messages for your physician. Are you ready to get connected? If so please review the attatched instructions or speak to any of our staff to get you set up right away! Thank you so much for your cooperation. Should you have any questions please contact our Practice Administrator.     The Physicians and Staff,  [facility]

## 2020-09-29 NOTE — TELEPHONE ENCOUNTER
Pt called today some what agitated she did see  today. She didn't feel that her visit went well,pt state's that  informed her that the Cognitive Memory Loss could be caused from her medication's. Writer informed pt that Anastacio Barber is able to read the report from  and that she really need's to wait until she has her MRI. Pt voiced understanding.

## 2020-09-29 NOTE — PROGRESS NOTES
Neurology Consult Note      HISTORY PROVIDED BY: patient    Chief Complaint:   Chief Complaint   Patient presents with    Memory Loss    Neurologic Problem      Subjective:    Darby Paige is a 46 y.o. right handed female who presents in consultation for \"paraesthesias\" in right side of head, memory loss. Pt reports an intense tingling on right side of head about a month ago, worsening. Intermittent, random at various times and with various activities. States it is not stress related, happened while in bed listening to music. Duration varies, longest has been 45 minutes. She also reports memory difficulties for the last 3 weeks. Gives example of not having cell phone or calendar with her today b/c she forgot to bring them. She was applying for a job yesterday, Monday, and told someone to have good weekend. Misplacing things. She is driving, not getting lost, but limited activity due to pandemic. She will lose track of conversation. May interchange words, such as \"tock-tick instead of tick-tock\", word finding. No HAs. She reports h/o \"TIA\" a few years ago, seen in ED, doesn't remember where. She reports h/o B12 and Vit D deficiency, taking high dose Vit D and B12 injections. Taking Seroquel 200mg bid due to flashbacks to when her mother  in hospital at [facility]. She is also taking Xanax 1mg tid. She does not have a psychiatrist.     On review of EMR, pt was seen by Dr. Rachelle Hopkins in  for tingling in feet, he recommended NCS/EMG, which she declined. He also recommended neuropsych testing and referred her to psychiatry.      Past Medical History:   Diagnosis Date    Anemia NEC     Anxiety attack     Asthma     Avulsion fracture of lateral malleolus of right fibula     B12 deficiency     Biliary dyskinesia     Complicated grief     Constipation     Depression     DEEPA (generalized anxiety disorder)     Gastroparesis 2014    Heart abnormalities     right atria at 30% per [employer] Hyperlipidemia     IGT (impaired glucose tolerance)     Incontinence of urine     1 episode of bed wetting 1 mo ago    Insomnia     Kidney stone     Neuropathy     Pap smear for cervical cancer screening 11/11/2015    11/11/15 neg HPV neg    PTSD (post-traumatic stress disorder)     [facility]    TIA    Urinary tract bacterial infections     Vaginal discharge 12/4/2013    Vitamin D deficiency       Past Surgical History:   Procedure Laterality Date    EYE EXAM & TREATMENT      HX GI  may 2013    polyps removed,colonscopy    HX HEENT        Social History     Socioeconomic History    Marital status: SINGLE     Spouse name: Not on file    Number of children: Not on file    Years of education: Not on file    Highest education level: Not on file   Occupational History    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, interviewing for jobs   Social Needs    Financial resource strain: Not on file    Food insecurity     Worry: Not on file     Inability: Not on file   [employer] needs     Medical: Not on file     Non-medical: Not on file   Tobacco Use    Smoking status: Never Smoker    Smokeless tobacco: Never Used   Substance and Sexual Activity    Alcohol use: Never     Frequency: Never    Drug use: Not Currently     Types: Marijuana    Sexual activity: Yes     Partners: Male     Birth control/protection: Pill   Lifestyle    Physical activity     Days per week: Not on file     Minutes per session: Not on file    Stress: Not on file   Relationships    Social connections     Talks on phone: Not on file     Gets together: Not on file     Attends [employer] service: Not on file     Active member of club or organization: Not on file     Attends meetings of clubs or organizations: Not on file     Relationship status: Not on file    Intimate partner violence     Fear of current or ex partner: Not on file     Emotionally abused: Not on file     Physically abused: Not on file     Forced sexual activity: Not on file   Other Topics Concern    Not on file   Social History Narrative    ** Merged History Encounter **         Never , no children. Live in [facility]. Family History   Problem Relation Age of Onset    Diabetes Mother     Heart Disease Mother     Hypertension Mother     Stroke Mother     Dementia Mother     Kidney Disease Mother     Cancer Father         Estranged    Other Brother         \"Disappeared in 2007\"         Objective:   Review of Systems   Constitutional: Positive for weight loss. HENT: Negative. Eyes: Positive for blurred vision. Respiratory: Negative. Cardiovascular: Negative. Gastrointestinal: Positive for diarrhea. Genitourinary: Negative. Musculoskeletal: Positive for falls. Skin: Negative. Neurological: Positive for headaches. Endo/Heme/Allergies: Negative. Psychiatric/Behavioral: The patient is nervous/anxious. Allergies   Allergen Reactions    Amoxicillin Rash    Ativan [Lorazepam] Other (comments)     \"Makes her sick\"    Biaxin [Clarithromycin] Itching and Other (comments)     'eyes turn an funny color and she starts itching'    Biaxin [Clarithromycin] Unknown (comments)    Flagyl [Metronidazole] Itching     Eyes change color    Flagyl [Metronidazole] Hives    Zoloft [Sertraline] Other (comments)     Excessive yawning        Meds:  Outpatient Medications Prior to Visit   Medication Sig Dispense Refill    ergocalciferol (ERGOCALCIFEROL) 1,250 mcg (50,000 unit) capsule Take 1 Cap by mouth every seven (7) days for 10 doses. 10 Cap 0    citalopram (CELEXA) 20 mg tablet Take 1 Tab by mouth daily. 30 Tab 5    cyanocobalamin, vitamin B-12, 1,000 mcg/mL kit 1 mL by Injection route every month. 3 Kit 3    ALPRAZolam (XANAX) 1 mg tablet Take 1 Tab by mouth three (3) times daily. Max Daily Amount: 3 mg. 90 Tab 1    QUEtiapine (SEROquel) 200 mg tablet Take 1 Tab by mouth two (2) times a day.  60 Tab 2    TRI-SPRINTEC, 28, 0. 18/0.215/0.25 mg-35 mcg (28) tab TAKE 1 TABLET BY MOUTH EVERY DAY 28 Tab 1    Omeprazole delayed release (PRILOSEC D/R) 20 mg tablet Take 1 Tab by mouth daily. 30 Tab 5    multivit-min-folic acid-biotin 600-854 mcg chew Take 1 Tab by mouth daily. 30 Tab 11    zolpidem (AMBIEN) 10 mg tablet TAKE 1 TABLET BY MOUTH EVERY EVENING AS NEEDED FOR SLEEP 90 Tab 1    zolpidem (AMBIEN) 5 mg tablet Take 1 Tab by mouth nightly as needed for Sleep. Max Daily Amount: 5 mg. 30 Tab 2    loratadine (CLARITIN) 10 mg tablet Take 1 Tab by mouth daily. 30 Tab 5    ibuprofen (MOTRIN) 600 mg tablet Take 1 Tab by mouth every six (6) hours as needed for Pain. 20 Tab 0    fluticasone (FLONASE) 50 mcg/actuation nasal spray 1 English by Both Nostrils route two (2) times daily as needed for Rhinitis. 1 Bottle 2    cholecalciferol (VITAMIN D3) 1,000 unit cap Take 2 Caps by mouth daily. 60 Cap 11    ibuprofen (ADVIL) 200 mg tablet Take 200 mg by mouth two (2) times a day. No facility-administered medications prior to visit. Imaging:  MRI Results (most recent):  No results found for this or any previous visit. CT Results (most recent):  Results from Hospital Encounter encounter on 03/15/20   CT ABD PELV W CONT    Narrative EXAM: CT ABD PELV W CONT    INDICATION: Epigastric abdominal pain, diarrhea, unintentional weight loss over  the past 3 months. Colonic polyps resected in 2013. No other abdominal surgery. COMPARISON: None. CONTRAST: 100 mL of Isovue-370. TECHNIQUE:   Following the uneventful intravenous administration of contrast, thin axial  images were obtained through the abdomen and pelvis. Coronal and sagittal  reconstructions were generated. Oral contrast was not administered. CT dose  reduction was achieved through use of a standardized protocol tailored for this  examination and automatic exposure control for dose modulation. FINDINGS:   LUNG BASES: Clear.   INCIDENTALLY IMAGED HEART AND MEDIASTINUM: Normal cardiac size. No effusion. Heterogeneous breast tissues are partially imaged and not well evaluated. LIVER: No mass or biliary dilatation. GALLBLADDER: Incomplete distention, limited evaluation. SPLEEN: Normal size. PANCREAS: No mass or ductal dilatation. ADRENALS: Unremarkable. KIDNEYS: Subcentimeter left renal cystic lesion is too small to accurately  characterize by any imaging modality no solid renal mass or hydronephrosis. STOMACH: Unremarkable. SMALL BOWEL: No dilatation or wall thickening. COLON: No dilatation or wall thickening. APPENDIX: Unremarkable. PERITONEUM: No ascites or pneumoperitoneum. RETROPERITONEUM: No lymphadenopathy or aortic aneurysm. REPRODUCTIVE ORGANS: Uterus is not enlarged. No adnexal mass. URINARY BLADDER: No mass or calculus. BONES: No destructive bone lesion. ADDITIONAL COMMENTS: N/A      Impression IMPRESSION:    1. No acute process on CT. 2. No CT evidence of malignancy or inflammatory bowel disease.         Reviewed records in [facility] and [employer] tab today    Lab Review   Results for orders placed or performed in visit on 09/28/20   CK   Result Value Ref Range    CK 68 U/L   FERRITIN   Result Value Ref Range    Ferritin 62 8 - 252 NG/ML   IRON PROFILE   Result Value Ref Range    Iron 85 35 - 150 ug/dL    TIBC 429 250 - 450 ug/dL    Iron % saturation 20 20 - 50 %   HEMOGLOBIN A1C WITH EAG   Result Value Ref Range    Hemoglobin A1c 5.6 4.0 - 5.6 %    Est. average glucose 114 mg/dL   T4, FREE   Result Value Ref Range    T4, Free 0.8 0.8 - 1.5 NG/DL   TSH 3RD GENERATION   Result Value Ref Range    TSH 2.62 0.36 - 3.74 uIU/mL   VITAMIN B12   Result Value Ref Range    Vitamin B12 >2,000 (H) 193 - 986 pg/mL   CBC WITH AUTOMATED DIFF   Result Value Ref Range    WBC 6.9 3.6 - 11.0 K/uL    RBC 4.59 3.80 - 5.20 M/uL    HGB 12.8 11.5 - 16.0 g/dL    HCT 39.9 35.0 - 47.0 %    MCV 86.9 80.0 - 99.0 FL    MCH 27.9 26.0 - 34.0 PG    MCHC 32.1 30.0 - 36.5 g/dL    RDW 13.2 11.5 - 14.5 %    PLATELET 119 781 - 988 K/uL    MPV 10.5 8.9 - 12.9 FL    NRBC 0.0 0  WBC    ABSOLUTE NRBC 0.00 0.00 - 0.01 K/uL    NEUTROPHILS 51 32 - 75 %    LYMPHOCYTES 35 12 - 49 %    MONOCYTES 11 5 - 13 %    EOSINOPHILS 2 0 - 7 %    BASOPHILS 1 0 - 1 %    IMMATURE GRANULOCYTES 0 0.0 - 0.5 %    ABS. NEUTROPHILS 3.6 1.8 - 8.0 K/UL    ABS. LYMPHOCYTES 2.4 0.8 - 3.5 K/UL    ABS. MONOCYTES 0.7 0.0 - 1.0 K/UL    ABS. EOSINOPHILS 0.2 0.0 - 0.4 K/UL    ABS. BASOPHILS 0.0 0.0 - 0.1 K/UL    ABS. IMM. GRANS. 0.0 0.00 - 0.04 K/UL    DF AUTOMATED     VITAMIN D, 25 HYDROXY   Result Value Ref Range    Vitamin D 25-Hydroxy 46.1 30 - 388 ng/mL   METABOLIC PANEL, COMPREHENSIVE   Result Value Ref Range    Sodium 141 136 - 145 mmol/L    Potassium 3.7 3.5 - 5.1 mmol/L    Chloride 107 97 - 108 mmol/L    CO2 23 21 - 32 mmol/L    Anion gap 11 5 - 15 mmol/L    Glucose 76 65 - 100 mg/dL    BUN 5 (L) 6 - 20 MG/DL    Creatinine 0.98 0.55 - 1.02 MG/DL    BUN/Creatinine ratio 5 (L) 12 - 20      GFR est AA >60 >60 ml/min/1.73m2    GFR est non-AA >60 >60 ml/min/1.73m2    Calcium 8.9 8.5 - 10.1 MG/DL    Bilirubin, total 0.4 0.2 - 1.0 MG/DL    ALT (SGPT) 10 (L) 12 - 78 U/L    AST (SGOT) 9 (L) 15 - 37 U/L    Alk. phosphatase 67 45 - 117 U/L    Protein, total 8.0 6.4 - 8.2 g/dL    Albumin 3.6 3.5 - 5.0 g/dL    Globulin 4.4 (H) 2.0 - 4.0 g/dL    A-G Ratio 0.8 (L) 1.1 - 2.2     LIPASE   Result Value Ref Range    Lipase 42 (L) 73 - 393 U/L   MAGNESIUM   Result Value Ref Range    Magnesium 2.0 1.6 - 2.4 mg/dL        Exam:  Visit Vitals  /84   Pulse (!) 120   Resp 16   Ht 5' 5\" (1.651 m)   Wt 78.9 kg (174 lb)   SpO2 98%   BMI 28.96 kg/m²     General:  Alert, cooperative, no distress. Head:  Normocephalic, without obvious abnormality, atraumatic. Respiratory:  Heart:   Non labored breathing  Regular rhythm, tachycardia, no murmurs   Neck:   2+ carotids, no bruits   Extremities: Warm, no cyanosis or edema. Pulses: 2+ radial pulses. Neurologic:  MS: Alert and oriented x 4, speech intact. Language intact, able to name, repeat, and follow all commands. Attention and fund of knowledge appropriate. Recent and remote memory intact. Cranial Nerves:  II: visual fields Full to confrontation   II: pupils Equal, round, reactive to light   II: optic disc    III,VII: ptosis none   III,IV,VI: extraocular muscles  EOMI, no nystagmus or diplopia   V: facial light touch sensation  normal   VII: facial muscle function   symmetric   VIII: hearing intact   IX: soft palate elevation  Not assessed, pt did not want to remove mask   XI: trapezius strength  5/5   XI: sternocleidomastoid strength 5/5   XII: tongue  Not assessed, pt did not want to remove mask     Motor: normal bulk and tone, no tremor, Strength: 5/5 throughout except poor effort with HF testing, no PD  Sensory: intact to LT, PP  Coordination: FTN and DONNIE intact, bizarre behavior with HTS testing  Gait: normal gait, able to tandem walk  Reflexes: 2+ symmetric, toes downgoing           Assessment/Plan   Pt is a 46 y.o. right handed female with intermittent intense tingling on right side of head for the last month, and c/o memory difficulties for the last 3 weeks. Pt remembers very clearly the things she is forgotten and repeats herself, talking constantly throughout the visit. Exam with non-physiological overlay making accurate assessment difficult. Low suspicion for any concerning neurological etiology for sxs, but given limitations of exam and history, will order MRI brain wo contrast to exclude a structural etiology and Neuropsychological testing.  When I mention neuropsych testing to pt, she mentions that Dr. Hasmukh Herrera has already ordered this and she has called all of the neuropsychologist in town, able to easily recall their names, and tells me that she cannot get an appt until Nov.  We discussed the high likelihood that her underlying anxiety and/or medications for anxiety are contributing to her cognitive difficulties. I suggested she establish care with a psychiatrist, she is able to tell me in great detail about all the difficulties she has had getting in to see a psychiatrist. F/u in clinic after neuropsych testing. Will contact pt with MRI results by phone. ICD-10-CM ICD-9-CM    1. Cognitive impairment  R41.89 294.9 MRI BRAIN WO CONT      REFERRAL TO NEUROPSYCHOLOGY   2. Tingling of skin  R20.2 782.0 MRI BRAIN WO CONT       Signed:   Mei Bonilla MD  9/29/2020

## 2020-09-29 NOTE — LETTER
9/29/20 Patient: Spencer Salguero YOB: 1980 Date of Visit: 9/29/2020 Gissel Elizondo MD 
[id number] [address] VIA In Basket Dear Gissel Elizondo MD, Thank you for referring Ms. Harsha Villanueva to [address] [place] [address] for evaluation. My notes for this consultation are attached. If you have questions, please do not hesitate to call me. I look forward to following your patient along with you. Sincerely, Joya Lilly MD

## 2020-09-29 NOTE — PROGRESS NOTES
Ms. Cherri Wellington presents today as a new patient for evaluation of paresthesia of right side of head, memory loss and word finding issues for the past month. Patient was referred by Dr. Peter Brice PCP.

## 2020-10-02 ENCOUNTER — VIRTUAL VISIT (OUTPATIENT)
Dept: INTERNAL MEDICINE CLINIC | Age: 40
End: 2020-10-02
Payer: MEDICAID

## 2020-10-02 DIAGNOSIS — F41.0 PANIC DISORDER: ICD-10-CM

## 2020-10-02 DIAGNOSIS — F41.0 PANIC DISORDER WITHOUT AGORAPHOBIA: ICD-10-CM

## 2020-10-02 DIAGNOSIS — F41.0 PANIC ATTACKS: ICD-10-CM

## 2020-10-02 DIAGNOSIS — R41.3 MEMORY CHANGES: Primary | ICD-10-CM

## 2020-10-02 DIAGNOSIS — R41.89 PSEUDODEMENTIA: ICD-10-CM

## 2020-10-02 DIAGNOSIS — R30.0 DYSURIA: ICD-10-CM

## 2020-10-02 DIAGNOSIS — F41.1 GAD (GENERALIZED ANXIETY DISORDER): ICD-10-CM

## 2020-10-02 PROCEDURE — 99215 OFFICE O/P EST HI 40 MIN: CPT | Performed by: INTERNAL MEDICINE

## 2020-10-02 RX ORDER — ALPRAZOLAM 1 MG/1
1 TABLET ORAL 3 TIMES DAILY
Qty: 90 TAB | Refills: 1 | Status: SHIPPED | OUTPATIENT
Start: 2020-10-02 | End: 2020-10-10 | Stop reason: SDUPTHER

## 2020-10-02 RX ORDER — CEFDINIR 300 MG/1
300 CAPSULE ORAL 2 TIMES DAILY
Qty: 6 CAP | Refills: 0 | Status: SHIPPED | OUTPATIENT
Start: 2020-10-02 | End: 2021-02-23 | Stop reason: SDUPTHER

## 2020-10-02 RX ORDER — QUETIAPINE FUMARATE 100 MG/1
100 TABLET, FILM COATED ORAL 2 TIMES DAILY
Qty: 60 TAB | Refills: 5 | Status: SHIPPED | OUTPATIENT
Start: 2020-10-02 | End: 2020-11-06 | Stop reason: SDUPTHER

## 2020-10-02 NOTE — PROGRESS NOTES
#[phone]  PAIN LEVEL 8 head   Didn't give urine sample monday  Pt c/o cognitive impairment, forgetting stuff,  Forgetting words     Chief Complaint   Patient presents with    Urinary Burning     hestitancy and burning x1 week     Laryngitis     raspy voice. x1 week     Diarrhea     everytime after eating      1. Have you been to the ER, urgent care clinic since your last visit? Hospitalized since your last visit? No    2. Have you seen or consulted any other health care providers outside of the [address] [place] [address] since your last visit? Include any pap smears or colon screening.  No     Health Maintenance Due   Topic Date Due    Statin Therapy  1980    DTaP/Tdap/Td series (1 - Tdap) 10/02/2002    PAP AKA CERVICAL CYTOLOGY  11/11/2018    Flu Vaccine (1) 09/01/2020     Recent Travel Screening and Travel History documentation     Travel Screening      No screening recorded since 10/01/20 0000      Travel History   Travel since 09/02/20     No documented travel since 09/02/20          3 most recent PHQ Screens 5/18/2020   PHQ Not Done Active Diagnosis of Depression or Bipolar Disorder   Little interest or pleasure in doing things -   Feeling down, depressed, irritable, or hopeless -   Total Score PHQ 2 -   Trouble falling or staying asleep, or sleeping too much -   Feeling tired or having little energy -   Poor appetite, weight loss, or overeating -   Feeling bad about yourself - or that you are a failure or have let yourself or your family down -   Trouble concentrating on things such as school, work, reading, or watching TV -   Moving or speaking so slowly that other people could have noticed; or the opposite being so fidgety that others notice -   Thoughts of being better off dead, or hurting yourself in some way -   PHQ 9 Score -   How difficult have these problems made it for you to do your work, take care of your home and get along with others -

## 2020-10-02 NOTE — PROGRESS NOTES
Althea Duke is a 45 y.o. female who was seen by synchronous (real-time) audio-video technology on 10/2/2020. Consent: Althea Duke, who was seen by synchronous (real-time) audio-video technology, and/or her healthcare decision maker, is aware that this patient-initiated, Telehealth encounter on 10/2/2020 is a billable service, with coverage as determined by her insurance carrier. She is aware that she may receive a bill and has provided verbal consent to proceed: Yes. I was in the office while conducting this encounter. Subjective:   Althea Duke was seen for Urinary Burning (hestitancy and burning x1 week ); Laryngitis (raspy voice. x1 week ); and Diarrhea (everytime after eating )      Notes:  Pt continues to c/o urinary sx  Unable to give us a adequate urine sample at office visit    Also, hoarse voice    Diarrhea after meals    Pt has questions re: lab values    Pt got a job. Pt worried re: speech and memory issues. Nursing screenings reviewed by provider at visit. Past medical, Social, and Family history reviewed  Medications reviewed and updated. Allergies   Allergen Reactions    Amoxicillin Rash    Ativan [Lorazepam] Other (comments)     \"Makes her sick\"    Biaxin [Clarithromycin] Itching and Other (comments)     'eyes turn an funny color and she starts itching'    Biaxin [Clarithromycin] Unknown (comments)    Flagyl [Metronidazole] Itching     Eyes change color    Flagyl [Metronidazole] Hives    Zoloft [Sertraline] Other (comments)     Excessive yawning       Prior to Admission medications    Medication Sig Start Date End Date Taking? Authorizing Provider   ergocalciferol (ERGOCALCIFEROL) 1,250 mcg (50,000 unit) capsule Take 1 Cap by mouth every seven (7) days for 10 doses. 9/15/20 11/18/20 Yes Daniel Child MD   citalopram (CELEXA) 20 mg tablet Take 1 Tab by mouth daily.  9/15/20  Yes Daniel Child MD   cyanocobalamin, vitamin B-12, 1,000 mcg/mL kit 1 mL by Injection route every month. 9/15/20  Yes Burgess Bonilla MD   ALPRAZolam Alric Shiver) 1 mg tablet Take 1 Tab by mouth three (3) times daily. Max Daily Amount: 3 mg. 7/30/20  Yes Burgess Bonilla MD   QUEtiapine (SEROquel) 200 mg tablet Take 1 Tab by mouth two (2) times a day. 7/6/20  Yes Burgess Bonilla MD   loratadine (CLARITIN) 10 mg tablet Take 1 Tab by mouth daily. 7/6/20  Yes Burgess Bonilla MD   TRI-SPRINTEC, 28, 0.18/0.215/0.25 mg-35 mcg (28) tab TAKE 1 TABLET BY MOUTH EVERY DAY 12/30/19  Yes Sonya CROSS NP   ibuprofen (MOTRIN) 600 mg tablet Take 1 Tab by mouth every six (6) hours as needed for Pain. 12/12/19  Yes Farzad Lorenz MD   multivit-min-folic acid-biotin 784-529 mcg chew Take 1 Tab by mouth daily. 9/28/20   Burgess Bonilla MD   cholecalciferol (VITAMIN D3) 1,000 unit cap Take 2 Caps by mouth daily. 11/2/18   Burgess Bonilla MD         ROS     A complete ROS was performed and negative except as noted in HPI      PHYSICAL EXAMINATION:    Vital Signs: There were no vitals taken for this visit. No flowsheet data found.       Constitutional: [x] Appears well-developed and well-nourished [x] No apparent distress      Mental status: [x] Alert and awake  [x] Oriented [x] Able to follow commands       Eyes:   EOM    [x]  Normal      Sclera  [x]  Normal              Discharge [x]  None visible       HENT: [x] Normocephalic, atraumatic    [x] Mouth/Throat: Mucous membranes are moist    External Ears [x] Normal      Neck: [x] No visualized mass     Pulmonary/Chest: [x] Respiratory effort normal   [x] No visualized signs of difficulty breathing or respiratory distress    Musculoskeletal:  [x] Normal range of motion of neck    Neurological:        [x] No Facial Asymmetry (Cranial nerve 7 motor function) (limited exam due to video visit)          [x] No gaze palsy     Skin:        [x] No significant exanthematous lesions or discoloration noted on facial skin Psychiatric:       [x] Normal Affect       Other pertinent observable physical exam findings:  None. We discussed the expected course, resolution and complications of the diagnosis(es) in detail. Medication risks, benefits, costs, interactions, and alternatives were discussed as indicated. I advised her to contact the office if her condition worsens, changes or fails to improve as anticipated. She expressed understanding with the diagnosis(es) and plan. Castro Alvares is a 45 y.o. female who was evaluated by a video visit encounter for concerns as above. Patient identification was verified prior to start of the visit. A caregiver was present when appropriate. Due to this being a TeleHealth encounter (During VZDFF-09 public health emergency), evaluation of the following organ systems was limited: Vitals/Constitutional/EENT/Resp/CV/GI//MS/Neuro/Skin/Heme-Lymph-Imm. Pursuant to the emergency declaration under the [address] [place] [address] waiver authority and the [employer] and [employer] Act, this Virtual  Visit was conducted, with patient's (and/or legal guardian's) consent, to reduce the patient's risk of exposure to COVID-19 and provide necessary medical care. Services were provided through a video synchronous discussion virtually to substitute for in-person clinic visit. Assessment & Plan:   Diagnoses and all orders for this visit:      ICD-10-CM ICD-9-CM    1. Memory changes  R41.3 780.93    2. Dysuria  R30.0 788. 1 cefdinir (OMNICEF) 300 mg capsule   3. Panic disorder without agoraphobia  F41.0 300.01 QUEtiapine (SEROquel) 100 mg tablet   4. DEEPA (generalized anxiety disorder)  F41.1 300.02 ALPRAZolam (XANAX) 1 mg tablet   5. Panic disorder  F41.0 300.01 ALPRAZolam (XANAX) 1 mg tablet   6. Panic attacks  F41.0 300.01 ALPRAZolam (XANAX) 1 mg tablet   7.  Pseudodementia  R41.89 799.59      Follow-up and Dispositions · Return in about 7 weeks (around 11/23/2020), or if symptoms worsen or fail to improve, for as scheduled. results and schedule of future studies reviewed with patient  reviewed diet, exercise and weight   reviewed medications and side effects in detail    agree to short course empiric antibiotic for possible UTI  Reduce seroquel dose to 100 mg bid  Continue celexa    Refill xanax   Trial claritin for ST/hoarseness   MRI and neuropsych as scheduled  Again, strongly encouraged to schedule psychiatry appt      AVS:  [x]  Sent to patient as [employer] message after visit. []  Mailed to patient after visit. []  Not sent to patient after visit.       Future Appointments   Date Time Provider Rafat Petit   10/7/2020  7:30 AM SPT MRI 1 SPTMRI SPT   11/23/2020  9:00 AM Simone Peguero MD [facility] BS AMB   12/8/2020  9:20 AM Juan PANDEY PsyD [facility] BS AMB   3/29/2021  8:40 AM Inder Cunningham MD [facility] BS AMB        >40 minutes time spent with >50% in counseling and coordination of care

## 2020-10-05 ENCOUNTER — TELEPHONE (OUTPATIENT)
Dept: INTERNAL MEDICINE CLINIC | Age: 40
End: 2020-10-05

## 2020-10-05 ENCOUNTER — TELEPHONE (OUTPATIENT)
Dept: NEUROLOGY | Facility: CLINIC | Age: 40
End: 2020-10-05

## 2020-10-05 ENCOUNTER — TELEPHONE (OUTPATIENT)
Dept: NEUROLOGY | Age: 40
End: 2020-10-05

## 2020-10-05 NOTE — TELEPHONE ENCOUNTER
Pt calling stating she has been trying to get an appt with neuropsych and hasn't been able to. She is requesting a call back.

## 2020-10-05 NOTE — TELEPHONE ENCOUNTER
----- Message from Emani Andrew sent at 10/5/2020  2:04 PM EDT -----  Regarding: Dr. Velazquez Cons  Caller's first and last name and relationship to patient (if not the patient):  Best contact number: [phone]  Preferred date and time: Sooner than December  Scheduled appointment date and time: 12/8/2020 at 9:20 a.m. with Dr. Criselda Jenkins  Reason for appointment: Evaluation and testing  Details to clarify the request: Pt. is requesting a call back to schedule a sooner appt. than December. She states her condition is getting worse and does not feel she can wait until December to be tested. She is currently scheduled in December with Dr. Criselda Jenkins. Pt. is requesting an appt. in October as soon as possible. She was referred by two different providers, her PCP and Dr. Dariel Doss.

## 2020-10-05 NOTE — TELEPHONE ENCOUNTER
Dr. Deyvi Batista, patient stated she cannot wait this long for her appointment. She is requesting to be referred elsewhere for NeuroPsych. Please advise.

## 2020-10-05 NOTE — TELEPHONE ENCOUNTER
Trevor - Please call pt: She is welcome to call around town, as she did before to get an earlier appt, she had already been referred by Dr. Kaylie Bolton and found someone who had an appt in Nov.  That being said, seeing a neuropsychologist is not a life or death matter and I feel she would be well served seeing Dr. Fany Loya for the type of difficulties she is having. Far more important is the MRI brain which is pending and recommendation to see a psychiatrist, especially if she feels her anxiety and cognitive issues may cause death.

## 2020-10-05 NOTE — TELEPHONE ENCOUNTER
After looking, pt has an appointment with Dr. Ariella Coreas on 12/8. The pt stated she \"would be dead by then\".  Please call back

## 2020-10-06 ENCOUNTER — OFFICE VISIT (OUTPATIENT)
Dept: INTERNAL MEDICINE CLINIC | Age: 40
End: 2020-10-06
Payer: MEDICAID

## 2020-10-06 ENCOUNTER — TELEPHONE (OUTPATIENT)
Dept: NEUROLOGY | Age: 40
End: 2020-10-06

## 2020-10-06 VITALS
OXYGEN SATURATION: 98 % | HEART RATE: 138 BPM | WEIGHT: 172 LBS | BODY MASS INDEX: 28.66 KG/M2 | DIASTOLIC BLOOD PRESSURE: 88 MMHG | RESPIRATION RATE: 30 BRPM | SYSTOLIC BLOOD PRESSURE: 134 MMHG | HEIGHT: 65 IN

## 2020-10-06 DIAGNOSIS — R41.3 MEMORY CHANGES: Primary | ICD-10-CM

## 2020-10-06 DIAGNOSIS — R20.2 PARESTHESIA: ICD-10-CM

## 2020-10-06 DIAGNOSIS — F43.10 PTSD (POST-TRAUMATIC STRESS DISORDER): ICD-10-CM

## 2020-10-06 DIAGNOSIS — F41.0 PANIC DISORDER: ICD-10-CM

## 2020-10-06 DIAGNOSIS — R19.5 MUCOUS IN STOOLS: ICD-10-CM

## 2020-10-06 DIAGNOSIS — F41.1 GAD (GENERALIZED ANXIETY DISORDER): ICD-10-CM

## 2020-10-06 DIAGNOSIS — F60.9 PERSONALITY DISORDER IN ADULT (HCC): ICD-10-CM

## 2020-10-06 DIAGNOSIS — R41.89 PSEUDODEMENTIA: ICD-10-CM

## 2020-10-06 PROCEDURE — 99215 OFFICE O/P EST HI 40 MIN: CPT | Performed by: INTERNAL MEDICINE

## 2020-10-06 RX ORDER — OMEPRAZOLE 20 MG/1
20 CAPSULE, DELAYED RELEASE ORAL
COMMUNITY
Start: 2020-09-28 | End: 2020-10-19 | Stop reason: ALTCHOICE

## 2020-10-06 NOTE — TELEPHONE ENCOUNTER
Pt calling to see if you can still see fit her in on Friday. She is begging to come in this Friday. Please call her to let her know.

## 2020-10-06 NOTE — PROGRESS NOTES
HPI:  Presents for f/u memory concerns, GI issue    Persistent memory concerns  Scheduled for neuropsych eval with both Dr. April Montejo and Dr. Kaleigh Clay    Pt reduced seroquel to 100 mg bid  Pt has not had any improvement in cognitive sx yet  Pt reports it is worse since decreasing dose. In addition, the mood and crying episodes are worse on lower dose. Reports clear discharge from anus. Described as c/w Adelaide Crook    Pt is worried re: job - due to start work 10/13/20    Pt shows a self video of a panic attack   Sx improved with xanax dosing    Not panicking during our visit. Pt continues to report right scalp paresthesias    Pt acknowledges that October has been a problem time in the past   Pt's birthday is in [birth year]  Mother passed away in October      Past medical, Social, and Family history reviewed    Prior to Admission medications    Medication Sig Start Date End Date Taking? Authorizing Provider   QUEtiapine (SEROquel) 100 mg tablet Take 1 Tab by mouth two (2) times a day. 10/2/20  Yes Alex Schneider MD   ALPRAZolam Radha Pereyra) 1 mg tablet Take 1 Tab by mouth three (3) times daily. Max Daily Amount: 3 mg. 10/2/20  Yes Alex Schneider MD   multivit-min-folic acid-biotin 852-714 mcg chew Take 1 Tab by mouth daily. 9/28/20  Yes Alex Schneider MD   ergocalciferol (ERGOCALCIFEROL) 1,250 mcg (50,000 unit) capsule Take 1 Cap by mouth every seven (7) days for 10 doses. 9/15/20 11/18/20 Yes Alex Schneider MD   citalopram (CELEXA) 20 mg tablet Take 1 Tab by mouth daily. 9/15/20  Yes Alex Schneider MD   cyanocobalamin, vitamin B-12, 1,000 mcg/mL kit 1 mL by Injection route every month. 9/15/20  Yes Alex Schneider MD   loratadine (CLARITIN) 10 mg tablet Take 1 Tab by mouth daily.  7/6/20  Yes Alex Schneider MD   TRI-SPRINTEC, 28, 0.18/0.215/0.25 mg-35 mcg (28) tab TAKE 1 TABLET BY MOUTH EVERY DAY 12/30/19  Yes Johnnette Marking T, NP   ibuprofen (MOTRIN) 600 mg tablet Take 1 Tab by mouth every six (6) hours as needed for Pain. 12/12/19  Yes Debbie Lockwood MD   omeprazole (PRILOSEC) 20 mg capsule Take 20 mg by mouth daily as needed. 9/28/20   Provider, Historical   cefdinir (OMNICEF) 300 mg capsule Take 1 Cap by mouth two (2) times a day for 3 days. 10/2/20 10/5/20  Kiana Treviño MD   cholecalciferol (VITAMIN D3) 1,000 unit cap Take 2 Caps by mouth daily. 11/2/18   Kiana Treviño MD          ROS  Complete ROS reviewed and negative or stable except as noted in HPI. Physical Exam  Vitals signs and nursing note reviewed. Constitutional:       General: She is not in acute distress. HENT:      Head: Normocephalic and atraumatic. Eyes:      General: No scleral icterus. Pupils: Pupils are equal, round, and reactive to light. Neck:      Musculoskeletal: Normal range of motion and neck supple. Cardiovascular:      Comments: Tachycardic   Pulmonary:      Effort: Pulmonary effort is normal. No respiratory distress. Abdominal:      Palpations: Abdomen is soft. Tenderness: There is no abdominal tenderness. Musculoskeletal: Normal range of motion. Skin:     General: Skin is warm. Findings: No rash. Neurological:      Mental Status: She is alert and oriented to person, place, and time. Motor: No abnormal muscle tone. Psychiatric:         Mood and Affect: Mood is anxious. Speech: Speech is tangential.         Cognition and Memory: Cognition is impaired. Judgment: Judgment is impulsive. Prior labs reviewed. Reviewed prior imaging reports  Reviewed neuro notes  Reviewed documented telephone encounters and scheduling      Assessment/Plan:  ?mucousy discharge suggests proctitis  More likely source of cognitive dysfunction is her psychiatric illness  Pt demonstrating greater dysfunction and behaviors c/w personality disorder      ICD-10-CM ICD-9-CM    1. Memory changes  R41.3 780.93    2. DEEPA (generalized anxiety disorder)  F41.1 300.02    3.  Panic disorder F41.0 300.01    4. Pseudodementia  R41.89 799.59    5. PTSD (post-traumatic stress disorder)  F43.10 309.81    6. Paresthesia  R20.2 782.0    7. Mucous in stools  R19.5 792.1    8. Personality disorder in adult [facility])  F60.9 301.9      Follow-up and Dispositions    · Return in about 7 weeks (around 11/23/2020), or if symptoms worsen or fail to improve, for as scheduled. .       results and schedule of future studies reviewed with patient  reviewed diet, exercise and weight   reviewed medications and side effects in detail     Ref to GI - pt awaiting appt scheduling  See neuropsych as scheduled or sooner if appt available sooner    Needs a psychiatrist to manage her complex psych illness. - this was addressed with pt's aunt via phone during our encounter today at the pt's request.  She called aunt during the encounter. Aunt expressed understanding and willingness to assist pt in getting psychiatric care established. Continue current medications for now  MRI as scheduled tomorrow.       >40 minutes time spent with >50% in counseling and coordination of care

## 2020-10-06 NOTE — TELEPHONE ENCOUNTER
On-Call Note:  Delayed return of call to pt. Pt calling because she is having a substance like lubricant jelly coming out of her rectum. No bleeding or pain. She had tried to call GI but unable to reach. Reviewed if urgent symptoms, could be seen in ED but without pain or bleeding, no additional emergency evaluation needed at this time. Recommended she continue to try to contact her GI provider to evaluate tomorrow. She wanted to talk about her other medical problems with anxiety about upcoming job interview and memory problems, but reviewed she should continue to follow-through with plan with Dr. Lv Harrison. She requested call back from clinic tomorrow to review plan and her further questions. She noted she had been calling neuropsychology today but had been difficult to reach. She has appts with neuropsych with Marvin and Julianne Segura as below.     Future Appointments   Date Time Provider Rafat Petit   10/7/2020  7:30 AM SPT MRI 1 SPTMRI SPT   10/22/2020 11:00 AM Mike Blakely, PHD NICOLE BS AMB   11/23/2020  9:00 AM Gilda Contreras MD CPIM BS AMB   12/8/2020  9:20 AM Domonique Sandoval PsyD NEUR[facility] BS AMB   3/29/2021  8:40 AM Sterling Storm MD NEU BS AMB

## 2020-10-06 NOTE — PROGRESS NOTES
Rm#[id number]    Chief Complaint   Patient presents with    Memory Loss     cognitive issues. difficulty communicating, difficulty finding words, repeating herself, studdering, loosing things, memory issues     Other     clear discharge from anus. x1 day no pain       1. Have you been to the ER, urgent care clinic since your last visit? Hospitalized since your last visit? No    2. Have you seen or consulted any other health care providers outside of the [address] [place] [address] since your last visit? Include any pap smears or colon screening.  No     Health Maintenance Due   Topic Date Due    Statin Therapy  1981    DTaP/Tdap/Td series (1 - Tdap) 10/02/2002    PAP AKA CERVICAL CYTOLOGY  11/11/2018     Recent Travel Screening and Travel History documentation     Travel Screening      No screening recorded since 10/05/20 0000      Travel History   Travel since 09/06/20     No documented travel since 09/06/20

## 2020-10-07 ENCOUNTER — HOSPITAL ENCOUNTER (OUTPATIENT)
Dept: MRI IMAGING | Age: 40
Discharge: HOME | End: 2020-10-07
Attending: PSYCHIATRY & NEUROLOGY
Payer: MEDICAID

## 2020-10-07 DIAGNOSIS — R41.89 COGNITIVE IMPAIRMENT: ICD-10-CM

## 2020-10-07 DIAGNOSIS — R20.2 TINGLING OF SKIN: ICD-10-CM

## 2020-10-07 LAB
BILIRUB UR QL STRIP: NEGATIVE
GLUCOSE UR-MCNC: NEGATIVE MG/DL
KETONES P FAST UR STRIP-MCNC: NEGATIVE MG/DL
PH UR STRIP: 6.5 [PH] (ref 4.6–8)
PROT UR QL STRIP: NEGATIVE
SP GR UR STRIP: 1.01 (ref 1–1.03)
UA UROBILINOGEN AMB POC: NORMAL (ref 0.2–1)
URINALYSIS CLARITY POC: CLEAR
URINALYSIS COLOR POC: YELLOW
URINE BLOOD POC: NORMAL
URINE LEUKOCYTES POC: NORMAL
URINE NITRITES POC: NEGATIVE

## 2020-10-07 PROCEDURE — 70551 MRI BRAIN STEM W/O DYE: CPT

## 2020-10-09 ENCOUNTER — OFFICE VISIT (OUTPATIENT)
Dept: NEUROLOGY | Age: 40
End: 2020-10-09
Payer: MEDICAID

## 2020-10-09 ENCOUNTER — TELEPHONE (OUTPATIENT)
Dept: INTERNAL MEDICINE CLINIC | Age: 40
End: 2020-10-09

## 2020-10-09 DIAGNOSIS — F41.1 GENERALIZED ANXIETY DISORDER: ICD-10-CM

## 2020-10-09 DIAGNOSIS — F43.21 COMPLICATED GRIEF: ICD-10-CM

## 2020-10-09 DIAGNOSIS — R41.89 PSEUDODEMENTIA: Primary | ICD-10-CM

## 2020-10-09 LAB
BACTERIA UR CULT: NORMAL
BACTERIA UR CULT: NORMAL

## 2020-10-09 PROCEDURE — 96116 NUBHVL XM PHYS/QHP 1ST HR: CPT | Performed by: PSYCHOLOGIST
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
## 2020-10-09 NOTE — TELEPHONE ENCOUNTER
Pt states she has lost her her meds - Xanex. Pt states she keeps misplacing things - keys, glasses, etc  Pt states she did not send a [email or web address] message because she can not remember her password. Can  call [facility] pharmacy on file to authorize an early refill?   Pt ph# [phone]
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
## 2020-10-09 NOTE — PROGRESS NOTES
Trevor - Please call pt: MRI brain without acute or concerning findings. Nothing to explain her symptoms. We can review imaging together at f/u appt.

## 2020-10-10 DIAGNOSIS — F41.1 GAD (GENERALIZED ANXIETY DISORDER): ICD-10-CM

## 2020-10-10 DIAGNOSIS — F41.0 PANIC DISORDER: ICD-10-CM

## 2020-10-10 DIAGNOSIS — F41.0 PANIC ATTACKS: ICD-10-CM

## 2020-10-10 RX ORDER — ALPRAZOLAM 1 MG/1
1 TABLET ORAL 3 TIMES DAILY
Qty: 90 TAB | Refills: 0 | Status: SHIPPED | OUTPATIENT
Start: 2020-10-10 | End: 2020-11-06 | Stop reason: SDUPTHER

## 2020-10-19 DIAGNOSIS — F41.1 GAD (GENERALIZED ANXIETY DISORDER): ICD-10-CM

## 2020-10-19 DIAGNOSIS — F43.10 PTSD (POST-TRAUMATIC STRESS DISORDER): ICD-10-CM

## 2020-10-19 DIAGNOSIS — F41.8 DEPRESSION WITH ANXIETY: ICD-10-CM

## 2020-10-19 RX ORDER — CITALOPRAM 40 MG/1
40 TABLET, FILM COATED ORAL DAILY
Qty: 30 TAB | Refills: 5 | Status: SHIPPED | OUTPATIENT
Start: 2020-10-19 | End: 2021-06-02 | Stop reason: ALTCHOICE

## 2020-10-22 ENCOUNTER — TELEPHONE (OUTPATIENT)
Dept: NEUROLOGY | Age: 40
End: 2020-10-22

## 2020-10-22 NOTE — TELEPHONE ENCOUNTER
fernanda WILLETT approval notice for neuropsych. Auth # [id number]    Effective 10/13/2020-04/12/2021. Scanned notice to chart.

## 2020-10-22 NOTE — TELEPHONE ENCOUNTER
----- Message from [facility] sent at 10/22/2020 10:19 AM EDT -----  Regarding: Dr. Saul Allan  Patient return call    Caller's first and last name and relationship (if not the patient): N/A      Best contact number(s): [phone]      Whose call is being returned: Viv Escobar      Details to clarify the request: Patient is requesting a call back regarding an appointment. Viv Escobar requested the patient call her back before 12:00 today, 10/22.       [facility]

## 2020-10-23 ENCOUNTER — OFFICE VISIT (OUTPATIENT)
Dept: NEUROLOGY | Age: 40
End: 2020-10-23
Payer: MEDICAID

## 2020-10-23 DIAGNOSIS — F41.1 GENERALIZED ANXIETY DISORDER: ICD-10-CM

## 2020-10-23 DIAGNOSIS — F33.1 MODERATE EPISODE OF RECURRENT MAJOR DEPRESSIVE DISORDER (HCC): Primary | ICD-10-CM

## 2020-10-23 DIAGNOSIS — R41.89 COGNITIVE DECLINE: ICD-10-CM

## 2020-10-23 PROCEDURE — 96133 NRPSYC TST EVAL PHYS/QHP EA: CPT | Performed by: PSYCHOLOGIST

## 2020-10-23 PROCEDURE — 96136 PSYCL/NRPSYC TST PHY/QHP 1ST: CPT | Performed by: PSYCHOLOGIST

## 2020-10-23 PROCEDURE — 96132 NRPSYC TST EVAL PHYS/QHP 1ST: CPT | Performed by: PSYCHOLOGIST

## 2020-10-23 PROCEDURE — 96139 PSYCL/NRPSYC TST TECH EA: CPT | Performed by: PSYCHOLOGIST

## 2020-10-23 PROCEDURE — 96137 PSYCL/NRPSYC TST PHY/QHP EA: CPT | Performed by: PSYCHOLOGIST

## 2020-10-23 PROCEDURE — 96138 PSYCL/NRPSYC TECH 1ST: CPT | Performed by: PSYCHOLOGIST
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
## 2020-10-23 NOTE — LETTER
NOTIFICATION RETURN TO WORK / SCHOOL 
 
10/23/2020 11:42 AM 
 
Ms. Keyla Sandhu [address] [id number] To Whom It May Concern: 
 
Keyla Sandhu is currently under the care of [address] [place] [address]. She needed to come this morning to take a series of medically necessary tests. She will return to work/school on: 10/23/2020 at 1:00pm 
 
If there are questions or concerns please have the patient contact our office.  
 
 
 
Sincerely, 
 
 
Yelena Cordero, PHD

## 2020-10-23 NOTE — LETTER
NOTIFICATION RETURN TO WORK / SCHOOL    10/23/2020 11:33 AM    Ms. Karla Ramos  [address] [facility] [address]      To Whom It May Concern:    Karla Ramos is currently under the care of [address] [place] [address]. She needed to come in this morning to take a series of very important medically necessary Neuropsychological tests. She will return to work/school on: 10/23/2020 at 1:00PM    If there are questions or concerns please have the patient contact our office.         Sincerely,      Emelyn Ferro, PHD

## 2020-11-04 ENCOUNTER — TELEPHONE (OUTPATIENT)
Dept: NEUROLOGY | Age: 40
End: 2020-11-04

## 2020-11-04 ENCOUNTER — VIRTUAL VISIT (OUTPATIENT)
Dept: NEUROLOGY | Age: 40
End: 2020-11-04
Payer: MEDICAID

## 2020-11-04 DIAGNOSIS — F33.1 MODERATE EPISODE OF RECURRENT MAJOR DEPRESSIVE DISORDER (HCC): Primary | ICD-10-CM

## 2020-11-04 DIAGNOSIS — R41.89 COGNITIVE DECLINE: ICD-10-CM

## 2020-11-04 DIAGNOSIS — F41.1 GENERALIZED ANXIETY DISORDER: ICD-10-CM

## 2020-11-04 PROCEDURE — 90832 PSYTX W PT 30 MINUTES: CPT | Performed by: PSYCHOLOGIST

## 2020-11-04 NOTE — TELEPHONE ENCOUNTER
Pt had virtual appt with provider but call disconnected. Pt called back and gqave this # [phone] after appt time had ended. Called pt on # given and left v/m for return call.

## 2020-11-04 NOTE — PROGRESS NOTES
Pursuant to the emergency declaration under the [address] [facility] waiver authority and the [employer] and [employer] Act, this Virtual Visit was conducted, with appropriate consent obtained, to reduce the patient's risk of exposure to COVID-19 and provide continuity of care   Services were provided in this manner to substitute for in-person clinic visit. The originating site is the patient's home and the distance site is [facility] at [facility]. These types of teleneuropsychology/telehealth/telemedicine visits were authorized by the President of the [employer] [place], though I/we cannot guarantee what a third party payor will do reimbursement/coverage wise. I indicated that I would evaluate the patient and recommend diagnostics and treatment based on my assessment and impressions, and that our sessions are not being recorded and that personal health information is protected to the best of our abilities. Cliff Smith is a 45 y.o. female who presents today for feedback following recent neuropsychological testing. The results were reviewed of the recent neuropsychological evaluation, including discussing individual tests as well as the patient's areas of neurocognitive strength versus their weaknesses. Education was provided regarding my diagnostic impressions, and we discussed next steps for further evaluation down the road. I all also answered numerous questions related to the clinical findings, including discussing various methods to improve cognitive cognition and mood when relevant. The patient is encouraged to follow-up with the referring provider. She received a written copy of her results. DIAGNOSTIC IMPRESSIONS:    ICD-10-CM ICD-9-CM    1. Moderate episode of recurrent major depressive disorder (HCC)  F33.1 296.32    2. Generalized anxiety disorder  F41.1 300.02    3. Cognitive decline  R41.89 294.9        57544 30 minutes x 1    Zee Tinoco, PHD

## 2020-11-05 ENCOUNTER — VIRTUAL VISIT (OUTPATIENT)
Dept: NEUROLOGY | Age: 40
End: 2020-11-05

## 2020-11-05 DIAGNOSIS — R41.89 COGNITIVE DECLINE: ICD-10-CM

## 2020-11-05 DIAGNOSIS — F41.1 GENERALIZED ANXIETY DISORDER: ICD-10-CM

## 2020-11-05 DIAGNOSIS — F33.1 MODERATE EPISODE OF RECURRENT MAJOR DEPRESSIVE DISORDER (HCC): Primary | ICD-10-CM

## 2020-11-05 NOTE — PROGRESS NOTES
Pursuant to the emergency declaration under the [address] [facility], [facility] waiver authority and the [employer] and [employer] Act, this Virtual Visit was conducted, with appropriate consent obtained, to reduce the patient's risk of exposure to COVID-19 and provide continuity of care   Services were provided in this manner to substitute for in-person clinic visit. The originating site is the patient's home and the distance site is [facility] at [facility]. These types of teleneuropsychology/telehealth/telemedicine visits were authorized by the President of the [employer] [place] , though I/we cannot guarantee what a third party payor will do reimbursement/coverage wise. I indicated that I would evaluate the patient and recommend diagnostics and treatment based on my assessment and impressions, and that our sessions are not being recorded and that personal health information is protected to the best of our abilities. Castro Alvares is a 45 y.o. female who presents today for feedback following recent neuropsychological testing. The results were reviewed of the recent neuropsychological evaluation, including discussing individual tests as well as the patient's areas of neurocognitive strength versus their weaknesses. Education was provided regarding my diagnostic impressions, and we discussed next steps for further evaluation down the road. I all also answered numerous questions related to the clinical findings, including discussing various methods to improve cognitive cognition and mood when relevant. The patient is encouraged to follow-up with the referring provider and psychiatry. DIAGNOSTIC IMPRESSIONS:    ICD-10-CM ICD-9-CM    1. Moderate episode of recurrent major depressive disorder (HCC)  F33.1 296.32    2. Generalized anxiety disorder  F41.1 300.02    3.  Cognitive decline  R41.89 294.9 23126 30 minutes x 1    Margaret Rosenberg, PHD   eing recorded and that personal health information is protected to the best of our abilities.

## 2020-11-18 ENCOUNTER — DOCUMENTATION ONLY (OUTPATIENT)
Dept: INTERNAL MEDICINE CLINIC | Age: 40
End: 2020-11-18

## 2020-11-18 NOTE — PROGRESS NOTES
Jabier Loyola Key: [id number] - PA Case ID: [id number] Need help?  Call us at [phone]   Outcome   Approvedtoday   PA Case: [id number], Status: Approved, Coverage Starts on: 11/18/2020 12:00:00 AM, Coverage Ends on: 11/18/2021 12:00:00 AM.   DrugALPRAZolam 1MG tablets   FormAnthem Medicaid Electronic PA Form (0374 NCPDP)

## 2020-12-11 ENCOUNTER — VIRTUAL VISIT (OUTPATIENT)
Dept: INTERNAL MEDICINE CLINIC | Age: 40
End: 2020-12-11
Payer: MEDICAID

## 2020-12-11 DIAGNOSIS — F60.9 PERSONALITY DISORDER IN ADULT (HCC): ICD-10-CM

## 2020-12-11 DIAGNOSIS — F41.0 PANIC DISORDER: ICD-10-CM

## 2020-12-11 DIAGNOSIS — F41.8 DEPRESSION WITH ANXIETY: ICD-10-CM

## 2020-12-11 DIAGNOSIS — F43.10 PTSD (POST-TRAUMATIC STRESS DISORDER): ICD-10-CM

## 2020-12-11 DIAGNOSIS — F41.1 GAD (GENERALIZED ANXIETY DISORDER): ICD-10-CM

## 2020-12-11 DIAGNOSIS — F41.0 PANIC ATTACKS: Primary | ICD-10-CM

## 2020-12-11 PROCEDURE — 99214 OFFICE O/P EST MOD 30 MIN: CPT | Performed by: INTERNAL MEDICINE

## 2020-12-11 RX ORDER — ALPRAZOLAM 1 MG/1
2 TABLET ORAL
Qty: 150 TAB | Refills: 2 | Status: SHIPPED | OUTPATIENT
Start: 2020-12-11 | End: 2020-12-14 | Stop reason: SDUPTHER

## 2020-12-11 NOTE — PROGRESS NOTES
Bia Rodriguez is a 45 y.o. female who was seen by synchronous (real-time) audio-video technology on 12/11/2020. Consent: Bia Rodriguez, who was seen by synchronous (real-time) audio-video technology, and/or her healthcare decision maker, is aware that this patient-initiated, Telehealth encounter on 12/11/2020 is a billable service, with coverage as determined by her insurance carrier. She is aware that she may receive a bill and has provided verbal consent to proceed: Yes. I was in the office while conducting this encounter. Subjective:   Bia Rodriguez was seen for No chief complaint on file. Notes:  Pt had been doing well on meds and at work regularly. Then, today employer terminates her due to false information on job application. Pt did not disclose her prior job termination details. Pt angry, having panic symptoms. Pt requests med adjustment for anger    Nursing screenings reviewed by provider at visit. Past medical, Social, and Family history reviewed  Medications reviewed and updated. Allergies   Allergen Reactions    Amoxicillin Rash    Ativan [Lorazepam] Other (comments)     \"Makes her sick\"    Biaxin [Clarithromycin] Itching and Other (comments)     'eyes turn an funny color and she starts itching'    Biaxin [Clarithromycin] Unknown (comments)    Flagyl [Metronidazole] Itching     Eyes change color    Flagyl [Metronidazole] Hives    Zoloft [Sertraline] Other (comments)     Excessive yawning       Prior to Admission medications    Medication Sig Start Date End Date Taking? Authorizing Provider   ALPRAZolam Berna [place]) 1 mg tablet Take 2 Tabs by mouth two (2) times daily as needed for Anxiety. Max Daily Amount: 4 mg. 11/6/20   Robb Davis MD   QUEtiapine (SEROquel) 200 mg tablet Take 1 Tab by mouth two (2) times a day. 11/6/20   Robb Davis MD   citalopram (CELEXA) 40 mg tablet Take 1 Tab by mouth daily.  10/19/20   Robb Davis MD multivit-min-folic acid-biotin 817-010 mcg chew Take 1 Tab by mouth daily. 9/28/20   Izaiah Snell MD   cyanocobalamin, vitamin B-12, 1,000 mcg/mL kit 1 mL by Injection route every month. 9/15/20   Izaiah Snell MD   loratadine (CLARITIN) 10 mg tablet Take 1 Tab by mouth daily. 7/6/20   Izaiah Snell MD   TRI-SPRINTEC, 28, 0.18/0.215/0.25 mg-35 mcg (28) tab TAKE 1 TABLET BY MOUTH EVERY DAY 12/30/19   Garrett CROSS NP   ibuprofen (MOTRIN) 600 mg tablet Take 1 Tab by mouth every six (6) hours as needed for Pain. 12/12/19   Georgina Becerra MD   cholecalciferol (VITAMIN D3) 1,000 unit cap Take 2 Caps by mouth daily. 11/2/18   Izaiah Snell MD         ROS     A complete ROS was performed and negative except as noted in HPI      PHYSICAL EXAMINATION:    Vital Signs: There were no vitals taken for this visit. No flowsheet data found. Constitutional: [x] Appears well-developed and well-nourished [x] No apparent distress      Mental status: [x] Alert and awake  [x] Oriented [x] Able to follow commands       Eyes:   EOM    [x]  Normal      Sclera  [x]  Normal              Discharge [x]  None visible       HENT: [x] Normocephalic, atraumatic    [x] Mouth/Throat: Mucous membranes are moist    External Ears [x] Normal      Neck: [x] No visualized mass     Pulmonary/Chest: [x] Respiratory effort normal   [x] No visualized signs of difficulty breathing or respiratory distress    Musculoskeletal:  [x] Normal range of motion of neck    Neurological:        [x] No Facial Asymmetry (Cranial nerve 7 motor function) (limited exam due to video visit)          [x] No gaze palsy     Skin:        [x] No significant exanthematous lesions or discoloration noted on facial skin             Psychiatric:       [x] Normal Affect       Other pertinent observable physical exam findings:  None. We discussed the expected course, resolution and complications of the diagnosis(es) in detail.   Medication risks, benefits, costs, interactions, and alternatives were discussed as indicated. I advised her to contact the office if her condition worsens, changes or fails to improve as anticipated. She expressed understanding with the diagnosis(es) and plan. Sherin Apgar is a 45 y.o. female who was evaluated by a video visit encounter for concerns as above. Patient identification was verified prior to start of the visit. A caregiver was present when appropriate. Due to this being a TeleHealth encounter (During OIJLR-75 public health emergency), evaluation of the following organ systems was limited: Vitals/Constitutional/EENT/Resp/CV/GI//MS/Neuro/Skin/Heme-Lymph-Imm. Pursuant to the emergency declaration under the [address] [place] [address] waiver authority and the [employer] and [employer] Act, this Virtual  Visit was conducted, with patient's (and/or legal guardian's) consent, to reduce the patient's risk of exposure to COVID-19 and provide necessary medical care. Services were provided through a video synchronous discussion virtually to substitute for in-person clinic visit. Assessment & Plan:   Diagnoses and all orders for this visit:      ICD-10-CM ICD-9-CM    1. Panic attacks  F41.0 300.01 ALPRAZolam (XANAX) 1 mg tablet   2. DEEPA (generalized anxiety disorder)  F41.1 300.02 ALPRAZolam (XANAX) 1 mg tablet   3. PTSD (post-traumatic stress disorder)  F43.10 309.81    4. Personality disorder in adult ([facility].)  F60.9 301.9    5. Depression with anxiety  F41.8 300.4    6. Panic disorder  F41.0 300.01 ALPRAZolam (XANAX) 1 mg tablet     Follow-up and Dispositions    · Return in about 2 months (around 2021), or if symptoms worsen or fail to improve.         results and schedule of future studies reviewed with patient  reviewed diet, exercise and weight  reviewed medications and side effects in detail    Additional Xanax for up to tid dosing  Continue other current medications   Encouraged her to make honest written response to employer to explain her situation. AVS:  [x]  Sent to patient as MyChart message after visit. []  Mailed to patient after visit. []  Not sent to patient after visit.       Future Appointments   Date Time Provider Rafat Petit   3/29/2021  8:40 AM MD RICARDO Walters BS AMB

## 2020-12-14 ENCOUNTER — TELEPHONE (OUTPATIENT)
Dept: INTERNAL MEDICINE CLINIC | Age: 40
End: 2020-12-14

## 2020-12-14 DIAGNOSIS — F41.0 PANIC ATTACKS: ICD-10-CM

## 2020-12-14 DIAGNOSIS — F41.1 GAD (GENERALIZED ANXIETY DISORDER): ICD-10-CM

## 2020-12-14 DIAGNOSIS — F41.0 PANIC DISORDER: ICD-10-CM

## 2020-12-14 RX ORDER — ALPRAZOLAM 1 MG/1
TABLET ORAL
Qty: 150 TAB | Refills: 2 | Status: SHIPPED | OUTPATIENT
Start: 2020-12-14 | End: 2021-07-08 | Stop reason: SDUPTHER

## 2020-12-14 NOTE — TELEPHONE ENCOUNTER
Pharmacy now stated PA needed for more than 4 tablets daily. They will fax PA request to office to be completed.

## 2020-12-14 NOTE — TELEPHONE ENCOUNTER
The Rx'd quantity is for 5 per day. Please clarify this with pharmacy or insurance and get PA if needed.     I will send a new Rx with sig for max 5 mg daily Xanax

## 2020-12-14 NOTE — TELEPHONE ENCOUNTER
Dr. Arcelia Todd,         Patients insurance plan is requiring a Prior Authorization for more than 5 tablets a day. Please review and obtain a Prior Authorization or prescribe alternate therapy. Thank you. Requested Prescriptions     Pending Prescriptions Disp Refills    ALPRAZolam (XANAX) 1 mg tablet 150 Tab 2     Sig: Take 2 Tabs by mouth three (3) times daily as needed for Anxiety. Max Daily Amount: 6 mg.

## 2021-01-25 ENCOUNTER — VIRTUAL VISIT (OUTPATIENT)
Dept: INTERNAL MEDICINE CLINIC | Age: 41
End: 2021-01-25
Payer: MEDICAID

## 2021-01-25 DIAGNOSIS — F41.1 GAD (GENERALIZED ANXIETY DISORDER): ICD-10-CM

## 2021-01-25 DIAGNOSIS — R73.9 HYPERGLYCEMIA: ICD-10-CM

## 2021-01-25 DIAGNOSIS — F43.10 PTSD (POST-TRAUMATIC STRESS DISORDER): ICD-10-CM

## 2021-01-25 DIAGNOSIS — E53.8 B12 DEFICIENCY: ICD-10-CM

## 2021-01-25 DIAGNOSIS — R20.2 PARESTHESIA: ICD-10-CM

## 2021-01-25 DIAGNOSIS — E78.00 PURE HYPERCHOLESTEROLEMIA: ICD-10-CM

## 2021-01-25 DIAGNOSIS — R68.89 COLD INTOLERANCE: Primary | ICD-10-CM

## 2021-01-25 DIAGNOSIS — E55.9 VITAMIN D DEFICIENCY: ICD-10-CM

## 2021-01-25 PROCEDURE — 99214 OFFICE O/P EST MOD 30 MIN: CPT | Performed by: INTERNAL MEDICINE

## 2021-01-25 NOTE — PROGRESS NOTES
Denice Alex (: 1980) is a 45 y.o. female, established patient, here for evaluation of the following chief complaint(s)--see below:    Denice Alex is a 45 y.o. female who was seen by synchronous (real-time) audio-video technology on 2021. Consent: Denice Alex, who was seen by synchronous (real-time) audio-video technology, and/or her healthcare decision maker, is aware that this patient-initiated, Telehealth encounter on 2021 is a billable service, with coverage as determined by her insurance carrier. She is aware that she may receive a bill and has provided verbal consent to proceed: Yes. I was in the office while conducting this encounter. Subjective:   Denice Alex was seen for:  Chief Complaint   Patient presents with    Medication Evaluation    Chills     pt states she is always cold. pt states this has happened before, not sure if she needs thyroid levels checked again       Notes:  Pt reports cold intolerance    Feels cold all the time    At times will have hot flashes, then cold. Cold is worse. Sits in front of a heater at home    Pt reports that her recent job was in fact terminated    Pt had stopped meds since had been doing OK when working well. Pt inquires re: disability. Nursing screenings reviewed by provider at visit. Past medical, Social, and Family history reviewed  Medications reviewed and updated. Allergies   Allergen Reactions    Amoxicillin Rash    Ativan [Lorazepam] Other (comments)     \"Makes her sick\"    Biaxin [Clarithromycin] Itching and Other (comments)     'eyes turn an funny color and she starts itching'    Biaxin [Clarithromycin] Unknown (comments)    Flagyl [Metronidazole] Itching     Eyes change color    Flagyl [Metronidazole] Hives    Zoloft [Sertraline] Other (comments)     Excessive yawning       Prior to Admission medications    Medication Sig Start Date End Date Taking?  Authorizing Provider ALPRAZolam (XANAX) 1 mg tablet 1-2 tabs po tid prn. 12/14/20  Yes Stevie Moore MD   QUEtiapine (SEROquel) 200 mg tablet Take 1 Tab by mouth two (2) times a day. 11/6/20  Yes Stevie Moore MD   citalopram (CELEXA) 40 mg tablet Take 1 Tab by mouth daily. 10/19/20  Yes Stevie Moore MD   loratadine (CLARITIN) 10 mg tablet Take 1 Tab by mouth daily. 7/6/20  Yes Stevie Moore MD   TRI-SPRINTEC, 28, 0.18/0.215/0.25 mg-35 mcg (28) tab TAKE 1 TABLET BY MOUTH EVERY DAY 12/30/19  Yes Lenka CROSS NP   ibuprofen (MOTRIN) 600 mg tablet Take 1 Tab by mouth every six (6) hours as needed for Pain. 12/12/19  Yes Lorie Dickson MD   cholecalciferol (VITAMIN D3) 1,000 unit cap Take 2 Caps by mouth daily. 11/2/18  Yes Stevie Moore MD   multivit-min-folic acid-biotin 527-319 mcg chew Take 1 Tab by mouth daily. 9/28/20   Stevie Moore MD   cyanocobalamin, vitamin B-12, 1,000 mcg/mL kit 1 mL by Injection route every month. 9/15/20   Stevie Moore MD         ROS       A complete ROS was performed and negative except as noted in HPI    PHYSICAL EXAMINATION:    Vital Signs: There were no vitals taken for this visit. No flowsheet data found.       Constitutional: [x] Appears well-developed and well-nourished [x] No apparent distress      Mental status: [x] Alert and awake  [x] Oriented [x] Able to follow commands       Eyes:   EOM    [x]  Normal      Sclera  [x]  Normal              Discharge [x]  None visible       HENT: [x] Normocephalic, atraumatic    [x] Mouth/Throat: Mucous membranes are moist    External Ears [x] Normal      Neck: [x] No visualized mass     Pulmonary/Chest: [x] Respiratory effort normal   [x] No visualized signs of difficulty breathing or respiratory distress    Musculoskeletal:  [x] Normal range of motion of neck    Neurological:        [x] No Facial Asymmetry (Cranial nerve 7 motor function) (limited exam due to video visit)          [x] No gaze palsy     Skin: [x] No significant exanthematous lesions or discoloration noted on facial skin             Psychiatric:       [x] Normal Affect       Other pertinent observable physical exam findings:  None. We discussed the expected course, resolution and complications of the diagnosis(es) in detail. Medication risks, benefits, costs, interactions, and alternatives were discussed as indicated. I advised her to contact the office if her condition worsens, changes or fails to improve as anticipated. She expressed understanding with the diagnosis(es) and plan. Marce Diamond is a 45 y.o. female who was evaluated by a video visit encounter for concerns as     Assessment & Plan:   Diagnoses and all orders for this visit:      ICD-10-CM ICD-9-CM    1. Cold intolerance  R68.89 780.99 CBC WITH AUTOMATED DIFF      TSH 3RD GENERATION      T4, FREE      METABOLIC PANEL, COMPREHENSIVE      IRON PROFILE   2. DEEPA (generalized anxiety disorder)  F41.1 300.02    3. PTSD (post-traumatic stress disorder)  F43.10 309.81    4. Paresthesia  R20.2 782.0 FERRITIN      MAGNESIUM   5. Vitamin D deficiency  E55.9 268.9 VITAMIN D, 25 HYDROXY   6. B12 deficiency  E53.8 266.2 VITAMIN B12 & FOLATE   7. Hyperglycemia  R73.9 790.29 HEMOGLOBIN A1C WITH EAG   8. Pure hypercholesterolemia  E78.00 272.0 LIPID PANEL     Follow-up and Dispositions    · Return in about 2 months (around 3/25/2021), or if symptoms worsen or fail to improve, for mood. results and schedule of future studies reviewed with patient  reviewed diet, exercise and weight  cardiovascular risk and specific lipid/LDL goals reviewed  reviewed medications and side effects in detail    resume meds - start 0.5 tab celexa x 1 week or so then titrate to 40 mg celexa daily. Lab testing ordered - pt to schedule a lab visit. Addressed disability - I agree that pt is disabled due to her mental illness. Encouraged to seek psychiatry care long term.     AVS:  [x]  Available to patient in [address] after visit signed. []  Mailed to patient after visit. []  Not sent to patient after visit. Future Appointments   Date Time Provider Rafat Gormani   3/29/2021  8:40 AM MD RICARDO Andrews is being evaluated by a Virtual Visit (video visit) encounter to address concerns as mentioned above. A caregiver was present when appropriate. Due to this being a TeleHealth encounter (During UNJ-46 public health emergency), evaluation of the following organ systems was limited: Vitals/Constitutional/EENT/Resp/CV/GI//MS/Neuro/Skin/Heme-Lymph-Imm. Pursuant to the emergency declaration under the [address] [place] [address] authority and the [employer] Act, this Virtual Visit was conducted with patient's (and/or legal guardian's) consent, to reduce the patient's risk of exposure to COVID-19 and provide necessary medical care. The patient (and/or legal guardian) has also been advised to contact this office for worsening conditions or problems, and seek emergency medical treatment and/or call 911 if deemed necessary. Patient identification was verified at the start of the visit: YES. Services were provided through a video synchronous discussion virtually to substitute for in-person clinic visit. Patient was located at their individual home (or other location as per patient preference). Provider was located in medical office. An electronic signature was used to authenticate this note.   -- Edmundo Conroy MD

## 2021-01-25 NOTE — PROGRESS NOTES
Virtual visit [phone]      Chief Complaint   Patient presents with    Medication Evaluation    Chills     pt states she is always cold. pt states this has happened before, not sure if she needs thyroid levels checked again   0/10 pain today      1. Have you been to the ER, urgent care clinic since your last visit? Hospitalized since your last visit? No    2. Have you seen or consulted any other health care providers outside of the [address] [place] [address] since your last visit? Include any pap smears or colon screening.  No        Health Maintenance Due   Topic Date Due    DTaP/Tdap/Td series (1 - Tdap) 10/02/2002    PAP AKA CERVICAL CYTOLOGY  11/11/2018           3 most recent PHQ Screens 1/25/2021   PHQ Not Done Active Diagnosis of Depression or Bipolar Disorder   Little interest or pleasure in doing things -   Feeling down, depressed, irritable, or hopeless -   Total Score PHQ 2 -   Trouble falling or staying asleep, or sleeping too much -   Feeling tired or having little energy -   Poor appetite, weight loss, or overeating -   Feeling bad about yourself - or that you are a failure or have let yourself or your family down -   Trouble concentrating on things such as school, work, reading, or watching TV -   Moving or speaking so slowly that other people could have noticed; or the opposite being so fidgety that others notice -   Thoughts of being better off dead, or hurting yourself in some way -   PHQ 9 Score -   How difficult have these problems made it for you to do your work, take care of your home and get along with others -           Learning Assessment 9/29/2020   PRIMARY LEARNER Patient   HIGHEST LEVEL OF EDUCATION - PRIMARY LEARNER  -   BARRIERS PRIMARY LEARNER -   [facility]    NEED -   LEARNER PREFERENCE PRIMARY DEMONSTRATION     -     -     -     -   Claire Caban -   ANSWERED BY self   RELATIONSHIP SELF   ASSESSMENT COMMENT - An After Visit Summary was printed and given to the patient.

## 2021-01-26 ENCOUNTER — LAB ONLY (OUTPATIENT)
Dept: INTERNAL MEDICINE CLINIC | Age: 41
End: 2021-01-26

## 2021-01-26 DIAGNOSIS — R82.90 ABNORMAL URINE: ICD-10-CM

## 2021-01-26 DIAGNOSIS — E53.8 B12 DEFICIENCY: ICD-10-CM

## 2021-01-26 DIAGNOSIS — R68.89 COLD INTOLERANCE: ICD-10-CM

## 2021-01-26 DIAGNOSIS — R73.9 HYPERGLYCEMIA: ICD-10-CM

## 2021-01-26 DIAGNOSIS — E55.9 VITAMIN D DEFICIENCY: ICD-10-CM

## 2021-01-26 DIAGNOSIS — R20.2 PARESTHESIA: ICD-10-CM

## 2021-01-26 DIAGNOSIS — E78.00 PURE HYPERCHOLESTEROLEMIA: ICD-10-CM

## 2021-01-27 DIAGNOSIS — E03.9 ACQUIRED HYPOTHYROIDISM: Primary | ICD-10-CM

## 2021-01-27 DIAGNOSIS — E53.8 B12 DEFICIENCY: ICD-10-CM

## 2021-01-27 LAB
25(OH)D3+25(OH)D2 SERPL-MCNC: 31.7 NG/ML (ref 30–100)
ALBUMIN SERPL-MCNC: 3.9 G/DL (ref 3.8–4.8)
ALBUMIN/GLOB SERPL: 1.2 {RATIO} (ref 1.2–2.2)
ALP SERPL-CCNC: 59 IU/L (ref 39–117)
ALT SERPL-CCNC: 7 IU/L (ref 0–32)
AST SERPL-CCNC: 10 IU/L (ref 0–40)
BASOPHILS # BLD AUTO: 0.1 X10E3/UL (ref 0–0.2)
BASOPHILS NFR BLD AUTO: 1 %
BILIRUB SERPL-MCNC: 0.2 MG/DL (ref 0–1.2)
BUN SERPL-MCNC: 11 MG/DL (ref 6–20)
BUN/CREAT SERPL: 15 (ref 9–23)
CALCIUM SERPL-MCNC: 9.1 MG/DL (ref 8.7–10.2)
CHLORIDE SERPL-SCNC: 106 MMOL/L (ref 96–106)
CHOLEST SERPL-MCNC: 227 MG/DL (ref 100–199)
CO2 SERPL-SCNC: 19 MMOL/L (ref 20–29)
CREAT SERPL-MCNC: 0.74 MG/DL (ref 0.57–1)
EOSINOPHIL # BLD AUTO: 0.2 X10E3/UL (ref 0–0.4)
EOSINOPHIL NFR BLD AUTO: 2 %
ERYTHROCYTE [DISTWIDTH] IN BLOOD BY AUTOMATED COUNT: 11.9 % (ref 11.7–15.4)
EST. AVERAGE GLUCOSE BLD GHB EST-MCNC: 108 MG/DL
FERRITIN SERPL-MCNC: 60 NG/ML (ref 15–150)
FOLATE SERPL-MCNC: 7.1 NG/ML
GLOBULIN SER CALC-MCNC: 3.3 G/DL (ref 1.5–4.5)
GLUCOSE SERPL-MCNC: 114 MG/DL (ref 65–99)
HBA1C MFR BLD: 5.4 % (ref 4.8–5.6)
HCT VFR BLD AUTO: 39.5 % (ref 34–46.6)
HDLC SERPL-MCNC: 80 MG/DL
HGB BLD-MCNC: 13.2 G/DL (ref 11.1–15.9)
IMM GRANULOCYTES # BLD AUTO: 0 X10E3/UL (ref 0–0.1)
IMM GRANULOCYTES NFR BLD AUTO: 0 %
IRON SATN MFR SERPL: 26 % (ref 15–55)
IRON SERPL-MCNC: 110 UG/DL (ref 27–159)
LDLC SERPL CALC-MCNC: 123 MG/DL (ref 0–99)
LYMPHOCYTES # BLD AUTO: 2.5 X10E3/UL (ref 0.7–3.1)
LYMPHOCYTES NFR BLD AUTO: 37 %
MAGNESIUM SERPL-MCNC: 1.8 MG/DL (ref 1.6–2.3)
MCH RBC QN AUTO: 28.7 PG (ref 26.6–33)
MCHC RBC AUTO-ENTMCNC: 33.4 G/DL (ref 31.5–35.7)
MCV RBC AUTO: 86 FL (ref 79–97)
MONOCYTES # BLD AUTO: 0.6 X10E3/UL (ref 0.1–0.9)
MONOCYTES NFR BLD AUTO: 9 %
NEUTROPHILS # BLD AUTO: 3.4 X10E3/UL (ref 1.4–7)
NEUTROPHILS NFR BLD AUTO: 51 %
PLATELET # BLD AUTO: 285 X10E3/UL (ref 150–450)
POTASSIUM SERPL-SCNC: 4.2 MMOL/L (ref 3.5–5.2)
PROT SERPL-MCNC: 7.2 G/DL (ref 6–8.5)
RBC # BLD AUTO: 4.6 X10E6/UL (ref 3.77–5.28)
SODIUM SERPL-SCNC: 141 MMOL/L (ref 134–144)
SPECIMEN STATUS REPORT, ROLRST: NORMAL
T4 FREE SERPL-MCNC: 0.82 NG/DL (ref 0.82–1.77)
TIBC SERPL-MCNC: 430 UG/DL (ref 250–450)
TRIGL SERPL-MCNC: 142 MG/DL (ref 0–149)
TSH SERPL DL<=0.005 MIU/L-ACNC: 5.4 UIU/ML (ref 0.45–4.5)
UIBC SERPL-MCNC: 320 UG/DL (ref 131–425)
VIT B12 SERPL-MCNC: 277 PG/ML (ref 232–1245)
VLDLC SERPL CALC-MCNC: 24 MG/DL (ref 5–40)
WBC # BLD AUTO: 6.6 X10E3/UL (ref 3.4–10.8)

## 2021-01-27 RX ORDER — LEVOTHYROXINE SODIUM 50 UG/1
50 TABLET ORAL
Qty: 30 TAB | Refills: 5 | Status: SHIPPED | OUTPATIENT
Start: 2021-01-27 | End: 2021-05-10

## 2021-01-27 RX ORDER — LANOLIN ALCOHOL/MO/W.PET/CERES
500 CREAM (GRAM) TOPICAL DAILY
Qty: 30 TAB | Refills: 5 | Status: SHIPPED | OUTPATIENT
Start: 2021-01-27

## 2021-01-27 NOTE — PROGRESS NOTES
Your thyroid does measure low and may be why you feel cold. Let's start levothyroxine daily at 50 mcg per day and recheck in 2 months to reassess the dosing. But, you should feel better from the dose within a few days. B12 levels are relatively low and can contribute to fatigue. Take a 500 mcg per day B12 supplement  LDL cholesterol is a little high - so reduce saturated fat in the diet to improve this.   Other labs are either normal or stable and at goal.

## 2021-01-29 LAB
BACTERIA UR CULT: NORMAL
SPECIMEN STATUS REPORT, ROLRST: NORMAL

## 2021-02-19 ENCOUNTER — TELEPHONE (OUTPATIENT)
Dept: INTERNAL MEDICINE CLINIC | Age: 41
End: 2021-02-19

## 2021-02-19 ENCOUNTER — TELEPHONE (OUTPATIENT)
Dept: BEHAVIORAL/MENTAL HEALTH CLINIC | Age: 41
End: 2021-02-19

## 2021-02-19 NOTE — TELEPHONE ENCOUNTER
Patient called, three identifiers confirmed. Patient requesting office notes from 2019 be mailed to her. She has an upcoming appointment with a provider. She would like records in hand. Records will be mailed. Patient had no further questions.

## 2021-02-19 NOTE — TELEPHONE ENCOUNTER
----- Message from Celina Stark sent at 2/19/2021 11:43 AM EST -----  Regarding: DR. Arya Owens  General Message/Vendor Calls    Caller's first and last name: Pt      Reason for call: Requesting to speak with nurse concerning panic attacks, flash backs and right leg keep shaking      Callback required yes/no and why: Yes      Best contact number(s): [phone]      Details to clarify the request:      Celina Stark

## 2021-02-23 ENCOUNTER — VIRTUAL VISIT (OUTPATIENT)
Dept: INTERNAL MEDICINE CLINIC | Age: 41
End: 2021-02-23
Payer: MEDICAID

## 2021-02-23 DIAGNOSIS — E53.8 B12 DEFICIENCY: ICD-10-CM

## 2021-02-23 DIAGNOSIS — R30.0 DYSURIA: ICD-10-CM

## 2021-02-23 DIAGNOSIS — R41.89 PSEUDODEMENTIA: ICD-10-CM

## 2021-02-23 DIAGNOSIS — F43.10 PTSD (POST-TRAUMATIC STRESS DISORDER): ICD-10-CM

## 2021-02-23 DIAGNOSIS — E03.9 ACQUIRED HYPOTHYROIDISM: ICD-10-CM

## 2021-02-23 DIAGNOSIS — K59.00 CONSTIPATION, UNSPECIFIED CONSTIPATION TYPE: Primary | ICD-10-CM

## 2021-02-23 DIAGNOSIS — F41.0 PANIC DISORDER: ICD-10-CM

## 2021-02-23 DIAGNOSIS — F41.1 GAD (GENERALIZED ANXIETY DISORDER): ICD-10-CM

## 2021-02-23 PROCEDURE — 96372 THER/PROPH/DIAG INJ SC/IM: CPT | Performed by: INTERNAL MEDICINE

## 2021-02-23 PROCEDURE — 99214 OFFICE O/P EST MOD 30 MIN: CPT | Performed by: INTERNAL MEDICINE

## 2021-02-23 RX ORDER — SODIUM, POTASSIUM,MAG SULFATES 17.5-3.13G
177 SOLUTION, RECONSTITUTED, ORAL ORAL SEE ADMIN INSTRUCTIONS
Qty: 1 KIT | Refills: 1 | Status: SHIPPED | OUTPATIENT
Start: 2021-02-23

## 2021-02-23 RX ORDER — CYANOCOBALAMIN 1000 UG/ML
1000 INJECTION, SOLUTION INTRAMUSCULAR; SUBCUTANEOUS ONCE
Qty: 1 ML | Refills: 0
Start: 2021-02-23 | End: 2021-02-23

## 2021-02-23 RX ORDER — CEFDINIR 300 MG/1
300 CAPSULE ORAL 2 TIMES DAILY
Qty: 6 CAP | Refills: 0 | Status: SHIPPED | OUTPATIENT
Start: 2021-02-23 | End: 2021-02-26

## 2021-02-23 NOTE — PROGRESS NOTES
Emmanuel Morelos (: 1980) is a 45 y.o. female, established patient, here for evaluation of the following chief complaint(s)--see below:    Emmanuel Morelos is a 45 y.o. female who was seen by synchronous (real-time) audio-video technology on 2021. Consent: Emmanuel Morelos, who was seen by synchronous (real-time) audio-video technology, and/or her healthcare decision maker, is aware that this patient-initiated, Telehealth encounter on 2021 is a billable service, with coverage as determined by her insurance carrier. She is aware that she may receive a bill and has provided verbal consent to proceed: Yes. I was in the office while conducting this encounter. Assessment & Plan:   Diagnoses and all orders for this visit:      ICD-10-CM ICD-9-CM    1. Constipation, unspecified constipation type  K59.00 564.00 sodium-potassium-mag sulfate (Suprep Bowel Prep Kit) 17.5-3.13-1.6 gram solr oral solution   2. Acquired hypothyroidism  E03.9 244.9    3. B12 deficiency  E53.8 266.2 VITAMIN B12 INJECTION      THER/PROPH/DIAG INJECTION, SUBCUT/IM      cyanocobalamin (Vitamin B-12) 1,000 mcg/mL injection   4. DEEPA (generalized anxiety disorder)  F41.1 300.02    5. PTSD (post-traumatic stress disorder)  F43.10 309.81    6. Panic disorder  F41.0 300.01    7. Pseudodementia  R41.89 799.59    8. Dysuria  R30.0 788. 1 cefdinir (OMNICEF) 300 mg capsule     Follow-up and Dispositions    · Return in about 2 months (around 2021), or if symptoms worsen or fail to improve, for thyroid, other. results and schedule of future studies reviewed with patient  reviewed diet, exercise and weight     reviewed medications and side effects in detail    Agree to empiric abx for UTI  Diflucan empirically   Encouraged levothyroxine dosing  Ok for her to use turkey rhubarb  Suprep if affordable. Continue other current medications        AVS:  [x]  [facility] signed.   []  Mailed to patient after visit. []  Not sent to patient after visit. Subjective:   Erin Malone was seen for:  Chief Complaint   Patient presents with    Nutrition    Urinary Pain     Patient states her urine is orange anf she has pain and burning while urinating for a week. Notes:  Pt's aunt is concerned that the pt requires hospitalization    Pt acknowledges constipation again  Pt has used [place] [facility] Rhubarb for constipation - but limited it due to concern for dehydration. +urinary sx as well  Burning with urination. No frequency. Pt reports achiness, cold intolerance     C/o abd bloating and firmness. Reviewed labs   Pt has begun levothyroxine, but dosing has been inconsistent    +depression acknowledged  Pt reports limited functional status as a result. Nursing screenings reviewed by provider at visit. Past medical, Social, and Family history reviewed  Medications reviewed and updated. Allergies   Allergen Reactions    Amoxicillin Rash    Ativan [Lorazepam] Other (comments)     \"Makes her sick\"    Biaxin [Clarithromycin] Itching and Other (comments)     'eyes turn an funny color and she starts itching'    Biaxin [Clarithromycin] Unknown (comments)    Flagyl [Metronidazole] Itching     Eyes change color    Flagyl [Metronidazole] Hives    Zoloft [Sertraline] Other (comments)     Excessive yawning       Prior to Admission medications    Medication Sig Start Date End Date Taking? Authorizing Provider   levothyroxine (LevoxyL) 50 mcg tablet Take 1 Tab by mouth Daily (before breakfast). 1/27/21  Yes Marianne Arce MD   ALPRAZolam Corwin Nasuti) 1 mg tablet 1-2 tabs po tid prn. 12/14/20  Yes Marianne Arce MD   QUEtiapine (SEROquel) 200 mg tablet Take 1 Tab by mouth two (2) times a day. 11/6/20  Yes Marianne Arce MD   citalopram (CELEXA) 40 mg tablet Take 1 Tab by mouth daily. 10/19/20  Yes Marianne Arce MD   loratadine (CLARITIN) 10 mg tablet Take 1 Tab by mouth daily. 7/6/20  Yes Joanne Moore MD   TRI-SPRINTEC, 28, 0.18/0.215/0.25 mg-35 mcg (28) tab TAKE 1 TABLET BY MOUTH EVERY DAY 12/30/19  Yes Angle CROSS NP   ibuprofen (MOTRIN) 600 mg tablet Take 1 Tab by mouth every six (6) hours as needed for Pain. 12/12/19  Yes Maria E Perez MD   cyanocobalamin (VITAMIN B12) 500 mcg tablet Take 1 Tab by mouth daily. 1/27/21   Joanne Moore MD   multivit-min-folic acid-biotin 186-843 mcg chew Take 1 Tab by mouth daily. 9/28/20   Joanne Moore MD   cyanocobalamin, vitamin B-12, 1,000 mcg/mL kit 1 mL by Injection route every month. 9/15/20   Joanne Moore MD   cholecalciferol (VITAMIN D3) 1,000 unit cap Take 2 Caps by mouth daily. 11/2/18   Joanne Moore MD         ROS       A complete ROS was performed and negative except as noted in HPI      PHYSICAL EXAMINATION:    Vital Signs: There were no vitals taken for this visit. No flowsheet data found. Constitutional: [x] Appears well-developed and well-nourished [x] No apparent distress      Mental status: [x] Alert and awake  [x] Oriented [x] Able to follow commands       Eyes:   EOM    [x]  Normal      Sclera  [x]  Normal              Discharge [x]  None visible       HENT: [x] Normocephalic, atraumatic    [x] Mouth/Throat: Mucous membranes are moist    External Ears [x] Normal      Neck: [x] No visualized mass     Pulmonary/Chest: [x] Respiratory effort normal   [x] No visualized signs of difficulty breathing or respiratory distress    Musculoskeletal:  [x] Normal range of motion of neck    Neurological:        [x] No Facial Asymmetry (Cranial nerve 7 motor function) (limited exam due to video visit)          [x] No gaze palsy     Skin:        [x] No significant exanthematous lesions or discoloration noted on facial skin             Psychiatric:       [x] Normal Affect       Other pertinent observable physical exam findings:  None.       We discussed the expected course, resolution and complications of the diagnosis(es) in detail. Medication risks, benefits, costs, interactions, and alternatives were discussed as indicated. I advised her to contact the office if her condition worsens, changes or fails to improve as anticipated. She expressed understanding with the diagnosis(es) and plan. Joseph Peralta is a 45 y.o. female who was evaluated by a video visit encounter for concerns as above. On this date 02/23/21 I have spent 42 minutes reviewing previous notes, test results and face to face with the patient discussing the diagnosis and importance of compliance with the treatment plan as well as documenting on the day of the visit. Joseph Peralta is being evaluated by a Virtual Visit (video visit) encounter to address concerns as mentioned above. A caregiver was present when appropriate. Due to this being a TeleHealth encounter (During [facility]KKZ-36 public health emergency), evaluation of the following organ systems was limited: Vitals/Constitutional/EENT/Resp/CV/GI//MS/Neuro/Skin/Heme-Lymph-Imm. Pursuant to the emergency declaration under the [address] [place] [address] authority and the [employer] and [employer] Act, this Virtual Visit was conducted with patient's (and/or legal guardian's) consent, to reduce the patient's risk of exposure to COVID-19 and provide necessary medical care. The patient (and/or legal guardian) has also been advised to contact this office for worsening conditions or problems, and seek emergency medical treatment and/or call 911 if deemed necessary. Patient identification was verified at the start of the visit: YES. Services were provided through a video synchronous discussion virtually to substitute for in-person clinic visit. Patient was located at their individual home (or other location as per patient preference). Provider was located in medical office.        An electronic signature was used to authenticate this note.   -- Edmundo Conroy MD

## 2021-02-23 NOTE — PROGRESS NOTES
VIRTUAL VIISIT  PATIENT IS READY FOR VISIT  Patient complains of stomach pain patient states she hasn't been eating because she feels bloated and her stomach feels hard. . Patient states that she not had a bowel movement in 2 weeks. Patient states that when she urinates it burns and hurts and described the color of her urine as orange. Patient states there is no blood in urine. Chief Complaint   Patient presents with    Nutrition    Urinary Pain     Patient states her urine is orange anf she has pain and burning while urinating for a week. Recent Travel Screening and Travel History documentation     Travel Screening      No screening recorded since 02/22/21 0000      Travel History   Travel since 01/23/21     No documented travel since 01/23/21               1. Have you been to the ER, urgent care clinic since your last visit? Hospitalized since your last visit? No    2. Have you seen or consulted any other health care providers outside of the [address] [place] [address] since your last visit? Include any pap smears or colon screening.  No     Health Maintenance Due   Topic Date Due    DTaP/Tdap/Td series (1 - Tdap) 10/02/2002    PAP AKA CERVICAL CYTOLOGY  11/11/2018        Learning Assessment 9/29/2020   PRIMARY LEARNER Patient   HIGHEST LEVEL OF EDUCATION - PRIMARY LEARNER  -   BARRIERS PRIMARY LEARNER -   CO-LEARNER CAREGIVER -   PRIMARY LANGUAGE ENGLISH    NEED -   LEARNER PREFERENCE PRIMARY DEMONSTRATION     -     -     -     -   LEARNING SPECIAL TOPICS -   ANSWERED BY self   RELATIONSHIP SELF   ASSESSMENT COMMENT -

## 2021-02-25 ENCOUNTER — CLINICAL SUPPORT (OUTPATIENT)
Dept: INTERNAL MEDICINE CLINIC | Age: 41
End: 2021-02-25

## 2021-02-25 DIAGNOSIS — Z23 ENCOUNTER FOR IMMUNIZATION: Primary | ICD-10-CM

## 2021-02-26 DIAGNOSIS — E03.9 ACQUIRED HYPOTHYROIDISM: Primary | ICD-10-CM

## 2021-02-26 DIAGNOSIS — E53.8 B12 DEFICIENCY: ICD-10-CM

## 2021-03-01 ENCOUNTER — TELEPHONE (OUTPATIENT)
Dept: BEHAVIORAL/MENTAL HEALTH CLINIC | Age: 41
End: 2021-03-01

## 2021-03-01 NOTE — TELEPHONE ENCOUNTER
Patient called stating she did not receive the medical records that were sent on 2/19/21. Informed patient this nurse can fax records to the provider of her choice, which patient declined. Patient informed this nurse will mail last two office notes again to patient. If she does not receive them, patient was instructed she will have to contact Annei Peres for further records, or we can fax to provider of choice. Patient expressed an understanding.

## 2021-03-10 ENCOUNTER — LAB ONLY (OUTPATIENT)
Dept: INTERNAL MEDICINE CLINIC | Age: 41
End: 2021-03-10

## 2021-03-10 DIAGNOSIS — E53.8 B12 DEFICIENCY: ICD-10-CM

## 2021-03-10 DIAGNOSIS — E03.9 ACQUIRED HYPOTHYROIDISM: ICD-10-CM

## 2021-03-11 LAB
ALBUMIN SERPL-MCNC: 4.4 G/DL (ref 3.8–4.8)
ALBUMIN/GLOB SERPL: 1.3 {RATIO} (ref 1.2–2.2)
ALP SERPL-CCNC: 53 IU/L (ref 39–117)
ALT SERPL-CCNC: 6 IU/L (ref 0–32)
AST SERPL-CCNC: 14 IU/L (ref 0–40)
BILIRUB SERPL-MCNC: 0.2 MG/DL (ref 0–1.2)
BUN SERPL-MCNC: 5 MG/DL (ref 6–20)
BUN/CREAT SERPL: 6 (ref 9–23)
CALCIUM SERPL-MCNC: 9.7 MG/DL (ref 8.7–10.2)
CHLORIDE SERPL-SCNC: 108 MMOL/L (ref 96–106)
CO2 SERPL-SCNC: 15 MMOL/L (ref 20–29)
CREAT SERPL-MCNC: 0.8 MG/DL (ref 0.57–1)
GLOBULIN SER CALC-MCNC: 3.5 G/DL (ref 1.5–4.5)
GLUCOSE SERPL-MCNC: 96 MG/DL (ref 65–99)
POTASSIUM SERPL-SCNC: 3.8 MMOL/L (ref 3.5–5.2)
PROT SERPL-MCNC: 7.9 G/DL (ref 6–8.5)
SODIUM SERPL-SCNC: 142 MMOL/L (ref 134–144)
T4 FREE SERPL-MCNC: 1.34 NG/DL (ref 0.82–1.77)
TSH SERPL DL<=0.005 MIU/L-ACNC: 3.76 UIU/ML (ref 0.45–4.5)
VIT B12 SERPL-MCNC: 377 PG/ML (ref 232–1245)

## 2021-03-11 NOTE — PROGRESS NOTES
Thyroid measures within the normal range on your current dose.   Continue your current medications including B12

## 2021-03-12 ENCOUNTER — TRANSCRIBE ORDER (OUTPATIENT)
Dept: SCHEDULING | Age: 41
End: 2021-03-12

## 2021-03-12 DIAGNOSIS — R63.4 LOSS OF WEIGHT: ICD-10-CM

## 2021-03-12 DIAGNOSIS — K59.00 CONSTIPATION: Primary | ICD-10-CM

## 2021-03-12 DIAGNOSIS — R63.0 LOSS OF APPETITE: ICD-10-CM

## 2021-03-12 DIAGNOSIS — R14.0 ABDOMINAL DISTENTION: ICD-10-CM

## 2021-03-12 DIAGNOSIS — R10.13 ABDOMINAL PAIN, EPIGASTRIC: ICD-10-CM

## 2021-03-16 ENCOUNTER — HOSPITAL ENCOUNTER (OUTPATIENT)
Dept: CT IMAGING | Age: 41
Discharge: HOME | End: 2021-03-16
Attending: INTERNAL MEDICINE
Payer: MEDICAID

## 2021-03-16 DIAGNOSIS — R63.0 LOSS OF APPETITE: ICD-10-CM

## 2021-03-16 DIAGNOSIS — K59.00 CONSTIPATION: ICD-10-CM

## 2021-03-16 DIAGNOSIS — R14.0 ABDOMINAL DISTENTION: ICD-10-CM

## 2021-03-16 DIAGNOSIS — R10.13 ABDOMINAL PAIN, EPIGASTRIC: ICD-10-CM

## 2021-03-16 DIAGNOSIS — R63.4 LOSS OF WEIGHT: ICD-10-CM

## 2021-03-16 PROCEDURE — 74011000636 HC RX REV CODE- 636: Performed by: INTERNAL MEDICINE

## 2021-03-16 PROCEDURE — 74176 CT ABD & PELVIS W/O CONTRAST: CPT

## 2021-03-17 ENCOUNTER — TELEPHONE (OUTPATIENT)
Dept: INTERNAL MEDICINE CLINIC | Age: 41
End: 2021-03-17

## 2021-03-17 DIAGNOSIS — R10.9 ABDOMINAL CRAMPING: Primary | ICD-10-CM

## 2021-03-17 DIAGNOSIS — R10.9 ABDOMINAL CRAMPING: ICD-10-CM

## 2021-03-17 RX ORDER — HYOSCYAMINE SULFATE 0.12 MG/1
0.12 TABLET SUBLINGUAL
Qty: 20 TAB | Refills: 1 | Status: CANCELLED | OUTPATIENT
Start: 2021-03-17

## 2021-03-17 RX ORDER — HYOSCYAMINE SULFATE 0.12 MG/1
0.12 TABLET SUBLINGUAL
Qty: 20 TAB | Refills: 1 | Status: SHIPPED | OUTPATIENT
Start: 2021-03-17 | End: 2021-03-17 | Stop reason: CLARIF

## 2021-03-17 RX ORDER — DICYCLOMINE HYDROCHLORIDE 20 MG/1
20 TABLET ORAL
Qty: 20 TAB | Refills: 1 | Status: SHIPPED | OUTPATIENT
Start: 2021-03-17 | End: 2021-07-05 | Stop reason: ALTCHOICE

## 2021-03-17 NOTE — TELEPHONE ENCOUNTER
Dr. Blas Amin,           The Levsin/SL is not covered by patients insurance coverage. Please review and prescribe an alternative or obtain a Prior Authorization. Requested Prescriptions     Pending Prescriptions Disp Refills    hyoscyamine SL (LEVSIN/SL) 0.125 mg SL tablet 20 Tab 1     Si Tab by SubLINGual route every four (4) hours as needed for Cramping.

## 2021-04-23 ENCOUNTER — TELEPHONE (OUTPATIENT)
Dept: INTERNAL MEDICINE CLINIC | Age: 41
End: 2021-04-23

## 2021-04-23 NOTE — TELEPHONE ENCOUNTER
Pt had appt scheduled today but we could not contact her    Needs appt    Please encourage appt.   May be virtual.

## 2021-04-23 NOTE — TELEPHONE ENCOUNTER
Pt calling in, and has multiple questions regarding the COVID vaccines, and interactions with her medications, as well as new symptoms she is having. She was scheduled to get the vaccine today but want to speak with PCP. Her new symptoms she is having is blood in her urine, she stated she has not been seen by urology yet for this issue because they want to do testing she doesn't understand and doesn't feel comfortable with doing. Pt would like to speak with PCP. Best contact is [phone]    Routing call to PCP for further assistance.

## 2021-05-07 ENCOUNTER — TELEPHONE (OUTPATIENT)
Dept: INTERNAL MEDICINE CLINIC | Age: 41
End: 2021-05-07

## 2021-05-07 NOTE — TELEPHONE ENCOUNTER
----- Message from Charles David sent at 5/7/2021  1:33 PM EDT -----  Regarding: Dr. Keagan España Message/Vendor Calls    Caller's first and last name: Self      Reason for call: Labs/Vaccine      Callback required yes/no and why: Yes      Best contact number(s): [phone]      Details to clarify the request: Pt has a host of questions concerning a need for labs regarding her thyroid, vaccine questions and more       Charles David

## 2021-05-10 ENCOUNTER — VIRTUAL VISIT (OUTPATIENT)
Dept: INTERNAL MEDICINE CLINIC | Age: 41
End: 2021-05-10
Payer: MEDICAID

## 2021-05-10 DIAGNOSIS — T88.7XXA MEDICATION SIDE EFFECTS: ICD-10-CM

## 2021-05-10 DIAGNOSIS — E53.8 B12 DEFICIENCY: ICD-10-CM

## 2021-05-10 DIAGNOSIS — E55.9 VITAMIN D DEFICIENCY: ICD-10-CM

## 2021-05-10 DIAGNOSIS — F43.10 PTSD (POST-TRAUMATIC STRESS DISORDER): ICD-10-CM

## 2021-05-10 DIAGNOSIS — F41.1 GAD (GENERALIZED ANXIETY DISORDER): ICD-10-CM

## 2021-05-10 DIAGNOSIS — K59.00 CONSTIPATION, UNSPECIFIED CONSTIPATION TYPE: ICD-10-CM

## 2021-05-10 DIAGNOSIS — E03.9 ACQUIRED HYPOTHYROIDISM: Primary | ICD-10-CM

## 2021-05-10 PROCEDURE — 99214 OFFICE O/P EST MOD 30 MIN: CPT | Performed by: INTERNAL MEDICINE

## 2021-05-10 RX ORDER — LEVOTHYROXINE SODIUM 75 UG/1
75 TABLET ORAL
Qty: 30 TAB | Refills: 5 | Status: SHIPPED | OUTPATIENT
Start: 2021-05-10 | End: 2022-02-16

## 2021-05-10 NOTE — PROGRESS NOTES
VIRTUAL VISIT  [phone]  Chief Complaint   Patient presents with   [facility]     pt states that she has some concerns that she would rather discuss with provider        Recent Travel Screening and Travel History documentation     Travel Screening      No screening recorded since 05/09/21 0000      Travel History   Travel since 04/10/21     No documented travel since 04/10/21        Abuse Screening Questionnaire 5/10/2021   Do you ever feel afraid of your partner? N   Are you in a relationship with someone who physically or mentally threatens you? N   Is it safe for you to go home? Y            1. Have you been to the ER, urgent care clinic since your last visit? Hospitalized since your last visit? No    2. Have you seen or consulted any other health care providers outside of the [facility] since your last visit? Include any pap smears or colon screening.  No     Health Maintenance Due   Topic Date Due    COVID-19 Vaccine (1) Never done    DTaP/Tdap/Td series (1 - Tdap) Never done    PAP AKA CERVICAL CYTOLOGY  11/11/2018        Learning Assessment 9/29/2020   PRIMARY LEARNER Patient   HIGHEST LEVEL OF EDUCATION - PRIMARY LEARNER  -   BARRIERS PRIMARY LEARNER -   CO-LEARNER CAREGIVER -   PRIMARY LANGUAGE ENGLISH    NEED -   LEARNER PREFERENCE PRIMARY DEMONSTRATION     -     -     -     -   LEARNING SPECIAL TOPICS -   ANSWERED BY self   RELATIONSHIP SELF   ASSESSMENT COMMENT -

## 2021-05-10 NOTE — PROGRESS NOTES
aKley Perdomo (: 1980) is a 45 y.o. female, established patient, here for evaluation of the following chief complaint(s)--see below:    Kaley Perdomo is a 45 y.o. female who was seen by synchronous (real-time) audio-video technology on 5/10/2021. Consent: Kaley Perdomo, who was seen by synchronous (real-time) audio-video technology, and/or her healthcare decision maker, is aware that this patient-initiated, Telehealth encounter on 5/10/2021 is a billable service, with coverage as determined by her insurance carrier. She is aware that she may receive a bill and has provided verbal consent to proceed: Yes. I was in the office while conducting this encounter. Assessment & Plan:   Diagnoses and all orders for this visit:      ICD-10-CM ICD-9-CM    1. Acquired hypothyroidism  E03.9 244.9 levothyroxine (LevoxyL) 75 mcg tablet   2. Vitamin D deficiency  E55.9 268.9    3. B12 deficiency  E53.8 266.2    4. DEEPA (generalized anxiety disorder)  F41.1 300.02    5. PTSD (post-traumatic stress disorder)  F43.10 309.81    6. Constipation, unspecified constipation type  K59.00 564.00    7. Medication side effects  T88. 7XXA 995.20      Follow-up and Dispositions    · Return in about 2 months (around 7/10/2021), or if symptoms worsen or fail to improve, for thyroid. results and schedule of future studies reviewed with patient  reviewed diet, exercise and weight    cardiovascular risk and specific lipid/LDL goals reviewed  reviewed medications and side effects in detail     Since thyroid trend toward low and pt symptomatic, agree to titrate med dose to 75 mcg levothyroxine daily. Goal TSH may need to be < 2.50  Colonoscopy to be scheduled next month or so     Follow-up and Dispositions    · Return in about 2 months (around 7/10/2021), or if symptoms worsen or fail to improve, for thyroid. AVS:  [x]  [facility] after visit signed.   []  Mailed to patient after visit. []  Not sent to patient after visit. Subjective:   Nerissa Chen was seen for:  Chief Complaint   Patient presents with   [facility]     pt states that she has some concerns that she would rather discuss with provider     Notes:  Tickle in the chest and subjective fever  Metallic taste for 5 days    Symptoms have now resolved    Pt inquires re: thyroid re: cold intolerance, dry skin, brittle hair. Pt reports abdominal symptoms - poor appetite, bloating following eating. Nursing screenings reviewed by provider at visit. Past medical, Social, and Family history reviewed  Medications reviewed and updated. Allergies   Allergen Reactions    Amoxicillin Rash    Ativan [Lorazepam] Other (comments)     \"Makes her sick\"    Biaxin [Clarithromycin] Itching and Other (comments)     'eyes turn an funny color and she starts itching'    Biaxin [Clarithromycin] Unknown (comments)    Flagyl [Metronidazole] Itching     Eyes change color    Flagyl [Metronidazole] Hives    Zoloft [Sertraline] Other (comments)     Excessive yawning       Prior to Admission medications    Medication Sig Start Date End Date Taking? Authorizing Provider   levothyroxine (LevoxyL) 75 mcg tablet Take 1 Tab by mouth Daily (before breakfast). 5/10/21  Yes Swapnil Berman MD   dicyclomine (BENTYL) 20 mg tablet Take 1 Tab by mouth every six (6) hours as needed for Abdominal Cramps or Cramping. 3/17/21  Yes Swapnil Berman MD   sodium-potassium-mag sulfate (Suprep Bowel Prep Kit) 17.5-3.13-1.6 gram solr oral solution Take 177 mL by mouth See Admin Instructions. 2/23/21  Yes Swapnil Berman MD   cyanocobalamin (VITAMIN B12) 500 mcg tablet Take 1 Tab by mouth daily. 1/27/21  Yes Swapnil Berman MD   ALPRAZolam Rita Poncho) 1 mg tablet 1-2 tabs po tid prn. 12/14/20  Yes Swapnil Berman MD   QUEtiapine (SEROquel) 200 mg tablet Take 1 Tab by mouth two (2) times a day.  11/6/20  Yes Swapnil Berman MD   citalopram (CELEXA) 40 mg tablet Take 1 Tab by mouth daily. 10/19/20  Yes Je Cross MD   multivit-min-folic acid-biotin 936-346 mcg chew Take 1 Tab by mouth daily. 9/28/20  Yes Je Cross MD   cyanocobalamin, vitamin B-12, 1,000 mcg/mL kit 1 mL by Injection route every month. 9/15/20  Yes Je Cross MD   loratadine (CLARITIN) 10 mg tablet Take 1 Tab by mouth daily. 7/6/20  Yes Je Cross MD   TRI-SPRINTEC, 28, 0.18/0.215/0.25 mg-35 mcg (28) tab TAKE 1 TABLET BY MOUTH EVERY DAY 12/30/19  Yes Riki CROSS NP   ibuprofen (MOTRIN) 600 mg tablet Take 1 Tab by mouth every six (6) hours as needed for Pain. 12/12/19  Yes Lv Ha MD   cholecalciferol (VITAMIN D3) 1,000 unit cap Take 2 Caps by mouth daily. 11/2/18  Yes Je Cross MD         ROS     A complete ROS was performed and negative except as noted in HPI     PHYSICAL EXAMINATION:    Vital Signs: There were no vitals taken for this visit. No flowsheet data found. Constitutional: [x] Appears well-developed and well-nourished [x] No apparent distress      Mental status: [x] Alert and awake  [x] Oriented [x] Able to follow commands       Eyes:   EOM    [x]  Normal      Sclera  [x]  Normal              Discharge [x]  None visible       HENT: [x] Normocephalic, atraumatic    [x] Mouth/Throat: Mucous membranes are moist    External Ears [x] Normal      Neck: [x] No visualized mass     Pulmonary/Chest: [x] Respiratory effort normal   [x] No visualized signs of difficulty breathing or respiratory distress    Musculoskeletal:  [x] Normal range of motion of neck    Neurological:        [x] No Facial Asymmetry (Cranial nerve 7 motor function) (limited exam due to video visit)          [x] No gaze palsy     Skin:        [x] No significant exanthematous lesions or discoloration noted on facial skin             Psychiatric:       [x] Normal Affect       Other pertinent observable physical exam findings:  None.     Prior labs reviewed. Reviewed prior imaging reports      We discussed the expected course, resolution and complications of the diagnosis(es) in detail. Medication risks, benefits, costs, interactions, and alternatives were discussed as indicated. I advised her to contact the office if her condition worsens, changes or fails to improve as anticipated. She expressed understanding with the diagnosis(es) and plan. Maeve Sandra is a 45 y.o. female who was evaluated by a video visit encounter for concerns as above. Maeve Sandra is being evaluated by a Virtual Visit (video visit) encounter to address concerns as mentioned above. A caregiver was present when appropriate. Due to this being a TeleHealth encounter (During [facility] public health emergency), evaluation of the following organ systems was limited: Vitals/Constitutional/EENT/Resp/CV/GI//MS/Neuro/Skin/Heme-Lymph-Imm. Pursuant to the emergency declaration under the [address] [place] [address] and the [employer] and [employer] Act, this Virtual Visit was conducted with patient's (and/or legal guardian's) consent, to reduce the patient's risk of exposure to COVID-19 and provide necessary medical care. The patient (and/or legal guardian) has also been advised to contact this office for worsening conditions or problems, and seek emergency medical treatment and/or call 911 if deemed necessary. Patient identification was verified at the start of the visit: YES. Services were provided through a video synchronous discussion virtually to substitute for in-person clinic visit. Patient was located at their individual home (or other location as per patient preference). Provider was located in medical office. An electronic signature was used to authenticate this note.   -- Oanh Lion MD

## 2021-05-14 ENCOUNTER — DOCUMENTATION ONLY (OUTPATIENT)
Dept: BEHAVIORAL/MENTAL HEALTH CLINIC | Age: 41
End: 2021-05-14

## 2021-05-19 PROBLEM — S92.909D: Status: ACTIVE | Noted: 2021-05-19

## 2021-05-28 ENCOUNTER — TELEPHONE (OUTPATIENT)
Dept: INTERNAL MEDICINE CLINIC | Age: 41
End: 2021-05-28

## 2021-05-28 NOTE — TELEPHONE ENCOUNTER
----- Message from [employer] sent at 5/26/2021  4:58 PM EDT -----  Regarding: Dr. Friedman Right Message/Vendor Calls    Caller's first and last name:  Self      Reason for call: Nurse  Advice      Callback required yes/no and why: Yes       Best contact number(s): [phone]      Details to clarify the request: Pt has many questions and concerns about meds, checkup and would like to be seen by Dr. Meka Ellison on 05/27 or 05/28/21 if possible, virtual or in office       Belinda Jose L

## 2021-06-02 ENCOUNTER — VIRTUAL VISIT (OUTPATIENT)
Dept: INTERNAL MEDICINE CLINIC | Age: 41
End: 2021-06-02
Payer: MEDICAID

## 2021-06-02 DIAGNOSIS — R53.83 OTHER FATIGUE: ICD-10-CM

## 2021-06-02 DIAGNOSIS — F41.8 DEPRESSION WITH ANXIETY: Primary | ICD-10-CM

## 2021-06-02 DIAGNOSIS — E61.1 IRON DEFICIENCY: ICD-10-CM

## 2021-06-02 DIAGNOSIS — E03.9 ACQUIRED HYPOTHYROIDISM: ICD-10-CM

## 2021-06-02 DIAGNOSIS — F41.0 PANIC ATTACKS: ICD-10-CM

## 2021-06-02 DIAGNOSIS — F43.10 PTSD (POST-TRAUMATIC STRESS DISORDER): ICD-10-CM

## 2021-06-02 DIAGNOSIS — E53.8 B12 DEFICIENCY: ICD-10-CM

## 2021-06-02 DIAGNOSIS — F41.1 GAD (GENERALIZED ANXIETY DISORDER): ICD-10-CM

## 2021-06-02 DIAGNOSIS — E55.9 VITAMIN D DEFICIENCY: ICD-10-CM

## 2021-06-02 PROCEDURE — 99215 OFFICE O/P EST HI 40 MIN: CPT | Performed by: INTERNAL MEDICINE

## 2021-06-02 RX ORDER — ACETAMINOPHEN AND CODEINE PHOSPHATE 300; 30 MG/1; MG/1
1 TABLET ORAL
COMMUNITY
Start: 2021-05-19 | End: 2021-09-06 | Stop reason: ALTCHOICE

## 2021-06-02 RX ORDER — ACETAMINOPHEN AND CODEINE PHOSPHATE 300; 30 MG/1; MG/1
TABLET ORAL
COMMUNITY
Start: 2021-05-19 | End: 2021-09-06 | Stop reason: ALTCHOICE

## 2021-06-02 RX ORDER — PAROXETINE HYDROCHLORIDE 20 MG/1
20 TABLET, FILM COATED ORAL DAILY
Qty: 30 TABLET | Refills: 5 | Status: SHIPPED | OUTPATIENT
Start: 2021-06-02 | End: 2021-09-30

## 2021-06-02 RX ORDER — GLUCOSAMINE SULFATE 1500 MG
2000 POWDER IN PACKET (EA) ORAL DAILY
Qty: 60 CAPSULE | Refills: 11 | Status: SHIPPED | OUTPATIENT
Start: 2021-06-02 | End: 2021-09-30 | Stop reason: ALTCHOICE

## 2021-06-02 RX ORDER — FLUTICASONE PROPIONATE 50 MCG
SPRAY, SUSPENSION (ML) NASAL
COMMUNITY
Start: 2021-05-28

## 2021-06-02 NOTE — PROGRESS NOTES
VIRTUAL VISIT    Chief Complaint   Patient presents with    Anxiety     pt states that she has been trying to get help and go to the hospital for assistance. pt never stated what health issues she has been experiencing. Recent Travel Screening and Travel History documentation     Travel Screening     Question   Response    In the last month, have you been in contact with someone who was confirmed or suspected to have Coronavirus / COVID-19? No / Unsure    Have you had a COVID-19 viral test in the last 14 days? No    Do you have any of the following new or worsening symptoms? None of these    Have you traveled internationally or domestically in the last month? No      Travel History   Travel since 05/02/21     No documented travel since 05/02/21                     Abuse Screening Questionnaire 5/10/2021   Do you ever feel afraid of your partner? N   Are you in a relationship with someone who physically or mentally threatens you? N   Is it safe for you to go home? Y            1. Have you been to the ER, urgent care clinic since your last visit? Hospitalized since your last visit? No    2. Have you seen or consulted any other health care providers outside of the [address] [place] [address] since your last visit? Include any pap smears or colon screening.  No     Health Maintenance Due   Topic Date Due    DTaP/Tdap/Td series (1 - Tdap) Never done    PAP AKA CERVICAL CYTOLOGY  11/11/2018        Learning Assessment 9/29/2020   PRIMARY LEARNER Patient   HIGHEST LEVEL OF EDUCATION - PRIMARY LEARNER  -   BARRIERS PRIMARY LEARNER -   CO-LEARNER CAREGIVER -   PRIMARY LANGUAGE ENGLISH    NEED -   LEARNER PREFERENCE PRIMARY DEMONSTRATION     -     -     -     -   LEARNING SPECIAL TOPICS -   ANSWERED BY self   RELATIONSHIP SELF   ASSESSMENT COMMENT -

## 2021-06-02 NOTE — PROGRESS NOTES
Abel Vo (: 1980) is a 45 y.o. female, established patient, here for evaluation of the following chief complaint(s)--see below:    Abel Vo is a 45 y.o. female who was seen by synchronous (real-time) audio-video technology on 2021. Consent: Abel Vo, who was seen by synchronous (real-time) audio-video technology, and/or her healthcare decision maker, is aware that this patient-initiated, Telehealth encounter on 2021 is a billable service, with coverage as determined by her insurance carrier. She is aware that she may receive a bill and has provided verbal consent to proceed: Yes. I was in the office while conducting this encounter. Assessment & Plan:   Diagnoses and all orders for this visit:      ICD-10-CM ICD-9-CM    1. Depression with anxiety  F41.8 300.4    2. Vitamin D deficiency  E55.9 268.9 cholecalciferol (VITAMIN D3) 25 mcg (1,000 unit) cap      VITAMIN D, 25 HYDROXY      VITAMIN D, 25 HYDROXY   3. Acquired hypothyroidism  E03.9 244.9 TSH 3RD GENERATION      T4, FREE      METABOLIC PANEL, COMPREHENSIVE      TSH 3RD GENERATION      T4, FREE      METABOLIC PANEL, COMPREHENSIVE   4. B12 deficiency  E53.8 266.2 VITAMIN B12      VITAMIN B12   5. DEEPA (generalized anxiety disorder)  F41.1 300.02    6. PTSD (post-traumatic stress disorder)  F43.10 309.81    7. Panic attacks  F41.0 300.01    8. Other fatigue  R53.83 780.79 CBC WITH AUTOMATED DIFF      METABOLIC PANEL, COMPREHENSIVE      FERRITIN      IRON PROFILE      CBC WITH AUTOMATED DIFF      METABOLIC PANEL, COMPREHENSIVE      FERRITIN      IRON PROFILE   9. Iron deficiency  E61.1 280.9 FERRITIN      IRON PROFILE      FERRITIN      IRON PROFILE     Follow-up and Dispositions    · Return in about 1 month (around 2021), or if symptoms worsen or fail to improve, for mood , thyroid.        results and schedule of future studies reviewed with patient  reviewed diet, exercise and weight  cardiovascular risk and specific lipid/LDL goals reviewed  reviewed medications and side effects in detail    labs   See GI as rec'd for EGD, colonoscopy  Trial of paxil  Continue xanax, seroquel. AVS:  [x]  [facility] signed. []  Mailed to patient after visit. []  Not sent to patient after visit. On this date 06/02/2021 I have spent 41 minutes reviewing previous notes, test results and face to face with the patient discussing the diagnosis and importance of compliance with the treatment plan as well as documenting on the day of the visit. Subjective:   Cindy Hughes was seen for:  Chief Complaint   Patient presents with    Anxiety     pt states that she has been trying to get help and go to the hospital for assistance. pt never stated what health issues she has been experiencing. Notes:  CP and SOB x 2 weeks with climbing stairs    Some wheezing reported  Stopped     Tired with activity    Leg pain, knees    +depression as well  Taking xanax and seroquel. Stopped taking celexa claiming it doesn't work    effexor reported ineffective    Pt feels she has no control over her life    Nursing screenings reviewed by provider at visit. Past medical, Social, and Family history reviewed  Medications reviewed and updated. Allergies   Allergen Reactions    Amoxicillin Rash    Ativan [Lorazepam] Other (comments)     \"Makes her sick\"    Biaxin [Clarithromycin] Itching and Other (comments)     'eyes turn an funny color and she starts itching'    Biaxin [Clarithromycin] Unknown (comments)    Flagyl [Metronidazole] Itching     Eyes change color    Flagyl [Metronidazole] Hives    Zoloft [Sertraline] Other (comments)     Excessive yawning       Prior to Admission medications    Medication Sig Start Date End Date Taking? Authorizing Provider   acetaminophen-codeine (TYLENOL #3) 300-30 mg per tablet Take 1 Tablet by mouth every six (6) hours as needed.  5/19/21  Yes Provider, Historical   levothyroxine (LevoxyL) 75 mcg tablet Take 1 Tab by mouth Daily (before breakfast). 5/10/21  Yes Roseline Olson MD   dicyclomine (BENTYL) 20 mg tablet Take 1 Tab by mouth every six (6) hours as needed for Abdominal Cramps or Cramping. 3/17/21  Yes Roseline Olson MD   sodium-potassium-mag sulfate (Suprep Bowel Prep Kit) 17.5-3.13-1.6 gram solr oral solution Take 177 mL by mouth See Admin Instructions. 2/23/21  Yes Roseline Olson MD   cyanocobalamin (VITAMIN B12) 500 mcg tablet Take 1 Tab by mouth daily. 1/27/21  Yes Roseline Olson MD   ALPRAZolam Truddie Best) 1 mg tablet 1-2 tabs po tid prn. 12/14/20  Yes Roseline Olson MD   QUEtiapine (SEROquel) 200 mg tablet Take 1 Tab by mouth two (2) times a day. 11/6/20  Yes Roseline Olson MD   citalopram (CELEXA) 40 mg tablet Take 1 Tab by mouth daily. 10/19/20  Yes Roseline Olson MD   multivit-min-folic acid-biotin 544-203 mcg chew Take 1 Tab by mouth daily. 9/28/20  Yes Roseline Olson MD   cyanocobalamin, vitamin B-12, 1,000 mcg/mL kit 1 mL by Injection route every month. 9/15/20  Yes Roseline Olson MD   loratadine (CLARITIN) 10 mg tablet Take 1 Tab by mouth daily. 7/6/20  Yes Roseline Olson MD   TRI-SPRINTEC, 28, 0.18/0.215/0.25 mg-35 mcg (28) tab TAKE 1 TABLET BY MOUTH EVERY DAY 12/30/19  Yes Bertha CROSS NP   ibuprofen (MOTRIN) 600 mg tablet Take 1 Tab by mouth every six (6) hours as needed for Pain. 12/12/19  Yes Bakari Brown MD   cholecalciferol (VITAMIN D3) 1,000 unit cap Take 2 Caps by mouth daily. 11/2/18  Yes Roseline Olson MD   acetaminophen-codeine (TYLENOL #3) 300-30 mg per tablet  5/19/21   Provider, Historical   fluticasone propionate (FLONASE) 50 mcg/actuation nasal spray  5/28/21   Provider, Historical         ROS     A complete ROS was performed and negative except as noted in HPI     PHYSICAL EXAMINATION:    Vital Signs: There were no vitals taken for this visit.     No flowsheet data found.      Constitutional: [x] Appears well-developed and well-nourished [x] No apparent distress      Mental status: [x] Alert and awake  [x] Oriented [x] Able to follow commands       Eyes:   EOM    [x]  Normal      Sclera  [x]  Normal              Discharge [x]  None visible       HENT: [x] Normocephalic, atraumatic    [x] Mouth/Throat: Mucous membranes are moist    External Ears [x] Normal      Neck: [x] No visualized mass     Pulmonary/Chest: [x] Respiratory effort normal   [x] No visualized signs of difficulty breathing or respiratory distress    Musculoskeletal:  [x] Normal range of motion of neck    Neurological:        [x] No Facial Asymmetry (Cranial nerve 7 motor function) (limited exam due to video visit)          [x] No gaze palsy     Skin:        [x] No significant exanthematous lesions or discoloration noted on facial skin             Psychiatric:       [x] Normal Affect       Other pertinent observable physical exam findings:  None. We discussed the expected course, resolution and complications of the diagnosis(es) in detail. Medication risks, benefits, costs, interactions, and alternatives were discussed as indicated. I advised her to contact the office if her condition worsens, changes or fails to improve as anticipated. She expressed understanding with the diagnosis(es) and plan. Annie Bravo is a 45 y.o. female who was evaluated by a video visit encounter for concerns as above. Annie Bravo is being evaluated by a Virtual Visit (video visit) encounter to address concerns as mentioned above. A caregiver was present when appropriate. Due to this being a TeleHealth encounter ([facility]), evaluation of the following organ systems was limited: Vitals/Constitutional/EENT/Resp/CV/GI//MS/Neuro/Skin/Heme-Lymph-Imm.   Pursuant to the emergency declaration under the [facility]5 waiver authority and the Coronavirus Preparedness and Response Supplemental Appropriations Act, this Virtual Visit was conducted with patient's (and/or legal guardian's) consent, to reduce the patient's risk of exposure to COVID-19 and provide necessary medical care. The patient (and/or legal guardian) has also been advised to contact this office for worsening conditions or problems, and seek emergency medical treatment and/or call 911 if deemed necessary. Patient identification was verified at the start of the visit: YES. Services were provided through a video synchronous discussion virtually to substitute for in-person clinic visit. Patient was located at their individual home (or other location as per patient preference). Provider was located in medical office. An electronic signature was used to authenticate this note.   -- Amadou Kimbrough MD

## 2021-06-02 NOTE — PROGRESS NOTES
HPI: 
established patient Presents for f/u Past medical, Social, and Family history reviewed Prior to Admission medications Medication Sig Start Date End Date Taking? Authorizing Provider  
acetaminophen-codeine (TYLENOL #3) 300-30 mg per tablet Take 1 Tablet by mouth every six (6) hours as needed. 5/19/21  Yes Provider, Historical  
levothyroxine (LevoxyL) 75 mcg tablet Take 1 Tab by mouth Daily (before breakfast). 5/10/21  Yes Manpreet Antonio MD  
dicyclomine (BENTYL) 20 mg tablet Take 1 Tab by mouth every six (6) hours as needed for Abdominal Cramps or Cramping. 3/17/21  Yes Manpreet Antonio MD  
sodium-potassium-mag sulfate (Suprep Bowel Prep Kit) 17.5-3.13-1.6 gram solr oral solution Take 177 mL by mouth See Admin Instructions. 2/23/21  Yes Manpreet Antonio MD  
cyanocobalamin (VITAMIN B12) 500 mcg tablet Take 1 Tab by mouth daily. 1/27/21  Yes Manpreet Antonio MD  
ALPRAZolam Nicko Lavender) 1 mg tablet 1-2 tabs po tid prn. 12/14/20  Yes Manpreet Antonio MD  
QUEtiapine (SEROquel) 200 mg tablet Take 1 Tab by mouth two (2) times a day. 11/6/20  Yes Manpreet Antonio MD  
citalopram (CELEXA) 40 mg tablet Take 1 Tab by mouth daily. 10/19/20  Yes Manpreet Antonio MD  
multivit-min-folic acid-biotin 698-153 mcg chew Take 1 Tab by mouth daily. 9/28/20  Yes Manpreet Antonio MD  
cyanocobalamin, vitamin B-12, 1,000 mcg/mL kit 1 mL by Injection route every month. 9/15/20  Yes Manpreet Antonio MD  
loratadine (CLARITIN) 10 mg tablet Take 1 Tab by mouth daily. 7/6/20  Yes Manpreet Antonio MD  
TRI-SPRINTEC, 28, 0.18/0.215/0.25 mg-35 mcg (28) tab TAKE 1 TABLET BY MOUTH EVERY DAY 12/30/19  Yes Briana CROSS NP  
ibuprofen (MOTRIN) 600 mg tablet Take 1 Tab by mouth every six (6) hours as needed for Pain. 12/12/19  Yes Danay Manzo MD  
cholecalciferol (VITAMIN D3) 1,000 unit cap Take 2 Caps by mouth daily.  11/2/18  Yes Manpreet Antonio MD  
acetaminophen-codeine (TYLENOL #3) 300-30 mg per tablet  5/19/21   Provider, Historical  
fluticasone propionate (FLONASE) 50 mcg/actuation nasal spray  5/28/21   Provider, Historical  
  
 
 
ROS Complete ROS reviewed and negative or stable except as noted in HPI. Physical Exam 
 
 
Prior labs reviewed. Assessment/Plan: 
{ASSESSMENT/PLAN:33501} {Time Documentation Optional:54462} An electronic signature was used to authenticate this note.  
-- Elsie Lema MD

## 2021-06-04 ENCOUNTER — TELEPHONE (OUTPATIENT)
Dept: INTERNAL MEDICINE CLINIC | Age: 41
End: 2021-06-04

## 2021-06-04 NOTE — TELEPHONE ENCOUNTER
Patient needs to have a talk with provider concerning the Crisis Stabilization program she need advice on what she should do

## 2021-06-07 NOTE — TELEPHONE ENCOUNTER
Pt called to make a lab appt she's scheduled to come in and have lab's drawn on 6//8/21 @ 12:00. The call was originally waiting on a callback from  to discuss the Crisis Stabilization Program.Pt then went into how sad she has been,how depressed she is and how she has not eaten in the last few day's. Pt also state's that she keep's crying and is very up tight about this crisis program please call ASAP pt's # [phone]

## 2021-06-08 ENCOUNTER — APPOINTMENT (OUTPATIENT)
Dept: INTERNAL MEDICINE CLINIC | Age: 41
End: 2021-06-08

## 2021-06-08 NOTE — TELEPHONE ENCOUNTER
I spoke with pt. She was interested in notifying me of her plan to seek help with support services. I encouraged her to seek the help. Encouraged meds as Rx'd.

## 2021-06-08 NOTE — TELEPHONE ENCOUNTER
Patient came in to office for Lab appointment. States that Provider needed information on crisis support program she is using. Patient states she is using [address] at [phone].

## 2021-06-09 NOTE — PROGRESS NOTES
Labs are either normal or stable and at goal.  No reason on labs for the shortness of breath.   Continue your current medications

## 2021-06-10 LAB
25(OH)D3+25(OH)D2 SERPL-MCNC: 51.1 NG/ML (ref 30–100)
ALBUMIN SERPL-MCNC: 4.7 G/DL (ref 3.8–4.8)
ALBUMIN/GLOB SERPL: 1.2 {RATIO} (ref 1.2–2.2)
ALP SERPL-CCNC: 69 IU/L (ref 48–121)
ALT SERPL-CCNC: 8 IU/L (ref 0–32)
AST SERPL-CCNC: 15 IU/L (ref 0–40)
BASOPHILS # BLD AUTO: 0.1 X10E3/UL (ref 0–0.2)
BASOPHILS NFR BLD AUTO: 1 %
BILIRUB SERPL-MCNC: 0.5 MG/DL (ref 0–1.2)
BUN SERPL-MCNC: 9 MG/DL (ref 6–20)
BUN/CREAT SERPL: 11 (ref 9–23)
CALCIUM SERPL-MCNC: 9.7 MG/DL (ref 8.7–10.2)
CHLORIDE SERPL-SCNC: 103 MMOL/L (ref 96–106)
CO2 SERPL-SCNC: 16 MMOL/L (ref 20–29)
CREAT SERPL-MCNC: 0.83 MG/DL (ref 0.57–1)
EOSINOPHIL # BLD AUTO: 0.2 X10E3/UL (ref 0–0.4)
EOSINOPHIL NFR BLD AUTO: 3 %
ERYTHROCYTE [DISTWIDTH] IN BLOOD BY AUTOMATED COUNT: 13.2 % (ref 11.7–15.4)
FERRITIN SERPL-MCNC: 77 NG/ML (ref 15–150)
GLOBULIN SER CALC-MCNC: 4 G/DL (ref 1.5–4.5)
GLUCOSE SERPL-MCNC: 79 MG/DL (ref 65–99)
HCT VFR BLD AUTO: 43.2 % (ref 34–46.6)
HGB BLD-MCNC: 13.9 G/DL (ref 11.1–15.9)
IMM GRANULOCYTES # BLD AUTO: 0 X10E3/UL (ref 0–0.1)
IMM GRANULOCYTES NFR BLD AUTO: 0 %
IRON SATN MFR SERPL: 24 % (ref 15–55)
IRON SERPL-MCNC: 107 UG/DL (ref 27–159)
LYMPHOCYTES # BLD AUTO: 2.1 X10E3/UL (ref 0.7–3.1)
LYMPHOCYTES NFR BLD AUTO: 33 %
MCH RBC QN AUTO: 28 PG (ref 26.6–33)
MCHC RBC AUTO-ENTMCNC: 32.2 G/DL (ref 31.5–35.7)
MCV RBC AUTO: 87 FL (ref 79–97)
MONOCYTES # BLD AUTO: 0.7 X10E3/UL (ref 0.1–0.9)
MONOCYTES NFR BLD AUTO: 11 %
NEUTROPHILS # BLD AUTO: 3.4 X10E3/UL (ref 1.4–7)
NEUTROPHILS NFR BLD AUTO: 52 %
PLATELET # BLD AUTO: 354 X10E3/UL (ref 150–450)
POTASSIUM SERPL-SCNC: 4.3 MMOL/L (ref 3.5–5.2)
PROT SERPL-MCNC: 8.7 G/DL (ref 6–8.5)
RBC # BLD AUTO: 4.96 X10E6/UL (ref 3.77–5.28)
SODIUM SERPL-SCNC: 136 MMOL/L (ref 134–144)
SPECIMEN STATUS REPORT, ROLRST: NORMAL
T4 FREE SERPL-MCNC: 1.48 NG/DL (ref 0.82–1.77)
TIBC SERPL-MCNC: 445 UG/DL (ref 250–450)
TSH SERPL DL<=0.005 MIU/L-ACNC: 1.89 UIU/ML (ref 0.45–4.5)
UIBC SERPL-MCNC: 338 UG/DL (ref 131–425)
VIT B12 SERPL-MCNC: 360 PG/ML (ref 232–1245)
WBC # BLD AUTO: 6.5 X10E3/UL (ref 3.4–10.8)

## 2021-06-17 ENCOUNTER — TELEPHONE (OUTPATIENT)
Dept: INTERNAL MEDICINE CLINIC | Age: 41
End: 2021-06-17

## 2021-06-17 NOTE — TELEPHONE ENCOUNTER
A chest Xray will not address the issue that the cardiologist wished to rule out. We have not seen pt for this concern making empiric ordering medically inappropriate. Pt needs to address these concerns with the cardiologist as the ordering provider.

## 2021-06-18 NOTE — TELEPHONE ENCOUNTER
Spoke with patient and advised her that provider will not be able to order chest xray because has not been seen in office for condition. Patient referred to cardiologist to request order for xray and she refused.

## 2021-06-25 ENCOUNTER — TELEPHONE (OUTPATIENT)
Dept: PRIMARY CARE CLINIC | Age: 41
End: 2021-06-25

## 2021-06-26 NOTE — TELEPHONE ENCOUNTER
On call note:    Patient called with a c/o urinary frequency and feeling hot. She worry that her kidney is failing. Denies any burning urination, lower abdominal pain, fever. Assured that her recent kidney function was normal.  Advised to go to nearest urgent care for evaluation. Patient sounds very anxious over phone. Confirmed that she is taking her psych meds.

## 2021-06-30 ENCOUNTER — LAB ONLY (OUTPATIENT)
Dept: INTERNAL MEDICINE CLINIC | Age: 41
End: 2021-06-30

## 2021-06-30 DIAGNOSIS — R73.02 IGT (IMPAIRED GLUCOSE TOLERANCE): ICD-10-CM

## 2021-06-30 DIAGNOSIS — E78.2 MIXED HYPERLIPIDEMIA: ICD-10-CM

## 2021-06-30 DIAGNOSIS — E03.9 ACQUIRED HYPOTHYROIDISM: ICD-10-CM

## 2021-07-01 LAB
ALBUMIN SERPL-MCNC: 4 G/DL (ref 3.8–4.8)
ALBUMIN/GLOB SERPL: 1.1 {RATIO} (ref 1.2–2.2)
ALP SERPL-CCNC: 56 IU/L (ref 48–121)
ALT SERPL-CCNC: 6 IU/L (ref 0–32)
AST SERPL-CCNC: 9 IU/L (ref 0–40)
BASOPHILS # BLD AUTO: 0 X10E3/UL (ref 0–0.2)
BASOPHILS NFR BLD AUTO: 0 %
BILIRUB SERPL-MCNC: 0.2 MG/DL (ref 0–1.2)
BUN SERPL-MCNC: 6 MG/DL (ref 6–20)
BUN/CREAT SERPL: 8 (ref 9–23)
CALCIUM SERPL-MCNC: 8.9 MG/DL (ref 8.7–10.2)
CHLORIDE SERPL-SCNC: 105 MMOL/L (ref 96–106)
CHOLEST SERPL-MCNC: 238 MG/DL (ref 100–199)
CO2 SERPL-SCNC: 18 MMOL/L (ref 20–29)
CREAT SERPL-MCNC: 0.73 MG/DL (ref 0.57–1)
EOSINOPHIL # BLD AUTO: 0.1 X10E3/UL (ref 0–0.4)
EOSINOPHIL NFR BLD AUTO: 1 %
ERYTHROCYTE [DISTWIDTH] IN BLOOD BY AUTOMATED COUNT: 12.8 % (ref 11.7–15.4)
EST. AVERAGE GLUCOSE BLD GHB EST-MCNC: 108 MG/DL
GLOBULIN SER CALC-MCNC: 3.8 G/DL (ref 1.5–4.5)
GLUCOSE SERPL-MCNC: 89 MG/DL (ref 65–99)
HBA1C MFR BLD: 5.4 % (ref 4.8–5.6)
HCT VFR BLD AUTO: 39.3 % (ref 34–46.6)
HDLC SERPL-MCNC: 88 MG/DL
HGB BLD-MCNC: 12.5 G/DL (ref 11.1–15.9)
IMM GRANULOCYTES # BLD AUTO: 0 X10E3/UL (ref 0–0.1)
IMM GRANULOCYTES NFR BLD AUTO: 0 %
LDLC SERPL CALC-MCNC: 134 MG/DL (ref 0–99)
LYMPHOCYTES # BLD AUTO: 1.4 X10E3/UL (ref 0.7–3.1)
LYMPHOCYTES NFR BLD AUTO: 28 %
MCH RBC QN AUTO: 28 PG (ref 26.6–33)
MCHC RBC AUTO-ENTMCNC: 31.8 G/DL (ref 31.5–35.7)
MCV RBC AUTO: 88 FL (ref 79–97)
MONOCYTES # BLD AUTO: 0.5 X10E3/UL (ref 0.1–0.9)
MONOCYTES NFR BLD AUTO: 9 %
NEUTROPHILS # BLD AUTO: 3.2 X10E3/UL (ref 1.4–7)
NEUTROPHILS NFR BLD AUTO: 62 %
PLATELET # BLD AUTO: 363 X10E3/UL (ref 150–450)
POTASSIUM SERPL-SCNC: 4.4 MMOL/L (ref 3.5–5.2)
PROT SERPL-MCNC: 7.8 G/DL (ref 6–8.5)
RBC # BLD AUTO: 4.47 X10E6/UL (ref 3.77–5.28)
SODIUM SERPL-SCNC: 140 MMOL/L (ref 134–144)
T4 FREE SERPL-MCNC: 1.33 NG/DL (ref 0.82–1.77)
TRIGL SERPL-MCNC: 94 MG/DL (ref 0–149)
TSH SERPL DL<=0.005 MIU/L-ACNC: 2.14 UIU/ML (ref 0.45–4.5)
VLDLC SERPL CALC-MCNC: 16 MG/DL (ref 5–40)
WBC # BLD AUTO: 5.2 X10E3/UL (ref 3.4–10.8)

## 2021-07-01 NOTE — PROGRESS NOTES
LDL cholesterol is a little high - reducing saturated fat in the diet will help.   Other labs are either normal or stable and at goal.

## 2021-07-02 ENCOUNTER — OFFICE VISIT (OUTPATIENT)
Dept: INTERNAL MEDICINE CLINIC | Age: 41
End: 2021-07-02
Payer: MEDICAID

## 2021-07-02 VITALS
WEIGHT: 175.5 LBS | DIASTOLIC BLOOD PRESSURE: 95 MMHG | HEIGHT: 65 IN | SYSTOLIC BLOOD PRESSURE: 144 MMHG | TEMPERATURE: 98.3 F | BODY MASS INDEX: 29.24 KG/M2 | HEART RATE: 86 BPM | RESPIRATION RATE: 18 BRPM | OXYGEN SATURATION: 98 %

## 2021-07-02 DIAGNOSIS — R33.9 URINARY RETENTION WITH INCOMPLETE BLADDER EMPTYING: Primary | ICD-10-CM

## 2021-07-02 DIAGNOSIS — R07.9 CHEST PAIN, UNSPECIFIED TYPE: ICD-10-CM

## 2021-07-02 DIAGNOSIS — F43.10 PTSD (POST-TRAUMATIC STRESS DISORDER): ICD-10-CM

## 2021-07-02 DIAGNOSIS — F41.8 DEPRESSION WITH ANXIETY: ICD-10-CM

## 2021-07-02 DIAGNOSIS — E78.2 MIXED HYPERLIPIDEMIA: ICD-10-CM

## 2021-07-02 DIAGNOSIS — F41.0 PANIC ATTACKS: ICD-10-CM

## 2021-07-02 DIAGNOSIS — E03.9 ACQUIRED HYPOTHYROIDISM: ICD-10-CM

## 2021-07-02 PROCEDURE — 99214 OFFICE O/P EST MOD 30 MIN: CPT | Performed by: INTERNAL MEDICINE

## 2021-07-02 RX ORDER — LEVOFLOXACIN 500 MG/1
TABLET, FILM COATED ORAL
COMMUNITY
Start: 2021-06-30 | End: 2021-09-30 | Stop reason: ALTCHOICE

## 2021-07-02 NOTE — PATIENT INSTRUCTIONS
Kegel Exercises: Care Instructions  Overview     Kegel exercises strengthen muscles around the bladder. These muscles control the flow of urine. Kegel exercises are sometime called \"pelvic floor\" exercises. They can help prevent urine leakage and keep the pelvic organs in place. Kegel exercises can strengthen pelvic muscles that have been weakened by age, pregnancy, childbirth, and surgery. They may help prevent or treat urine leakage. You do Kegel exercises by tightening the muscles you use when you urinate. You will likely need to do these exercises for several weeks to get better. Follow-up care is a key part of your treatment and safety. Be sure to make and go to all appointments, and call your doctor if you are having problems. It's also a good idea to know your test results and keep a list of the medicines you take. How can you care for yourself at home? · Do Kegel exercises. ? Find the muscles you need to strengthen. To do this, tighten the muscles that stop your urine while you are going to the bathroom. These are the same muscles you squeeze during Kegel exercises. ? Squeeze the muscles as hard as you can. Your belly and thighs should not move. ? Hold the squeeze for 3 seconds. Then relax for 3 seconds. ? Start with 3 seconds, and then add 1 second each week until you are able to squeeze for 10 seconds. ? Repeat the exercise 10 to 15 times for each session. Do three or more sessions each day. · You can check to see if you are using the right muscles. ? Tighten the muscles that help you stop passing gas or keep you from urinating. Make sure you aren't using your stomach, leg, or buttock muscles. ? Place a finger in your vagina and squeeze around it. You are doing them right when you feel pressure around your finger. Your doctor may also suggest that you put special weights in your vagina while you do the exercises.   · Check with your doctor if you don't notice a difference after trying these exercises for several weeks. Your doctor may suggest getting help from a physical therapist or recommend other treatment. Where can you learn more? Go to http://www.gray.com/  Enter D155 in the search box to learn more about \"Kegel Exercises: Care Instructions. \"  Current as of: June 29, 2020               Content Version: 12.8  © 3351-6894 [employer]. Care instructions adapted under license by [employer] (which disclaims liability or warranty for this information). If you have questions about a medical condition or this instruction, always ask your healthcare professional. [address] any warranty or liability for your use of this information.

## 2021-07-02 NOTE — PROGRESS NOTES
HPI:  established patient  Presents for f/u urinary sx and other    Pt reports urinary frequency  Fair volume with each urination. Hands and arms swelling - ?fluid retention  This occurred prior to urine output. Poor appetite, forces herself to eat    Pt saw urology  Pt reports some urine retention on bladder scan. Pt acknowledges not taking thyroid dose daily. Past medical, Social, and Family history reviewed    Prior to Admission medications    Medication Sig Start Date End Date Taking? Authorizing Provider   levoFLOXacin (LEVAQUIN) 500 mg tablet  6/30/21  Yes Provider, Historical   fluticasone propionate (FLONASE) 50 mcg/actuation nasal spray  5/28/21  Yes Provider, Historical   cholecalciferol (VITAMIN D3) 25 mcg (1,000 unit) cap Take 2 Capsules by mouth daily. 6/2/21  Yes Sheri Urbina MD   PARoxetine (PAXIL) 20 mg tablet Take 1 Tablet by mouth daily. 6/2/21  Yes Sheri Urbina MD   levothyroxine (LevoxyL) 75 mcg tablet Take 1 Tab by mouth Daily (before breakfast). 5/10/21  Yes Sheri Urbina MD   sodium-potassium-mag sulfate (Suprep Bowel Prep Kit) 17.5-3.13-1.6 gram solr oral solution Take 177 mL by mouth See Admin Instructions. 2/23/21  Yes Sheri Urbina MD   cyanocobalamin (VITAMIN B12) 500 mcg tablet Take 1 Tab by mouth daily. 1/27/21  Yes Sheri Urbina MD   ALPRAZolam Nelson Bors) 1 mg tablet 1-2 tabs po tid prn. 12/14/20  Yes Sheri Urbina MD   QUEtiapine (SEROquel) 200 mg tablet Take 1 Tab by mouth two (2) times a day. 11/6/20  Yes Sheri Urbina MD   cyanocobalamin, vitamin B-12, 1,000 mcg/mL kit 1 mL by Injection route every month. 9/15/20  Yes Sheri Urbina MD   loratadine (CLARITIN) 10 mg tablet Take 1 Tab by mouth daily.  7/6/20  Yes Sheri Urbina MD   TRI-SPRINTEC, 28, 0.18/0.215/0.25 mg-35 mcg (28) tab TAKE 1 TABLET BY MOUTH EVERY DAY 12/30/19  Yes Sharon Kain T, NP   ibuprofen (MOTRIN) 600 mg tablet Take 1 Tab by mouth every six (6) hours as needed for Pain. 12/12/19  Yes Anahy MD Lisbet   acetaminophen-codeine (TYLENOL #3) 300-30 mg per tablet Take 1 Tablet by mouth every six (6) hours as needed. Patient not taking: Reported on 7/2/2021 5/19/21   Provider, Historical   acetaminophen-codeine (TYLENOL #3) 300-30 mg per tablet  5/19/21   Provider, Historical   dicyclomine (BENTYL) 20 mg tablet Take 1 Tab by mouth every six (6) hours as needed for Abdominal Cramps or Cramping. Patient not taking: Reported on 7/2/2021 3/17/21   Jose Ramon MD   multivit-min-folic acid-biotin 686-699 mcg chew Take 1 Tab by mouth daily. Patient not taking: Reported on 7/2/2021 9/28/20   Jose Ramon MD          ROS  Complete ROS reviewed and negative or stable except as noted in HPI. Physical Exam  Vitals and nursing note reviewed. Constitutional:       General: She is not in acute distress. HENT:      Head: Normocephalic and atraumatic. Eyes:      General: No scleral icterus. Pupils: Pupils are equal, round, and reactive to light. Neck:      Vascular: No JVD. Cardiovascular:      Rate and Rhythm: Normal rate and regular rhythm. Heart sounds: Normal heart sounds. No murmur heard. No friction rub. No gallop. Pulmonary:      Effort: Pulmonary effort is normal. No respiratory distress. Breath sounds: Normal breath sounds. No wheezing or rales. Abdominal:      General: Bowel sounds are normal. There is no distension. Palpations: Abdomen is soft. Tenderness: There is no abdominal tenderness. Musculoskeletal:         General: Normal range of motion. Cervical back: Normal range of motion and neck supple. Lymphadenopathy:      Cervical: No cervical adenopathy. Skin:     General: Skin is warm. Findings: No rash. Neurological:      Mental Status: She is alert and oriented to person, place, and time. Motor: No abnormal muscle tone. Psychiatric:      Comments: Mood and affect appropriate today. Prior labs reviewed. Assessment/Plan:  Possible intermittent hypothyroid fluid retention  Likely bladder retention      ICD-10-CM ICD-9-CM    1. Urinary retention with incomplete bladder emptying  R33.9 788.21    2. Acquired hypothyroidism  E03.9 244.9    3. Mixed hyperlipidemia  E78.2 272.2    4. PTSD (post-traumatic stress disorder)  F43.10 309.81    5. Panic attacks  F41.0 300.01    6. Depression with anxiety  F41.8 300.4    7. Chest pain, unspecified type  R07.9 786.50      Follow-up and Dispositions    · Return in about 2 months (around 9/2/2021), or if symptoms worsen or fail to improve, for thyroid. results and schedule of future studies reviewed with patient  reviewed diet, exercise and weight   reviewed medications and side effects in detail    See urology  Reassurance offered  Encouraged daily thyroid compliance  Kegel exercise        An electronic signature was used to authenticate this note.   -- Hermilo Hodges MD

## 2021-07-02 NOTE — PROGRESS NOTES
RM: 15    Chief Complaint   Patient presents with    Chest Pain     swelling in both arms and face    Urinary Frequency     patient states that she has experienced intermittent pain with urination      Visit Vitals  BP (!) 144/95 (BP 1 Location: Left upper arm, BP Patient Position: Sitting, BP Cuff Size: Adult)   Pulse 86   Temp 98.3 °F (36.8 °C) (Oral)   Resp 18   Ht 5' 5\" (1.651 m)   Wt 175 lb 8 oz (79.6 kg)   LMP 07/02/2021 (Exact Date)   SpO2 98%   BMI 29.20 kg/m²     Recent Travel Screening and Travel History documentation     Travel Screening     Question   Response    In the last month, have you been in contact with someone who was confirmed or suspected to have Coronavirus / COVID-19? No / Unsure    Have you had a COVID-19 viral test in the last 14 days? No    Do you have any of the following new or worsening symptoms? None of these    Have you traveled internationally or domestically in the last month? No      Travel History   Travel since 06/02/21     No documented travel since 06/02/21                   3 most recent PHQ Screens 6/2/2021   PHQ Not Done -   Little interest or pleasure in doing things Not at all   Feeling down, depressed, irritable, or hopeless More than half the days   Total Score PHQ 2 2   Trouble falling or staying asleep, or sleeping too much -   Feeling tired or having little energy -   Poor appetite, weight loss, or overeating -   Feeling bad about yourself - or that you are a failure or have let yourself or your family down -   Trouble concentrating on things such as school, work, reading, or watching TV -   Moving or speaking so slowly that other people could have noticed; or the opposite being so fidgety that others notice -   Thoughts of being better off dead, or hurting yourself in some way -   PHQ 9 Score -   How difficult have these problems made it for you to do your work, take care of your home and get along with others -            1.  Have you been to the ER, urgent care clinic since your last visit? Hospitalized since your last visit? No    2. Have you seen or consulted any other health care providers outside of the [address] [place] [address] since your last visit? Include any pap smears or colon screening.  No     Health Maintenance Due   Topic Date Due    DTaP/Tdap/Td series (1 - Tdap) Never done    PAP AKA CERVICAL CYTOLOGY  11/11/2018        Learning Assessment 9/29/2020   PRIMARY LEARNER Patient   HIGHEST LEVEL OF EDUCATION - PRIMARY LEARNER  -   BARRIERS PRIMARY LEARNER -   CO-LEARNER CAREGIVER -   PRIMARY LANGUAGE ENGLISH    NEED -   LEARNER PREFERENCE PRIMARY DEMONSTRATION     -     -     -     -   LEARNING SPECIAL TOPICS -   ANSWERED BY self   RELATIONSHIP SELF   ASSESSMENT COMMENT -

## 2021-07-08 DIAGNOSIS — F41.0 PANIC ATTACKS: ICD-10-CM

## 2021-07-08 DIAGNOSIS — F41.1 GAD (GENERALIZED ANXIETY DISORDER): ICD-10-CM

## 2021-07-08 DIAGNOSIS — F41.0 PANIC DISORDER: ICD-10-CM

## 2021-07-08 RX ORDER — ALPRAZOLAM 1 MG/1
TABLET ORAL
Qty: 150 TABLET | Refills: 2 | Status: SHIPPED | OUTPATIENT
Start: 2021-07-08 | End: 2021-10-14

## 2021-07-08 NOTE — TELEPHONE ENCOUNTER
Last visit 2021 MD Hay Gastelum   Next appointment 2021 MD Hay Gastelum   Previous refill encounter(s)   2020 Xanax #150 with 2 refills    No access to      Requested Prescriptions     Pending Prescriptions Disp Refills    ALPRAZolam (XANAX) 1 mg tablet 150 Tablet 2     Si-2 tabs po tid prn.

## 2021-07-22 ENCOUNTER — TELEPHONE (OUTPATIENT)
Dept: INTERNAL MEDICINE CLINIC | Age: 41
End: 2021-07-22

## 2021-07-22 NOTE — TELEPHONE ENCOUNTER
Patient called stating that she has to have CT scan and Echo and was provided with 2 pills called metoprolol and she looked them up on the Internet that states that if you have thyroid problems and want to know if this is ok to take.

## 2021-07-22 NOTE — TELEPHONE ENCOUNTER
Please clarify the 2nd medication. But, metoprolol is safe to use along with thyroid problems. She may take the medication and follow up with me as scheduled 9/1/21.

## 2021-07-23 ENCOUNTER — TRANSCRIBE ORDER (OUTPATIENT)
Dept: NEUROLOGY | Age: 41
End: 2021-07-23

## 2021-07-23 NOTE — TELEPHONE ENCOUNTER
Patient took the medication and went through with the procedure of  the cardiologist and states the doctor told her that she has heart failure. Patient wants to talk with provider about the results. Patient wants a second opinion from her provider. Cardiologist states that the heart is not pumping blood through the body like it supposed to.

## 2021-07-23 NOTE — TELEPHONE ENCOUNTER
Please clarify what cardiologist she saw and obtain the records to review as the records are not part of the [employer] system.

## 2021-07-26 ENCOUNTER — VIRTUAL VISIT (OUTPATIENT)
Dept: INTERNAL MEDICINE CLINIC | Age: 41
End: 2021-07-26
Payer: MEDICAID

## 2021-07-26 DIAGNOSIS — R51.9 HEADACHE, UNSPECIFIED HEADACHE TYPE: ICD-10-CM

## 2021-07-26 DIAGNOSIS — I40.1 IDIOPATHIC MYOCARDITIS, UNSPECIFIED CHRONICITY: Primary | ICD-10-CM

## 2021-07-26 DIAGNOSIS — E78.2 MIXED HYPERLIPIDEMIA: ICD-10-CM

## 2021-07-26 DIAGNOSIS — E03.9 ACQUIRED HYPOTHYROIDISM: ICD-10-CM

## 2021-07-26 DIAGNOSIS — F41.1 GAD (GENERALIZED ANXIETY DISORDER): ICD-10-CM

## 2021-07-26 DIAGNOSIS — F43.10 PTSD (POST-TRAUMATIC STRESS DISORDER): ICD-10-CM

## 2021-07-26 DIAGNOSIS — I50.9 MILD CONGESTIVE HEART FAILURE (HCC): ICD-10-CM

## 2021-07-26 DIAGNOSIS — R07.9 CHEST PAIN, UNSPECIFIED TYPE: ICD-10-CM

## 2021-07-26 DIAGNOSIS — F41.8 DEPRESSION WITH ANXIETY: ICD-10-CM

## 2021-07-26 PROCEDURE — 99215 OFFICE O/P EST HI 40 MIN: CPT | Performed by: INTERNAL MEDICINE

## 2021-07-26 RX ORDER — LISINOPRIL 5 MG/1
1 TABLET ORAL
COMMUNITY
Start: 2021-07-22 | End: 2021-07-28

## 2021-07-26 RX ORDER — COLCHICINE 0.6 MG/1
TABLET ORAL
COMMUNITY
Start: 2021-07-26 | End: 2021-09-06 | Stop reason: ALTCHOICE

## 2021-07-26 RX ORDER — IBUPROFEN 800 MG/1
800 TABLET ORAL 2 TIMES DAILY WITH MEALS
Qty: 60 TABLET | Refills: 1 | Status: SHIPPED | OUTPATIENT
Start: 2021-07-26

## 2021-07-26 RX ORDER — NORGESTIMATE AND ETHINYL ESTRADIOL 7DAYSX3 28
1 KIT ORAL DAILY
Qty: 28 TABLET | Refills: 5 | Status: SHIPPED | OUTPATIENT
Start: 2021-07-26

## 2021-07-26 RX ORDER — METOPROLOL SUCCINATE 25 MG/1
1 TABLET, EXTENDED RELEASE ORAL
COMMUNITY
Start: 2021-07-22 | End: 2021-12-07 | Stop reason: ALTCHOICE

## 2021-07-26 NOTE — TELEPHONE ENCOUNTER
----- Message from Feliz Treviño sent at 7/23/2021  7:25 PM EDT -----  Regarding: Dr. Lorenzo Michael Message/Vendor Calls    Caller's first and last name:  Gabriela Marie, Self      Reason for call:  having panic attacks      Callback required yes/no and why:  Yes      Best contact number(s):  [phone]      Details to clarify the request:  Pt states she just found out that she has a bad heart and is having really bad panic attacks. Pt refused transfer to the answering service, due to Dr. Mary Rater is rude to her and doesn't care about the problems she's having and he refuses to help her.     Thanks,  Feliz Treviño

## 2021-07-26 NOTE — PROGRESS NOTES
Marcia Barahona (: 1980) is a 45 y.o. female, established patient, here for evaluation of the following chief complaint(s)--see below:    Marcia Barahona is a 45 y.o. female who was seen by synchronous (real-time) audio-video technology on 2021. Consent: Marcia Barahona, who was seen by synchronous (real-time) audio-video technology, and/or her healthcare decision maker, is aware that this patient-initiated, Telehealth encounter on 2021 is a billable service, with coverage as determined by her insurance carrier. She is aware that she may receive a bill and has provided verbal consent to proceed: Yes. I was in the office while conducting this encounter. Assessment & Plan:   Diagnoses and all orders for this visit:    Favor myocarditis - possibly from COVID vaccine  Consider hypothyroid or stress related cardiac change. ICD-10-CM ICD-9-CM    1. Idiopathic myocarditis, unspecified chronicity  I40.1 422.91 ibuprofen (MOTRIN) 800 mg tablet   2. Mild congestive heart failure (HCC)  I50.9 428.0    3. DEEPA (generalized anxiety disorder)  F41.1 300.02    4. Acquired hypothyroidism  E03.9 244.9    5. Mixed hyperlipidemia  E78.2 272.2    6. Depression with anxiety  F41.8 300.4    7. PTSD (post-traumatic stress disorder)  F43.10 309.81    8. Chest pain, unspecified type  R07.9 786.50    9. Headache, unspecified headache type  R51.9 784.0 norgestimate-ethinyl estradioL (Tri-Sprintec, 28,) 0.18/0.215/0.25 mg-35 mcg (28) tab     Follow-up and Dispositions    · Return in about 3 weeks (around 2021), or if symptoms worsen or fail to improve, for chest pain, heart.        results and schedule of future studies reviewed with patient  reviewed diet, exercise and weight control  cardiovascular risk and specific lipid/LDL goals reviewed  reviewed medications and side effects in detail    Trial of metoprolol  Defer lisinopril for now to limit low BP  Colchicine + Ibuprofen bid  - consider dc ibuprofen when symptoms improve and may continue colchicine for longer. See Mikey Rapp - as scheduled this week for additional eval and establish care for future follow up echo, etc.  Continue current medications       AVS:  [x]  Available to patient in [facility] after visit signed. []  Mailed to patient after visit. []  Not sent to patient after visit. Future Appointments   Date Time Provider Rafat Petit   7/28/2021  2:00 PM MD MICHAEL Bray BS AMB   9/1/2021  1:30 PM Celso Dandy, MD CP BS AMB         Subjective:   Deneen Cristina was seen for:  Chief Complaint   Patient presents with    Results     EKG results review     Notes:  Pt reports CP, heaviness  Saw Cardiology at [facility] and CTA and echo done. Interaction with the cardiologist was poor and limited pt's understanding of diagnosis and results. Colchicine, lisinopril and metoprolol Rx'd to pt     +POE, persistent chest pain. Pt reports poor support system  Pt has been offered a job in [place], [place]  Pt unclear how she can get there. Persistent depression and anxiety  Poor appetite  No SI, though. Poor med compliance due to poor thought. Nursing screenings reviewed by provider at visit. Past medical, Social, and Family history reviewed  Medications reviewed and updated. Allergies   Allergen Reactions    Amoxicillin Rash    Ativan [Lorazepam] Other (comments)     \"Makes her sick\"    Biaxin [Clarithromycin] Itching and Other (comments)     'eyes turn an funny color and she starts itching'    Biaxin [Clarithromycin] Unknown (comments)    Flagyl [Metronidazole] Itching     Eyes change color    Flagyl [Metronidazole] Hives    Zoloft [Sertraline] Other (comments)     Excessive yawning       Prior to Admission medications    Medication Sig Start Date End Date Taking?  Authorizing Provider   ALPRAZolam [facility]) 1 mg tablet 1-2 tabs po tid prn. 7/8/21  Yes Celso Dandy, MD   levoFLOXacin [facility]) 500 mg tablet  6/30/21  Yes Provider, Historical   fluticasone propionate (FLONASE) 50 mcg/actuation nasal spray  5/28/21  Yes Provider, Historical   cholecalciferol (VITAMIN D3) 25 mcg (1,000 unit) cap Take 2 Capsules by mouth daily. 6/2/21  Yes Eva Dorman MD   PARoxetine (PAXIL) 20 mg tablet Take 1 Tablet by mouth daily. 6/2/21  Yes Eva Dorman MD   levothyroxine (LevoxyL) 75 mcg tablet Take 1 Tab by mouth Daily (before breakfast). 5/10/21  Yes Eva Dorman MD   sodium-potassium-mag sulfate (Suprep Bowel Prep Kit) 17.5-3.13-1.6 gram solr oral solution Take 177 mL by mouth See Admin Instructions. 2/23/21  Yes Eva Dorman MD   cyanocobalamin (VITAMIN B12) 500 mcg tablet Take 1 Tab by mouth daily. 1/27/21  Yes Eva Dorman MD   QUEtiapine (SEROquel) 200 mg tablet Take 1 Tab by mouth two (2) times a day. 11/6/20  Yes Eva Dorman MD   multivit-min-folic acid-biotin 201-979 mcg chew Take 1 Tab by mouth daily. 9/28/20  Yes Eva Dorman MD   cyanocobalamin, vitamin B-12, 1,000 mcg/mL kit 1 mL by Injection route every month. 9/15/20  Yes Eva Dorman MD   loratadine (CLARITIN) 10 mg tablet Take 1 Tab by mouth daily. 7/6/20  Yes Eva Dorman MD   TRI-SPRINTEC, 28, 0.18/0.215/0.25 mg-35 mcg (28) tab TAKE 1 TABLET BY MOUTH EVERY DAY 12/30/19  Yes Mis CROSS NP   ibuprofen (MOTRIN) 600 mg tablet Take 1 Tab by mouth every six (6) hours as needed for Pain. 12/12/19  Yes Amari Navas MD   acetaminophen-codeine (TYLENOL #3) 300-30 mg per tablet Take 1 Tablet by mouth every six (6) hours as needed. Patient not taking: Reported on 7/2/2021 5/19/21   Provider, Historical   acetaminophen-codeine (TYLENOL #3) 300-30 mg per tablet  5/19/21   Provider, Historical         ROS     A complete ROS was performed and negative except as noted in HPI     PHYSICAL EXAMINATION:    Vital Signs:  Last menstrual period 07/02/2021. No flowsheet data found.       Constitutional: [x] Appears well-developed and well-nourished [x] No apparent distress      Mental status: [x] Alert and awake  [x] Oriented [x] Able to follow commands       Eyes:   EOM    [x]  Normal      Sclera  [x]  Normal              Discharge [x]  None visible       HENT: [x] Normocephalic, atraumatic    [x] Mouth/Throat: Mucous membranes are moist    External Ears [x] Normal      Neck: [x] No visualized mass     Pulmonary/Chest: [x] Respiratory effort normal   [x] No visualized signs of difficulty breathing or respiratory distress    Musculoskeletal:  [x] Normal range of motion of neck    Neurological:        [x] No Facial Asymmetry (Cranial nerve 7 motor function) (limited exam due to video visit)          [x] No gaze palsy     Skin:        [x] No significant exanthematous lesions or discoloration noted on facial skin             Psychiatric:       [x] Normal Affect       Other pertinent observable physical exam findings:  None. CTA - RCA imaging non-diagnostic. Left main, LAD, Left Circumflex without significant stenosis   EF calc - 43%  Echo - EF 40-45%   Trace valve regurg - MV, TV, PV    We discussed the expected course, resolution and complications of the diagnosis(es) in detail. Medication risks, benefits, costs, interactions, and alternatives were discussed as indicated. I advised her to contact the office if her condition worsens, changes or fails to improve as anticipated. She expressed understanding with the diagnosis(es) and plan. Jeanie Florez is a 45 y.o. female who was evaluated by a video visit encounter for concerns as above. On this date 07/26/2021 I have spent 65 minutes reviewing previous notes, test results and face to face with the patient discussing the diagnosis and importance of compliance with the treatment plan as well as documenting on the day of the visit.     Jeanie Florez is being evaluated by a Virtual Visit (video visit) encounter to address concerns as mentioned above.  A caregiver was present when appropriate. Due to this being a TeleHealth encounter (During YPDWX-00 public health emergency), evaluation of the following organ systems was limited: Vitals/Constitutional/EENT/Resp/CV/GI//MS/Neuro/Skin/Heme-Lymph-Imm. Pursuant to the emergency declaration under the [address] [place] [address], [address] [place] [address] and the [employer] Act, this Virtual Visit was conducted with patient's (and/or legal guardian's) consent, to reduce the patient's risk of exposure to COVID-19 and provide necessary medical care. The patient (and/or legal guardian) has also been advised to contact this office for worsening conditions or problems, and seek emergency medical treatment and/or call 911 if deemed necessary. Patient identification was verified at the start of the visit: YES. Services were provided through a video synchronous discussion virtually to substitute for in-person clinic visit. Patient was located at their individual home (or other location as per patient preference). Provider was located in medical office. An electronic signature was used to authenticate this note.   -- Pat Sagastume MD

## 2021-07-26 NOTE — PROGRESS NOTES
VIRTUAL VISIT    Chief Complaint   Patient presents with    Results     EKG results review      Recent Travel Screening and Travel History documentation     Travel Screening     Question   Response    In the last month, have you been in contact with someone who was confirmed or suspected to have Coronavirus / COVID-19? No / Unsure    Have you had a COVID-19 viral test in the last 14 days? No    Do you have any of the following new or worsening symptoms? None of these    Have you traveled internationally or domestically in the last month? No      Travel History   Travel since 06/26/21     No documented travel since 06/26/21          Abuse Screening Questionnaire 7/26/2021   Do you ever feel afraid of your partner? N   Are you in a relationship with someone who physically or mentally threatens you? N   Is it safe for you to go home? Y     3 most recent PHQ Screens 7/26/2021   PHQ Not Done -   Little interest or pleasure in doing things Several days   Feeling down, depressed, irritable, or hopeless Nearly every day   Total Score PHQ 2 4   Trouble falling or staying asleep, or sleeping too much Several days   Feeling tired or having little energy Several days   Poor appetite, weight loss, or overeating Several days   Feeling bad about yourself - or that you are a failure or have let yourself or your family down Several days   Trouble concentrating on things such as school, work, reading, or watching TV Several days   Moving or speaking so slowly that other people could have noticed; or the opposite being so fidgety that others notice Several days   Thoughts of being better off dead, or hurting yourself in some way Several days   PHQ 9 Score 11   How difficult have these problems made it for you to do your work, take care of your home and get along with others Very difficult            1. Have you been to the ER, urgent care clinic since your last visit? Hospitalized since your last visit? No    2.  Have you seen or consulted any other health care providers outside of the [address] [place] [address] since your last visit? Include any pap smears or colon screening.  No     Health Maintenance Due   Topic Date Due    DTaP/Tdap/Td series (1 - Tdap) Never done    PAP AKA CERVICAL CYTOLOGY  11/11/2018        Learning Assessment 9/29/2020   PRIMARY LEARNER Patient   HIGHEST LEVEL OF EDUCATION - PRIMARY LEARNER  -   BARRIERS PRIMARY LEARNER -   CO-LEARNER CAREGIVER -   PRIMARY LANGUAGE ENGLISH    NEED -   LEARNER PREFERENCE PRIMARY DEMONSTRATION     -     -     -     -   LEARNING SPECIAL TOPICS -   ANSWERED BY self   RELATIONSHIP SELF   ASSESSMENT COMMENT -

## 2021-07-28 ENCOUNTER — OFFICE VISIT (OUTPATIENT)
Dept: CARDIOLOGY CLINIC | Age: 41
End: 2021-07-28
Payer: MEDICAID

## 2021-07-28 VITALS
DIASTOLIC BLOOD PRESSURE: 80 MMHG | HEIGHT: 65 IN | RESPIRATION RATE: 16 BRPM | BODY MASS INDEX: 28.49 KG/M2 | HEART RATE: 103 BPM | SYSTOLIC BLOOD PRESSURE: 120 MMHG | OXYGEN SATURATION: 99 % | WEIGHT: 171 LBS

## 2021-07-28 DIAGNOSIS — I42.8 NON-ISCHEMIC CARDIOMYOPATHY (HCC): ICD-10-CM

## 2021-07-28 DIAGNOSIS — R07.9 CHEST PAIN, UNSPECIFIED TYPE: Primary | ICD-10-CM

## 2021-07-28 PROCEDURE — 93000 ELECTROCARDIOGRAM COMPLETE: CPT | Performed by: INTERNAL MEDICINE

## 2021-07-28 PROCEDURE — 99204 OFFICE O/P NEW MOD 45 MIN: CPT | Performed by: INTERNAL MEDICINE

## 2021-07-28 NOTE — PROGRESS NOTES
[place] [address]:   [phone]    History of Present Illness:  Ms. Chukcie Blackwood is a 46 yo F referred by Dr. Kenyon Blanco for cardiac evaluation, history of tobacco use, family history of early coronary artery disease, on medication for thyroid. She is here due to recent chest pains. She says they have been on and off ever since Father's Day. She had a COVID shot in May and wondered if this might be related. With regard to her chest pain, it occurs daily, is nonexertional, lasting several minutes, up to 15-20 minutes at a time. She has been more easily short of breath when she exerts herself. She had extensive workup at [facility] including an echocardiogram and a coronary CT scan. Her CT of the coronaries demonstrated no significant coronary artery disease and we discussed this. Her echocardiogram did demonstrate slight cardiomyopathy, EF of 40-45%. She has been prescribed medication, but has not taken anything yet. She said when she went to the pharmacy they told her to get checked here. She is compensated on exam with clear lungs and no lower extremity edema. Soc hx. Tob quit 2015  Fam hx. Mom with CHF, ESRD and ICD. Assessment and Plan:   1. Chest pain. Noncardiac. Her coronary CT demonstrated no significant coronary artery disease and tried to reassure her. Agree there is a possibility this might be related to inflammation and she has already been prescribed Motrin and Colchicine and encouraged her to take this. 2. Cardiomyopathy. This is nonischemic. I do agree with her taking Toprol. Her blood pressure is on the low side, so she has a lot of hesitancy about taking Lisinopril and agree would just start off with Toprol. Will have her follow back in three months with a repeat echocardiogram.   3. Tobacco use. Quit in 2015. 4. Family history of early coronary artery disease. 5. Recent covid vaccine shot.        She  has a past medical history of Anemia NEC, Anxiety attack, Asthma, Avulsion fracture of lateral malleolus of right fibula, B12 deficiency, Biliary dyskinesia (2/6/2017), CHF (congestive heart failure) ([facility] Closed fracture of foot with routine healing (3/56/1970), Complicated grief, Constipation, Depression, DEEPA (generalized anxiety disorder), Gastroparesis (7/18/2014), Heart abnormalities, Hyperlipidemia, IGT (impaired glucose tolerance), Incontinence of urine, Insomnia, Kidney stone, Neuropathy, Pap smear for cervical cancer screening (11/11/2015), PTSD (post-traumatic stress disorder), [facility] ([facility] (2011), Urinary tract bacterial infections, Vaginal discharge (12/4/2013), and Vitamin D deficiency. She also has no past medical history of Family history of skin cancer, Radiation exposure, Skin cancer, Sunburn, blistering, or Tanning bed exposure. All other systems negative except as above. PE  Vitals:    07/28/21 1418   BP: 120/80   Pulse: (!) 103   Resp: 16   SpO2: 99%   Weight: 171 lb (77.6 kg)   Height: 5' 5\" (1.651 m)    Body mass index is 28.46 kg/m².    General appearance - alert, well appearing, and in no distress  Mental status - affect appropriate to mood  Eyes - sclera anicteric, moist mucous membranes  Neck - supple, no JVD  Chest - clear to auscultation, no wheezes, rales or rhonchi  Heart - normal rate, regular rhythm, normal S1, S2, no murmurs, rubs, clicks or gallops  Abdomen - soft, nontender, nondistended, no masses or organomegaly  Neurological - no focal deficit  Extremities - peripheral pulses normal, no pedal edema      Recent Labs:  Lab Results   Component Value Date/Time    Cholesterol, total 238 (H) 06/30/2021 12:30 PM    HDL Cholesterol 88 06/30/2021 12:30 PM    LDL, calculated 134 (H) 06/30/2021 12:30 PM    LDL, calculated 131 (H) 10/29/2018 04:22 PM    Triglyceride 94 06/30/2021 12:30 PM    CHOL/HDL Ratio 2.2 06/26/2015 04:45 AM     Lab Results   Component Value Date/Time    Creatinine 0.73 06/30/2021 12:30 PM     Lab Results   Component Value Date/Time BUN 6 06/30/2021 12:30 PM     Lab Results   Component Value Date/Time    Potassium 4.4 06/30/2021 12:30 PM     Lab Results   Component Value Date/Time    Hemoglobin A1c 5.4 06/30/2021 12:30 PM     Lab Results   Component Value Date/Time    HGB 12.5 06/30/2021 12:30 PM     Lab Results   Component Value Date/Time    PLATELET 603 24/60/3103 12:30 PM       Reviewed:  Past Medical History:   Diagnosis Date    Anemia NEC     Anxiety attack     Asthma     Avulsion fracture of lateral malleolus of right fibula     B12 deficiency     Biliary dyskinesia 2/6/2017    CHF (congestive heart failure) (HCC)     Closed fracture of foot with routine healing 2/30/6113    Complicated grief     Constipation     Depression     DEEPA (generalized anxiety disorder)     Gastroparesis 7/18/2014    Heart abnormalities     right atria at 30% per [facility]    Hyperlipidemia     IGT (impaired glucose tolerance)     Incontinence of urine     1 episode of bed wetting 1 mo ago    Insomnia     Kidney stone     Neuropathy     Pap smear for cervical cancer screening 11/11/2015    11/11/15 neg HPV neg    PTSD (post-traumatic stress disorder)     Stroke ([facility] 2011    TIA    Urinary tract bacterial infections     Vaginal discharge 12/4/2013    Vitamin D deficiency      Social History     Tobacco Use   Smoking Status Never Smoker   Smokeless Tobacco Never Used     Social History     Substance and Sexual Activity   Alcohol Use Never     Allergies   Allergen Reactions    Amoxicillin Rash    Ativan [Lorazepam] Other (comments)     \"Makes her sick\"    Biaxin [Clarithromycin] Itching and Other (comments)     'eyes turn an funny color and she starts itching'    Biaxin [Clarithromycin] Unknown (comments)    Flagyl [Metronidazole] Itching     Eyes change color    Flagyl [Metronidazole] Hives    Zoloft [Sertraline] Other (comments)     Excessive yawning       Current Outpatient Medications   Medication Sig    norgestimate-ethinyl estradioL (Tri-Sprintec, 28,) 0.18/0.215/0.25 mg-35 mcg (28) tab Take 1 Tablet by mouth daily.  ibuprofen (MOTRIN) 800 mg tablet Take 1 Tablet by mouth two (2) times daily (with meals).  ALPRAZolam (XANAX) 1 mg tablet 1-2 tabs po tid prn.  levoFLOXacin (LEVAQUIN) 500 mg tablet     fluticasone propionate (FLONASE) 50 mcg/actuation nasal spray     cholecalciferol (VITAMIN D3) 25 mcg (1,000 unit) cap Take 2 Capsules by mouth daily.  PARoxetine (PAXIL) 20 mg tablet Take 1 Tablet by mouth daily.  levothyroxine (LevoxyL) 75 mcg tablet Take 1 Tab by mouth Daily (before breakfast).  sodium-potassium-mag sulfate (Suprep Bowel Prep Kit) 17.5-3.13-1.6 gram solr oral solution Take 177 mL by mouth See Admin Instructions.  cyanocobalamin (VITAMIN B12) 500 mcg tablet Take 1 Tab by mouth daily.  QUEtiapine (SEROquel) 200 mg tablet Take 1 Tab by mouth two (2) times a day.  multivit-min-folic acid-biotin 765-346 mcg chew Take 1 Tab by mouth daily.  cyanocobalamin, vitamin B-12, 1,000 mcg/mL kit 1 mL by Injection route every month.  loratadine (CLARITIN) 10 mg tablet Take 1 Tab by mouth daily.  colchicine 0.6 mg tablet  (Patient not taking: Reported on 7/26/2021)    lisinopriL (PRINIVIL, ZESTRIL) 5 mg tablet Take 1 Tablet by mouth. (Patient not taking: Reported on 7/26/2021)    metoprolol succinate (TOPROL-XL) 25 mg XL tablet Take 1 Tablet by mouth. (Patient not taking: Reported on 7/26/2021)    acetaminophen-codeine (TYLENOL #3) 300-30 mg per tablet Take 1 Tablet by mouth every six (6) hours as needed. (Patient not taking: Reported on 7/2/2021)    acetaminophen-codeine (TYLENOL #3) 300-30 mg per tablet  (Patient not taking: Reported on 7/2/2021)     No current facility-administered medications for this visit.        Rylie Nanny, MD  [facility] [place]  [facility] [address] [facility] [address]  [address]

## 2021-08-09 ENCOUNTER — TELEPHONE (OUTPATIENT)
Dept: INTERNAL MEDICINE CLINIC | Age: 41
End: 2021-08-09

## 2021-08-09 NOTE — TELEPHONE ENCOUNTER
Spoke with patient or guardian, verified patient using name and . Pt calling in with complaints of chest discomfort since yesterday. Pt has been seen by cardiology already for this issues, per notes it is non- cardiac. I advised pt if she is having chest pains she should call 911 immediately. Pt refused to call 911, stated \"they never help me, that's not good for me, I dont want to go to the hospital, they are racist\"     She stated that she wanted an office visit with PCP, for chest discomfort and for her skin darkening while she went to the store and came back home. Pt stated she took pictures and will send them via [employer].. Advised I would route the call to PCP to let him know everything.

## 2021-08-24 ENCOUNTER — TELEPHONE (OUTPATIENT)
Dept: INTERNAL MEDICINE CLINIC | Age: 41
End: 2021-08-24

## 2021-08-24 NOTE — TELEPHONE ENCOUNTER
Pt states Friday she started experiencing nasal burning, pressure and runny nose. State the next day it progressed to postnasal drip and a sore throat, as well as a tight feeling in her chest. She did not feel well today and had a fever of 100.9 when she checked her temp at home today. Made an apt with [facility], while there she reports she had a fever of 104.7 and her temp was taken several times while there in the office. She was tested for covid and the rapid test came back negative. Due to such a high fever, they sent out a covid test and she expects to get the results back on Thursday. ENT office advised that she should call her PCP and inform our office about her condition. Pt was looking for suggestions on how to treat her fever, advised that she rest as much as possible and take tylenol to reduce the fever. Please advise any other suggestions. Pt also mentioned that she has been trying to contact Dr. Heber Irwin in regards to her skin condition. States there has been a change, that she is getting burnt very easily and states it is causing blisters on her skin when she is in the sun. See previous messages and photos that have been sent. Pt requests a call from Dr. Heber Irwin. She states she is anxious about her symptoms and feels she will not be able to rest or sleep, or eat. Please advise.

## 2021-08-25 ENCOUNTER — DOCUMENTATION ONLY (OUTPATIENT)
Dept: INTERNAL MEDICINE CLINIC | Age: 41
End: 2021-08-25

## 2021-08-25 NOTE — PROGRESS NOTES
Patient calls today with concerns of covid. Patient was anxious and crying and stuttering to the point where she could not be understood. Patient states she's hot and has a sore throat and a runny nose. Patient also states that she has a lost of taste and needs recommendations on what to do to relieve symptoms. Advised patient to take  ibuprofen to relieve fever and sore throat and possibly a cool shower. Patient states that she refuses to go to Urgent care or ED. Patient states that there [facility] and [facility] to wait. Patient states that she needs a stronger dose of Xanax because the current dose isn't helping her anxiety. Patient also inquires about an Rx for Haldol to help keep her calm due to her concerns of receiving a positive covid test. Patient states she has been diagnosed with Diverticulitis and cataracts and doesn't know how to deal with diagnosis. Please advise.

## 2021-08-25 NOTE — PROGRESS NOTES
If she has acute symptoms that are not controlled, then she should be seen at [facility] or ER (which she refuses to do). Otherwise, I will need to eval with a virtual appt. If she is actively ill, she should not see me in office on 9/1/21.

## 2021-08-26 NOTE — PROGRESS NOTES
Advised patient No sick clinic  availability, Dr. Destin Patel out of the office the rest of this week. Dr. Destin Patel would like her to go to an Urgent care or ER for evaluation of her fever. Patient reports fever as high as 104F. Advised while she is sick she cannot see Dr Destin Patel. Patient reports she does not want to go to urgent care because there is always a long wait. I suggested she should arrive at opening to help prevent a wait but it is common to have a wait in urgent care facilities as they are walk in and not appointment based. Patient was dissatisfied that she could not see dr Roslyn Hardy at this time. And hung up the phone.

## 2021-09-01 ENCOUNTER — OFFICE VISIT (OUTPATIENT)
Dept: INTERNAL MEDICINE CLINIC | Age: 41
End: 2021-09-01
Payer: MEDICAID

## 2021-09-01 ENCOUNTER — TELEPHONE (OUTPATIENT)
Dept: INTERNAL MEDICINE CLINIC | Age: 41
End: 2021-09-01

## 2021-09-01 VITALS
WEIGHT: 176.25 LBS | DIASTOLIC BLOOD PRESSURE: 75 MMHG | RESPIRATION RATE: 18 BRPM | BODY MASS INDEX: 29.37 KG/M2 | SYSTOLIC BLOOD PRESSURE: 120 MMHG | OXYGEN SATURATION: 95 % | TEMPERATURE: 97.7 F | HEART RATE: 92 BPM | HEIGHT: 65 IN

## 2021-09-01 DIAGNOSIS — E55.9 VITAMIN D DEFICIENCY: ICD-10-CM

## 2021-09-01 DIAGNOSIS — L81.9: Primary | ICD-10-CM

## 2021-09-01 DIAGNOSIS — R23.3 EASY BRUISING: ICD-10-CM

## 2021-09-01 DIAGNOSIS — R53.83 OTHER FATIGUE: ICD-10-CM

## 2021-09-01 DIAGNOSIS — F43.10 PTSD (POST-TRAUMATIC STRESS DISORDER): ICD-10-CM

## 2021-09-01 DIAGNOSIS — Z91.09 SENSITIVITY TO SUNLIGHT: ICD-10-CM

## 2021-09-01 DIAGNOSIS — F41.8 DEPRESSION WITH ANXIETY: ICD-10-CM

## 2021-09-01 DIAGNOSIS — E61.1 IRON DEFICIENCY: ICD-10-CM

## 2021-09-01 DIAGNOSIS — E03.9 ACQUIRED HYPOTHYROIDISM: ICD-10-CM

## 2021-09-01 DIAGNOSIS — E53.8 B12 DEFICIENCY: ICD-10-CM

## 2021-09-01 PROCEDURE — 99214 OFFICE O/P EST MOD 30 MIN: CPT | Performed by: INTERNAL MEDICINE

## 2021-09-01 RX ORDER — CYANOCOBALAMIN 1000 UG/ML
1000 INJECTION, SOLUTION INTRAMUSCULAR; SUBCUTANEOUS ONCE
Qty: 1 ML | Refills: 0
Start: 2021-09-01 | End: 2021-09-06

## 2021-09-01 NOTE — PROGRESS NOTES
HPI:  established patient  Presents for f/u skin concerns, fatigue, paresthesias    Skin \"burning\" and color changes reported. Limited sun exposures - less than 30 minutes    Leg bruising - no trauma reported. Paresthesias, fatigue, chest pain reported. Tired and breathless when standing for any extended period of time. +sweating episodes  Not anxiety related. Some variability to thyroid dosing but confirms daily dosing. Past medical, Social, and Family history reviewed    Prior to Admission medications    Medication Sig Start Date End Date Taking? Authorizing Provider   norgestimate-ethinyl estradioL (Tri-Sprintec, 28,) 0.18/0.215/0.25 mg-35 mcg (28) tab Take 1 Tablet by mouth daily. 7/26/21  Yes Rosie Harris MD   ibuprofen (MOTRIN) 800 mg tablet Take 1 Tablet by mouth two (2) times daily (with meals). 7/26/21  Yes Rosie Harris MD   ALPRAZolam [facility]) 1 mg tablet 1-2 tabs po tid prn. 7/8/21  Yes Rosie Harris MD   levoFLOXacin [facility]) 500 mg tablet  6/30/21  Yes Provider, Historical   fluticasone propionate (FLONASE) 50 mcg/actuation nasal spray  5/28/21  Yes Provider, Historical   cholecalciferol (VITAMIN D3) 25 mcg (1,000 unit) cap Take 2 Capsules by mouth daily. 6/2/21  Yes Rosie Harris MD   PARoxetine (PAXIL) 20 mg tablet Take 1 Tablet by mouth daily. 6/2/21  Yes Rosie Harris MD   levothyroxine (LevoxyL) 75 mcg tablet Take 1 Tab by mouth Daily (before breakfast). 5/10/21  Yes Rosie Harris MD   sodium-potassium-mag sulfate (Suprep Bowel Prep Kit) 17.5-3.13-1.6 gram solr oral solution Take 177 mL by mouth See Admin Instructions. 2/23/21  Yes Rosie Harris MD   cyanocobalamin (VITAMIN B12) 500 mcg tablet Take 1 Tab by mouth daily. 1/27/21  Yes Rosie Harrsi MD   QUEtiapine (SEROquel) 200 mg tablet Take 1 Tab by mouth two (2) times a day. 11/6/20  Yes Rosie Harris MD   multivit-min-folic acid-biotin 543-597 mcg chew Take 1 Tab by mouth daily. 9/28/20  Yes Celso Dandy, MD   cyanocobalamin, vitamin B-12, 1,000 mcg/mL kit 1 mL by Injection route every month. 9/15/20  Yes Celso Dandy, MD   loratadine (CLARITIN) 10 mg tablet Take 1 Tab by mouth daily. 7/6/20  Yes Celso Dandy, MD   colchicine 0.6 mg tablet  7/26/21   Provider, Historical   metoprolol succinate (TOPROL-XL) 25 mg XL tablet Take 1 Tablet by mouth. Patient not taking: Reported on 7/26/2021 7/22/21   Provider, Historical   acetaminophen-codeine (TYLENOL #3) 300-30 mg per tablet Take 1 Tablet by mouth every six (6) hours as needed. Patient not taking: Reported on 7/2/2021 5/19/21   Provider, Historical   acetaminophen-codeine (TYLENOL #3) 300-30 mg per tablet  5/19/21   Provider, Historical          ROS  Complete ROS reviewed and negative or stable except as noted in HPI. Physical Exam  Vitals and nursing note reviewed. Constitutional:       General: She is not in acute distress. HENT:      Head: Normocephalic and atraumatic. Eyes:      General: No scleral icterus. Pupils: Pupils are equal, round, and reactive to light. Neck:      Vascular: No JVD. Cardiovascular:      Rate and Rhythm: Normal rate and regular rhythm. Heart sounds: Normal heart sounds. No murmur heard. No friction rub. No gallop. Pulmonary:      Effort: Pulmonary effort is normal. No respiratory distress. Breath sounds: Normal breath sounds. No wheezing or rales. Abdominal:      General: Bowel sounds are normal. There is no distension. Palpations: Abdomen is soft. Tenderness: There is no abdominal tenderness. Musculoskeletal:         General: Normal range of motion. Cervical back: Normal range of motion and neck supple. Lymphadenopathy:      Cervical: No cervical adenopathy. Skin:     General: Skin is warm. Findings: No rash. Neurological:      Mental Status: She is alert and oriented to person, place, and time. Motor: No abnormal muscle tone. Psychiatric:      Comments: Mood and affect appropriate today. Prior labs reviewed. Assessment/Plan:  Possible COVID vaccine immune response causing myocarditis and skin sensitivity      ICD-10-CM ICD-9-CM    1. Disorder of skin color  L81.9 709.00 SED RATE (ESR)      C REACTIVE PROTEIN, QT      REFERRAL TO DERMATOLOGY   2. Sensitivity to sunlight  Z91.09 V15.09 SED RATE (ESR)      C REACTIVE PROTEIN, QT      REFERRAL TO DERMATOLOGY   3. Other fatigue  R53.83 780.79    4. B12 deficiency  E53.8 266.2 VITAMIN B12      VITAMIN B12 INJECTION      THER/PROPH/DIAG INJECTION, SUBCUT/IM   5. Vitamin D deficiency  E55.9 268.9 VITAMIN D, 25 HYDROXY   6. Depression with anxiety  F41.8 300.4    7. PTSD (post-traumatic stress disorder)  F43.10 309.81    8. Acquired hypothyroidism  E03.9 244.9 TSH 3RD GENERATION      T4, FREE   9. Easy bruising  R23.8 782.9 SED RATE (ESR)      C REACTIVE PROTEIN, QT      PROTHROMBIN TIME + INR      PTT      VON WILLEBRAND FACTOR (VWF) SCREENING PROFILE (ESOTERIX)   10. Iron deficiency  E61.1 280.9 CBC WITH AUTOMATED DIFF      FERRITIN     Follow-up and Dispositions    · Return in about 2 months (around 11/1/2021), or if symptoms worsen or fail to improve. results and schedule of future studies reviewed with patient  reviewed diet, exercise and weight  reviewed medications and side effects in detail    Ref Derm  Check labs  MVI daily and/or nutritional supplements ie Boost, Ensure  Encouraged beta blocker  B12 shot after labs drawn today      An electronic signature was used to authenticate this note.   -- Brooke Patel MD

## 2021-09-01 NOTE — PROGRESS NOTES
RM: 15    Chief Complaint   Patient presents with    Thyroid Problem     2 month f/u patient presents with concerns of skin turning black while in the sun and present with concerns of previous symptoms of covid.  Tingling     patient presents with concerns of tingling in hand and feet and states that she has been experiencing fatigue. patient also states that she has beens exepriencing weakness and chest pain and poor appetite.  tingling more present on right hand    Labs     patient requests to have her Vit D levels checked    Skin Problem     patient presents with concerns of skin burning burning while heading to the store and returning back home and unknown bruising      Visit Vitals  /75 (BP 1 Location: Left upper arm, BP Patient Position: Sitting, BP Cuff Size: Adult)   Pulse 92   Temp 97.7 °F (36.5 °C) (Axillary)   Resp 18   Ht 5' 5\" (1.651 m)   Wt 176 lb 4 oz (79.9 kg)   SpO2 95%   BMI 29.33 kg/m²     Recent Travel Screening and Travel History documentation     Travel Screening      No screening recorded since 08/31/21 0000      Travel History   Travel since 08/01/21     No documented travel since 08/01/21                   3 most recent PHQ Screens 7/26/2021   PHQ Not Done -   Little interest or pleasure in doing things Several days   Feeling down, depressed, irritable, or hopeless Nearly every day   Total Score PHQ 2 4   Trouble falling or staying asleep, or sleeping too much Several days   Feeling tired or having little energy Several days   Poor appetite, weight loss, or overeating Several days   Feeling bad about yourself - or that you are a failure or have let yourself or your family down Several days   Trouble concentrating on things such as school, work, reading, or watching TV Several days   Moving or speaking so slowly that other people could have noticed; or the opposite being so fidgety that others notice Several days   Thoughts of being better off dead, or hurting yourself in some way Several days   PHQ 9 Score 11   How difficult have these problems made it for you to do your work, take care of your home and get along with others Very difficult            1. Have you been to the ER, urgent care clinic since your last visit? Hospitalized since your last visit? No    2. Have you seen or consulted any other health care providers outside of the [address] [place] [address] since your last visit? Include any pap smears or colon screening.  No     Health Maintenance Due   Topic Date Due    DTaP/Tdap/Td series (1 - Tdap) Never done    PAP AKA CERVICAL CYTOLOGY  11/11/2018    Flu Vaccine (1) 09/01/2021        Learning Assessment 9/29/2020   PRIMARY LEARNER Patient   HIGHEST LEVEL OF EDUCATION - PRIMARY LEARNER  -   BARRIERS PRIMARY LEARNER -   CO-LEARNER CAREGIVER -   PRIMARY LANGUAGE ENGLISH    NEED -   LEARNER PREFERENCE PRIMARY DEMONSTRATION     -     -     -     -   LEARNING SPECIAL TOPICS -   ANSWERED BY self   RELATIONSHIP SELF   ASSESSMENT COMMENT -

## 2021-09-02 ENCOUNTER — TELEPHONE (OUTPATIENT)
Dept: INTERNAL MEDICINE CLINIC | Age: 41
End: 2021-09-02

## 2021-09-02 NOTE — TELEPHONE ENCOUNTER
----- Message from [place] [employer] sent at 9/2/2021  2:23 PM EDT -----  Regarding: Dr. Lisa Aviles  Patient return call    Caller's first and last name and relationship (if not the patient):      Best contact number(s): [phone]      Whose call is being returned:nurse      Details to clarify the request:Pt returned the nurse call.       Yibailin

## 2021-09-02 NOTE — TELEPHONE ENCOUNTER
Called patient to inform that we are out of vitamin B injections. Did not get an answer. Left message advising patient to return call to office.

## 2021-09-16 ENCOUNTER — TELEPHONE (OUTPATIENT)
Dept: INTERNAL MEDICINE CLINIC | Age: 41
End: 2021-09-16

## 2021-09-16 NOTE — TELEPHONE ENCOUNTER
Pt checking to see if the office has received B-12 injections. Last time pt was in the office, she was unable to receive B-12 injection due to the office not having any, pt checking to see when she can schedule an appt for B12 injection.   Please call pt - ph# [phone]

## 2021-09-23 LAB
APTT PPP: 24.4 SEC
APTT PPP: 26 SEC (ref 24–33)
FACT VIII ACT/NOR PPP: 169 %
VWF AG ACT/NOR PPP IA: 180 %
VWF:RCO ACT/NOR PPP PL AGG: 164 %

## 2021-09-23 NOTE — PROGRESS NOTES
Final bleeding test is normal for bleeding risk. Elevated factor VIII activity can be raised due to stress, but only low levels result in bleeding and your level is high, not low. So, no source of bleeding problems to account for the bruising noted.

## 2021-09-28 ENCOUNTER — TELEPHONE (OUTPATIENT)
Dept: INTERNAL MEDICINE CLINIC | Age: 41
End: 2021-09-28

## 2021-09-28 NOTE — TELEPHONE ENCOUNTER
Pt came into the office stating that she is experiencing really bad Panic Attack's she is scheduled to have a VV with  on 9/30/21 @ 11:45 am.Pt also wanted to let Dana Stephanie know that she applied for Social Security and that he would be receiving paperwork.

## 2021-09-29 ENCOUNTER — APPOINTMENT (OUTPATIENT)
Dept: INTERNAL MEDICINE CLINIC | Age: 41
End: 2021-09-29

## 2021-09-29 ENCOUNTER — CLINICAL SUPPORT (OUTPATIENT)
Dept: INTERNAL MEDICINE CLINIC | Age: 41
End: 2021-09-29

## 2021-09-29 DIAGNOSIS — E53.8 B12 DEFICIENCY: Primary | ICD-10-CM

## 2021-09-29 DIAGNOSIS — E03.9 ACQUIRED HYPOTHYROIDISM: ICD-10-CM

## 2021-09-29 DIAGNOSIS — L81.9: ICD-10-CM

## 2021-09-29 DIAGNOSIS — E61.1 IRON DEFICIENCY: ICD-10-CM

## 2021-09-29 DIAGNOSIS — E55.9 VITAMIN D DEFICIENCY: ICD-10-CM

## 2021-09-29 DIAGNOSIS — Z91.09 SENSITIVITY TO SUNLIGHT: ICD-10-CM

## 2021-09-29 DIAGNOSIS — R23.3 EASY BRUISING: ICD-10-CM

## 2021-09-29 DIAGNOSIS — E53.8 B12 DEFICIENCY: ICD-10-CM

## 2021-09-29 PROCEDURE — 96372 THER/PROPH/DIAG INJ SC/IM: CPT | Performed by: INTERNAL MEDICINE

## 2021-09-29 RX ORDER — CYANOCOBALAMIN 1000 UG/ML
1000 INJECTION, SOLUTION INTRAMUSCULAR; SUBCUTANEOUS ONCE
Qty: 1 ML | Refills: 0
Start: 2021-09-29 | End: 2021-09-29

## 2021-09-29 NOTE — PROGRESS NOTES
After obtaining consent, and per orders of Dr. Ashkan Samuel , injection of B12 1000mcg/ml given by Karen Murray LPN. Patient instructed to remain in clinic for 20 minutes afterwards, and to report any adverse reaction to me immediately.   Patient tolerated well

## 2021-09-30 ENCOUNTER — VIRTUAL VISIT (OUTPATIENT)
Dept: INTERNAL MEDICINE CLINIC | Age: 41
End: 2021-09-30
Payer: MEDICAID

## 2021-09-30 DIAGNOSIS — R53.83 OTHER FATIGUE: ICD-10-CM

## 2021-09-30 DIAGNOSIS — F41.8 DEPRESSION WITH ANXIETY: Primary | ICD-10-CM

## 2021-09-30 DIAGNOSIS — E55.9 VITAMIN D DEFICIENCY: ICD-10-CM

## 2021-09-30 DIAGNOSIS — F41.0 PANIC DISORDER WITHOUT AGORAPHOBIA: ICD-10-CM

## 2021-09-30 DIAGNOSIS — E03.9 ACQUIRED HYPOTHYROIDISM: ICD-10-CM

## 2021-09-30 DIAGNOSIS — F43.10 PTSD (POST-TRAUMATIC STRESS DISORDER): ICD-10-CM

## 2021-09-30 LAB
25(OH)D3+25(OH)D2 SERPL-MCNC: 27.3 NG/ML (ref 30–100)
BASOPHILS # BLD AUTO: 0.1 X10E3/UL (ref 0–0.2)
BASOPHILS NFR BLD AUTO: 1 %
CRP SERPL-MCNC: 5 MG/L (ref 0–10)
EOSINOPHIL # BLD AUTO: 0.1 X10E3/UL (ref 0–0.4)
EOSINOPHIL NFR BLD AUTO: 2 %
ERYTHROCYTE [DISTWIDTH] IN BLOOD BY AUTOMATED COUNT: 13.3 % (ref 11.7–15.4)
ERYTHROCYTE [SEDIMENTATION RATE] IN BLOOD BY WESTERGREN METHOD: 56 MM/HR (ref 0–32)
FERRITIN SERPL-MCNC: 50 NG/ML (ref 15–150)
HCT VFR BLD AUTO: 38.3 % (ref 34–46.6)
HGB BLD-MCNC: 12.7 G/DL (ref 11.1–15.9)
IMM GRANULOCYTES # BLD AUTO: 0 X10E3/UL (ref 0–0.1)
IMM GRANULOCYTES NFR BLD AUTO: 0 %
LYMPHOCYTES # BLD AUTO: 2.4 X10E3/UL (ref 0.7–3.1)
LYMPHOCYTES NFR BLD AUTO: 36 %
MCH RBC QN AUTO: 28.7 PG (ref 26.6–33)
MCHC RBC AUTO-ENTMCNC: 33.2 G/DL (ref 31.5–35.7)
MCV RBC AUTO: 87 FL (ref 79–97)
MONOCYTES # BLD AUTO: 0.6 X10E3/UL (ref 0.1–0.9)
MONOCYTES NFR BLD AUTO: 9 %
NEUTROPHILS # BLD AUTO: 3.5 X10E3/UL (ref 1.4–7)
NEUTROPHILS NFR BLD AUTO: 52 %
PLATELET # BLD AUTO: 356 X10E3/UL (ref 150–450)
RBC # BLD AUTO: 4.42 X10E6/UL (ref 3.77–5.28)
T4 FREE SERPL-MCNC: 1.2 NG/DL (ref 0.82–1.77)
TSH SERPL DL<=0.005 MIU/L-ACNC: 1.72 UIU/ML (ref 0.45–4.5)
VIT B12 SERPL-MCNC: >2000 PG/ML (ref 232–1245)
WBC # BLD AUTO: 6.7 X10E3/UL (ref 3.4–10.8)

## 2021-09-30 PROCEDURE — 99214 OFFICE O/P EST MOD 30 MIN: CPT | Performed by: INTERNAL MEDICINE

## 2021-09-30 RX ORDER — QUETIAPINE FUMARATE 200 MG/1
200 TABLET, FILM COATED ORAL 2 TIMES DAILY
Qty: 60 TABLET | Refills: 5 | Status: SHIPPED | OUTPATIENT
Start: 2021-09-30 | End: 2022-07-30

## 2021-09-30 RX ORDER — ERGOCALCIFEROL 1.25 MG/1
50000 CAPSULE ORAL
Qty: 3 CAPSULE | Refills: 4 | Status: SHIPPED | OUTPATIENT
Start: 2021-09-30 | End: 2022-03-28 | Stop reason: SDUPTHER

## 2021-09-30 RX ORDER — PAROXETINE HYDROCHLORIDE 20 MG/1
20 TABLET, FILM COATED ORAL DAILY
Qty: 30 TABLET | Refills: 5 | Status: SHIPPED | OUTPATIENT
Start: 2021-09-30

## 2021-09-30 RX ORDER — GLUCOSAMINE SULFATE 1500 MG
2000 POWDER IN PACKET (EA) ORAL DAILY
Qty: 60 CAPSULE | Refills: 11 | Status: CANCELLED | OUTPATIENT
Start: 2021-09-30

## 2021-09-30 NOTE — PROGRESS NOTES
Diamond Pisano (: 1980) is a 45 y.o. female, established patient, here for evaluation of the following chief complaint(s)--see below:    Diamond Pisano is a 45 y.o. female who was seen by synchronous (real-time) audio-video technology on 2021. Consent: Diamond Pisano, who was seen by synchronous (real-time) audio-video technology, and/or her healthcare decision maker, is aware that this patient-initiated, Telehealth encounter on 2021 is a billable service, with coverage as determined by her insurance carrier. She is aware that she may receive a bill and has provided verbal consent to proceed: Yes. I was in the office while conducting this encounter. Assessment & Plan:   Diagnoses and all orders for this visit:      ICD-10-CM ICD-9-CM    1. Depression with anxiety  F41.8 300.4    2. Vitamin D deficiency  E55.9 268.9    3. Other fatigue  R53.83 780.79    4. PTSD (post-traumatic stress disorder)  F43.10 309.81    5. Acquired hypothyroidism  E03.9 244.9    6. Panic disorder without agoraphobia  F41.0 300.01 QUEtiapine (SEROquel) 200 mg tablet     Follow-up and Dispositions    · Return in about 2 months (around 2021), or if symptoms worsen or fail to improve, for mood. results and schedule of future studies reviewed with patient  reviewed diet, exercise and weight   reviewed medications and side effects in detail    needs daily dosing of SSRI - encourage her to set a smartphone reminder   Continue othercurrent medications     AVS:  [x]  Available to patient in MyChart after visit signed. []  Mailed to patient after visit. []  Not sent to patient after visit.       Future Appointments   Date Time Provider Rafat Petit   10/25/2021  1:00 PM DAV MORALES AMB   10/25/2021  1:40 PM MD MICHAEL Bonner BS AMB          Subjective:   Diamond Pisano was seen for:  Chief Complaint   Patient presents with   Jose D Valencia     continues to have anxiety attacks. pt is sobbing on the phone. states she has no control over her life. cannot sleep. cannot think straight. feeling overwhelmed. lost job and car. Notes:  Depression and anxiety  Difficulty dealing with social stressors  Frustrated with circumstances  Frequent crying. Poor sleep. Feeling overwhelmed. No SI, no HI    Taking seroquel and xanax prn  Pt acknowledges frequent missed dosing. Not taking paxil     Discussed disability   Agree that she is disabled. Nursing screenings reviewed by provider at visit. Past medical, Social, and Family history reviewed  Medications reviewed and updated. Allergies   Allergen Reactions    Amoxicillin Rash    Ativan [Lorazepam] Other (comments)     \"Makes her sick\"    Biaxin [Clarithromycin] Itching and Other (comments)     'eyes turn an funny color and she starts itching'    Biaxin [Clarithromycin] Unknown (comments)    Flagyl [Metronidazole] Itching     Eyes change color    Flagyl [Metronidazole] Hives    Zoloft [Sertraline] Other (comments)     Excessive yawning       Prior to Admission medications    Medication Sig Start Date End Date Taking? Authorizing Provider   metoprolol succinate (TOPROL-XL) 25 mg XL tablet Take 1 Tablet by mouth. 7/22/21  Yes Provider, Historical   norgestimate-ethinyl estradioL (Tri-Sprintec, 28,) 0.18/0.215/0.25 mg-35 mcg (28) tab Take 1 Tablet by mouth daily. 7/26/21  Yes Iris Vang MD   ibuprofen (MOTRIN) 800 mg tablet Take 1 Tablet by mouth two (2) times daily (with meals). 7/26/21  Yes Iris Vang MD   ALPRAZolam [facility]) 1 mg tablet 1-2 tabs po tid prn. 7/8/21  Yes Iris Vang MD   fluticasone propionate [facility]) 50 mcg/actuation nasal spray  5/28/21  Yes Provider, Historical   cholecalciferol (VITAMIN D3) 25 mcg (1,000 unit) cap Take 2 Capsules by mouth daily. 6/2/21  Yes Iris Vang MD   levothyroxine (LevoxyL) 75 mcg tablet Take 1 Tab by mouth Daily (before breakfast).  5/10/21 Yes Marianne Arce MD   cyanocobalamin (VITAMIN B12) 500 mcg tablet Take 1 Tab by mouth daily. 1/27/21  Yes Marianne Arce MD   QUEtiapine (SEROquel) 200 mg tablet Take 1 Tab by mouth two (2) times a day. 11/6/20  Yes Marianne Arce MD   cyanocobalamin, vitamin B-12, 1,000 mcg/mL kit 1 mL by Injection route every month. 9/15/20  Yes Marianne Arce MD   loratadine (CLARITIN) 10 mg tablet Take 1 Tab by mouth daily. 7/6/20  Yes Marianne Arce MD   cyanocobalamin (Vitamin B-12) 1,000 mcg/mL injection 1 mL by IntraMUSCular route once for 1 dose. 9/29/21 9/29/21  Marianne Arce MD   levoFLOXacin [facility]) 500 mg tablet  6/30/21   Provider, Historical   PARoxetine (PAXIL) 20 mg tablet Take 1 Tablet by mouth daily. 6/2/21   Marianne Arce MD   sodium-potassium-mag sulfate (Suprep Bowel Prep Kit) 17.5-3.13-1.6 gram solr oral solution Take 177 mL by mouth See Admin Instructions. Patient not taking: Reported on 9/30/2021 2/23/21   Marianne Arce MD   multivit-min-folic acid-biotin 964-506 mcg chew Take 1 Tab by mouth daily. 9/28/20   Marianne Arce MD         ROS     A complete ROS was performed and negative except as noted in HPI     PHYSICAL EXAMINATION:    Vital Signs: There were no vitals taken for this visit. No flowsheet data found.       Constitutional: [x] Appears well-developed and well-nourished [x] No apparent distress      Mental status: [x] Alert and awake  [x] Oriented [x] Able to follow commands       Eyes:   EOM    [x]  Normal      Sclera  [x]  Normal              Discharge [x]  None visible       HENT: [x] Normocephalic, atraumatic    [x] Mouth/Throat: Mucous membranes are moist    External Ears [x] Normal      Neck: [x] No visualized mass     Pulmonary/Chest: [x] Respiratory effort normal   [x] No visualized signs of difficulty breathing or respiratory distress    Musculoskeletal:  [x] Normal range of motion of neck    Neurological:        [x] No Facial Asymmetry (Cranial nerve 7 motor function) (limited exam due to video visit)          [x] No gaze palsy     Skin:        [x] No significant exanthematous lesions or discoloration noted on facial skin             Psychiatric:       [x] Normal Affect       Other pertinent observable physical exam findings:  None. We discussed the expected course, resolution and complications of the diagnosis(es) in detail. Medication risks, benefits, costs, interactions, and alternatives were discussed as indicated. I advised her to contact the office if her condition worsens, changes or fails to improve as anticipated. She expressed understanding with the diagnosis(es) and plan. Shay Bang is a 45 y.o. female who was evaluated by a video visit encounter for concerns as above. Shay Bang is being evaluated by a Virtual Visit (video visit) encounter to address concerns as mentioned above. A caregiver was present when appropriate. Due to this being a TeleHealth encounter (During [facility] public health emergency), evaluation of the following organ systems was limited: Vitals/Constitutional/EENT/Resp/CV/GI//MS/Neuro/Skin/Heme-Lymph-Imm. Pursuant to the emergency declaration under the [address] [place] [address] authority and the [employer] and [employer] Act, this Virtual Visit was conducted with patient's (and/or legal guardian's) consent, to reduce the patient's risk of exposure to COVID-19 and provide necessary medical care. The patient (and/or legal guardian) has also been advised to contact this office for worsening conditions or problems, and seek emergency medical treatment and/or call 911 if deemed necessary. Patient identification was verified at the start of the visit: YES. Services were provided through a video synchronous discussion virtually to substitute for in-person clinic visit.  Patient was located at their individual home (or other location as per patient preference). Provider was located in medical office. An electronic signature was used to authenticate this note.   -- Bryson Umanzor MD

## 2021-09-30 NOTE — PROGRESS NOTES
VV    Chief Complaint   Patient presents with    Anxiety     continues to have anxiety attacks. pt is sobbing on the phone. states she has no control over her life. cannot sleep. cannot think straight. feeling overwhelmed. lost job and car. There were no vitals taken for this visit. 3 most recent PHQ Screens 7/26/2021   PHQ Not Done -   Little interest or pleasure in doing things Several days   Feeling down, depressed, irritable, or hopeless Nearly every day   Total Score PHQ 2 4   Trouble falling or staying asleep, or sleeping too much Several days   Feeling tired or having little energy Several days   Poor appetite, weight loss, or overeating Several days   Feeling bad about yourself - or that you are a failure or have let yourself or your family down Several days   Trouble concentrating on things such as school, work, reading, or watching TV Several days   Moving or speaking so slowly that other people could have noticed; or the opposite being so fidgety that others notice Several days   Thoughts of being better off dead, or hurting yourself in some way Several days   PHQ 9 Score 11   How difficult have these problems made it for you to do your work, take care of your home and get along with others Very difficult         1. Have you been to the ER, urgent care clinic since your last visit? Hospitalized since your last visit? No    2. Have you seen or consulted any other health care providers outside of the [address] [place] [address] since your last visit? Include any pap smears or colon screening.  Dr. Zaida Max saw once for therapy , Dr miranda (has not seen yet, seeing tomorrow at [place] [address])    Health Maintenance Due   Topic Date Due    DTaP/Tdap/Td series (1 - Tdap) Never done    Cervical cancer screen  11/11/2020    Flu Vaccine (1) 09/01/2021       Learning Assessment 9/29/2020   PRIMARY LEARNER Patient   HIGHEST LEVEL OF EDUCATION - PRIMARY LEARNER  -   [facility] PRIMARY LEARNER -   [address]  CAREGIVER - PRIMARY LANGUAGE ENGLISH    NEED -   LEARNER PREFERENCE PRIMARY DEMONSTRATION     -     -     -     -   LEARNING SPECIAL TOPICS -   ANSWERED BY self   RELATIONSHIP SELF   ASSESSMENT COMMENT -       AVS  education, follow up, and recommendations provided and addressed with patient.

## 2021-10-07 ENCOUNTER — TELEPHONE (OUTPATIENT)
Dept: INTERNAL MEDICINE CLINIC | Age: 41
End: 2021-10-07

## 2021-10-07 NOTE — TELEPHONE ENCOUNTER
Pt came into the office concerned about one of her level's that came back in the higher range it was her ESR Level. Pt went and googled it and it showed that it could be associated with Lupus. Pt would like a callback from at least the nurse since Serenity Gastelum is out of the office to discuss it the pt can be reached at # [phone].

## 2021-10-08 ENCOUNTER — TELEPHONE (OUTPATIENT)
Dept: INTERNAL MEDICINE CLINIC | Age: 41
End: 2021-10-08

## 2021-10-08 NOTE — TELEPHONE ENCOUNTER
----- Message from Juliano Padilla sent at 10/8/2021  1:07 PM EDT -----  Regarding: \telephone  Contact: [phone]  General Message/Vendor Calls    Caller's first and last name:self       Reason for call:just will like to talk about blood work (abnormal)       Callback required yes/no and why:y      Best contact number(s):[phone]      Details to clarify the request:n\a      Juliano Padilla

## 2021-10-08 NOTE — TELEPHONE ENCOUNTER
Please notify pt . ESR is a non-specific marker for inflammation and does not diagnose lupus. Please reassure pt that we have checked for lupus at least 5 times and all were normal - JAYDA test.    Her ESR elevation is likely related to our concern that she has had some adverse reactions related to her COVID vaccine or your recent illness with fevers is even more likely to cause an elevated ESR.

## 2021-10-08 NOTE — TELEPHONE ENCOUNTER
Pt was advised of this information via a [facility] message.  Advised to call our office with any further questions

## 2021-10-14 DIAGNOSIS — F41.0 PANIC ATTACKS: ICD-10-CM

## 2021-10-14 DIAGNOSIS — F41.0 PANIC DISORDER: ICD-10-CM

## 2021-10-14 DIAGNOSIS — F41.1 GAD (GENERALIZED ANXIETY DISORDER): ICD-10-CM

## 2021-10-14 RX ORDER — ALPRAZOLAM 1 MG/1
TABLET ORAL
Qty: 150 TABLET | Refills: 2 | Status: SHIPPED | OUTPATIENT
Start: 2021-10-14 | End: 2022-01-20

## 2021-11-03 ENCOUNTER — TELEPHONE (OUTPATIENT)
Dept: INTERNAL MEDICINE CLINIC | Age: 41
End: 2021-11-03

## 2021-11-03 ENCOUNTER — NURSE TRIAGE (OUTPATIENT)
Dept: OTHER | Facility: CLINIC | Age: 41
End: 2021-11-03

## 2021-11-03 NOTE — TELEPHONE ENCOUNTER
----- Message from [place] sent at 11/3/2021  8:15 AM EDT -----  Subject: Message to Provider    QUESTIONS  Information for Provider? Patient is requesting call back for neck pain   ---------------------------------------------------------------------------  --------------  CALL BACK INFO  What is the best way for the office to contact you? OK to leave message on   voicemail  Preferred Call Back Phone Number? [phone]  ---------------------------------------------------------------------------  --------------  SCRIPT ANSWERS  Relationship to Patient?  Self

## 2021-11-03 NOTE — TELEPHONE ENCOUNTER
Received call from [address] at [facility] with Red Flag Complaint. Brief description of triage:  Patient calling for concerns for left side neck pain over the past two weeks. Has noted some swelling under her left jaw over the past few days as well. Triage indicates for patient to see HCP within 4 hours. If no appointments available, go to [facility] for evaluation. Care advice provided, patient verbalizes understanding; denies any other questions or concerns; instructed to call back for any new or worsening symptoms. Writer provided warm transfer to Matt montague at [facility] for appointment scheduling. Attention Provider: Thank you for allowing me to participate in the care of your patient. The patient was connected to triage in response to information provided to the [facility]. Please do not respond through this encounter as the response is not directed to a shared pool. Reason for Disposition   [1] SEVERE neck pain (e.g., excruciating, unable to do any normal activities) AND [2] not improved after 2 hours of pain medicine    Answer Assessment - Initial Assessment Questions  1. ONSET: \"When did the pain begin? \"       Two weeks ago    2. LOCATION: \"Where does it hurt? \"       Left side of her neck    3. PATTERN \"Does the pain come and go, or has it been constant since it started? \"       Comes and goes    4. SEVERITY: \"How bad is the pain? \"  (Scale 1-10; or mild, moderate, severe)    - MILD (1-3): doesn't interfere with normal activities     - MODERATE (4-7): interferes with normal activities or awakens from sleep     - SEVERE (8-10):  excruciating pain, unable to do any normal activities       Yes, sharp pain, 9/10    5. RADIATION: \"Does the pain go anywhere else, shoot into your arms? \"      Starts at top of neck and radiates down, sometimes into left ear    6. CORD SYMPTOMS: \"Any weakness or numbness of the arms or legs? \"      No    7. CAUSE: \"What do you think is causing the neck pain? \"      Unsure    8. NECK OVERUSE: Genevia Milling recent activities that involved turning or twisting the neck? \"      No    9. OTHER SYMPTOMS: \"Do you have any other symptoms? \" (e.g., headache, fever, chest pain, difficulty breathing, neck swelling)      Headaches over the past week, no fever, swelling to left side of neck under jaw    10. PREGNANCY: \"Is there any chance you are pregnant? \" \"When was your last menstrual period? \"        no    Protocols used: NECK PAIN OR STIFFNESS-ADULT-AH

## 2021-11-29 ENCOUNTER — PATIENT MESSAGE (OUTPATIENT)
Dept: INTERNAL MEDICINE CLINIC | Age: 41
End: 2021-11-29

## 2021-11-29 ENCOUNTER — TELEPHONE (OUTPATIENT)
Dept: CARDIOLOGY CLINIC | Age: 41
End: 2021-11-29

## 2021-11-29 NOTE — TELEPHONE ENCOUNTER
Patient calling to ask if she can receive the covid booster, patient has an appointment scheduled on 11/29/21 at 2:30pm to receive the booster, please advise            [phone]

## 2021-11-29 NOTE — TELEPHONE ENCOUNTER
Returned call to patient. Two patient indentifiers verified. Pt was informed that we recommend everyone get the vaccine. Pt concerned due to low EF on Echo. Pt was informed that it is her choice if she gets the booster or not but we do recommend the booster. Pt scheduled for follow up with echo since no showed follow up appointment on 10/25/2021.       Future Appointments   Date Time Provider Rafat Petit   12/16/2021  8:00 AM DAV MORALES   12/16/2021  8:40 AM MD MICHAEL Young AMB

## 2021-12-01 ENCOUNTER — TELEPHONE (OUTPATIENT)
Dept: INTERNAL MEDICINE CLINIC | Age: 41
End: 2021-12-01

## 2021-12-01 NOTE — TELEPHONE ENCOUNTER
Pt called, pt crying and upset. Pt is concerned about her \"drastic\" weight loss. Pt states she does have food and has been eating; but does not know why she is losing weight and is very concerned about her weight loss. Pt scheduled for a VV 12/6/21 w/ Dr Mima Jara. Pt states she is very stressed, pt states she has reached out to her psych provider, Flaquito Grullon - pt waiting to hear back from her.     Pt also states she is having tingling on the Right side of her head x 2-3 days  Please call pt - ph# [phone]

## 2021-12-02 ENCOUNTER — NURSE TRIAGE (OUTPATIENT)
Dept: OTHER | Facility: CLINIC | Age: 41
End: 2021-12-02

## 2021-12-02 NOTE — TELEPHONE ENCOUNTER
Received call from Raúl Camacho at [facility] with Red Flag Complaint. Brief description of triage: Patient called stating has new complaint of thick, yellow-green sputum starting this morning. Triage indicates for patient to continue homecare. Care advice provided, patient verbalizes understanding; denies any other questions or concerns; instructed to call back for any new or worsening symptoms. Attention Provider: Thank you for allowing me to participate in the care of your patient. The patient was connected to triage in response to information provided to the [facility]. Please do not respond through this encounter as the response is not directed to a shared pool. Reason for Disposition   Cough with no complications    Answer Assessment - Initial Assessment Questions  1. ONSET: \"When did the cough begin? \"       This morning    2. SEVERITY: \"How bad is the cough today? \"       Moderate - states hasn't stopped since starting this morning. Was unable to hold conversation without issue. 3. RESPIRATORY DISTRESS: \"Describe your breathing. \"       Normal    4. FEVER: \"Do you have a fever? \" If so, ask: \"What is your temperature, how was it measured, and when did it start? \"      No - not checked    5. HEMOPTYSIS: \"Are you coughing up any blood? \" If so ask: \"How much? \" (flecks, streaks, tablespoons, etc.)      No    6. TREATMENT: \"What have you done so far to treat the cough? \" (e.g., meds, fluids, humidifier)      Nothing    7. CARDIAC HISTORY: \"Do you have any history of heart disease? 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 LUNG HISTORY: \"Do you have any history of lung disease? \"  (e.g., pulmonary embolus, asthma, emphysema)      No    9. PE RISK FACTORS: \"Do you have a history of blood clots? \" (or: recent major surgery, recent prolonged travel, bedridden)      DVT    10.  OTHER SYMPTOMS: \"Do you have any other symptoms? (e.g., runny nose, wheezing, chest pain)        Chest pain with coughing only    11. PREGNANCY: \"Is there any chance you are pregnant? \" \"When was your last menstrual period? \"        No    12. TRAVEL: \"Have you traveled out of the country in the last month? \" (e.g., travel history, exposures)        No    Protocols used: IXJWA-IRSON-HO

## 2021-12-06 ENCOUNTER — TELEPHONE (OUTPATIENT)
Dept: INTERNAL MEDICINE CLINIC | Age: 41
End: 2021-12-06

## 2021-12-06 ENCOUNTER — VIRTUAL VISIT (OUTPATIENT)
Dept: INTERNAL MEDICINE CLINIC | Age: 41
End: 2021-12-06

## 2021-12-06 ENCOUNTER — ANCILLARY PROCEDURE (OUTPATIENT)
Dept: CARDIOLOGY CLINIC | Age: 41
End: 2021-12-06
Payer: MEDICAID

## 2021-12-06 ENCOUNTER — TELEPHONE (OUTPATIENT)
Dept: CARDIOLOGY CLINIC | Age: 41
End: 2021-12-06

## 2021-12-06 VITALS
SYSTOLIC BLOOD PRESSURE: 120 MMHG | DIASTOLIC BLOOD PRESSURE: 78 MMHG | HEIGHT: 65 IN | BODY MASS INDEX: 29.32 KG/M2 | WEIGHT: 176 LBS

## 2021-12-06 DIAGNOSIS — R63.4 WEIGHT LOSS: Primary | ICD-10-CM

## 2021-12-06 DIAGNOSIS — R70.0 ELEVATED SED RATE: ICD-10-CM

## 2021-12-06 DIAGNOSIS — F41.1 GAD (GENERALIZED ANXIETY DISORDER): ICD-10-CM

## 2021-12-06 DIAGNOSIS — R07.9 CHEST PAIN, UNSPECIFIED TYPE: ICD-10-CM

## 2021-12-06 DIAGNOSIS — E03.9 ACQUIRED HYPOTHYROIDISM: ICD-10-CM

## 2021-12-06 DIAGNOSIS — I40.1 IDIOPATHIC MYOCARDITIS, UNSPECIFIED CHRONICITY: ICD-10-CM

## 2021-12-06 DIAGNOSIS — F41.0 PANIC DISORDER: ICD-10-CM

## 2021-12-06 DIAGNOSIS — E55.9 VITAMIN D DEFICIENCY: ICD-10-CM

## 2021-12-06 DIAGNOSIS — F43.10 PTSD (POST-TRAUMATIC STRESS DISORDER): ICD-10-CM

## 2021-12-06 DIAGNOSIS — I42.8 NON-ISCHEMIC CARDIOMYOPATHY (HCC): ICD-10-CM

## 2021-12-06 DIAGNOSIS — R23.3 EASY BRUISING: ICD-10-CM

## 2021-12-06 PROCEDURE — 99215 OFFICE O/P EST HI 40 MIN: CPT | Performed by: INTERNAL MEDICINE

## 2021-12-06 PROCEDURE — 93306 TTE W/DOPPLER COMPLETE: CPT | Performed by: INTERNAL MEDICINE

## 2021-12-06 RX ORDER — COLCHICINE 0.6 MG/1
0.6 TABLET ORAL 2 TIMES DAILY
Qty: 30 TABLET | Refills: 1 | Status: SHIPPED | OUTPATIENT
Start: 2021-12-06

## 2021-12-06 NOTE — TELEPHONE ENCOUNTER
Patient just had echo done on 12/06/21 and called stating she is having chest pains off and on and would like the results, patient was informed that results would be ready 24-48 hours after test, patient was reminded that it would take this long and that she will receive a phone call with the results, please advise      [phone]

## 2021-12-06 NOTE — PROGRESS NOTES
VV    Chief Complaint   Patient presents with    Weight Management       There were no vitals taken for this visit. 3 most recent PHQ Screens 7/26/2021   PHQ Not Done -   Little interest or pleasure in doing things Several days   Feeling down, depressed, irritable, or hopeless Nearly every day   Total Score PHQ 2 4   Trouble falling or staying asleep, or sleeping too much Several days   Feeling tired or having little energy Several days   Poor appetite, weight loss, or overeating Several days   Feeling bad about yourself - or that you are a failure or have let yourself or your family down Several days   Trouble concentrating on things such as school, work, reading, or watching TV Several days   Moving or speaking so slowly that other people could have noticed; or the opposite being so fidgety that others notice Several days   Thoughts of being better off dead, or hurting yourself in some way Several days   PHQ 9 Score 11   How difficult have these problems made it for you to do your work, take care of your home and get along with others Very difficult         1. Have you been to the ER, urgent care clinic since your last visit? Hospitalized since your last visit? No    2. Have you seen or consulted any other health care providers outside of the [address] [place] [address] since your last visit? Include any pap smears or colon screening. Dr. Neeta Mendosa for anxiety.      Health Maintenance Due   Topic Date Due    DTaP/Tdap/Td series (1 - Tdap) Never done    Cervical cancer screen  11/11/2020    Flu Vaccine (1) 09/01/2021    COVID-19 Vaccine (3 - Booster for Moderna series) 11/19/2021       Learning Assessment 9/29/2020   PRIMARY LEARNER Patient   HIGHEST LEVEL OF EDUCATION - PRIMARY LEARNER  -   BARRIERS PRIMARY LEARNER -   CO-LEARNER CAREGIVER -   PRIMARY LANGUAGE ENGLISH    NEED -   LEARNER PREFERENCE PRIMARY DEMONSTRATION     -     -     -     -   LEARNING SPECIAL TOPICS -   ANSWERED BY self RELATIONSHIP SELF   ASSESSMENT COMMENT -       AVS  education, follow up, and recommendations provided and addressed with patient.   services used to advise patient -no

## 2021-12-06 NOTE — LETTER
2021     RE:    Ms. Evangelista Scott  [address] [place] [phone]   - [birth year]    TO:  Ms. Betzy Belcher and    To whom it may concern:    I am writing on behalf of Ms. Nerissa Rudolph as her primary care physician, and I have cared for her for 8 years. She has diagnoses of Generalized Anxiety Disorder, Major Depression, PTSD, Panic Attacks, and Prolonged Grief Disorder. The effects of these diagnoses have resulted in disability and the inability to work. I support her application for disability benefits and hope you will consider this in decisions regarding her disability claim. Thank you for your consideration.       Sincerely,            Noelle De Oliveira MD

## 2021-12-06 NOTE — TELEPHONE ENCOUNTER
The form in media scanned both 11/9/21 and 11/29/21 is a release of information form. Records need to be sent to [employer]  Pt may need to sign it.

## 2021-12-06 NOTE — PROGRESS NOTES
Jonn Camargo (: 1980) is a 37 y.o. female, established patient, here for evaluation of the following chief complaint(s)--see below:    Jonn Camargo is a 37 y.o. female who was seen by synchronous (real-time) audio-video technology on 2021. Consent: Jonn Camargo, who was seen by synchronous (real-time) audio-video technology, and/or her healthcare decision maker, is aware that this patient-initiated, Telehealth encounter on 2021 is a billable service, with coverage as determined by her insurance carrier. She is aware that she may receive a bill and has provided verbal consent to proceed: Yes. I was in the office while conducting this encounter. 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 ?malabsorption as source for weight loss  Recheck ESR as well. ICD-10-CM ICD-9-CM    1. Weight loss  R63.4 783.21 C REACTIVE PROTEIN, QT   2. DEEPA (generalized anxiety disorder)  F41.1 300.02    3. Panic disorder  F41.0 300.01    4. PTSD (post-traumatic stress disorder)  F43.10 309.81    5. Acquired hypothyroidism  E03.9 244.9 TSH 3RD GENERATION      T4, FREE   6. Easy bruising  R23.8 782.9    7. Elevated sed rate  R70.0 790.1 SED RATE (ESR)   8. Vitamin D deficiency  E55.9 268.9 VITAMIN D, 25 HYDROXY   9. Idiopathic myocarditis, unspecified chronicity  I40.1 422.91      Follow-up and Dispositions    · Return in about 3 months (around 3/6/2022), or if symptoms worsen or fail to improve. results and schedule of future studies reviewed with patient  reviewed diet, exercise and weight   cardiovascular risk and specific lipid/LDL goals reviewed  reviewed medications and side effects in detail     ref back to GI   Needs new psych provider - Merlinda Mantel, Psychologist, PhD, [phone]  Labs soon. 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 Flu shot as nursing visit.     AVS:  [x] Available to patient in [facility] after visit signed. []  Mailed to patient after visit. []  Not sent to patient after visit. Future Appointments   Date Time Provider Rafat Gormani   12/16/2021  8:40 AM MD MICHAEL Gay BS AMB        Subjective:   tSeven Bush was seen for:  Chief Complaint   Patient presents with   [employer]     concern for wt loss. states she has gone down 4 pant sizes. pt states she is very stressed out but she is eating. states she is concerned that people are talking about her.  Nasal Congestion     and headaches.  Chest Pain     had echo done today. Notes:  +weight loss and clothing looks different on her    Had colonoscopy and has been following with GI  Pt declined EGD  Due to COVID and mask wearing. Pt had COVID booster last week  Pt developed chest pain again. Pt depressed and cannot sleep unless uses seroquel and xanax qhs    Psychologist has not helped pt at all. Provider goes to restroom and eats while pt is there - at her home office. Provider not wearing a mask. Nursing screenings reviewed by provider at visit. Past medical, Social, and Family history reviewed  Medications reviewed and updated. Allergies   Allergen Reactions    Amoxicillin Rash    Ativan [Lorazepam] Other (comments)     \"Makes her sick\"    Biaxin [Clarithromycin] Itching and Other (comments)     'eyes turn an funny color and she starts itching'    Biaxin [Clarithromycin] Unknown (comments)    Flagyl [Metronidazole] Itching     Eyes change color    Flagyl [Metronidazole] Hives    Zoloft [Sertraline] Other (comments)     Excessive yawning       Prior to Admission medications    Medication Sig Start Date End Date Taking? Authorizing Provider   ALPRAZolam Carlynn Remak) 1 mg tablet TAKE 1 TO 2 TABLETS BY MOUTH 3 TIMES A DAY AS NEEDED 10/14/21  Yes Reina Fountain MD   PARoxetine (PAXIL) 20 mg tablet Take 1 Tablet by mouth daily.  9/30/21  Yes Sujit Sepulveda Cristina Wallace MD   QUEtiapine (SEROquel) 200 mg tablet Take 1 Tablet by mouth two (2) times a day. 9/30/21  Yes Feroz Salazar MD   metoprolol succinate (TOPROL-XL) 25 mg XL tablet Take 1 Tablet by mouth. 7/22/21  Yes Provider, Historical   norgestimate-ethinyl estradioL (Tri-Sprintec, 28,) 0.18/0.215/0.25 mg-35 mcg (28) tab Take 1 Tablet by mouth daily. 7/26/21  Yes Feroz Salazar MD   ibuprofen (MOTRIN) 800 mg tablet Take 1 Tablet by mouth two (2) times daily (with meals). 7/26/21  Yes Feroz Salazar MD   fluticasone propionate [facility]) 50 mcg/actuation nasal spray  5/28/21  Yes Provider, Historical   levothyroxine (LevoxyL) 75 mcg tablet Take 1 Tab by mouth Daily (before breakfast). 5/10/21  Yes Feroz Salazar MD   multivit-min-folic acid-biotin 635-718 mcg chew Take 1 Tab by mouth daily. 9/28/20  Yes Feroz Salazar MD   cyanocobalamin, vitamin B-12, 1,000 mcg/mL kit 1 mL by Injection route every month. 9/15/20  Yes Feroz Salazar MD   loratadine (CLARITIN) 10 mg tablet Take 1 Tab by mouth daily. 7/6/20  Yes Feroz Salazar MD   ergocalciferol (ERGOCALCIFEROL) 1,250 mcg (50,000 unit) capsule Take 1 Capsule by mouth every thirty (30) days. Patient not taking: Reported on 12/6/2021 9/30/21   Feroz Salazar MD   sodium-potassium-mag sulfate (Suprep Bowel Prep Kit) 17.5-3.13-1.6 gram solr oral solution Take 177 mL by mouth See Admin Instructions. Patient not taking: Reported on 9/30/2021 2/23/21   Feroz Salazar MD   cyanocobalamin (VITAMIN B12) 500 mcg tablet Take 1 Tab by mouth daily. Patient not taking: Reported on 12/6/2021 1/27/21   Feroz Salazar MD         ROS    A complete ROS was performed and negative except as noted in HPI     PHYSICAL EXAMINATION:    Vital Signs: There were no vitals taken for this visit. No flowsheet data found.       Constitutional: [x] Appears well-developed and well-nourished [x] No apparent distress      Mental status: [x] Alert and awake [x] Oriented [x] Able to follow commands       Eyes:   EOM    [x]  Normal      Sclera  [x]  Normal              Discharge [x]  None visible       HENT: [x] Normocephalic, atraumatic    [x] Mouth/Throat: Mucous membranes are moist    External Ears [x] Normal      Neck: [x] No visualized mass     Pulmonary/Chest: [x] Respiratory effort normal   [x] No visualized signs of difficulty breathing or respiratory distress    Musculoskeletal:  [x] Normal range of motion of neck    Neurological:        [x] No Facial Asymmetry (Cranial nerve 7 motor function) (limited exam due to video visit)          [x] No gaze palsy     Skin:        [x] No significant exanthematous lesions or discoloration noted on facial skin             Psychiatric:       [x] Normal Affect       Other pertinent observable physical exam findings:  None. We discussed the expected course, resolution and complications of the diagnosis(es) in detail. Medication risks, benefits, costs, interactions, and alternatives were discussed as indicated. I advised her to contact the office if her condition worsens, changes or fails to improve as anticipated. She expressed understanding with the diagnosis(es) and plan. Ana Paula Andrews is a 37 y.o. female who was evaluated by a video visit encounter for concerns as above. Ana Paula Andrews is being evaluated by a Virtual Visit (video visit) encounter to address concerns as mentioned above. A caregiver was present when appropriate. Due to this being a TeleHealth encounter (During TYYQN-42 public health emergency), evaluation of the following organ systems was limited: Vitals/Constitutional/EENT/Resp/CV/GI//MS/Neuro/Skin/Heme-Lymph-Imm.   Pursuant to the emergency declaration under the [facility] [address] waiver authority and the [employer] Act, this Virtual Visit was conducted with patient's (and/or legal guardian's) consent, to reduce the patient's risk of exposure to COVID-19 and provide necessary medical care. The patient (and/or legal guardian) has also been advised to contact this office for worsening conditions or problems, and seek emergency medical treatment and/or call 911 if deemed necessary. Patient identification was verified at the start of the visit: YES. Services were provided through a video synchronous discussion virtually to substitute for in-person clinic visit. Patient was located at their individual home (or other location as per patient preference). Provider was located in medical office. On this date 12/06/2021 I have spent 40 minutes reviewing previous notes, test results and face to face with the patient discussing the diagnosis and importance of compliance with the treatment plan as well as documenting on the day of the visit. An electronic signature was used to authenticate this note.   -- Inge Estrada MD

## 2021-12-07 ENCOUNTER — TELEPHONE (OUTPATIENT)
Dept: CARDIOLOGY CLINIC | Age: 41
End: 2021-12-07

## 2021-12-07 LAB
ECHO AO ASC DIAM: 2.43 CM
ECHO AO ROOT DIAM: 2.68 CM
ECHO AV AREA PEAK VELOCITY: 2.03 CM2
ECHO AV AREA VTI: 2 CM2
ECHO AV AREA/BSA PEAK VELOCITY: 1.1 CM2/M2
ECHO AV AREA/BSA VTI: 1.1 CM2/M2
ECHO AV MEAN GRADIENT: 4.25 MMHG
ECHO AV PEAK GRADIENT: 8.04 MMHG
ECHO AV PEAK VELOCITY: 141.76 CM/S
ECHO AV VTI: 32.1 CM
ECHO EST RA PRESSURE: 3 MMHG
ECHO IVC PROX: 1.45 CM
ECHO LA AREA 4C: 15.98 CM2
ECHO LA MAJOR AXIS: 3.24 CM
ECHO LA MINOR AXIS: 1.73 CM
ECHO LA VOL 2C: 30.46 ML (ref 22–52)
ECHO LA VOL 4C: 38.7 ML (ref 22–52)
ECHO LA VOL BP: 41.47 ML (ref 22–52)
ECHO LA VOL/BSA BIPLANE: 22.18 ML/M2 (ref 16–28)
ECHO LA VOLUME INDEX A2C: 16.29 ML/M2 (ref 16–28)
ECHO LA VOLUME INDEX A4C: 20.7 ML/M2 (ref 16–28)
ECHO LV E' LATERAL VELOCITY: 13.46 CM/S
ECHO LV E' SEPTAL VELOCITY: 12.89 CM/S
ECHO LV EDV A2C: 83.68 ML
ECHO LV EDV A4C: 95.48 ML
ECHO LV EDV BP: 92.27 ML (ref 56–104)
ECHO LV EDV INDEX A4C: 51.1 ML/M2
ECHO LV EDV INDEX BP: 49.3 ML/M2
ECHO LV EDV NDEX A2C: 44.7 ML/M2
ECHO LV EJECTION FRACTION A2C: 56 PERCENT
ECHO LV EJECTION FRACTION A4C: 53 PERCENT
ECHO LV EJECTION FRACTION BIPLANE: 55 PERCENT (ref 55–100)
ECHO LV ESV A2C: 36.91 ML
ECHO LV ESV A4C: 45.04 ML
ECHO LV ESV BP: 41.53 ML (ref 19–49)
ECHO LV ESV INDEX A2C: 19.7 ML/M2
ECHO LV ESV INDEX A4C: 24.1 ML/M2
ECHO LV ESV INDEX BP: 22.2 ML/M2
ECHO LV INTERNAL DIMENSION DIASTOLIC: 4.76 CM (ref 3.9–5.3)
ECHO LV INTERNAL DIMENSION SYSTOLIC: 3.22 CM
ECHO LV IVSD: 0.75 CM (ref 0.6–0.9)
ECHO LV MASS 2D: 115 G (ref 67–162)
ECHO LV MASS INDEX 2D: 61.5 G/M2 (ref 43–95)
ECHO LV POSTERIOR WALL DIASTOLIC: 0.75 CM (ref 0.6–0.9)
ECHO LVOT DIAM: 1.76 CM
ECHO LVOT PEAK GRADIENT: 5.58 MMHG
ECHO LVOT PEAK VELOCITY: 118.13 CM/S
ECHO LVOT SV: 64.1 ML
ECHO LVOT VTI: 26.36 CM
ECHO MV A VELOCITY: 65.4 CM/S
ECHO MV AREA PHT: 3.08 CM2
ECHO MV E DECELERATION TIME (DT): 246.4 MS
ECHO MV E VELOCITY: 99.77 CM/S
ECHO MV E/A RATIO: 1.53
ECHO MV E/E' LATERAL: 7.41
ECHO MV E/E' RATIO (AVERAGED): 7.58
ECHO MV E/E' SEPTAL: 7.74
ECHO MV PRESSURE HALF TIME (PHT): 71.46 MS
ECHO RIGHT VENTRICULAR SYSTOLIC PRESSURE (RVSP): 27.47 MMHG
ECHO RV TAPSE: 2.24 CM (ref 1.5–2)
ECHO TV REGURGITANT MAX VELOCITY: 247.26 CM/S
ECHO TV REGURGITANT PEAK GRADIENT: 24.47 MMHG
LA VOL DISK BP: 38.15 ML (ref 22–52)
LVOT MG: 3.08 MMHG

## 2021-12-07 NOTE — TELEPHONE ENCOUNTER
Patient called in with c/o of chest pain. Two patient indentifiers verified. Pt was given echo results. Pt was informed that she can discuss the results more with Dr. Leanne Figueroa at the appointment.

## 2021-12-07 NOTE — TELEPHONE ENCOUNTER
Chest pain and discomfort. Patient received her booster and she has been still experiencing again the same chest pain and discomfort from when she had the first vaccine for COVID-19. patient is looking for her results from her echocardiogram.    [phone]

## 2021-12-08 NOTE — TELEPHONE ENCOUNTER
From: Evan Lao  To: Inge Estrada MD  Sent: 11/29/2021 1:22 PM EST  Subject: loosing alot of weight and booster     hi Dr Carolyn Medina. I was told I have to get a booster and I have an appt to get one today but I wanted u to tell me of it's ok since I already have the cardiomyopathy from the last shot. Also I have been loosing alot of weight. people are starting to talk about me. you can see my ribs poking out my sides. I've been fat my while life so something is not right for me to be getting this skinny. my pants are falling off of me and all you see is bone. .please call me [phone] thank you

## 2021-12-08 NOTE — TELEPHONE ENCOUNTER
Pt addressed concerns with Dr. Shameka Levin during   [facility] [place] [facility] 1237 12/6/21

## 2021-12-13 NOTE — TELEPHONE ENCOUNTER
Pt called stating that she need's to have  write a letter to the [employer]. The letter need's to state that she is disabled and unable to work due to having several medical condition's. Pt need's to have  state that she has Generalized Disorder,Major Depression,PTSD,Prolonged Grief Disorder as well as Panic Attack's. The letter need's to be addressed to luis TONYJeet Angela Jen her Fax # [phone] any question's Ms. Angela Li can be reached at # [phone] Case # [id number]. Writer printed off [employer] Form it show's where pt electronically signed it writer is going to fax it to Annie Peres pt would like [phone] note's sent to [facility].

## 2021-12-14 ENCOUNTER — TELEPHONE (OUTPATIENT)
Dept: CARDIOLOGY CLINIC | Age: 41
End: 2021-12-14

## 2021-12-14 NOTE — TELEPHONE ENCOUNTER
MD Mazin Dillon, RN  Caller: Unspecified (Today,  3:21 PM)  Agree can make appt to discuss echo results if she would like.  sveta

## 2021-12-14 NOTE — TELEPHONE ENCOUNTER
Letter has been faxed to the attention of Victorino Collet at [phone]. Transmission log received and placed in scanning.

## 2021-12-14 NOTE — TELEPHONE ENCOUNTER
Patient cancelled follow up after echo scheduled for 12/16/21, patient stated she would like a call back from the doctor with results from echo, please advise    [phone]

## 2021-12-20 DIAGNOSIS — R30.0 DYSURIA: Primary | ICD-10-CM

## 2021-12-22 ENCOUNTER — TELEPHONE (OUTPATIENT)
Dept: PEDIATRICS CLINIC | Age: 41
End: 2021-12-22

## 2021-12-22 DIAGNOSIS — Z23 ENCOUNTER FOR IMMUNIZATION: Primary | ICD-10-CM

## 2021-12-22 NOTE — TELEPHONE ENCOUNTER
Spoke with pt who is very anxious about coming in the office tomorrow. Spoke with her and stated will assure that she is in and out in a timely manner. Pt is bringing her own sterile cup of urine so she doesn't have to wait. Pt asked about the flu shot which she isn't sure if she wants to get this done. Attempted to help ease her anxiety which helped. Pt will be in office tomorrow for lab visit, please get her in and out as efficiently as possible. Will send to lead nurses at [facility].

## 2021-12-22 NOTE — TELEPHONE ENCOUNTER
----- Message from Nunu Govea sent at 12/22/2021 10:56 AM EST -----  Subject: Message to Provider    QUESTIONS  Information for Provider? Patient is very nervous about coming in for appt   tomorrow. She has a sterile cup that she will use and bring in for her   urine. Wants to be in and out as fast as she can.   ---------------------------------------------------------------------------  --------------  CALL BACK INFO  What is the best way for the office to contact you? OK to leave message on   voicemail  Preferred Call Back Phone Number? [phone]  ---------------------------------------------------------------------------  --------------  SCRIPT ANSWERS  Relationship to Patient?  Self

## 2021-12-23 ENCOUNTER — LAB ONLY (OUTPATIENT)
Dept: INTERNAL MEDICINE CLINIC | Age: 41
End: 2021-12-23

## 2021-12-23 DIAGNOSIS — R30.0 DYSURIA: ICD-10-CM

## 2021-12-23 DIAGNOSIS — R23.3 EASY BRUISING: ICD-10-CM

## 2021-12-24 DIAGNOSIS — M79.672 FOOT PAIN, BILATERAL: ICD-10-CM

## 2021-12-24 DIAGNOSIS — G62.9 NEUROPATHY: ICD-10-CM

## 2021-12-24 DIAGNOSIS — M79.671 FOOT PAIN, BILATERAL: ICD-10-CM

## 2021-12-24 DIAGNOSIS — N32.89 BLADDER IRRITABILITY: Primary | ICD-10-CM

## 2021-12-24 RX ORDER — AMITRIPTYLINE HYDROCHLORIDE 25 MG/1
25 TABLET, FILM COATED ORAL
Qty: 30 TABLET | Refills: 5 | Status: SHIPPED | OUTPATIENT
Start: 2021-12-24

## 2021-12-24 NOTE — PROGRESS NOTES
See other message re: urine  Other labs are either normal or stable and at goal.  Continue current medications

## 2021-12-26 LAB
25(OH)D3+25(OH)D2 SERPL-MCNC: 30 NG/ML (ref 30–100)
APPEARANCE UR: CLEAR
APTT PPP: 29 SEC (ref 24–33)
BACTERIA #/AREA URNS HPF: ABNORMAL /[HPF]
BACTERIA UR CULT: NORMAL
BILIRUB UR QL STRIP: NEGATIVE
CASTS URNS QL MICRO: ABNORMAL /LPF
COLOR UR: YELLOW
CRP SERPL-MCNC: 7 MG/L (ref 0–10)
EPI CELLS #/AREA URNS HPF: ABNORMAL /HPF (ref 0–10)
ERYTHROCYTE [SEDIMENTATION RATE] IN BLOOD BY WESTERGREN METHOD: 34 MM/HR (ref 0–32)
GLUCOSE UR QL: NEGATIVE
HGB UR QL STRIP: ABNORMAL
INR PPP: 1 (ref 0.9–1.2)
KETONES UR QL STRIP: ABNORMAL
LEUKOCYTE ESTERASE UR QL STRIP: NEGATIVE
MICRO URNS: ABNORMAL
NITRITE UR QL STRIP: NEGATIVE
PH UR STRIP: 5.5 [PH] (ref 5–7.5)
PROT UR QL STRIP: NEGATIVE
PROTHROMBIN TIME: 10.2 SEC (ref 9.1–12)
RBC #/AREA URNS HPF: ABNORMAL /HPF (ref 0–2)
SP GR UR: 1.02 (ref 1–1.03)
T4 FREE SERPL-MCNC: 1.43 NG/DL (ref 0.82–1.77)
TSH SERPL DL<=0.005 MIU/L-ACNC: 2.98 UIU/ML (ref 0.45–4.5)
UROBILINOGEN UR STRIP-MCNC: 0.2 MG/DL (ref 0.2–1)
WBC #/AREA URNS HPF: ABNORMAL /HPF (ref 0–5)

## 2022-01-19 DIAGNOSIS — F41.0 PANIC ATTACKS: ICD-10-CM

## 2022-01-19 DIAGNOSIS — F41.0 PANIC DISORDER: ICD-10-CM

## 2022-01-19 DIAGNOSIS — F41.1 GAD (GENERALIZED ANXIETY DISORDER): ICD-10-CM

## 2022-01-20 RX ORDER — ALPRAZOLAM 1 MG/1
TABLET ORAL
Qty: 150 TABLET | Refills: 2 | Status: SHIPPED | OUTPATIENT
Start: 2022-01-20 | End: 2022-05-31

## 2022-01-25 ENCOUNTER — TELEPHONE (OUTPATIENT)
Dept: INTERNAL MEDICINE CLINIC | Age: 42
End: 2022-01-25

## 2022-01-25 NOTE — TELEPHONE ENCOUNTER
Future Appointments   Date Time Provider Rafat Marisabel   2/2/2022  9:30 AM Danay Barbosa MD [facility] BS AMB     Pt has been added to the schedule for next week with Dr. Charmaine Fagan per his recommendations,

## 2022-01-25 NOTE — TELEPHONE ENCOUNTER
----- Message from Forrest Colon sent at 1/25/2022  1:16 PM EST -----  Subject: Message to Provider    QUESTIONS  Information for Provider? At the request of the pt, She want's to be seen   and triaged by Dr. Gilford Sia. She would like to have the office    her to speak about a situation that happened recently. She   needs to be scheduled for an issue for her leg that she did not disclose   to myself.  ---------------------------------------------------------------------------  --------------  CALL BACK INFO  What is the best way for the office to contact you? OK to leave message on   voicemail  Preferred Call Back Phone Number? [phone]  ---------------------------------------------------------------------------  --------------  SCRIPT ANSWERS  Relationship to Patient?  Self

## 2022-01-31 ENCOUNTER — TELEPHONE (OUTPATIENT)
Dept: INTERNAL MEDICINE CLINIC | Age: 42
End: 2022-01-31

## 2022-02-01 ENCOUNTER — TELEPHONE (OUTPATIENT)
Dept: CARDIOLOGY CLINIC | Age: 42
End: 2022-02-01

## 2022-02-01 ENCOUNTER — PATIENT MESSAGE (OUTPATIENT)
Dept: INTERNAL MEDICINE CLINIC | Age: 42
End: 2022-02-01

## 2022-02-01 DIAGNOSIS — R06.02 SHORTNESS OF BREATH: Primary | ICD-10-CM

## 2022-02-01 NOTE — TELEPHONE ENCOUNTER
Sharp chest pains in the middle of the chest that seem to be getting worse, patient discontinued the metoprolol. Patient is terrified of going to the hospital, patient states he had a nervous breakdown when she attempted to go to [address] last night. Patient suffers with anxiety. Patient states she is experiencing shortness of breath with exertion.       INTQX:[phone]

## 2022-02-01 NOTE — TELEPHONE ENCOUNTER
MD Bobbi Jeong, RN  Caller: Unspecified (Today, 11:05 AM)  Agree with appt with PCP. CT with no coronaries.  Echo in 12/2021 EF was normal. thx

## 2022-02-01 NOTE — TELEPHONE ENCOUNTER
----- Message from [address] [place] sent at 1/31/2022  1:38 PM EST -----  Regarding: bad chest pain  hey Dr Uyen Santana. I have been experiencing bad chest pain in the middle of my chest. When I go up the stairs I am winded and can't breathe and the pain gets worse when I'm sitting stationary I just feel the pain in the middle of my chest. I don't know what the cause is it's a different pain than the pain I would feel when they said I had cardiomyopathy. I am scared to go to the hospital due to covid so I have been just trying to deal with the pain but the pain is getting worse. Can you please send a order in for me to get a chest x-ray. I can go to the Imaging Lab at [place] to have the test done I would prefer to do that rather than go to the emergency room. I have lost so much weight until people are now saying that I look malnourished and they are starting to talk about me I don't know why I have a hard time with my appetite I tried to eat I'm doing the best I can I'm continuously losing wait to where you can see my rib cage and I was told by several people that my face now looks   narrow and they continue to question me on what's wrong with me.  I'm worried about the weight loss but the chest pain worries me even more because I can barely go up and down the steps without being in discomfort and this feeling is something that has been going on for about a week but it's getting worse

## 2022-02-02 ENCOUNTER — VIRTUAL VISIT (OUTPATIENT)
Dept: INTERNAL MEDICINE CLINIC | Age: 42
End: 2022-02-02
Payer: MEDICAID

## 2022-02-02 DIAGNOSIS — R23.3 EASY BRUISING: ICD-10-CM

## 2022-02-02 DIAGNOSIS — F43.10 PTSD (POST-TRAUMATIC STRESS DISORDER): ICD-10-CM

## 2022-02-02 DIAGNOSIS — R07.9 CHEST PAIN, UNSPECIFIED TYPE: ICD-10-CM

## 2022-02-02 DIAGNOSIS — F41.1 GAD (GENERALIZED ANXIETY DISORDER): ICD-10-CM

## 2022-02-02 DIAGNOSIS — R06.09 DOE (DYSPNEA ON EXERTION): Primary | ICD-10-CM

## 2022-02-02 DIAGNOSIS — F41.8 DEPRESSION WITH ANXIETY: ICD-10-CM

## 2022-02-02 DIAGNOSIS — I40.1 IDIOPATHIC MYOCARDITIS, UNSPECIFIED CHRONICITY: ICD-10-CM

## 2022-02-02 DIAGNOSIS — R63.4 WEIGHT LOSS: ICD-10-CM

## 2022-02-02 DIAGNOSIS — E03.9 ACQUIRED HYPOTHYROIDISM: ICD-10-CM

## 2022-02-02 PROCEDURE — 99215 OFFICE O/P EST HI 40 MIN: CPT | Performed by: INTERNAL MEDICINE

## 2022-02-02 NOTE — PROGRESS NOTES
[phone]    Chief Complaint   Patient presents with    Leg Pain    Weight Loss     1. Have you been to the ER, urgent care clinic since your last visit? Hospitalized since your last visit? No    2. Have you seen or consulted any other health care providers outside of the [address] [place] [address] since your last visit? Include any pap smears or colon screening.

## 2022-02-02 NOTE — TELEPHONE ENCOUNTER
Returned call to patient. Two patient indentifiers verified. Pt stated that she had a follow up with her PCP and that her PCP thinks this could be cardiac related. Offered patient an in office appointment but refused an in office appointment. Pt stated she wants to talk to the doctor.  Pt was informed that a message will be sent to MD.

## 2022-02-02 NOTE — PROGRESS NOTES
Parviz Plummer (: 1980) is a 37 y.o. female, established patient, here for evaluation of the following chief complaint(s)--see below:    Parviz Plummer is a 37 y.o. female who was seen by synchronous (real-time) audio-video technology on 2022. Consent: Parviz Plummer, who was seen by synchronous (real-time) audio-video technology, and/or her healthcare decision maker, is aware that this patient-initiated, Telehealth encounter on 2022 is a billable service, with coverage as determined by her insurance carrier. She is aware that she may receive a bill and has provided verbal consent to proceed: Yes. I was in the office while conducting this encounter. Assessment & Plan:   Diagnoses and all orders for this visit:    Must r/o cardiac and lung dysfunction      ICD-10-CM ICD-9-CM    1. POE (dyspnea on exertion)  R06.00 786.09 PULMONARY FUNCTION TEST      ECHO ADULT COMPLETE   2. DEEPA (generalized anxiety disorder)  F41.1 300.02    3. Easy bruising  R23.8 782.9 REFERRAL TO VASCULAR SURGERY   4. PTSD (post-traumatic stress disorder)  F43.10 309.81    5. Acquired hypothyroidism  E03.9 244.9    6. Weight loss  R63.4 783.21    7. Idiopathic myocarditis, unspecified chronicity  I40.1 422.91    8. Depression with anxiety  F41.8 300.4    9. Chest pain, unspecified type  R07.9 786.50      Follow-up and Dispositions    · Return in about 4 weeks (around 3/2/2022), or if symptoms worsen or fail to improve, for POE. results and schedule of future studies reviewed with patient  reviewed diet, exercise and weight   cardiovascular risk and specific lipid/LDL goals reviewed  reviewed medications and side effects in detail    echo    CXR - may need chest CT  PFTs  Ref vascular re: bruising  May need psych and counseling if testing is normal.      AVS:  [x]  Available to patient in MyChart after visit signed. []  Mailed to patient after visit. []  Not sent to patient after visit. Subjective:   Madina Thompson was seen for:  Chief Complaint   Patient presents with    Leg Pain    Weight Loss     Notes:  Chest pain recently assoc with SOB, POE  Pt reports it different from prior myocarditis. Cannot go up stairs without SOB    Pt had oral biopsy today [facility]  Dermatology did not know what the bruising is - no diagnosis - deferred to me  Not ringworm. Leg bruising - no injury    +wt loss    Nursing screenings reviewed by provider at visit. Past medical, Social, and Family history reviewed  Medications reviewed and updated. Allergies   Allergen Reactions    Amoxicillin Rash    Ativan [Lorazepam] Other (comments)     \"Makes her sick\"    Biaxin [Clarithromycin] Itching and Other (comments)     'eyes turn an funny color and she starts itching'    Biaxin [Clarithromycin] Unknown (comments)    Flagyl [Metronidazole] Itching     Eyes change color    Flagyl [Metronidazole] Hives    Zoloft [Sertraline] Other (comments)     Excessive yawning       Prior to Admission medications    Medication Sig Start Date End Date Taking? Authorizing Provider   ALPRAZolam Audrey Pillow) 1 mg tablet TAKE 1 TO 2 TABLETS BY MOUTH 3 TIMES A DAY AS NEEDED 1/20/22  Yes Vera Gilman MD   amitriptyline (ELAVIL) 25 mg tablet Take 1 Tablet by mouth nightly. 12/24/21  Yes Vera Gilman MD   colchicine 0.6 mg tablet Take 1 Tablet by mouth two (2) times a day. 12/6/21  Yes Vera Gilman MD   PARoxetine (PAXIL) 20 mg tablet Take 1 Tablet by mouth daily. 9/30/21  Yes Vera Gilman MD   QUEtiapine (SEROquel) 200 mg tablet Take 1 Tablet by mouth two (2) times a day. 9/30/21  Yes Vera Gilman MD   norgestimate-ethinyl estradioL (Tri-Sprintec, 28,) 0.18/0.215/0.25 mg-35 mcg (28) tab Take 1 Tablet by mouth daily. 7/26/21  Yes Vera Gilman MD   ibuprofen (MOTRIN) 800 mg tablet Take 1 Tablet by mouth two (2) times daily (with meals).  7/26/21  Yes Vera Gilman MD   fluticasone propionate (FLONASE) 50 mcg/actuation nasal spray  5/28/21  Yes Provider, Historical   levothyroxine (LevoxyL) 75 mcg tablet Take 1 Tab by mouth Daily (before breakfast). 5/10/21  Yes Gerald Alatorre MD   sodium-potassium-mag sulfate (Suprep Bowel Prep Kit) 17.5-3.13-1.6 gram solr oral solution Take 177 mL by mouth See Admin Instructions. 2/23/21  Yes Gerald Alatorre MD   multivit-min-folic acid-biotin 924-146 mcg chew Take 1 Tab by mouth daily. 9/28/20  Yes Gerald Alatorre MD   cyanocobalamin, vitamin B-12, 1,000 mcg/mL kit 1 mL by Injection route every month. 9/15/20  Yes Gerald Alatorre MD   loratadine (CLARITIN) 10 mg tablet Take 1 Tab by mouth daily. 7/6/20  Yes Gerald Alatorre MD   ergocalciferol (ERGOCALCIFEROL) 1,250 mcg (50,000 unit) capsule Take 1 Capsule by mouth every thirty (30) days. Patient not taking: Reported on 12/6/2021 9/30/21   Gerald Alatorre MD   cyanocobalamin (VITAMIN B12) 500 mcg tablet Take 1 Tab by mouth daily. Patient not taking: Reported on 12/6/2021 1/27/21   Gerald Alatorre MD         ROS     A complete ROS was performed and negative except as noted in HPI     PHYSICAL EXAMINATION:    Vital Signs: There were no vitals taken for this visit. No flowsheet data found.       Constitutional: [x] Appears well-developed and well-nourished [x] No apparent distress      Mental status: [x] Alert and awake  [x] Oriented [x] Able to follow commands       Eyes:   EOM    [x]  Normal      Sclera  [x]  Normal              Discharge [x]  None visible       HENT: [x] Normocephalic, atraumatic    [x] Mouth/Throat: Mucous membranes are moist    External Ears [x] Normal      Neck: [x] No visualized mass     Pulmonary/Chest: [x] Respiratory effort normal   [x] No visualized signs of difficulty breathing or respiratory distress    Musculoskeletal:  [x] Normal range of motion of neck    Neurological:        [x] No Facial Asymmetry (Cranial nerve 7 motor function) (limited exam due to video visit)          [x] No gaze palsy     Skin:        [x] No significant exanthematous lesions or discoloration noted on facial skin             Psychiatric:       [x] Normal Affect       Other pertinent observable physical exam findings:  None. Prior labs reviewed. Reviewed imaging - echo. Reviewed pictures of leg brusing//lesions    We discussed the expected course, resolution and complications of the diagnosis(es) in detail. Medication risks, benefits, costs, interactions, and alternatives were discussed as indicated. I advised her to contact the office if her condition worsens, changes or fails to improve as anticipated. She expressed understanding with the diagnosis(es) and plan. Harvey Hawthorne is a 37 y.o. female who was evaluated by a video visit encounter for concerns as above. Harvey Hawthorne is being evaluated by a Virtual Visit (video visit) encounter to address concerns as mentioned above. A caregiver was present when appropriate. Due to this being a TeleHealth encounter (During ASE-49 public health emergency), evaluation of the following organ systems was limited: Vitals/Constitutional/EENT/Resp/CV/GI//MS/Neuro/Skin/Heme-Lymph-Imm. Pursuant to the emergency declaration under the [address] [place] [address] authority and the [employer] and [employer] Act, this Virtual Visit was conducted with patient's (and/or legal guardian's) consent, to reduce the patient's risk of exposure to COVID-19 and provide necessary medical care. The patient (and/or legal guardian) has also been advised to contact this office for worsening conditions or problems, and seek emergency medical treatment and/or call 911 if deemed necessary. Patient identification was verified at the start of the visit: YES.     Services were provided through a video synchronous discussion virtually to substitute for in-person clinic visit. Patient was located at their individual home (or other location as per patient preference). Provider was located in medical office. On this date 02/02/2022 I have spent 45 minutes reviewing previous notes, test results and face to face with the patient discussing the diagnosis and importance of compliance with the treatment plan as well as documenting on the day of the visit. An electronic signature was used to authenticate this note.   -- Samantha Kat MD

## 2022-02-02 NOTE — TELEPHONE ENCOUNTER
MD Azam Prescott, RN  Caller: Unspecified (Yesterday, 11:05 AM)  We spoke at length. Reviewed her cardiac CT from 7/2021 and there was no CAD with calcium score of 0. Echo in 12/2021 was normal. No additional cardiac evaluation indicated and reassured her. She does not need a cardiology follow up.  Immanuel

## 2022-02-05 ENCOUNTER — HOSPITAL ENCOUNTER (OUTPATIENT)
Dept: GENERAL RADIOLOGY | Age: 42
Discharge: HOME | End: 2022-02-05
Payer: MEDICAID

## 2022-02-05 DIAGNOSIS — R06.02 SHORTNESS OF BREATH: ICD-10-CM

## 2022-02-05 PROCEDURE — 71046 X-RAY EXAM CHEST 2 VIEWS: CPT

## 2022-02-07 ENCOUNTER — TRANSCRIBE ORDER (OUTPATIENT)
Dept: REGISTRATION | Age: 42
End: 2022-02-07

## 2022-02-07 ENCOUNTER — HOSPITAL ENCOUNTER (OUTPATIENT)
Dept: PREADMISSION TESTING | Age: 42
Discharge: HOME | End: 2022-02-07
Attending: INTERNAL MEDICINE
Payer: MEDICAID

## 2022-02-07 DIAGNOSIS — U07.1 COVID-19: Primary | ICD-10-CM

## 2022-02-07 DIAGNOSIS — U07.1 COVID-19: ICD-10-CM

## 2022-02-07 PROCEDURE — U0005 INFEC AGEN DETEC AMPLI PROBE: HCPCS

## 2022-02-08 LAB
SARS-COV-2, XPLCVT: NOT DETECTED
SOURCE, COVRS: NORMAL

## 2022-02-16 DIAGNOSIS — E03.9 ACQUIRED HYPOTHYROIDISM: ICD-10-CM

## 2022-02-16 RX ORDER — LEVOTHYROXINE SODIUM 75 UG/1
TABLET ORAL
Qty: 30 TABLET | Refills: 5 | Status: SHIPPED | OUTPATIENT
Start: 2022-02-16 | End: 2022-10-21 | Stop reason: SDUPTHER

## 2022-02-17 ENCOUNTER — HOSPITAL ENCOUNTER (OUTPATIENT)
Dept: NON INVASIVE DIAGNOSTICS | Age: 42
Discharge: HOME | End: 2022-02-17
Attending: INTERNAL MEDICINE
Payer: MEDICAID

## 2022-02-17 ENCOUNTER — HOSPITAL ENCOUNTER (OUTPATIENT)
Dept: PULMONOLOGY | Age: 42
Discharge: HOME | End: 2022-02-17
Attending: INTERNAL MEDICINE
Payer: MEDICAID

## 2022-02-17 VITALS
BODY MASS INDEX: 29.32 KG/M2 | WEIGHT: 176 LBS | DIASTOLIC BLOOD PRESSURE: 78 MMHG | HEIGHT: 65 IN | SYSTOLIC BLOOD PRESSURE: 120 MMHG

## 2022-02-17 DIAGNOSIS — R06.09 DOE (DYSPNEA ON EXERTION): ICD-10-CM

## 2022-02-17 DIAGNOSIS — I51.4: ICD-10-CM

## 2022-02-17 LAB
ECHO AO ROOT DIAM: 2.7 CM
ECHO AO ROOT INDEX: 1.44 CM/M2
ECHO AV AREA PEAK VELOCITY: 3.2 CM2
ECHO AV AREA PEAK VELOCITY: 3.2 CM2
ECHO AV PEAK GRADIENT: 6 MMHG
ECHO AV PEAK VELOCITY: 1.2 M/S
ECHO AV VELOCITY RATIO: 1
ECHO EST RA PRESSURE: 5 MMHG
ECHO LA DIAMETER INDEX: 1.82 CM/M2
ECHO LA DIAMETER: 3.4 CM
ECHO LA TO AORTIC ROOT RATIO: 1.26
ECHO LA VOL 2C: 39 ML (ref 22–52)
ECHO LA VOL 4C: 34 ML (ref 22–52)
ECHO LA VOL BP: 38 ML (ref 22–52)
ECHO LA VOL BP: 38 ML (ref 22–52)
ECHO LA VOLUME AREA LENGTH: 42 ML
ECHO LA VOLUME INDEX A2C: 21 ML/M2 (ref 16–34)
ECHO LA VOLUME INDEX A4C: 18 ML/M2 (ref 16–34)
ECHO LA VOLUME INDEX AREA LENGTH: 22 ML/M2 (ref 16–34)
ECHO LV E' LATERAL VELOCITY: 12 CM/S
ECHO LV E' SEPTAL VELOCITY: 11 CM/S
ECHO LV FRACTIONAL SHORTENING: 28 % (ref 28–44)
ECHO LV INTERNAL DIMENSION DIASTOLE INDEX: 2.51 CM/M2
ECHO LV INTERNAL DIMENSION DIASTOLIC: 4.7 CM (ref 3.9–5.3)
ECHO LV INTERNAL DIMENSION SYSTOLIC INDEX: 1.82 CM/M2
ECHO LV INTERNAL DIMENSION SYSTOLIC: 3.4 CM
ECHO LV IVSD: 0.9 CM (ref 0.6–0.9)
ECHO LV IVSS: 0.8 CM
ECHO LV MASS 2D: 142.7 G (ref 67–162)
ECHO LV MASS INDEX 2D: 76.3 G/M2 (ref 43–95)
ECHO LV POSTERIOR WALL DIASTOLIC: 0.9 CM (ref 0.6–0.9)
ECHO LV POSTERIOR WALL SYSTOLIC: 1.2 CM
ECHO LV RELATIVE WALL THICKNESS RATIO: 0.38
ECHO LVOT AREA: 3.5 CM2
ECHO LVOT DIAM: 2.1 CM
ECHO LVOT PEAK GRADIENT: 6 MMHG
ECHO LVOT PEAK VELOCITY: 1.2 M/S
ECHO MV A VELOCITY: 0.65 M/S
ECHO MV E DECELERATION TIME (DT): 217.5 MS
ECHO MV E VELOCITY: 0.85 M/S
ECHO MV E/A RATIO: 1.31
ECHO MV E/E' LATERAL: 7.08
ECHO MV E/E' RATIO (AVERAGED): 7.41
ECHO MV E/E' SEPTAL: 7.73
ECHO PV MAX VELOCITY: 1.3 M/S
ECHO PV PEAK GRADIENT: 6 MMHG
ECHO RIGHT VENTRICULAR SYSTOLIC PRESSURE (RVSP): 27 MMHG
ECHO RV FREE WALL PEAK S': 13 CM/S
ECHO RV INTERNAL DIMENSION: 4.1 CM
ECHO RV TAPSE: 2 CM (ref 1.5–2)
ECHO TV REGURGITANT MAX VELOCITY: 2.35 M/S
ECHO TV REGURGITANT PEAK GRADIENT: 22 MMHG

## 2022-02-17 PROCEDURE — 94010 BREATHING CAPACITY TEST: CPT

## 2022-02-17 PROCEDURE — 93306 TTE W/DOPPLER COMPLETE: CPT

## 2022-02-17 PROCEDURE — 93306 TTE W/DOPPLER COMPLETE: CPT | Performed by: SPECIALIST

## 2022-02-17 NOTE — PROGRESS NOTES
Your heart test (echocardiogram) shows a mild reduction in the heart function, but not as significant as the previous time when you had the myocarditis. I suspect both of these episodes are related to the COVID vaccine. Ibuprofen (taken with food) twice a day for 10-14 days should calm it down. We can check another echo in 2-3 months to be sure everything gets back to normal.    When any new COVID vaccine recommendations come out, we will need to discuss your options. You should NOT get another Moderna vaccine since it has now caused this problem twice.

## 2022-03-02 NOTE — PROCEDURES
[address]  PULMONARY FUNCTION TEST    Name:  Marlena Viera  MR#:  [id number]  :  1980  ACCOUNT #:  [de-identified]  DATE OF SERVICE:  2022      Spirometry reveals no evidence of airflow obstruction or restriction.   Flow volume loop is normal.      DO BONIFACIO Patrick/[email or web address]/  D:  2022 14:43  T:  2022 17:29  JOB #:  [id number]

## 2022-03-18 ENCOUNTER — TELEPHONE (OUTPATIENT)
Dept: CARDIOLOGY CLINIC | Age: 42
End: 2022-03-18

## 2022-03-18 PROBLEM — F60.9 PERSONALITY DISORDER IN ADULT (HCC): Status: ACTIVE | Noted: 2020-10-06

## 2022-03-18 PROBLEM — F43.29 GRIEF REACTION WITH PROLONGED BEREAVEMENT: Status: ACTIVE | Noted: 2019-09-19

## 2022-03-18 RX ORDER — FUROSEMIDE 20 MG/1
20 TABLET ORAL AS NEEDED
Qty: 30 TABLET | Refills: 0 | Status: SHIPPED | OUTPATIENT
Start: 2022-03-18

## 2022-03-18 NOTE — TELEPHONE ENCOUNTER
Patient is calling to discuss swelling on her stomach, legs and arms. Please reach out to her through [address]          [phone]

## 2022-03-18 NOTE — TELEPHONE ENCOUNTER
Requested Prescriptions     Signed Prescriptions Disp Refills    furosemide (LASIX) 20 mg tablet 30 Tablet 0     Sig: Take 1 Tablet by mouth as needed (swelling). Authorizing Provider: Lilian Hansen     Ordering User: Betty Busby       Per Dr. Matias So     No future appointments.

## 2022-03-19 PROBLEM — K82.8 BILIARY DYSKINESIA: Status: ACTIVE | Noted: 2017-02-06

## 2022-03-19 PROBLEM — R68.89 COLD INTOLERANCE: Status: ACTIVE | Noted: 2017-09-11

## 2022-03-19 PROBLEM — F43.29 ADJUSTMENT DISORDER WITH MIXED EMOTIONAL FEATURES: Status: ACTIVE | Noted: 2019-09-19

## 2022-03-19 PROBLEM — I51.4: Status: ACTIVE | Noted: 2022-02-17

## 2022-03-19 PROBLEM — R68.89 IMPAIRED REGULATION OF BODY TEMPERATURE: Status: ACTIVE | Noted: 2017-09-11

## 2022-03-20 PROBLEM — E03.9 ACQUIRED HYPOTHYROIDISM: Status: ACTIVE | Noted: 2018-11-20

## 2022-03-20 PROBLEM — S92.909D: Status: ACTIVE | Noted: 2021-05-19

## 2022-03-28 ENCOUNTER — VIRTUAL VISIT (OUTPATIENT)
Dept: INTERNAL MEDICINE CLINIC | Age: 42
End: 2022-03-28
Payer: MEDICAID

## 2022-03-28 DIAGNOSIS — J02.9 ALLERGIC PHARYNGITIS: ICD-10-CM

## 2022-03-28 DIAGNOSIS — E03.9 ACQUIRED HYPOTHYROIDISM: ICD-10-CM

## 2022-03-28 DIAGNOSIS — R06.09 DOE (DYSPNEA ON EXERTION): ICD-10-CM

## 2022-03-28 DIAGNOSIS — R60.9 EDEMA, UNSPECIFIED TYPE: Primary | ICD-10-CM

## 2022-03-28 DIAGNOSIS — E55.9 VITAMIN D DEFICIENCY: ICD-10-CM

## 2022-03-28 DIAGNOSIS — K13.70 ORAL MUCOSAL LESION: ICD-10-CM

## 2022-03-28 DIAGNOSIS — E88.09 HYPOALBUMINEMIA: ICD-10-CM

## 2022-03-28 PROCEDURE — 99214 OFFICE O/P EST MOD 30 MIN: CPT | Performed by: INTERNAL MEDICINE

## 2022-03-28 RX ORDER — LORATADINE 10 MG/1
10 TABLET ORAL DAILY
Qty: 30 TABLET | Refills: 5 | Status: SHIPPED | OUTPATIENT
Start: 2022-03-28

## 2022-03-28 RX ORDER — ERGOCALCIFEROL 1.25 MG/1
50000 CAPSULE ORAL
Qty: 3 CAPSULE | Refills: 4 | Status: SHIPPED | OUTPATIENT
Start: 2022-03-28

## 2022-03-28 NOTE — PROGRESS NOTES
VV    Chief Complaint   Patient presents with    Swelling     follow up on [employer] messages about swelling and decreased urination . There were no vitals taken for this visit. 3 most recent PHQ Screens 7/26/2021   PHQ Not Done -   Little interest or pleasure in doing things Several days   Feeling down, depressed, irritable, or hopeless Nearly every day   Total Score PHQ 2 4   Trouble falling or staying asleep, or sleeping too much Several days   Feeling tired or having little energy Several days   Poor appetite, weight loss, or overeating Several days   Feeling bad about yourself - or that you are a failure or have let yourself or your family down Several days   Trouble concentrating on things such as school, work, reading, or watching TV Several days   Moving or speaking so slowly that other people could have noticed; or the opposite being so fidgety that others notice Several days   Thoughts of being better off dead, or hurting yourself in some way Several days   PHQ 9 Score 11   How difficult have these problems made it for you to do your work, take care of your home and get along with others Very difficult         1. Have you been to the ER, urgent care clinic since your last visit? Hospitalized since your last visit? No    2. Have you seen or consulted any other health care providers outside of the [address] [place] [address] since your last visit? Include any pap smears or colon screening. Urologist did blood work. [facility] [address] [facility] urology.  Cannot remember dr name     Health Maintenance Due   Topic Date Due    Pneumococcal 0-64 years (1 of 2 - PPSV23) Never done    DTaP/Tdap/Td series (1 - Tdap) Never done    Cervical cancer screen  11/11/2020    Flu Vaccine (1) 09/01/2021       Learning Assessment 9/29/2020   PRIMARY LEARNER Patient   HIGHEST LEVEL OF EDUCATION - PRIMARY LEARNER  -   BARRIERS PRIMARY LEARNER -   [address]  CAREGIVER -   PRIMARY LANGUAGE ENGLISH    NEED -   LEARNER PREFERENCE PRIMARY DEMONSTRATION     -     -     -     -   LEARNING SPECIAL TOPICS -   ANSWERED BY self   RELATIONSHIP SELF   ASSESSMENT COMMENT -         AVS  education, follow up, and recommendations provided and addressed with patient.   services used to advise patient -no

## 2022-03-28 NOTE — PROGRESS NOTES
Elian Carpenter (: 1980) is a 37 y.o. female, established patient, here for evaluation of the following chief complaint(s)--see below:    Elian Carpenter is a 37 y.o. female who was seen by synchronous (real-time) audio-video technology on 3/28/2022. Consent: Elian Carpenter, who was seen by synchronous (real-time) audio-video technology, and/or her healthcare decision maker, is aware that this patient-initiated, Telehealth encounter on 3/28/2022 is a billable service, with coverage as determined by her insurance carrier. She is aware that she may receive a bill and has provided verbal consent to proceed: Yes. I was in the office while conducting this encounter. Assessment & Plan:   Diagnoses and all orders for this visit:      ICD-10-CM ICD-9-CM    1. Edema, unspecified type  R60.9 782.3 CBC WITH AUTOMATED DIFF      SED RATE (ESR)      METABOLIC PANEL, COMPREHENSIVE      PROTEIN/CREATININE RATIO, URINE      C REACTIVE PROTEIN, QT   2. Allergic pharyngitis  J02.9 462 loratadine (CLARITIN) 10 mg tablet   3. Acquired hypothyroidism  E03.9 244.9 TSH 3RD GENERATION      T4, FREE      TSH 3RD GENERATION      T4, FREE   4. POE (dyspnea on exertion)  R06.00 786.09 CBC WITH AUTOMATED DIFF   5. Vitamin D deficiency  E55.9 268.9 VITAMIN D, 25 HYDROXY   6. Oral mucosal lesion  K13.70 528.9 REFERRAL TO ENT-OTOLARYNGOLOGY   7. Hypoalbuminemia  E88.09 273.8      Follow-up and Dispositions    · Return in about 4 weeks (around 2022), or if symptoms worsen or fail to improve, for edema . results and schedule of future studies reviewed with patient  reviewed diet, exercise and weight    reviewed medications and side effects in detail    lasix 2-3 x times per week prn  Needs labs and urine to eval ALB and urine protein  Get outside labs - urologist  May need another echo  Ref ENT re: oral lesions/hyperpigmentation. AVS:  [x]  [facility] visit signed.   []  Mailed to patient after visit. []  Not sent to patient after visit. Subjective:   Alirio Muñoz was seen for:  Chief Complaint   Patient presents with    Swelling     follow up on [employer] messages about swelling and decreased urination. has seen urologist and cardiologist, only urinated once yesterday, wants to discuss fluid pill.  Blood Pressure Check     states BP has been running high. states she will send readings via [employer]. has been 131/90, 114/92 and 129/106,      Notes:  Swelling and poor urine output  Saw urology - deferred     Persistent edema despite increased protein in the diet. Also, relatively high BP - pt not stressed or anxious at the time. Spoke to Cards - not thought to be heart related. Pt worried re: heart due to her mother's hx heart disease. Still relative POE - not worsening, but not baseline. Staying hydrated. No caffeine or etoh. Oral hyperpigmentation reported on lips, oral mucosa on 1 side  Started as little ovals that have merged. Nursing screenings reviewed by provider at visit. Past medical, Social, and Family history reviewed  Medications reviewed and updated. Allergies   Allergen Reactions    Amoxicillin Rash    Ativan [Lorazepam] Other (comments)     \"Makes her sick\"    Biaxin [Clarithromycin] Itching and Other (comments)     'eyes turn an funny color and she starts itching'    Biaxin [Clarithromycin] Unknown (comments)    Covid-19 Vaccine, Mrna, [id number], Lnp-S (Moderna) Other (comments)     Myocarditis     Flagyl [Metronidazole] Itching     Eyes change color    Flagyl [Metronidazole] Hives    Zoloft [Sertraline] Other (comments)     Excessive yawning       Prior to Admission medications    Medication Sig Start Date End Date Taking? Authorizing Provider   loratadine (CLARITIN) 10 mg tablet Take 1 Tablet by mouth daily.  3/28/22  Yes Bonnie Whitmore MD   ergocalciferol (ERGOCALCIFEROL) 1,250 mcg (50,000 unit) capsule Take 1 Capsule by mouth every thirty (30) days. 3/28/22  Yes Soledad Cooney MD   levothyroxine (SYNTHROID) 75 mcg tablet TAKE 1 TABLET BY MOUTH EVERY DAY BEFORE BREAKFAST 2/16/22  Yes Soledad Cooney MD   ALPRAZolam Clifm Combe) 1 mg tablet TAKE 1 TO 2 TABLETS BY MOUTH 3 TIMES A DAY AS NEEDED 1/20/22  Yes Soledad Cooney MD   amitriptyline (ELAVIL) 25 mg tablet Take 1 Tablet by mouth nightly. 12/24/21  Yes Soledad Cooney MD   colchicine 0.6 mg tablet Take 1 Tablet by mouth two (2) times a day. 12/6/21  Yes Soledad Cooney MD   PARoxetine (PAXIL) 20 mg tablet Take 1 Tablet by mouth daily. 9/30/21  Yes Soledad Cooney MD   QUEtiapine (SEROquel) 200 mg tablet Take 1 Tablet by mouth two (2) times a day. 9/30/21  Yes Soledad Cooney MD   norgestimate-ethinyl estradioL (Tri-Sprintec, 28,) 0.18/0.215/0.25 mg-35 mcg (28) tab Take 1 Tablet by mouth daily. 7/26/21  Yes Soledad Cooney MD   ibuprofen (MOTRIN) 800 mg tablet Take 1 Tablet by mouth two (2) times daily (with meals). 7/26/21  Yes Soledad Cooney MD   fluticasone propionate [facility]) 50 mcg/actuation nasal spray  5/28/21  Yes Provider, Historical   sodium-potassium-mag sulfate (Suprep Bowel Prep Kit) 17.5-3.13-1.6 gram solr oral solution Take 177 mL by mouth See Admin Instructions. 2/23/21  Yes Soledad Cooney MD   furosemide (LASIX) 20 mg tablet Take 1 Tablet by mouth as needed (swelling). Patient not taking: Reported on 3/28/2022 3/18/22   Catherine Herbert MD   cyanocobalamin (VITAMIN B12) 500 mcg tablet Take 1 Tab by mouth daily. Patient not taking: Reported on 12/6/2021 1/27/21   Soledad Cooney MD   multivit-min-folic acid-biotin 769-448 mcg chew Take 1 Tab by mouth daily. Patient not taking: Reported on 3/28/2022 9/28/20   Soledad Cooney MD   cyanocobalamin, vitamin B-12, 1,000 mcg/mL kit 1 mL by Injection route every month.   Patient not taking: Reported on 3/28/2022 9/15/20   Soledad Cooney MD         ROS     A complete ROS was performed and negative except as noted in HPI     PHYSICAL EXAMINATION:    Vital Signs: There were no vitals taken for this visit. No flowsheet data found. Constitutional: [x] Appears well-developed and well-nourished [x] No apparent distress      Mental status: [x] Alert and awake  [x] Oriented [x] Able to follow commands       Eyes:   EOM    [x]  Normal      Sclera  [x]  Normal              Discharge [x]  None visible       HENT: [x] Normocephalic, atraumatic    [x] Mouth/Throat: Mucous membranes are moist    External Ears [x] Normal      Neck: [x] No visualized mass     Pulmonary/Chest: [x] Respiratory effort normal   [x] No visualized signs of difficulty breathing or respiratory distress    Musculoskeletal:  [x] Normal range of motion of neck    Neurological:        [x] No Facial Asymmetry (Cranial nerve 7 motor function) (limited exam due to video visit)          [x] No gaze palsy     Skin:        [x] No significant exanthematous lesions or discoloration noted on facial skin             Psychiatric:       [x] Normal Affect       Other pertinent observable physical exam findings:  None. Albumin 3.7 (3.8-4.8)    We discussed the expected course, resolution and complications of the diagnosis(es) in detail. Medication risks, benefits, costs, interactions, and alternatives were discussed as indicated. I advised her to contact the office if her condition worsens, changes or fails to improve as anticipated. She expressed understanding with the diagnosis(es) and plan. Sergio Valerio is a 37 y.o. female who was evaluated by a video visit encounter for concerns as above. Sergio Valerio is being evaluated by a Virtual Visit (video visit) encounter to address concerns as mentioned above. A caregiver was present when appropriate.   Due to this being a TeleHealth encounter (During ABP-46 public health emergency), evaluation of the following organ systems was limited: Vitals/Constitutional/EENT/Resp/CV/GI//MS/Neuro/Skin/Heme-Lymph-Imm. Pursuant to the emergency declaration under the [address] [place] [address] and the [employer] Act, this Virtual Visit was conducted with patient's (and/or legal guardian's) consent, to reduce the patient's risk of exposure to COVID-19 and provide necessary medical care. The patient (and/or legal guardian) has also been advised to contact this office for worsening conditions or problems, and seek emergency medical treatment and/or call 911 if deemed necessary. Patient identification was verified at the start of the visit: YES. Services were provided through a video synchronous discussion virtually to substitute for in-person clinic visit. Patient was located at their individual home (or other location as per patient preference). Provider was located in medical office. An electronic signature was used to authenticate this note.   -- Daria Dsouza MD

## 2022-04-06 ENCOUNTER — TELEPHONE (OUTPATIENT)
Dept: INTERNAL MEDICINE CLINIC | Age: 42
End: 2022-04-06

## 2022-04-06 ENCOUNTER — NURSE TRIAGE (OUTPATIENT)
Dept: OTHER | Facility: CLINIC | Age: 42
End: 2022-04-06

## 2022-04-06 NOTE — TELEPHONE ENCOUNTER
----- Message from Av Franco sent at 4/6/2022 11:10 AM EDT -----  Subject: Message to Provider    QUESTIONS  Information for Provider? Patient needs to schedule blood work please call   as soon as possible.  ---------------------------------------------------------------------------  --------------  [address]  What is the best way for the office to contact you? OK to leave message on   voicemail  Preferred Call Back Phone Number?  [phone]  ---------------------------------------------------------------------------  --------------  SCRIPT ANSWERS  undefined

## 2022-04-06 NOTE — TELEPHONE ENCOUNTER
Received call from [address]keon at [facility] with Red Flag Complaint. Subjective: Caller is talking hysterically, very fast, upset, hyperventilating at times. States she was informed in the past that she has high level of acid, that she has acidosis. Recieived results from an outside lab. Repeatedly asking writer what a low CO2 level  means and stating \"I don't want to die\"  States she has left several messages with her Dr. But has not heard back, has been goggling her symptoms. Writer attempted to calm patient down,speaking very calmly and trying to rationalize with her. Current Symptoms: Pain in legs and losing weight. Anxiety is bad,afraid of dying. Denies SI or HI. Onset: Today. Associated Symptoms: NA    Pain Severity: Did not rate    Temperature: Did not evaluate do to nature of call. What has been tried:     LMP:  Pregnant: Unknown    Recommended disposition: Call  Now    Care advice provided, patient verbalizes understanding; denies any other questions or concerns; instructed to call back for any new or worsening symptoms. Outcome:Advised to call 911 now due to level of anxiety/hyteria. Patient refuses. States her significant other is home but will not let me speak to him. Reassuring me that she is o.k. Has no intentions of harming self or others. Attention Provider: Thank you for allowing me to participate in the care of your patient. The patient was connected to triage in response to information provided to the [facility]. Please do not respond through this encounter as the response is not directed to a shared pool.     Reason for Disposition   Hysterical or combative behavior    Protocols used: ANXIETY AND PANIC ATTACK-ADULT-

## 2022-04-08 ENCOUNTER — LAB ONLY (OUTPATIENT)
Dept: INTERNAL MEDICINE CLINIC | Age: 42
End: 2022-04-08

## 2022-04-08 DIAGNOSIS — R06.09 DOE (DYSPNEA ON EXERTION): ICD-10-CM

## 2022-04-08 DIAGNOSIS — E55.9 VITAMIN D DEFICIENCY: ICD-10-CM

## 2022-04-08 DIAGNOSIS — R60.9 EDEMA, UNSPECIFIED TYPE: ICD-10-CM

## 2022-04-08 DIAGNOSIS — E03.9 ACQUIRED HYPOTHYROIDISM: ICD-10-CM

## 2022-04-09 LAB
25(OH)D3+25(OH)D2 SERPL-MCNC: 28.2 NG/ML (ref 30–100)
ALBUMIN SERPL-MCNC: 4.3 G/DL (ref 3.8–4.8)
ALBUMIN/GLOB SERPL: 1.2 {RATIO} (ref 1.2–2.2)
ALP SERPL-CCNC: 60 IU/L (ref 44–121)
ALT SERPL-CCNC: 11 IU/L (ref 0–32)
AST SERPL-CCNC: 20 IU/L (ref 0–40)
BASOPHILS # BLD AUTO: 0 X10E3/UL (ref 0–0.2)
BASOPHILS NFR BLD AUTO: 1 %
BILIRUB SERPL-MCNC: 0.4 MG/DL (ref 0–1.2)
BUN SERPL-MCNC: 5 MG/DL (ref 6–24)
BUN/CREAT SERPL: 6 (ref 9–23)
CALCIUM SERPL-MCNC: 9.3 MG/DL (ref 8.7–10.2)
CHLORIDE SERPL-SCNC: 106 MMOL/L (ref 96–106)
CO2 SERPL-SCNC: 17 MMOL/L (ref 20–29)
CREAT SERPL-MCNC: 0.9 MG/DL (ref 0.57–1)
CRP SERPL-MCNC: 10 MG/L (ref 0–10)
EOSINOPHIL # BLD AUTO: 0.2 X10E3/UL (ref 0–0.4)
EOSINOPHIL NFR BLD AUTO: 3 %
ERYTHROCYTE [DISTWIDTH] IN BLOOD BY AUTOMATED COUNT: 12.2 % (ref 11.7–15.4)
ERYTHROCYTE [SEDIMENTATION RATE] IN BLOOD BY WESTERGREN METHOD: 38 MM/HR (ref 0–32)
GLOBULIN SER CALC-MCNC: 3.6 G/DL (ref 1.5–4.5)
GLUCOSE SERPL-MCNC: 91 MG/DL (ref 65–99)
HCT VFR BLD AUTO: 41.5 % (ref 34–46.6)
HGB BLD-MCNC: 13.6 G/DL (ref 11.1–15.9)
IMM GRANULOCYTES # BLD AUTO: 0 X10E3/UL (ref 0–0.1)
IMM GRANULOCYTES NFR BLD AUTO: 0 %
LYMPHOCYTES # BLD AUTO: 2.4 X10E3/UL (ref 0.7–3.1)
LYMPHOCYTES NFR BLD AUTO: 47 %
MCH RBC QN AUTO: 28.3 PG (ref 26.6–33)
MCHC RBC AUTO-ENTMCNC: 32.8 G/DL (ref 31.5–35.7)
MCV RBC AUTO: 86 FL (ref 79–97)
MONOCYTES # BLD AUTO: 0.5 X10E3/UL (ref 0.1–0.9)
MONOCYTES NFR BLD AUTO: 11 %
NEUTROPHILS # BLD AUTO: 1.9 X10E3/UL (ref 1.4–7)
NEUTROPHILS NFR BLD AUTO: 38 %
PLATELET # BLD AUTO: 312 X10E3/UL (ref 150–450)
POTASSIUM SERPL-SCNC: 4.3 MMOL/L (ref 3.5–5.2)
PROT SERPL-MCNC: 7.9 G/DL (ref 6–8.5)
RBC # BLD AUTO: 4.81 X10E6/UL (ref 3.77–5.28)
SODIUM SERPL-SCNC: 141 MMOL/L (ref 134–144)
T4 FREE SERPL-MCNC: 1.4 NG/DL (ref 0.82–1.77)
TSH SERPL DL<=0.005 MIU/L-ACNC: 3.11 UIU/ML (ref 0.45–4.5)
WBC # BLD AUTO: 5 X10E3/UL (ref 3.4–10.8)

## 2022-04-11 ENCOUNTER — PATIENT MESSAGE (OUTPATIENT)
Dept: INTERNAL MEDICINE CLINIC | Age: 42
End: 2022-04-11

## 2022-04-11 NOTE — PROGRESS NOTES
Vitamin D is a little low - take an additional 1000 units per day of vitamin D. Other labs are normal or stable and at goal.  Keep up the good work!    Continue your current medications

## 2022-04-15 NOTE — TELEPHONE ENCOUNTER
Len Mason MD 4/14/2022 4:25 PM EDT    Please obtain records from [facility] EMERGENCY [facility] admission and arrange for a virtual FRANSISCA visit with me.  ----- Message -----  From: Stephen Filomena  Sent: 4/14/2022 4:23 PM EDT  To: Tad Hummel MD  Subject: my dimer is messed up     dr Jam Sanchez home now. I have been feeling really dizzy light headed and fatigued. I had a heart monitor on and they notice when I got up out the bed my heart rate would go up real high they could tell at the nurses station when my heart rate would go up and be abnormal. When I was in the hospital my blood pressure was low and when I left the hospital now my blood pressure is high again. They did a stress test on me and I got so winded and was so weak it took three people standing around me to try to do the test and when they saw I couldn't breathe they stop the test. I notice that when I try to communicate with people the wrong words are coming out and they think that I'm not getting enough oxygen to my brain. If you could please call me or have the nurse set up and virtual so I could talk to you regarding all of this stuff going on I just want to get better that's all I can't go up the steps at home because I get super winded and I have the chest tightness and when I stand up for   maybe 10 minutes or more I start getting dizzy and lightheaded. My potassium was real low so they were giving me two potassium pills three times a day and they gave me an IV of magnesium because my magnesium was low I'm not feeling very well I didn't want to stay in the hospital. I think maybe I just need to stay in one spot because moving around is very uncomfortable for me right now I can barely make it to the bathroom without getting winded and being extremely tired.

## 2022-04-15 NOTE — TELEPHONE ENCOUNTER
Request for medical records has been faxed to [facility] medical records dept. [address] [phone]. Transmission log received and placed in scanning.

## 2022-04-19 ENCOUNTER — TELEPHONE (OUTPATIENT)
Dept: CARDIOLOGY CLINIC | Age: 42
End: 2022-04-19

## 2022-04-19 ENCOUNTER — VIRTUAL VISIT (OUTPATIENT)
Dept: INTERNAL MEDICINE CLINIC | Age: 42
End: 2022-04-19
Payer: MEDICAID

## 2022-04-19 DIAGNOSIS — R60.9 EDEMA, UNSPECIFIED TYPE: ICD-10-CM

## 2022-04-19 DIAGNOSIS — I51.4: ICD-10-CM

## 2022-04-19 DIAGNOSIS — E88.09 HYPOALBUMINEMIA: ICD-10-CM

## 2022-04-19 DIAGNOSIS — F41.0 PANIC DISORDER: ICD-10-CM

## 2022-04-19 DIAGNOSIS — E55.9 VITAMIN D DEFICIENCY: ICD-10-CM

## 2022-04-19 DIAGNOSIS — E87.20 METABOLIC ACIDOSIS: ICD-10-CM

## 2022-04-19 DIAGNOSIS — E03.9 ACQUIRED HYPOTHYROIDISM: ICD-10-CM

## 2022-04-19 DIAGNOSIS — F41.8 DEPRESSION WITH ANXIETY: ICD-10-CM

## 2022-04-19 DIAGNOSIS — E87.6 HYPOKALEMIA: ICD-10-CM

## 2022-04-19 DIAGNOSIS — R06.09 DOE (DYSPNEA ON EXERTION): Primary | ICD-10-CM

## 2022-04-19 DIAGNOSIS — F41.1 GAD (GENERALIZED ANXIETY DISORDER): ICD-10-CM

## 2022-04-19 PROCEDURE — 99215 OFFICE O/P EST HI 40 MIN: CPT | Performed by: INTERNAL MEDICINE

## 2022-04-19 RX ORDER — COLCHICINE 0.6 MG/1
0.6 TABLET ORAL 2 TIMES DAILY
Qty: 20 TABLET | Refills: 0 | Status: SHIPPED | OUTPATIENT
Start: 2022-04-28 | End: 2022-05-08

## 2022-04-19 RX ORDER — METOPROLOL SUCCINATE 25 MG/1
25 TABLET, EXTENDED RELEASE ORAL DAILY
Qty: 30 TABLET | Refills: 5 | Status: SHIPPED | OUTPATIENT
Start: 2022-04-28 | End: 2022-10-21 | Stop reason: SDUPTHER

## 2022-04-19 NOTE — PROGRESS NOTES
VV    Chief Complaint   [facility] presents with   [facility]     [facility] EM[facility]GENCY MEDICAL CENTER 4/11/22-4/14/22       There were no vitals taken for this visit. 3 most recent PHQ Screens 7/26/2021   PHQ Not Done -   Little interest or pleasure in doing things Several days   Feeling down, depressed, irritable, or hopeless Nearly every day   Total Score PHQ 2 4   Trouble falling or staying asleep, or sleeping too much Several days   Feeling tired or having little energy Several days   Poor appetite, weight loss, or overeating Several days   Feeling bad about yourself - or that you are a failure or have let yourself or your family down Several days   Trouble concentrating on things such as school, work, reading, or watching TV Several days   Moving or speaking so slowly that other people could have noticed; or the opposite being so fidgety that others notice Several days   Thoughts of being better off dead, or hurting yourself in some way Several days   PHQ 9 Score 11   How difficult have these problems made it for you to do your work, take care of your home and get along with others Very difficult         1. Have you been to the ER, urgent care clinic since your last visit? Hospitalized since your last visit? [facility] EM[facility]GENC[facility] 4/11-4/14    2. Have you seen or consulted any other health care providers outside of the [facility] since your last visit? Include any pap smears or colon screening. Dr. Niki Taylor for pulm.      Health Maintenance Due   Topic Date Due    Pneumococcal 0-64 years (1 - PCV) Never done    DTaP/Tdap/Td series (1 - Tdap) Never done    Cervical cancer screen  11/11/2020       Learning Assessment 9/29/2020   PRIMARY LEARNER Patient   HIGHEST LEVEL OF EDUCATION - PRIMARY LEARNER  -   BARRIERS PRIMARY LEARNER -   CO-LEARNER CAREGIVER -   PRIMARY LANGUAGE ENGLISH    NEED -   LEARNER PREFERENCE PRIMARY DEMONSTRATION     -     -     -     -   LEARNING SPECIAL TOPICS -   ANSWERED BY self   RELATIONSHIP SELF ASSESSMENT COMMENT -         AVS  education, follow up, and recommendations provided and addressed with patient.   services used to advise patient -no

## 2022-04-19 NOTE — PROGRESS NOTES
Crow Nichols (: 1980) is a 37 y.o. female, established patient, here for evaluation of the following chief complaint(s)--see below:    Crow Nichols is a 37 y.o. female who was seen by synchronous (real-time) audio-video technology on 2022. Consent: Crow Nichols, who was seen by synchronous (real-time) audio-video technology, and/or her healthcare decision maker, is aware that this patient-initiated, Telehealth encounter on 2022 is a billable service, with coverage as determined by her insurance carrier. She is aware that she may receive a bill and has provided verbal consent to proceed: Yes. I was in the office while conducting this encounter. 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 POE (dyspnea on exertion)  R06.00 786.09 CBC WITH AUTOMATED DIFF   2. Hypoalbuminemia  E88.09 273.8 PROTEIN/CREATININE RATIO, URINE      METABOLIC PANEL, COMPREHENSIVE   3. Hypokalemia  D61.0 737.3 METABOLIC PANEL, COMPREHENSIVE      MAGNESIUM   4. Vitamin D deficiency  E55.9 268.9 VITAMIN D, 25 HYDROXY   5. Acquired hypothyroidism  E03.9 244.9 TSH 3RD GENERATION      T4, FREE   6. Edema, unspecified type  R60.9 782.3 CBC WITH AUTOMATED DIFF   7. Myocarditis determined by echocardiography ([address]  I51.4 429.0 SED RATE (ESR)      C REACTIVE PROTEIN, QT   8. DEEPA (generalized anxiety disorder)  F41.1 300.02    9. Panic disorder  F41.0 300.01    10. Depression with anxiety  F41.8 300.4    11. Metabolic acidosis  R20.4 235.3      Follow-up and Dispositions    · Return in about 4 weeks (around 2022), or if symptoms worsen or fail to improve, for follow up.         results and schedule of future studies reviewed with patient  reviewed diet, exercise and weight   cardiovascular risk and specific lipid/LDL goals reviewed  reviewed medications and side effects in detail    See cardiology - consider repeat echo  See Renal - consider sodium bicarb  Maintain diet to include protein  Colchicine for myocarditis  Resume metoprolol XL 25 mg daily       AVS:  [x]  Available to patient in MyChart after visit signed. []  Mailed to patient after visit. []  Not sent to patient after visit. Subjective:   Tutu Perez was seen for:  Chief Complaint   Patient presents with   [facility] Follow Up     [facility] EMERGENCY [facility] CENTER 4/11/22-4/14/22. Notes:  Sent from Cardiology office - Dr. Rabia Garza - to ER  Admitted with palpitations, swelling, weakness, D-dimer elevated and found elevation in BNP as well  No PE or DVT identified  Echo done - apparent LV dysfunction    Albumin was low along with electrolyte imbalance and hypokalemia, hypomagnesemia. Tachycardia with minimal activity in the hospital.    Persistent POE with stairs and even crossing the room    Pt reviewed symptoms with Dr. Terri Araiza - pulmonary, today. Reports echo shows LV dysfunction. Has seen kidney doctor - known acidosis  Dx RTA vs other metabolic acidosis    Nursing screenings reviewed by provider at visit. Past medical, Social, and Family history reviewed  Medications reviewed and updated. Allergies   Allergen Reactions    Amoxicillin Rash    Ativan [Lorazepam] Other (comments)     \"Makes her sick\"    Biaxin [Clarithromycin] Itching and Other (comments)     'eyes turn an funny color and she starts itching'    Biaxin [Clarithromycin] Unknown (comments)    Covid-19 Vaccine, Mrna, [id number], Lnp-S (Moderna) Other (comments)     Myocarditis     Flagyl [Metronidazole] Itching     Eyes change color    Flagyl [Metronidazole] Hives    Zoloft [Sertraline] Other (comments)     Excessive yawning       Prior to Admission medications    Medication Sig Start Date End Date Taking? Authorizing Provider   colchicine 0.6 mg tablet Take 1 Tablet by mouth two (2) times a day for 10 days.  4/28/22 5/8/22 Yes Dino Post MD metoprolol succinate (TOPROL-XL) 25 mg XL tablet Take 1 Tablet by mouth daily. 4/28/22  Yes Pako Brambila MD   loratadine (CLARITIN) 10 mg tablet Take 1 Tablet by mouth daily. 3/28/22  Yes Pako Brambila MD   ergocalciferol (ERGOCALCIFEROL) 1,250 mcg (50,000 unit) capsule Take 1 Capsule by mouth every thirty (30) days. 3/28/22  Yes Pako Brambila MD   furosemide (LASIX) 20 mg tablet Take 1 Tablet by mouth as needed (swelling). 3/18/22  Yes Brian Cole MD   levothyroxine (SYNTHROID) 75 mcg tablet TAKE 1 TABLET BY MOUTH EVERY DAY BEFORE BREAKFAST 2/16/22  Yes Pako Brambila MD   ALPRAZolam [place]) 1 mg tablet TAKE 1 TO 2 TABLETS BY MOUTH 3 TIMES A DAY AS NEEDED 1/20/22  Yes Pako Brambila MD   amitriptyline (ELAVIL) 25 mg tablet Take 1 Tablet by mouth nightly. 12/24/21  Yes Pako Brambila MD   PARoxetine (PAXIL) 20 mg tablet Take 1 Tablet by mouth daily. 9/30/21  Yes Pako Brambila MD   QUEtiapine (SEROquel) 200 mg tablet Take 1 Tablet by mouth two (2) times a day. 9/30/21  Yes Pako Brambila MD   norgestimate-ethinyl estradioL (Tri-Sprintec, 28,) 0.18/0.215/0.25 mg-35 mcg (28) tab Take 1 Tablet by mouth daily. 7/26/21  Yes Pako Brambila MD   ibuprofen (MOTRIN) 800 mg tablet Take 1 Tablet by mouth two (2) times daily (with meals). 7/26/21  Yes Pako Brambila MD   fluticasone propionate [facility]) 50 mcg/actuation nasal spray  5/28/21  Yes Provider, Historical   sodium-potassium-mag sulfate (Suprep Bowel Prep Kit) 17.5-3.13-1.6 gram solr oral solution Take 177 mL by mouth See Admin Instructions. 2/23/21  Yes Pako Brambila MD   multivit-min-folic acid-biotin 785-651 mcg chew Take 1 Tab by mouth daily. 9/28/20  Yes Pako Brambila MD   colchicine 0.6 mg tablet Take 1 Tablet by mouth two (2) times a day. Patient not taking: Reported on 4/19/2022 12/6/21   Pako Brambila MD   cyanocobalamin (VITAMIN B12) 500 mcg tablet Take 1 Tab by mouth daily.   Patient not taking: Reported on 12/6/2021 1/27/21   Brayan Lyons MD   cyanocobalamin, vitamin B-12, 1,000 mcg/mL kit 1 mL by Injection route every month. Patient not taking: Reported on 3/28/2022 9/15/20   Brayan Lyons MD         ROS     A complete ROS was performed and negative except as noted in HPI     PHYSICAL EXAMINATION:    Vital Signs: There were no vitals taken for this visit. No flowsheet data found. Constitutional: [x] Appears well-developed and well-nourished [x] No apparent distress      Mental status: [x] Alert and awake  [x] Oriented [x] Able to follow commands       Eyes:   EOM    [x]  Normal      Sclera  [x]  Normal              Discharge [x]  None visible       HENT: [x] Normocephalic, atraumatic    [x] Mouth/Throat: Mucous membranes are moist    External Ears [x] Normal      Neck: [x] No visualized mass     Pulmonary/Chest: [x] Respiratory effort normal   [x] No visualized signs of difficulty breathing or respiratory distress while at rest.    Musculoskeletal:  [x] Normal range of motion of neck    Neurological:        [x] No Facial Asymmetry (Cranial nerve 7 motor function) (limited exam due to video visit)          [x] No gaze palsy     Skin:        [x] No significant exanthematous lesions or discoloration noted on facial skin             Psychiatric:       [x] Normal Affect       Other pertinent observable physical exam findings:  None. We discussed the expected course, resolution and complications of the diagnosis(es) in detail. Medication risks, benefits, costs, interactions, and alternatives were discussed as indicated. I advised her to contact the office if her condition worsens, changes or fails to improve as anticipated. She expressed understanding with the diagnosis(es) and plan. Cindy Romero is a 37 y.o. female who was evaluated by a video visit encounter for concerns as above.       Cindy Romero is being evaluated by a Virtual Visit (video visit) encounter to address concerns as mentioned above. A caregiver was present when appropriate. Due to this being a TeleHealth encounter (During MZNCJ-14 public health emergency), evaluation of the following organ systems was limited: Vitals/Constitutional/EENT/Resp/CV/GI//MS/Neuro/Skin/Heme-Lymph-Imm. Pursuant to the emergency declaration under the [address] [place] [address], [address] [place] [address] and the [employer] Act, this Virtual Visit was conducted with patient's (and/or legal guardian's) consent, to reduce the patient's risk of exposure to COVID-19 and provide necessary medical care. The patient (and/or legal guardian) has also been advised to contact this office for worsening conditions or problems, and seek emergency medical treatment and/or call 911 if deemed necessary. Patient identification was verified at the start of the visit: YES. Services were provided through a video synchronous discussion virtually to substitute for in-person clinic visit. Patient was located at their individual home (or other location as per patient preference). Provider was located in medical office. On this date 04/19/2022 I have spent 52 minutes reviewing previous notes, test results and face to face with the patient discussing the diagnosis and importance of compliance with the treatment plan as well as documenting on the day of the visit. An electronic signature was used to authenticate this note.   -- Lilia Montesinos MD

## 2022-04-19 NOTE — TELEPHONE ENCOUNTER
Verified patient with two types of identifiers. Patient states she was recently admitted to [address] [place] [address] for about a week for increased SOB and palpitations. Patient is frusterated because she was previously told nothing was wrong with her heart but now her PCP and pulmonologist are saying it is her heart. Verified that she was not having SOB or palpitations when she last saw Dr. Hilary Masterson. Appointment has been schedule to see Dr. Hilary Masterson this Friday. Will request records for further assessment. Patient verbalized understanding and will call with any other questions.       Future Appointments   Date Time Provider Rafat Petit   4/22/2022  9:20 AM MD MICHAEL Flower AMB

## 2022-04-21 ENCOUNTER — TELEPHONE (OUTPATIENT)
Dept: CARDIOLOGY CLINIC | Age: 42
End: 2022-04-21

## 2022-04-21 NOTE — TELEPHONE ENCOUNTER
Returned call to patient. She is looking for a woman primary cardiologist.    She has made an appointment with one.     Informed her that if it is determined that she has heart failure to call us back and will schedule an appointment w/Dr. Deven Kuhn

## 2022-05-05 ENCOUNTER — TRANSCRIBE ORDER (OUTPATIENT)
Dept: SCHEDULING | Age: 42
End: 2022-05-05

## 2022-05-05 ENCOUNTER — LAB ONLY (OUTPATIENT)
Dept: INTERNAL MEDICINE CLINIC | Age: 42
End: 2022-05-05

## 2022-05-05 DIAGNOSIS — E03.9 ACQUIRED HYPOTHYROIDISM: ICD-10-CM

## 2022-05-05 DIAGNOSIS — E88.09 HYPOALBUMINEMIA: ICD-10-CM

## 2022-05-05 DIAGNOSIS — I51.4 MYOCARDITIS, UNSPECIFIED (HCC): ICD-10-CM

## 2022-05-05 DIAGNOSIS — E87.6 HYPOKALEMIA: ICD-10-CM

## 2022-05-05 DIAGNOSIS — R60.9 EDEMA, UNSPECIFIED TYPE: ICD-10-CM

## 2022-05-05 DIAGNOSIS — I50.9 HEART FAILURE, UNSPECIFIED (HCC): Primary | ICD-10-CM

## 2022-05-05 DIAGNOSIS — I51.4: ICD-10-CM

## 2022-05-05 DIAGNOSIS — E55.9 VITAMIN D DEFICIENCY: ICD-10-CM

## 2022-05-05 DIAGNOSIS — I50.20 HEART FAILURE, SYSTOLIC (HCC): ICD-10-CM

## 2022-05-05 DIAGNOSIS — R06.09 DOE (DYSPNEA ON EXERTION): ICD-10-CM

## 2022-05-06 ENCOUNTER — TRANSCRIBE ORDER (OUTPATIENT)
Dept: SCHEDULING | Age: 42
End: 2022-05-06

## 2022-05-06 DIAGNOSIS — R79.89 ELEVATED D-DIMER: Primary | ICD-10-CM

## 2022-05-06 NOTE — PROGRESS NOTES
Sed rate is a non-specific marker of inflammation and is likely elevated due to your immune system responding to the vaccine and heart irritation. Vitamin D is slightly low - continue to take daily vitamin D.   Your kidney function (creatinine) and sodium and potassium are normal.  Other labs are normal or stable and at goal.

## 2022-05-09 LAB
25(OH)D3+25(OH)D2 SERPL-MCNC: 29.8 NG/ML (ref 30–100)
ALBUMIN SERPL-MCNC: 4 G/DL (ref 3.8–4.8)
ALBUMIN/GLOB SERPL: 1.3 {RATIO} (ref 1.2–2.2)
ALP SERPL-CCNC: 57 IU/L (ref 44–121)
ALT SERPL-CCNC: 9 IU/L (ref 0–32)
AST SERPL-CCNC: 11 IU/L (ref 0–40)
BASOPHILS # BLD AUTO: 0 X10E3/UL (ref 0–0.2)
BASOPHILS NFR BLD AUTO: 1 %
BILIRUB SERPL-MCNC: 0.3 MG/DL (ref 0–1.2)
BUN SERPL-MCNC: 6 MG/DL (ref 6–24)
BUN/CREAT SERPL: 7 (ref 9–23)
CALCIUM SERPL-MCNC: 8.9 MG/DL (ref 8.7–10.2)
CHLORIDE SERPL-SCNC: 103 MMOL/L (ref 96–106)
CO2 SERPL-SCNC: 18 MMOL/L (ref 20–29)
CREAT SERPL-MCNC: 0.92 MG/DL (ref 0.57–1)
CRP SERPL-MCNC: 8 MG/L (ref 0–10)
EOSINOPHIL # BLD AUTO: 0.2 X10E3/UL (ref 0–0.4)
EOSINOPHIL NFR BLD AUTO: 3 %
ERYTHROCYTE [DISTWIDTH] IN BLOOD BY AUTOMATED COUNT: 12.9 % (ref 11.7–15.4)
ERYTHROCYTE [SEDIMENTATION RATE] IN BLOOD BY WESTERGREN METHOD: 51 MM/HR (ref 0–32)
GLOBULIN SER CALC-MCNC: 3.2 G/DL (ref 1.5–4.5)
GLUCOSE SERPL-MCNC: 75 MG/DL (ref 65–99)
HCT VFR BLD AUTO: 40.5 % (ref 34–46.6)
HGB BLD-MCNC: 13 G/DL (ref 11.1–15.9)
IMM GRANULOCYTES # BLD AUTO: 0 X10E3/UL (ref 0–0.1)
IMM GRANULOCYTES NFR BLD AUTO: 0 %
LYMPHOCYTES # BLD AUTO: 2 X10E3/UL (ref 0.7–3.1)
LYMPHOCYTES NFR BLD AUTO: 42 %
MAGNESIUM SERPL-MCNC: 1.8 MG/DL (ref 1.6–2.3)
MCH RBC QN AUTO: 28.1 PG (ref 26.6–33)
MCHC RBC AUTO-ENTMCNC: 32.1 G/DL (ref 31.5–35.7)
MCV RBC AUTO: 88 FL (ref 79–97)
MONOCYTES # BLD AUTO: 0.5 X10E3/UL (ref 0.1–0.9)
MONOCYTES NFR BLD AUTO: 9 %
NEUTROPHILS # BLD AUTO: 2.2 X10E3/UL (ref 1.4–7)
NEUTROPHILS NFR BLD AUTO: 45 %
PLATELET # BLD AUTO: 302 X10E3/UL (ref 150–450)
POTASSIUM SERPL-SCNC: 4 MMOL/L (ref 3.5–5.2)
PROT SERPL-MCNC: 7.2 G/DL (ref 6–8.5)
RBC # BLD AUTO: 4.62 X10E6/UL (ref 3.77–5.28)
SODIUM SERPL-SCNC: 138 MMOL/L (ref 134–144)
T4 FREE SERPL-MCNC: 1.15 NG/DL (ref 0.82–1.77)
TSH SERPL DL<=0.005 MIU/L-ACNC: 1.37 UIU/ML (ref 0.45–4.5)
WBC # BLD AUTO: 4.9 X10E3/UL (ref 3.4–10.8)

## 2022-05-19 ENCOUNTER — TELEPHONE (OUTPATIENT)
Dept: NEUROLOGY | Age: 42
End: 2022-05-19

## 2022-05-19 ENCOUNTER — OFFICE VISIT (OUTPATIENT)
Dept: NEUROLOGY | Age: 42
End: 2022-05-19
Payer: MEDICAID

## 2022-05-19 DIAGNOSIS — R20.2 TINGLING OF SKIN: Primary | ICD-10-CM

## 2022-05-19 PROCEDURE — 95923 AUTONOMIC NRV SYST FUNJ TEST: CPT | Performed by: PSYCHIATRY & NEUROLOGY

## 2022-05-19 PROCEDURE — 95924 ANS PARASYMP & SYMP W/TILT: CPT | Performed by: PSYCHIATRY & NEUROLOGY

## 2022-05-19 NOTE — PROCEDURES
[facility]  [address]  [facility], [address] [place], [address] [facility]  Phone: [phone]  FAX: [phone]     Clinical Autonomic Testing Report     Patient ID:  Aldo Neal  [id number]  37 y.o.  1980     REFERRED BY: Jocelin Hearn MD  PCP: Brenton Gutierrez MD    Date of Testin2022      Indication/History:    Several months of chest pain, SOB, palpitations, dizziness after COVID vaccination with Echo EF 45-50% (+) hypothyroidism (+) HTN (+) tingling of both feet (+) anxiety    Patient is coming for syncope/autonomic dysfunction evaluation. Medications taken 48 hrs before the test: Seroquel     Procedure: This Autonomic Nervous System (ANS) testing is performed by utilizing [address] [place] [address] [employer] Autonomic System, with established protocol. Multiple procedures performed: Heart rate response to deep breathing (HRDB), Valsalva ratio, Heart rate and BP response to head up tilt (HUT), and Quantitative sudomotor axon reflex testing (QSWEAT) . Result:  1. Heart response to deep  breathing (HRDB): 2 series of 6 cycles were performed and the mean of 6 consecutive cycles was obtained. Average HR difference was 7.43 and E:I ratio was 1.10.    2. Heart rate response to Valsalva maneuver:  [id number] BP to Valsalva was measured and BP response in all 4 phases was normal. Heart response was the opposite of BP, a normal response. ( VR = 2.28 )  3. HUT (head-up tilt) : [id number] BP and HR were measured, up to 15 minutes post tilt. There is note of sinus tachycardia without associated symptoms or accompanying hypotension  4. SUDOMOTOR: QSWEAT response showed decreased sweat production in all 4 localities (forearm, proximal leg, distal leg, foot) of the right upper and lower limbs, comparing patient to age group. Impression:     1.  Sinus tachycardia without associated symptoms or accompanying hypotension noted on tilt table testing, which is non-specific in etiology    2.  Decreased sweat production in all 4 localities (forearm, proximal leg, distal leg, foot) which can be seen in small fiber polyneuropathy or medication effect (I.e Seroquel)         Kassy Palumbo MD  Diplomate, American Board of Psychiatry and Neurology  Diplomate, Neuromuscular Medicine  Diplomate, American Board of Electrodiagnostic Medicine    Note: Raw Data will be scanned separately in Media

## 2022-05-19 NOTE — TELEPHONE ENCOUNTER
Please call the patient. She needs a call today because her appt. Is schedule 1 p.m. today but she needed it to be scheduled for morning. Please call.

## 2022-05-25 ENCOUNTER — CLINICAL SUPPORT (OUTPATIENT)
Dept: INTERNAL MEDICINE CLINIC | Age: 42
End: 2022-05-25
Payer: MEDICAID

## 2022-05-25 DIAGNOSIS — E53.8 B12 DEFICIENCY: Primary | ICD-10-CM

## 2022-05-25 PROCEDURE — 96372 THER/PROPH/DIAG INJ SC/IM: CPT | Performed by: INTERNAL MEDICINE

## 2022-05-25 RX ORDER — CYANOCOBALAMIN 1000 UG/ML
1000 INJECTION, SOLUTION INTRAMUSCULAR; SUBCUTANEOUS ONCE
Status: COMPLETED | OUTPATIENT
Start: 2022-05-25 | End: 2022-05-25

## 2022-05-25 RX ADMIN — CYANOCOBALAMIN 1000 MCG: 1000 INJECTION, SOLUTION INTRAMUSCULAR; SUBCUTANEOUS at 15:08

## 2022-05-25 NOTE — PROGRESS NOTES
Chief Complaint   Patient presents with    Immunization/Injection     B12 injection     After obtaining consent, and per orders of Dr. Isaac Jones, injection of cyanocobalamin 1000 mcg/ml given by Sushil Do LPN. Patient instructed to remain in clinic for 20 minutes afterwards, and to report any adverse reaction to me immediately.

## 2022-05-27 DIAGNOSIS — F41.0 PANIC DISORDER: ICD-10-CM

## 2022-05-27 DIAGNOSIS — F41.1 GAD (GENERALIZED ANXIETY DISORDER): ICD-10-CM

## 2022-05-27 DIAGNOSIS — F41.0 PANIC ATTACKS: ICD-10-CM

## 2022-05-31 RX ORDER — ALPRAZOLAM 1 MG/1
TABLET ORAL
Qty: 150 TABLET | Refills: 2 | Status: SHIPPED | OUTPATIENT
Start: 2022-05-31 | End: 2022-10-21 | Stop reason: SDUPTHER

## 2022-06-20 ENCOUNTER — VIRTUAL VISIT (OUTPATIENT)
Dept: INTERNAL MEDICINE CLINIC | Age: 42
End: 2022-06-20
Payer: MEDICAID

## 2022-06-20 DIAGNOSIS — I51.4: ICD-10-CM

## 2022-06-20 DIAGNOSIS — R20.2 PARESTHESIA OF BOTH FEET: Primary | ICD-10-CM

## 2022-06-20 DIAGNOSIS — E03.9 ACQUIRED HYPOTHYROIDISM: ICD-10-CM

## 2022-06-20 DIAGNOSIS — E55.9 VITAMIN D DEFICIENCY: ICD-10-CM

## 2022-06-20 DIAGNOSIS — E53.8 B12 DEFICIENCY: ICD-10-CM

## 2022-06-20 DIAGNOSIS — F41.1 GAD (GENERALIZED ANXIETY DISORDER): ICD-10-CM

## 2022-06-20 PROCEDURE — 99214 OFFICE O/P EST MOD 30 MIN: CPT | Performed by: INTERNAL MEDICINE

## 2022-06-20 NOTE — PROGRESS NOTES
VV    Chief Complaint   Patient presents with    Swelling     feet swelling        There were no vitals taken for this visit. 3 most recent PHQ Screens 7/26/2021   PHQ Not Done -   Little interest or pleasure in doing things Several days   Feeling down, depressed, irritable, or hopeless Nearly every day   Total Score PHQ 2 4   Trouble falling or staying asleep, or sleeping too much Several days   Feeling tired or having little energy Several days   Poor appetite, weight loss, or overeating Several days   Feeling bad about yourself - or that you are a failure or have let yourself or your family down Several days   Trouble concentrating on things such as school, work, reading, or watching TV Several days   Moving or speaking so slowly that other people could have noticed; or the opposite being so fidgety that others notice Several days   Thoughts of being better off dead, or hurting yourself in some way Several days   PHQ 9 Score 11   How difficult have these problems made it for you to do your work, take care of your home and get along with others Very difficult         1. Have you been to the ER, urgent care clinic since your last visit? Hospitalized since your last visit? No    2. Have you seen or consulted any other health care providers outside of the [address] [place] [address] since your last visit? Include any pap smears or colon screening.  No    Health Maintenance Due   Topic Date Due    Pneumococcal 0-64 years (1 - PCV) Never done    DTaP/Tdap/Td series (1 - Tdap) Never done    Cervical cancer screen  11/11/2020       Learning Assessment 9/29/2020   PRIMARY LEARNER Patient   HIGHEST LEVEL OF EDUCATION - PRIMARY LEARNER  -   BARRIERS PRIMARY LEARNER -   CO-LEARNER CAREGIVER -   PRIMARY LANGUAGE ENGLISH    NEED -   LEARNER PREFERENCE PRIMARY DEMONSTRATION     -     -     -     -   LEARNING SPECIAL TOPICS -   ANSWERED BY self   RELATIONSHIP SELF   ASSESSMENT COMMENT -         AVS  education, follow up, and recommendations provided and addressed with patient.   services used to advise patient -no

## 2022-06-20 NOTE — PROGRESS NOTES
Marce Diamond (: 1980) is a 37 y.o. female, established patient, here for evaluation of the following chief complaint(s)--see below:    Marce Diamond is a 37 y.o. female who was seen by synchronous (real-time) audio-video technology on 2022. Consent: Marce Diamond, who was seen by synchronous (real-time) audio-video technology, and/or her healthcare decision maker, is aware that this patient-initiated, Telehealth encounter on 2022 is a billable service, with coverage as determined by her insurance carrier. She is aware that she may receive a bill and has provided verbal consent to proceed: Yes. I was in the office while conducting this encounter. Assessment & Plan:   Diagnoses and all orders for this visit:    Neuropathy (most likely) vs circulation (less likely)      ICD-10-CM ICD-9-CM    1. Paresthesia of both feet  R20.2 782.0 DUPLEX LOWER EXT ARTERY BILAT      DUPLEX UPPER EXT ART BILAT W WBI      REFERRAL TO NEUROLOGY   2. DEEPA (generalized anxiety disorder)  F41.1 300.02    3. B12 deficiency  E53.8 266.2    4. Myocarditis determined by echocardiography ([facility] [address]  I51.4 429.0    5. Acquired hypothyroidism  E03.9 244.9    6. Vitamin D deficiency  E55.9 268.9      Follow-up and Dispositions    · Return in about 2 months (around 2022), or if symptoms worsen or fail to improve. results and schedule of future studies reviewed with patient  reviewed diet, exercise and weight    reviewed medications and side effects in detail    arterial dopplers AMARI RAMOS  Ref neuro for EMG testing to clarify neuropathy  Pt asked to avoid Moderna vaccine   Could consider novavax instead. AVS:  [x]  Available to patient in MyChart after visit signed. []  Mailed to patient after visit. []  Not sent to patient after visit.       Future Appointments   Date Time Provider Rafat Petit   2022  4:00 PM Sharon Leal MD [facility] AMB   2022  1:45 PM [facility]          Subjective:   Diamond Pisano was seen for:  Chief Complaint   Patient presents with    Swelling     feet swelling. states they are tingling now. ongoing. both feet and hands      Notes:     Feet tingling despite B12 supplementation. Description is as if poor circulation or compressive syndrome. Though, pt denies any unusual positioning or compression. Moving around improves or restores feeling in the feet or hands. Pt reports taking meds as Rx'd    Nursing screenings reviewed by provider at visit. Past medical, Social, and Family history reviewed  Medications reviewed and updated. Allergies   Allergen Reactions    Amoxicillin Rash    Ativan [Lorazepam] Other (comments)     \"Makes her sick\"    Biaxin [Clarithromycin] Itching and Other (comments)     'eyes turn an funny color and she starts itching'    Biaxin [Clarithromycin] Unknown (comments)    Covid-19 Vaccine, Mrna, [id number], Lnp-S (Moderna) Other (comments)     Myocarditis     Flagyl [Metronidazole] Itching     Eyes change color    Flagyl [Metronidazole] Hives    Zoloft [Sertraline] Other (comments)     Excessive yawning       Prior to Admission medications    Medication Sig Start Date End Date Taking? Authorizing Provider   ALPRAZolam Naina Pile) 1 mg tablet TAKE 1 TO 2 TABLETS BY MOUTH 3 TIMES A DAY AS NEEDED 5/31/22  Yes Kristin Fatima MD   meclizine (ANTIVERT) 25 mg tablet Take  by mouth daily as needed. Yes Provider, Historical   metoprolol succinate (TOPROL-XL) 25 mg XL tablet Take 1 Tablet by mouth daily. 4/28/22  Yes Kristin Fatima MD   loratadine (CLARITIN) 10 mg tablet Take 1 Tablet by mouth daily. 3/28/22  Yes Kristin Fatima MD   ergocalciferol (ERGOCALCIFEROL) 1,250 mcg (50,000 unit) capsule Take 1 Capsule by mouth every thirty (30) days. 3/28/22  Yes Kristin Fatima MD   furosemide (LASIX) 20 mg tablet Take 1 Tablet by mouth as needed (swelling).  3/18/22  Yes Ottoniel Valenzuela MD levothyroxine (SYNTHROID) 75 mcg tablet TAKE 1 TABLET BY MOUTH EVERY DAY BEFORE BREAKFAST 2/16/22  Yes Ny Holder MD   colchicine 0.6 mg tablet Take 1 Tablet by mouth two (2) times a day. 12/6/21  Yes Ny Holder MD   PARoxetine (PAXIL) 20 mg tablet Take 1 Tablet by mouth daily. 9/30/21  Yes Ny Holder MD   QUEtiapine (SEROquel) 200 mg tablet Take 1 Tablet by mouth two (2) times a day. 9/30/21  Yes Ny Holder MD   norgestimate-ethinyl estradioL (Tri-Sprintec, 28,) 0.18/0.215/0.25 mg-35 mcg (28) tab Take 1 Tablet by mouth daily. 7/26/21  Yes Ny Holder MD   ibuprofen (MOTRIN) 800 mg tablet Take 1 Tablet by mouth two (2) times daily (with meals). 7/26/21  Yes Ny Holder MD   fluticasone propionate [facility]) 50 mcg/actuation nasal spray  5/28/21  Yes Provider, Historical   sodium-potassium-mag sulfate (Suprep Bowel Prep Kit) 17.5-3.13-1.6 gram solr oral solution Take 177 mL by mouth See Admin Instructions. 2/23/21  Yes Ny Holder MD   cyanocobalamin (VITAMIN B12) 500 mcg tablet Take 1 Tab by mouth daily. 1/27/21  Yes Ny Holder MD   multivit-min-folic acid-biotin 453-028 mcg chew Take 1 Tab by mouth daily. 9/28/20  Yes Ny Holder MD   cyanocobalamin, vitamin B-12, 1,000 mcg/mL kit 1 mL by Injection route every month. 9/15/20  Yes yN Holder MD   amitriptyline (ELAVIL) 25 mg tablet Take 1 Tablet by mouth nightly. Patient not taking: Reported on 6/20/2022 12/24/21   Ny Holder MD         ROS     A complete ROS was performed and negative except as noted in HPI     PHYSICAL EXAMINATION:    Vital Signs: There were no vitals taken for this visit. No flowsheet data found.       Constitutional: [x] Appears well-developed and well-nourished [x] No apparent distress      Mental status: [x] Alert and awake  [x] Oriented [x] Able to follow commands       Eyes:   EOM    [x]  Normal      Sclera  [x]  Normal              Discharge [x]  None visible       HENT: [x] Normocephalic, atraumatic    [x] Mouth/Throat: Mucous membranes are moist    External Ears [x] Normal      Neck: [x] No visualized mass     Pulmonary/Chest: [x] Respiratory effort normal   [x] No visualized signs of difficulty breathing or respiratory distress    Musculoskeletal:  [x] Normal range of motion of neck    Neurological:        [x] No Facial Asymmetry (Cranial nerve 7 motor function) (limited exam due to video visit)          [x] No gaze palsy     Skin:        [x] No significant exanthematous lesions or discoloration noted on facial skin             Psychiatric:       [x] Normal Affect       Other pertinent observable physical exam findings:  None. We discussed the expected course, resolution and complications of the diagnosis(es) in detail. Medication risks, benefits, costs, interactions, and alternatives were discussed as indicated. I advised her to contact the office if her condition worsens, changes or fails to improve as anticipated. She expressed understanding with the diagnosis(es) and plan. Evan Lao is a 37 y.o. female who was evaluated by a video visit encounter for concerns as above. Evan Lao is being evaluated by a Virtual Visit (video visit) encounter to address concerns as mentioned above. A caregiver was present when appropriate. Due to this being a TeleHealth encounter (During YQPJU-03 public health emergency), evaluation of the following organ systems was limited: Vitals/Constitutional/EENT/Resp/CV/GI//MS/Neuro/Skin/Heme-Lymph-Imm. Pursuant to the emergency declaration under the [address] [place] [address] and the [employer] and [employer] Act, this Virtual Visit was conducted with patient's (and/or legal guardian's) consent, to reduce the patient's risk of exposure to COVID-19 and provide necessary medical care.   The patient (and/or legal guardian) has also been advised to contact this office for worsening conditions or problems, and seek emergency medical treatment and/or call 911 if deemed necessary. Patient identification was verified at the start of the visit: YES. Services were provided through a video synchronous discussion virtually to substitute for in-person clinic visit. Patient was located at their individual home (or other location as per patient preference). Provider was located in medical office. An electronic signature was used to authenticate this note.   -- Palak Hopkins MD

## 2022-06-21 ENCOUNTER — HOSPITAL ENCOUNTER (OUTPATIENT)
Dept: MRI IMAGING | Age: 42
Discharge: HOME | End: 2022-06-21
Attending: INTERNAL MEDICINE
Payer: MEDICAID

## 2022-06-21 DIAGNOSIS — I50.9 HEART FAILURE, UNSPECIFIED (HCC): ICD-10-CM

## 2022-06-21 DIAGNOSIS — I50.20 HEART FAILURE, SYSTOLIC (HCC): ICD-10-CM

## 2022-06-21 DIAGNOSIS — I51.4 MYOCARDITIS, UNSPECIFIED (HCC): ICD-10-CM

## 2022-06-21 PROCEDURE — A9576 INJ PROHANCE MULTIPACK: HCPCS | Performed by: INTERNAL MEDICINE

## 2022-06-21 PROCEDURE — 74011250636 HC RX REV CODE- 250/636: Performed by: INTERNAL MEDICINE

## 2022-06-21 PROCEDURE — 75561 CARDIAC MRI FOR MORPH W/DYE: CPT

## 2022-06-21 RX ADMIN — GADOTERIDOL 23 ML: 279.3 INJECTION, SOLUTION INTRAVENOUS at 15:29

## 2022-06-24 ENCOUNTER — TELEPHONE (OUTPATIENT)
Dept: NEUROLOGY | Age: 42
End: 2022-06-24

## 2022-06-24 NOTE — TELEPHONE ENCOUNTER
That is fine with me. I have only seen her once in Sept, 2020 and she did not follow up after Neuropsych testing.

## 2022-06-24 NOTE — TELEPHONE ENCOUNTER
Received referral from Dr. Santo Perry to schedule patient a follow up w/ Dr. Thony Schilling. Called patient, she stated she just recently saw Dr. Luciano Rosario for ANS testing in May and is requesting to transfer care to Dr. Luciano Rosario. Please advise.

## 2022-07-11 ENCOUNTER — PATIENT MESSAGE (OUTPATIENT)
Dept: INTERNAL MEDICINE CLINIC | Age: 42
End: 2022-07-11

## 2022-07-11 ENCOUNTER — HOSPITAL ENCOUNTER (OUTPATIENT)
Dept: GENERAL RADIOLOGY | Age: 42
Discharge: HOME | End: 2022-07-11
Payer: MEDICAID

## 2022-07-11 ENCOUNTER — TRANSCRIBE ORDER (OUTPATIENT)
Dept: REGISTRATION | Age: 42
End: 2022-07-11

## 2022-07-11 DIAGNOSIS — R06.02 SHORTNESS OF BREATH: Primary | ICD-10-CM

## 2022-07-11 DIAGNOSIS — R06.02 SHORTNESS OF BREATH: ICD-10-CM

## 2022-07-11 PROCEDURE — 71046 X-RAY EXAM CHEST 2 VIEWS: CPT

## 2022-07-27 DIAGNOSIS — F41.0 PANIC DISORDER WITHOUT AGORAPHOBIA: ICD-10-CM

## 2022-07-28 ENCOUNTER — PATIENT MESSAGE (OUTPATIENT)
Dept: INTERNAL MEDICINE CLINIC | Age: 42
End: 2022-07-28

## 2022-07-29 NOTE — TELEPHONE ENCOUNTER
----- Message from Queta Doan sent at 7/28/2022  3:34 PM EDT -----  Subject: Message to Provider    QUESTIONS  Information for Provider? URGENT!!! Patient is requesting bloodwork from   Dr. Yary Goetz so 2 of her specialist doctors, Cardiology & Nephrology, can   schedule her an appointment. They won't schedule her an appointment until   she gets labs done. She said they can get her in next Friday 08/05 so   she's trying to schedule labs for tomorrow 07/29. Please call patient ASAP  ---------------------------------------------------------------------------  --------------  Gold PENDLETON  [phone]; OK to leave message on voicemail  ---------------------------------------------------------------------------  --------------  SCRIPT ANSWERS  Relationship to Patient?  Self

## 2022-07-30 RX ORDER — QUETIAPINE FUMARATE 200 MG/1
TABLET, FILM COATED ORAL
Qty: 60 TABLET | Refills: 5 | Status: SHIPPED | OUTPATIENT
Start: 2022-07-30

## 2022-07-31 NOTE — TELEPHONE ENCOUNTER
Patient has lab orders from her specialists physicians. Sent [employer] messages with [facility] locations.    [facility]  Jayro Gutierrez MD

## 2022-08-03 NOTE — TELEPHONE ENCOUNTER
MD Erik Lyons, RN  Caller: Unspecified (Today,  2:43 PM)  I spoke with her. Explained I do not think this is coming from the heart. If she would like she can get a second opinion. She did want to see if we could give her a script for a mild diuretic to see if this would help and so I did say we could give her lasix 20 mg to take prn (would give her a 1 month prescription, no refills). No cardiac testing or follow up needed.  gamax Palpation of the left side flank without any reproducible pain. ua in office negative. Exam wnl. Check cxr for rib cage pain. She admits to increased anxiety lately, so if labs and imaging normal, discuss further anxiety treatments.

## 2022-09-11 ENCOUNTER — APPOINTMENT (OUTPATIENT)
Dept: GENERAL RADIOLOGY | Age: 42
End: 2022-09-11
Attending: STUDENT IN AN ORGANIZED HEALTH CARE EDUCATION/TRAINING PROGRAM
Payer: MEDICAID

## 2022-09-11 ENCOUNTER — APPOINTMENT (OUTPATIENT)
Dept: CT IMAGING | Age: 42
End: 2022-09-11
Attending: STUDENT IN AN ORGANIZED HEALTH CARE EDUCATION/TRAINING PROGRAM
Payer: MEDICAID

## 2022-09-11 ENCOUNTER — HOSPITAL ENCOUNTER (EMERGENCY)
Age: 42
Discharge: HOME | End: 2022-09-11
Attending: STUDENT IN AN ORGANIZED HEALTH CARE EDUCATION/TRAINING PROGRAM
Payer: MEDICAID

## 2022-09-11 VITALS
HEART RATE: 68 BPM | DIASTOLIC BLOOD PRESSURE: 81 MMHG | HEIGHT: 65 IN | WEIGHT: 181.66 LBS | RESPIRATION RATE: 15 BRPM | OXYGEN SATURATION: 100 % | TEMPERATURE: 98.3 F | SYSTOLIC BLOOD PRESSURE: 114 MMHG | BODY MASS INDEX: 30.27 KG/M2

## 2022-09-11 DIAGNOSIS — R07.89 ATYPICAL CHEST PAIN: ICD-10-CM

## 2022-09-11 DIAGNOSIS — R06.02 SOB (SHORTNESS OF BREATH): Primary | ICD-10-CM

## 2022-09-11 DIAGNOSIS — E87.6 HYPOKALEMIA: ICD-10-CM

## 2022-09-11 LAB
ALBUMIN SERPL-MCNC: 3.6 G/DL (ref 3.5–5)
ALBUMIN/GLOB SERPL: 0.7 {RATIO} (ref 1.1–2.2)
ALP SERPL-CCNC: 53 U/L (ref 45–117)
ALT SERPL-CCNC: 16 U/L (ref 12–78)
ANION GAP SERPL CALC-SCNC: 14 MMOL/L (ref 5–15)
AST SERPL-CCNC: 8 U/L (ref 15–37)
BASOPHILS # BLD: 0 K/UL (ref 0–0.1)
BASOPHILS NFR BLD: 1 % (ref 0–1)
BILIRUB SERPL-MCNC: 0.5 MG/DL (ref 0.2–1)
BNP SERPL-MCNC: 142 PG/ML (ref 0–125)
BUN SERPL-MCNC: 10 MG/DL (ref 6–20)
BUN/CREAT SERPL: 9 (ref 12–20)
CALCIUM SERPL-MCNC: 8.7 MG/DL (ref 8.5–10.1)
CHLORIDE SERPL-SCNC: 101 MMOL/L (ref 97–108)
CO2 SERPL-SCNC: 24 MMOL/L (ref 21–32)
COMMENT, HOLDF: NORMAL
CREAT SERPL-MCNC: 1.17 MG/DL (ref 0.55–1.02)
DIFFERENTIAL METHOD BLD: NORMAL
EOSINOPHIL # BLD: 0.1 K/UL (ref 0–0.4)
EOSINOPHIL NFR BLD: 1 % (ref 0–7)
ERYTHROCYTE [DISTWIDTH] IN BLOOD BY AUTOMATED COUNT: 13.6 % (ref 11.5–14.5)
GLOBULIN SER CALC-MCNC: 5 G/DL (ref 2–4)
GLUCOSE SERPL-MCNC: 118 MG/DL (ref 65–100)
HCT VFR BLD AUTO: 38.4 % (ref 35–47)
HGB BLD-MCNC: 12.5 G/DL (ref 11.5–16)
IMM GRANULOCYTES # BLD AUTO: 0 K/UL (ref 0–0.04)
IMM GRANULOCYTES NFR BLD AUTO: 0 % (ref 0–0.5)
LYMPHOCYTES # BLD: 1.5 K/UL (ref 0.8–3.5)
LYMPHOCYTES NFR BLD: 27 % (ref 12–49)
MAGNESIUM SERPL-MCNC: 1.7 MG/DL (ref 1.6–2.4)
MCH RBC QN AUTO: 28 PG (ref 26–34)
MCHC RBC AUTO-ENTMCNC: 32.6 G/DL (ref 30–36.5)
MCV RBC AUTO: 86.1 FL (ref 80–99)
MONOCYTES # BLD: 0.6 K/UL (ref 0–1)
MONOCYTES NFR BLD: 11 % (ref 5–13)
NEUTS SEG # BLD: 3.3 K/UL (ref 1.8–8)
NEUTS SEG NFR BLD: 60 % (ref 32–75)
NRBC # BLD: 0 K/UL (ref 0–0.01)
NRBC BLD-RTO: 0 PER 100 WBC
PLATELET # BLD AUTO: 334 K/UL (ref 150–400)
PMV BLD AUTO: 9.6 FL (ref 8.9–12.9)
POTASSIUM SERPL-SCNC: 3 MMOL/L (ref 3.5–5.1)
PROT SERPL-MCNC: 8.6 G/DL (ref 6.4–8.2)
RBC # BLD AUTO: 4.46 M/UL (ref 3.8–5.2)
SAMPLES BEING HELD,HOLD: NORMAL
SODIUM SERPL-SCNC: 139 MMOL/L (ref 136–145)
TROPONIN-HIGH SENSITIVITY: <4 NG/L (ref 0–51)
WBC # BLD AUTO: 5.7 K/UL (ref 3.6–11)

## 2022-09-11 PROCEDURE — 74011250637 HC RX REV CODE- 250/637: Performed by: STUDENT IN AN ORGANIZED HEALTH CARE EDUCATION/TRAINING PROGRAM

## 2022-09-11 PROCEDURE — 36415 COLL VENOUS BLD VENIPUNCTURE: CPT

## 2022-09-11 PROCEDURE — 74011000636 HC RX REV CODE- 636: Performed by: STUDENT IN AN ORGANIZED HEALTH CARE EDUCATION/TRAINING PROGRAM

## 2022-09-11 PROCEDURE — 83735 ASSAY OF MAGNESIUM: CPT

## 2022-09-11 PROCEDURE — 83880 ASSAY OF NATRIURETIC PEPTIDE: CPT

## 2022-09-11 PROCEDURE — 85025 COMPLETE CBC W/AUTO DIFF WBC: CPT

## 2022-09-11 PROCEDURE — 71046 X-RAY EXAM CHEST 2 VIEWS: CPT

## 2022-09-11 PROCEDURE — 80053 COMPREHEN METABOLIC PANEL: CPT

## 2022-09-11 PROCEDURE — 71275 CT ANGIOGRAPHY CHEST: CPT

## 2022-09-11 PROCEDURE — 84484 ASSAY OF TROPONIN QUANT: CPT

## 2022-09-11 PROCEDURE — 93005 ELECTROCARDIOGRAM TRACING: CPT

## 2022-09-11 PROCEDURE — 99285 EMERGENCY DEPT VISIT HI MDM: CPT

## 2022-09-11 RX ORDER — SODIUM,POTASSIUM PHOSPHATES 280-250MG
2 POWDER IN PACKET (EA) ORAL
Status: COMPLETED | OUTPATIENT
Start: 2022-09-11 | End: 2022-09-11

## 2022-09-11 RX ORDER — SODIUM,POTASSIUM PHOSPHATES 280-250MG
2 POWDER IN PACKET (EA) ORAL DAILY
Qty: 20 PACKET | Refills: 0 | Status: SHIPPED | OUTPATIENT
Start: 2022-09-11 | End: 2022-09-21

## 2022-09-11 RX ADMIN — IOPAMIDOL 100 ML: 755 INJECTION, SOLUTION INTRAVENOUS at 14:57

## 2022-09-11 RX ADMIN — POTASSIUM & SODIUM PHOSPHATES POWDER PACK 280-160-250 MG 2 PACKET: 280-160-250 PACK at 16:09

## 2022-09-11 NOTE — DISCHARGE INSTRUCTIONS
You presented to the ED with continued shortness of breath and chest pain. Your cardiologist was concerned for a blood clot because you are on oral contraceptive pills. EMS evaluation was concern for possible heart failure. Extensive work-up was done and here in the ED with CT of the chest looking for blood clots, no clot seen. No fluid in your lungs or signs of pneumonia. Your BNP is only mildly elevated at 140, doubt significant CHF but most consistent with your previously diagnosed heart disease. Follow-up closely with your cardiologist for further evaluation. Take potassium as you are found to have hypokalemia. Prescription written for this.

## 2022-09-11 NOTE — ED NOTES
Pt discharged in stable condition at this time. MD/NATALIIA reviewed discharge instructions, prescriptions, and follow up with patient at bedside. Pt verbalized understanding and denies any needs or questions at this time. Pt ambulatory NAD on DC.

## 2022-09-11 NOTE — ED TRIAGE NOTES
Patient arrives with c/o palpitations, generalized fatigue, chest pain, SOB and coughing up \"salty stuff\". Reports hx of CHF. Patient reports symptoms started yesterday.

## 2022-09-12 LAB
ATRIAL RATE: 101 BPM
CALCULATED P AXIS, ECG09: 84 DEGREES
CALCULATED R AXIS, ECG10: 73 DEGREES
CALCULATED T AXIS, ECG11: 19 DEGREES
DIAGNOSIS, 93000: NORMAL
P-R INTERVAL, ECG05: 128 MS
Q-T INTERVAL, ECG07: 326 MS
QRS DURATION, ECG06: 82 MS
QTC CALCULATION (BEZET), ECG08: 422 MS
VENTRICULAR RATE, ECG03: 101 BPM

## 2022-09-12 NOTE — ED PROVIDER NOTES
Patient is a 71-year-old female with a complex extensive medical history presenting to the ED with palpitations, fatigue, chest pain, shortness of breath and cough. Patient reports a history of CHF. Review of medical record shows a reduced EF in the upper 40s to mid 46s. Patient was instructed to go to the ED by her cardiologist due to oral contraceptive use, shortness of breath and tachycardia. Patient states she is not anxious at this time but does have history of anxiety and took anxiety medication so she could and talk to medical staff about her symptoms. Patient states she has had a primary care doctor but he has since retired. Patient's vital signs are stable. Mild tachycardia. No hypoxia or respiratory distress. Patient reports a history of elevated D-dimer in the past.    The history is provided by the patient and medical records. Chest Pain (Angina)   This is a recurrent problem. The current episode started yesterday. The problem has not changed since onset. The pain is present in the substernal region. The pain is at a severity of 5/10. The pain is moderate. The quality of the pain is described as sharp. The pain does not radiate. Associated symptoms include cough, palpitations and shortness of breath. Pertinent negatives include no fever. Her past medical history does not include DM. Palpitations   This is a recurrent problem. The problem has not changed since onset. Associated symptoms include chest pain, cough and shortness of breath. Pertinent negatives include no fever. Her past medical history does not include DM. Shortness of Breath  This is a recurrent problem. The problem occurs intermittently. The current episode started more than 1 week ago. Progression since onset: Waxing and waning. Associated symptoms include cough and chest pain. Pertinent negatives include no fever.       Past Medical History:   Diagnosis Date    Anemia NEC     Anxiety attack     Asthma     Avulsion fracture of lateral malleolus of right fibula     B12 deficiency     Biliary dyskinesia 2/6/2017    CHF (congestive heart failure) (HCC)     Closed fracture of foot with routine healing 0/10/1021    Complicated grief     Constipation     Depression     DEEPA (generalized anxiety disorder)     Gastroparesis 7/18/2014    Heart abnormalities     right atria at 30% per [facility]    Hyperlipidemia     IGT (impaired glucose tolerance)     Incontinence of urine     1 episode of bed wetting 1 mo ago    Insomnia     Kidney stone     Myocarditis determined by echocardiography ([facility] [address] 2/17/2022    Due to [address] vaccine    Neuropathy     Pap smear for cervical cancer screening 11/11/2015    11/11/15 neg HPV neg    PTSD (post-traumatic stress disorder)     Stroke ([facility] [address] 2011    TIA    Urinary tract bacterial infections     Vaginal discharge 12/4/2013    Vitamin D deficiency        Past Surgical History:   Procedure Laterality Date    HX GI  may 2013    polyps removed,colonscopy    HX HEENT      [facility] EYE EXAM & TREATMENT           Family History:   Problem Relation Age of Onset    Diabetes Mother     Heart Disease Mother     Hypertension Mother     Stroke Mother     Dementia Mother     Kidney Disease Mother     Cancer Father         Estranged    Other Brother         \"Disappeared in 2007\"       Social History     Socioeconomic History    Marital status: SINGLE     Spouse name: Not on file    Number of children: Not on file    Years of education: Not on file    Highest education level: Not on file   Occupational History    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 Live in [place] alone. Social Determinants of Health     Financial Resource Strain: Not on file   Food Insecurity: Not on file   Transportation Needs: Not on file   Physical Activity: Not on file   Stress: Not on file   Social Connections: Not on file   Intimate Partner Violence: Not on file   Housing Stability: Not on file         ALLERGIES: Amoxicillin; Ativan [lorazepam]; Biaxin [clarithromycin]; Biaxin [clarithromycin]; Covid-19 vaccine, mrna, [id number], lnp-s (moderna); Flagyl [metronidazole]; Flagyl [metronidazole]; and Zoloft [sertraline]    Review of Systems   Constitutional:  Positive for activity change, appetite change and fatigue. Negative for fever. HENT: Negative. Eyes: Negative. Respiratory:  Positive for cough, chest tightness and shortness of breath. Cardiovascular:  Positive for chest pain and palpitations. Gastrointestinal: Negative. Endocrine: Negative. Genitourinary: Negative. Musculoskeletal: Negative. Skin:  Positive for color change. Allergic/Immunologic: Negative. Neurological: Negative. Hematological: Negative. Psychiatric/Behavioral: Negative. Vitals:    09/11/22 1600 09/11/22 1615 09/11/22 1630 09/11/22 1645   BP:  124/81 114/81    Pulse: 77 73 77 68   Resp: (!) 36 14 15 15   Temp:       SpO2: 100% 100% 99% 100%   Weight:       Height:                Physical Exam  Vitals and nursing note reviewed. Constitutional:       General: She is not in acute distress. Appearance: Normal appearance. HENT:      Head: Normocephalic and atraumatic. Right Ear: External ear normal.      Left Ear: External ear normal.      Nose: Nose normal.   Eyes:      Extraocular Movements: Extraocular movements intact. Conjunctiva/sclera: Conjunctivae normal.   Cardiovascular:      Rate and Rhythm: Regular rhythm. Tachycardia present. Pulses: Normal pulses.            Radial pulses are 2+ on the right side and 2+ on the left side. Heart sounds: Normal heart sounds. Pulmonary:      Effort: Pulmonary effort is normal.      Breath sounds: Normal breath sounds. Chest:      Chest wall: No deformity or tenderness. Abdominal:      General: Abdomen is flat. There is no distension. Tenderness: There is no abdominal tenderness. Musculoskeletal:         General: No deformity or signs of injury. Normal range of motion. Cervical back: Normal range of motion and neck supple. No tenderness. Skin:     General: Skin is warm and dry. Capillary Refill: Capillary refill takes less than 2 seconds. Findings: Bruising present. Comments: Patient reports bruising but no bruising present at this time. Neurological:      General: No focal deficit present. Mental Status: She is alert and oriented to person, place, and time. Psychiatric:         Attention and Perception: Attention normal.         Mood and Affect: Mood normal.         Behavior: Behavior normal.        MDM     Amount and/or Complexity of Data Reviewed  Decide to obtain previous medical records or to obtain history from someone other than the patient: yes      ED Course as of 09/11/22 2324   Sun Sep 11, 2022   1349 EKG interpretation:   Rhythm: sinus tachycardia; and regular . Rate (approx.): 101; Axis: normal; Intervals: normal ; ST/T wave: normal; EKG documented and interpreted by Santiago So.  Ben Triplett MD, Emergency Medicine.     [AL]      ED Course User Index  [AL] Emerson Kaye MD     LABORATORY RESULTS:  Labs Reviewed   METABOLIC PANEL, COMPREHENSIVE - Abnormal; Notable for the following components:       Result Value    Potassium 3.0 (*)     Glucose 118 (*)     Creatinine 1.17 (*)     BUN/Creatinine ratio 9 (*)     GFR est non-AA 51 (*)     AST (SGOT) 8 (*)     Protein, total 8.6 (*)     Globulin 5.0 (*)     A-G Ratio 0.7 (*)     All other components within normal limits   NT-PRO BNP - Abnormal; Notable for the following components:    NT pro- (*)     All other components within normal limits   SAMPLES BEING HELD   TROPONIN-HIGH SENSITIVITY   CBC WITH AUTOMATED DIFF   MAGNESIUM       IMAGING RESULTS:  CTA CHEST W OR W WO CONT   Final Result   Cardiorespiratory motion with no large or central pulmonary embolus. XR CHEST PA LAT   Final Result   No acute intrathoracic disease. MEDICATIONS GIVEN:  Medications   iopamidoL (ISOVUE-370) 76 % injection 100 mL (100 mL IntraVENous Given 9/11/22 9637)   potassium, sodium phosphates (NEUTRA-PHOS) packet 2 Packet (2 Packets Oral Given 9/11/22 1609)       Differential diagnosis: ACS, shortness of breath, pulmonary embolism, fatigue, bruising, thrombocytopenia, anemia, chronic illness, chronic pain, noncardiac chest pain, anxiety    ED physician interpretation of imaging: Chest x-ray with no focal pneumonia, pneumothorax  ED physician interpretation of EKG: No STEMI. See my interpretation EKG in ED course above. ED physician interpretation of laboratory results: Patient's lab work with mild hypokalemia but no hyponatremia mild elevation in BNP but patient has known CHF and reduced ejection fraction. Mild elevation in serum creatinine, not consistent with NAM. Troponin less than 4 doubt ACS. MDM: Patient is a 28-year-old female with a complex extensive medical history followed by cardiology and primary care presented to the ED with shortness of breath, chest pain, fatigue and multiple other symptoms and concerns. ACS, pulmonary embolism, anemia, pneumonia ruled out by lab work and CT scans. No signs of thrombocytopenia that could be contributory towards patient's bruising that is not present at this time in the ED. Talk to patient's cardiologist/cardiologist on-call for her group. They recommend close follow-up with patient's cardiologist for further treatment and evaluation but no concerning findings on CT, EKG and work-up.   So patient's symptoms may be due to hypokalemia. Repletion started in the ED. Prescription written for more potassium. Liquid form/packet form given due to patient's vacations and interactions. No further emergency medical interventions indicated at this time. CT scan shows no signs of PE. Patient's heart rate improved while in the ED. Further personalized recommendations for outpatient care as below. Key discharge instructions and summary of care: You presented to the ED with continued shortness of breath and chest pain. Your cardiologist was concerned for a blood clot because you are on oral contraceptive pills. EMS evaluation was concern for possible heart failure. Extensive work-up was done and here in the ED with CT of the chest looking for blood clots, no clot seen. No fluid in your lungs or signs of pneumonia. Your BNP is only mildly elevated at 140, doubt significant CHF but most consistent with your previously diagnosed heart disease. Follow-up closely with your cardiologist for further evaluation. Take potassium as you are found to have hypokalemia. Prescription written for this. Patient is stable for discharge. All available radiology and laboratory results have been reviewed with patient and/or available family. Patient and/or family verbally conveyed their understanding and agreement of the patient's signs, symptoms, diagnosis, treatment and prognosis and additionally agree to follow-up as recommended in the discharge instructions or to return to the Emergency Department should their condition change or worsen prior to their follow-up appointment. All questions have been answered and patient and/or available family express understanding. IMPRESSION:  1. SOB (shortness of breath)    2. Atypical chest pain    3. Hypokalemia        DISPOSITION: Discharged     Mikel Flores MD      Procedures

## 2022-10-17 ENCOUNTER — TRANSCRIBE ORDER (OUTPATIENT)
Dept: SCHEDULING | Age: 42
End: 2022-10-17

## 2022-10-17 DIAGNOSIS — R63.0 DECREASE IN APPETITE: ICD-10-CM

## 2022-10-17 DIAGNOSIS — R19.4 CHANGE IN BOWEL HABIT: Primary | ICD-10-CM

## 2022-10-17 DIAGNOSIS — K59.00 CONSTIPATION, UNSPECIFIED: ICD-10-CM

## 2022-10-17 DIAGNOSIS — R63.4 UNINTENTIONAL WEIGHT LOSS: ICD-10-CM

## 2022-10-19 ENCOUNTER — HOSPITAL ENCOUNTER (OUTPATIENT)
Dept: CT IMAGING | Age: 42
Discharge: HOME | End: 2022-10-19
Attending: INTERNAL MEDICINE
Payer: MEDICAID

## 2022-10-19 DIAGNOSIS — R63.4 UNINTENTIONAL WEIGHT LOSS: ICD-10-CM

## 2022-10-19 DIAGNOSIS — R63.0 DECREASE IN APPETITE: ICD-10-CM

## 2022-10-19 DIAGNOSIS — K59.00 CONSTIPATION, UNSPECIFIED: ICD-10-CM

## 2022-10-19 DIAGNOSIS — R19.4 CHANGE IN BOWEL HABIT: ICD-10-CM

## 2022-10-19 PROCEDURE — 74177 CT ABD & PELVIS W/CONTRAST: CPT

## 2022-10-19 PROCEDURE — 74011000636 HC RX REV CODE- 636: Performed by: INTERNAL MEDICINE

## 2022-10-19 RX ADMIN — IOPAMIDOL 100 ML: 755 INJECTION, SOLUTION INTRAVENOUS at 13:21

## 2022-10-21 DIAGNOSIS — F41.1 GAD (GENERALIZED ANXIETY DISORDER): ICD-10-CM

## 2022-10-21 DIAGNOSIS — E03.9 ACQUIRED HYPOTHYROIDISM: ICD-10-CM

## 2022-10-21 DIAGNOSIS — F41.0 PANIC ATTACKS: ICD-10-CM

## 2022-10-21 DIAGNOSIS — F41.0 PANIC DISORDER: ICD-10-CM

## 2022-10-21 RX ORDER — ALPRAZOLAM 1 MG/1
1 TABLET ORAL
Qty: 60 TABLET | Refills: 0 | Status: SHIPPED | OUTPATIENT
Start: 2022-10-21 | End: 2022-11-18 | Stop reason: SDUPTHER

## 2022-10-21 RX ORDER — LEVOTHYROXINE SODIUM 75 UG/1
75 TABLET ORAL
Qty: 30 TABLET | Refills: 5 | Status: SHIPPED | OUTPATIENT
Start: 2022-10-21

## 2022-10-21 RX ORDER — METOPROLOL SUCCINATE 25 MG/1
25 TABLET, EXTENDED RELEASE ORAL DAILY
Qty: 30 TABLET | Refills: 5 | Status: SHIPPED | OUTPATIENT
Start: 2022-10-21

## 2022-11-17 DIAGNOSIS — E78.2 MIXED HYPERLIPIDEMIA: ICD-10-CM

## 2022-11-17 DIAGNOSIS — E87.6 HYPOKALEMIA: ICD-10-CM

## 2022-11-17 DIAGNOSIS — E53.8 B12 DEFICIENCY: Primary | ICD-10-CM

## 2022-11-17 DIAGNOSIS — F41.1 GAD (GENERALIZED ANXIETY DISORDER): ICD-10-CM

## 2022-11-17 DIAGNOSIS — E03.9 ACQUIRED HYPOTHYROIDISM: ICD-10-CM

## 2022-11-18 DIAGNOSIS — I40.1 IDIOPATHIC MYOCARDITIS, UNSPECIFIED CHRONICITY: ICD-10-CM

## 2022-11-18 DIAGNOSIS — F41.1 GAD (GENERALIZED ANXIETY DISORDER): ICD-10-CM

## 2022-11-18 DIAGNOSIS — E53.8 B12 DEFICIENCY: ICD-10-CM

## 2022-11-18 DIAGNOSIS — F41.0 PANIC DISORDER: ICD-10-CM

## 2022-11-18 DIAGNOSIS — J02.9 ALLERGIC PHARYNGITIS: ICD-10-CM

## 2022-11-18 DIAGNOSIS — F41.0 PANIC DISORDER WITHOUT AGORAPHOBIA: ICD-10-CM

## 2022-11-18 DIAGNOSIS — F41.0 PANIC ATTACKS: ICD-10-CM

## 2022-11-18 DIAGNOSIS — R51.9 HEADACHE, UNSPECIFIED HEADACHE TYPE: ICD-10-CM

## 2022-11-18 RX ORDER — TORSEMIDE 20 MG/1
TABLET ORAL
COMMUNITY
Start: 2022-10-13 | End: 2022-11-18 | Stop reason: SDUPTHER

## 2022-11-18 RX ORDER — SODIUM BICARBONATE 650 MG/1
650 TABLET ORAL DAILY
COMMUNITY
Start: 2022-10-13

## 2022-11-18 RX ORDER — NORGESTIMATE AND ETHINYL ESTRADIOL 7DAYSX3 28
KIT ORAL
COMMUNITY
End: 2022-11-19 | Stop reason: SDUPTHER

## 2022-11-18 RX ORDER — CARVEDILOL 6.25 MG/1
6.25 TABLET ORAL 2 TIMES DAILY WITH MEALS
COMMUNITY
Start: 2022-10-18

## 2022-11-18 NOTE — TELEPHONE ENCOUNTER
PCP: None    Last appt: 6/20/2022  No future appointments.     Requested Prescriptions      No prescriptions requested or ordered in this encounter       Prior labs and Blood pressures:  BP Readings from Last 3 Encounters:   09/11/22 114/81   02/17/22 120/78   12/06/21 120/78     Lab Results   Component Value Date/Time    Sodium 139 09/11/2022 01:48 PM    Potassium 3.0 (L) 09/11/2022 01:48 PM    Chloride 101 09/11/2022 01:48 PM    CO2 24 09/11/2022 01:48 PM    Anion gap 14 09/11/2022 01:48 PM    Glucose 118 (H) 09/11/2022 01:48 PM    Glucose 78 06/26/2015 04:45 AM    BUN 10 09/11/2022 01:48 PM    Creatinine 1.17 (H) 09/11/2022 01:48 PM    BUN/Creatinine ratio 9 (L) 09/11/2022 01:48 PM    GFR est AA >60 09/11/2022 01:48 PM    GFR est non-AA 51 (L) 09/11/2022 01:48 PM    Calcium 8.7 09/11/2022 01:48 PM     Lab Results   Component Value Date/Time    Hemoglobin A1c 5.4 06/30/2021 12:30 PM     Lab Results   Component Value Date/Time    Cholesterol, total 238 (H) 06/30/2021 12:30 PM    HDL Cholesterol 88 06/30/2021 12:30 PM    LDL, calculated 134 (H) 06/30/2021 12:30 PM    LDL, calculated 131 (H) 10/29/2018 04:22 PM    VLDL, calculated 16 06/30/2021 12:30 PM    VLDL, calculated 21 10/29/2018 04:22 PM    Triglyceride 94 06/30/2021 12:30 PM    CHOL/HDL Ratio 2.2 06/26/2015 04:45 AM     Lab Results   Component Value Date/Time    Vitamin D 25-Hydroxy 46.1 09/28/2020 09:58 AM    VITAMIN D, 25-HYDROXY 29.8 (L) 05/05/2022 12:00 AM       Lab Results   Component Value Date/Time    TSH 1.370 05/05/2022 12:00 AM

## 2022-11-19 RX ORDER — QUETIAPINE FUMARATE 200 MG/1
200 TABLET, FILM COATED ORAL 2 TIMES DAILY
Qty: 60 TABLET | Refills: 5 | Status: SHIPPED | OUTPATIENT
Start: 2022-11-19

## 2022-11-19 RX ORDER — ALPRAZOLAM 1 MG/1
1 TABLET ORAL
Qty: 60 TABLET | Refills: 0 | Status: SHIPPED | OUTPATIENT
Start: 2022-11-19

## 2022-11-19 RX ORDER — TORSEMIDE 20 MG/1
TABLET ORAL
Qty: 30 TABLET | Refills: 0 | Status: SHIPPED | OUTPATIENT
Start: 2022-11-19

## 2022-11-19 RX ORDER — LANOLIN ALCOHOL/MO/W.PET/CERES
500 CREAM (GRAM) TOPICAL DAILY
Qty: 30 TABLET | Refills: 5 | Status: SHIPPED | OUTPATIENT
Start: 2022-11-19

## 2022-11-19 RX ORDER — LORATADINE 10 MG/1
10 TABLET ORAL DAILY
Qty: 30 TABLET | Refills: 5 | Status: SHIPPED | OUTPATIENT
Start: 2022-11-19

## 2022-11-19 RX ORDER — PAROXETINE HYDROCHLORIDE 20 MG/1
20 TABLET, FILM COATED ORAL DAILY
Qty: 30 TABLET | Refills: 5 | Status: SHIPPED | OUTPATIENT
Start: 2022-11-19

## 2022-11-19 RX ORDER — ERGOCALCIFEROL 1.25 MG/1
50000 CAPSULE ORAL
Qty: 3 CAPSULE | Refills: 4 | Status: SHIPPED | OUTPATIENT
Start: 2022-11-19

## 2022-11-19 RX ORDER — TRIAMCINOLONE ACETONIDE 1 MG/G
CREAM TOPICAL 2 TIMES DAILY
Qty: 15 G | Refills: 0 | Status: SHIPPED | OUTPATIENT
Start: 2022-11-19

## 2022-11-19 RX ORDER — NORGESTIMATE AND ETHINYL ESTRADIOL 7DAYSX3 28
1 KIT ORAL DAILY
Qty: 28 TABLET | Refills: 5 | Status: SHIPPED | OUTPATIENT
Start: 2022-11-19

## 2022-11-19 RX ORDER — IBUPROFEN 800 MG/1
800 TABLET ORAL 2 TIMES DAILY WITH MEALS
Qty: 60 TABLET | Refills: 1 | Status: SHIPPED | OUTPATIENT
Start: 2022-11-19

## 2022-11-19 RX ORDER — FLUCONAZOLE 150 MG/1
150 TABLET ORAL DAILY
Qty: 1 TABLET | Refills: 3 | Status: SHIPPED | OUTPATIENT
Start: 2022-11-19 | End: 2022-11-20

## 2022-11-21 ENCOUNTER — LAB ONLY (OUTPATIENT)
Dept: INTERNAL MEDICINE CLINIC | Age: 42
End: 2022-11-21

## 2022-11-21 DIAGNOSIS — E87.6 HYPOKALEMIA: ICD-10-CM

## 2022-11-21 DIAGNOSIS — E78.2 MIXED HYPERLIPIDEMIA: ICD-10-CM

## 2022-11-21 DIAGNOSIS — E03.9 ACQUIRED HYPOTHYROIDISM: ICD-10-CM

## 2022-11-21 DIAGNOSIS — E53.8 B12 DEFICIENCY: ICD-10-CM

## 2022-11-22 LAB
ALBUMIN SERPL-MCNC: 4 G/DL (ref 3.8–4.8)
ALBUMIN/GLOB SERPL: 1.3 {RATIO} (ref 1.2–2.2)
ALP SERPL-CCNC: 47 IU/L (ref 44–121)
ALT SERPL-CCNC: 8 IU/L (ref 0–32)
AST SERPL-CCNC: 17 IU/L (ref 0–40)
BASOPHILS # BLD AUTO: 0 X10E3/UL (ref 0–0.2)
BASOPHILS NFR BLD AUTO: 1 %
BILIRUB SERPL-MCNC: 0.5 MG/DL (ref 0–1.2)
BUN SERPL-MCNC: 6 MG/DL (ref 6–24)
BUN/CREAT SERPL: 8 (ref 9–23)
CALCIUM SERPL-MCNC: 8.7 MG/DL (ref 8.7–10.2)
CHLORIDE SERPL-SCNC: 105 MMOL/L (ref 96–106)
CHOLEST SERPL-MCNC: 237 MG/DL (ref 100–199)
CO2 SERPL-SCNC: 19 MMOL/L (ref 20–29)
CREAT SERPL-MCNC: 0.77 MG/DL (ref 0.57–1)
EOSINOPHIL # BLD AUTO: 0.1 X10E3/UL (ref 0–0.4)
EOSINOPHIL NFR BLD AUTO: 3 %
ERYTHROCYTE [DISTWIDTH] IN BLOOD BY AUTOMATED COUNT: 12.6 % (ref 11.7–15.4)
EST. AVERAGE GLUCOSE BLD GHB EST-MCNC: 108 MG/DL
GLOBULIN SER CALC-MCNC: 3 G/DL (ref 1.5–4.5)
GLUCOSE SERPL-MCNC: 81 MG/DL (ref 70–99)
HBA1C MFR BLD: 5.4 % (ref 4.8–5.6)
HCT VFR BLD AUTO: 35.9 % (ref 34–46.6)
HDLC SERPL-MCNC: 103 MG/DL
HGB BLD-MCNC: 11.9 G/DL (ref 11.1–15.9)
IMM GRANULOCYTES # BLD AUTO: 0 X10E3/UL (ref 0–0.1)
IMM GRANULOCYTES NFR BLD AUTO: 0 %
LDLC SERPL CALC-MCNC: 120 MG/DL (ref 0–99)
LYMPHOCYTES # BLD AUTO: 1.5 X10E3/UL (ref 0.7–3.1)
LYMPHOCYTES NFR BLD AUTO: 31 %
MAGNESIUM SERPL-MCNC: 1.8 MG/DL (ref 1.6–2.3)
MCH RBC QN AUTO: 28.9 PG (ref 26.6–33)
MCHC RBC AUTO-ENTMCNC: 33.1 G/DL (ref 31.5–35.7)
MCV RBC AUTO: 87 FL (ref 79–97)
MONOCYTES # BLD AUTO: 0.5 X10E3/UL (ref 0.1–0.9)
MONOCYTES NFR BLD AUTO: 10 %
NEUTROPHILS # BLD AUTO: 2.7 X10E3/UL (ref 1.4–7)
NEUTROPHILS NFR BLD AUTO: 55 %
PLATELET # BLD AUTO: 344 X10E3/UL (ref 150–450)
POTASSIUM SERPL-SCNC: 4.2 MMOL/L (ref 3.5–5.2)
PROT SERPL-MCNC: 7 G/DL (ref 6–8.5)
RBC # BLD AUTO: 4.12 X10E6/UL (ref 3.77–5.28)
SODIUM SERPL-SCNC: 139 MMOL/L (ref 134–144)
T4 FREE SERPL-MCNC: 1.41 NG/DL (ref 0.82–1.77)
TRIGL SERPL-MCNC: 86 MG/DL (ref 0–149)
TSH SERPL DL<=0.005 MIU/L-ACNC: 1.11 UIU/ML (ref 0.45–4.5)
VIT B12 SERPL-MCNC: 237 PG/ML (ref 232–1245)
VLDLC SERPL CALC-MCNC: 14 MG/DL (ref 5–40)
WBC # BLD AUTO: 4.8 X10E3/UL (ref 3.4–10.8)

## 2022-12-02 ENCOUNTER — TELEPHONE (OUTPATIENT)
Dept: INTERNAL MEDICINE CLINIC | Age: 42
End: 2022-12-02

## 2022-12-02 RX ORDER — ERGOCALCIFEROL 1.25 MG/1
50000 CAPSULE ORAL
Qty: 3 CAPSULE | Refills: 4 | Status: CANCELLED | OUTPATIENT
Start: 2022-12-02

## 2022-12-02 NOTE — TELEPHONE ENCOUNTER
[facility] would like clarification on the directions for the Vitamin D2. Per pt the directions for the Vitamin D2 should be to take 1 capsule once weekly and not every 1 cap every 30 days. Please review. For Pharmacy Admin Tracking Only  Intervention Detail: New Rx: 1, reason: Patient Preference  Time Spent (min): 5        Requested Prescriptions     Pending Prescriptions Disp Refills    ergocalciferol (ERGOCALCIFEROL) 1,250 mcg (50,000 unit) capsule 3 Capsule 4     Sig: Take 1 Capsule by mouth every thirty (30) days.

## 2022-12-05 NOTE — TELEPHONE ENCOUNTER
Vitamin D was refill per prior instructions from Dr Wooten Patient.  . She should take it as prescribed, will repeat a vitamin D level when she is seen to determine if she needs to continue prescription strength Vitamin D

## 2023-01-06 ENCOUNTER — NURSE TRIAGE (OUTPATIENT)
Dept: OTHER | Facility: CLINIC | Age: 43
End: 2023-01-06

## 2023-01-06 NOTE — TELEPHONE ENCOUNTER
Location of patient: [address] trejo from [employer] at [facility] with [employer]. Subjective: Caller states \"I'm having occasional sharp pains in the middle of my chest. I can become SOB when walking. I also have bruises all over my legs and there doesn't seem to be a reason for the bruising. \"       Onset:   Chest pain since Tuesday, 1/3/23. I had a burning sensation in my stomach I couldn't move. It felt like I ate/drank a bunch of acid. I took some Prilosec. I'm still having occasional CP and SOB up until now. Pain Severity:  9 / 10 when the CP is present    Temperature:  no     Recommended disposition: Go to ED Now    Care advice provided, patient verbalizes understanding; denies any other questions or concerns; instructed to call back for any new or worsening symptoms. Due to concern over bruises on patient's legs and the occasional severe pain that the pt experiences in her chest, Triage RN has asked pt to go to the ED to be evaluated by a Provider because Triage RN is concerned about the symptoms possibly being a blood clot, but needs to be evaluated by a Provider. Pt reports she is going to [place]. Attention Provider: Thank you for allowing me to participate in the care of your patient. The patient was connected to triage in response to information provided to the [facility]. Please do not respond through this encounter as the response is not directed to a shared pool.     Reason for Disposition   History of prior \"blood clot\" in leg or lungs (i.e., deep vein thrombosis, pulmonary embolism)    Protocols used: Chest Pain-ADULT-

## 2023-01-17 ENCOUNTER — TRANSCRIBE ORDER (OUTPATIENT)
Dept: SCHEDULING | Age: 43
End: 2023-01-17

## 2023-01-17 ENCOUNTER — TELEPHONE (OUTPATIENT)
Dept: INTERNAL MEDICINE CLINIC | Age: 43
End: 2023-01-17

## 2023-01-17 DIAGNOSIS — R07.9 CHEST PAIN: Primary | ICD-10-CM

## 2023-01-18 ENCOUNTER — HOSPITAL ENCOUNTER (OUTPATIENT)
Dept: CT IMAGING | Age: 43
Discharge: HOME | End: 2023-01-18
Attending: INTERNAL MEDICINE

## 2023-01-18 DIAGNOSIS — R07.9 CHEST PAIN: ICD-10-CM

## 2023-01-19 ENCOUNTER — DOCUMENTATION ONLY (OUTPATIENT)
Dept: CARDIOLOGY | Age: 43
End: 2023-01-19

## 2023-01-19 NOTE — PROGRESS NOTES
Pt continues to call in multiple times a day with chest pain. All cardiac testing has been negative. CT has been ordered as have labs. She has not completed her testing. Recommended she visit the er/urent care if her symptoms continue/worsen.

## 2023-01-26 ENCOUNTER — HOSPITAL ENCOUNTER (OUTPATIENT)
Dept: CT IMAGING | Age: 43
Discharge: HOME | End: 2023-01-26
Attending: INTERNAL MEDICINE

## 2023-01-27 ENCOUNTER — HOSPITAL ENCOUNTER (OUTPATIENT)
Dept: CT IMAGING | Age: 43
Discharge: HOME | End: 2023-01-27
Attending: INTERNAL MEDICINE
Payer: MEDICAID

## 2023-01-27 PROCEDURE — 74011000636 HC RX REV CODE- 636: Performed by: STUDENT IN AN ORGANIZED HEALTH CARE EDUCATION/TRAINING PROGRAM

## 2023-01-27 PROCEDURE — 71275 CT ANGIOGRAPHY CHEST: CPT

## 2023-01-27 RX ADMIN — IOPAMIDOL 80 ML: 755 INJECTION, SOLUTION INTRAVENOUS at 11:27

## 2023-04-25 NOTE — PROCEDURES
25-Apr-2023 Geraldo Story [place] [id number] Kedar Damon M.D.  [address] [place] [address], [address] [place] [address]  [phone]               Esophagogastroduodenoscopy (EGD) Procedure Note    NAME: Chuckie Richardson  :  1980  MRN:  [id number]    Indications:  Abdominal pain, epigastric; dysphagia     : Poornima Lama MD    Referring Provider:  George Mccarty MD; Minoo Coronado MD    Medicine:  [facility] anesthesia      Procedure Details:  After informed consent was obtained with all risks and benefits of the procedure explained and preprocedure exam completed, the patient was placed in the left lateral decubitus position. Universal protocol for patient identification was performed and documented in the nursing notes. Throughout the procedure, the patient's blood pressure was monitored at least every five minutes; pulse, and oxygen saturations were monitored continuously. All vital signs were documented in the nursing notes. The endoscope was inserted into the mouth and advanced under direct vision to second portion of the duodenum. A careful inspection was made as the gastroscope was withdrawn, including a retroflexed view of the proximal stomach; findings and interventions are described below. Findings:   Esophagus: normal s/p biopsies of the proximal esophagus for EoE  Stomach: small sliding hiatal hernia, rest of the stomach was normal s/p CLOtest biopsies  Duodenum: normal    Interventions:    biopsy of esophagus and stomach    Specimens:     ID Type Source Tests Collected by Time Destination   1 : proximal esophagus biopsy to rule out eosineophic esophagitis Preservative   Poornima Lama MD 2017 1546 Pathology        EBL: None          Complications:     No immediate complications        Impression:  -As above. No PUD. Recommendations:  -Await pathology. Follow-up with Dr. George Patel. Signed by:  Poornima Lama MD         2017 3:52 PM

## 2023-05-04 ENCOUNTER — TELEPHONE (OUTPATIENT)
Age: 43
End: 2023-05-04

## 2023-05-10 NOTE — PROGRESS NOTES
[employer] [facility] form signed 8/31/20 by provider, faxed 9/1/20 to [phone], form and confirmation placed in bin for central scan
KARAN/JOHN/Maria E
normal...

## 2023-05-11 NOTE — TELEPHONE ENCOUNTER
Spoke with Ms Leanne Iyer that several calls and mychart messages were sent to reschedule appt but no response adv of next avail appt . Patient declined appt.

## 2023-05-25 ENCOUNTER — TELEPHONE (OUTPATIENT)
Age: 43
End: 2023-05-25

## 2023-05-30 ENCOUNTER — TELEPHONE (OUTPATIENT)
Age: 43
End: 2023-05-30

## 2023-08-16 ENCOUNTER — APPOINTMENT (OUTPATIENT)
Facility: HOSPITAL | Age: 43
End: 2023-08-16
Payer: MEDICAID

## 2023-08-16 ENCOUNTER — HOSPITAL ENCOUNTER (EMERGENCY)
Facility: HOSPITAL | Age: 43
Discharge: HOME | End: 2023-08-16
Attending: STUDENT IN AN ORGANIZED HEALTH CARE EDUCATION/TRAINING PROGRAM
Payer: MEDICAID

## 2023-08-16 VITALS
RESPIRATION RATE: 18 BRPM | HEART RATE: 99 BPM | BODY MASS INDEX: 30.82 KG/M2 | SYSTOLIC BLOOD PRESSURE: 142 MMHG | WEIGHT: 185 LBS | TEMPERATURE: 100.6 F | HEIGHT: 65 IN | OXYGEN SATURATION: 100 % | DIASTOLIC BLOOD PRESSURE: 96 MMHG

## 2023-08-16 DIAGNOSIS — U07.1 COVID-19: Primary | ICD-10-CM

## 2023-08-16 LAB
ANION GAP SERPL CALC-SCNC: 7 MMOL/L (ref 5–15)
BASOPHILS # BLD: 0 K/UL (ref 0–0.1)
BASOPHILS NFR BLD: 0 % (ref 0–1)
BUN SERPL-MCNC: 5 MG/DL (ref 6–20)
BUN/CREAT SERPL: 7 (ref 12–20)
CALCIUM SERPL-MCNC: 8.1 MG/DL (ref 8.5–10.1)
CHLORIDE SERPL-SCNC: 109 MMOL/L (ref 97–108)
CO2 SERPL-SCNC: 23 MMOL/L (ref 21–32)
COMMENT:: NORMAL
CREAT SERPL-MCNC: 0.71 MG/DL (ref 0.55–1.02)
DIFFERENTIAL METHOD BLD: ABNORMAL
EKG ATRIAL RATE: 100 BPM
EKG DIAGNOSIS: NORMAL
EKG P AXIS: 74 DEGREES
EKG P-R INTERVAL: 134 MS
EKG Q-T INTERVAL: 316 MS
EKG QRS DURATION: 84 MS
EKG QTC CALCULATION (BAZETT): 407 MS
EKG R AXIS: 70 DEGREES
EKG T AXIS: 13 DEGREES
EKG VENTRICULAR RATE: 100 BPM
EOSINOPHIL # BLD: 0 K/UL (ref 0–0.4)
EOSINOPHIL NFR BLD: 0 % (ref 0–7)
ERYTHROCYTE [DISTWIDTH] IN BLOOD BY AUTOMATED COUNT: 13.6 % (ref 11.5–14.5)
FLUAV AG NPH QL IA: NEGATIVE
FLUBV AG NOSE QL IA: NEGATIVE
GLUCOSE SERPL-MCNC: 99 MG/DL (ref 65–100)
HCT VFR BLD AUTO: 31 % (ref 35–47)
HGB BLD-MCNC: 10.2 G/DL (ref 11.5–16)
IMM GRANULOCYTES # BLD AUTO: 0 K/UL (ref 0–0.04)
IMM GRANULOCYTES NFR BLD AUTO: 0 % (ref 0–0.5)
LYMPHOCYTES # BLD: 0.9 K/UL (ref 0.8–3.5)
LYMPHOCYTES NFR BLD: 17 % (ref 12–49)
MCH RBC QN AUTO: 28.7 PG (ref 26–34)
MCHC RBC AUTO-ENTMCNC: 32.9 G/DL (ref 30–36.5)
MCV RBC AUTO: 87.1 FL (ref 80–99)
MONOCYTES # BLD: 1.2 K/UL (ref 0–1)
MONOCYTES NFR BLD: 22 % (ref 5–13)
NEUTS SEG # BLD: 3.3 K/UL (ref 1.8–8)
NEUTS SEG NFR BLD: 61 % (ref 32–75)
NRBC # BLD: 0 K/UL (ref 0–0.01)
NRBC BLD-RTO: 0 PER 100 WBC
PLATELET # BLD AUTO: 236 K/UL (ref 150–400)
PMV BLD AUTO: 10.1 FL (ref 8.9–12.9)
POTASSIUM SERPL-SCNC: 4 MMOL/L (ref 3.5–5.1)
RBC # BLD AUTO: 3.56 M/UL (ref 3.8–5.2)
RBC MORPH BLD: ABNORMAL
SARS-COV-2 RDRP RESP QL NAA+PROBE: DETECTED
SODIUM SERPL-SCNC: 139 MMOL/L (ref 136–145)
SOURCE: ABNORMAL
SPECIMEN HOLD: NORMAL
WBC # BLD AUTO: 5.4 K/UL (ref 3.6–11)

## 2023-08-16 PROCEDURE — 99285 EMERGENCY DEPT VISIT HI MDM: CPT

## 2023-08-16 PROCEDURE — 80048 BASIC METABOLIC PNL TOTAL CA: CPT

## 2023-08-16 PROCEDURE — 93005 ELECTROCARDIOGRAM TRACING: CPT | Performed by: STUDENT IN AN ORGANIZED HEALTH CARE EDUCATION/TRAINING PROGRAM

## 2023-08-16 PROCEDURE — 87804 INFLUENZA ASSAY W/OPTIC: CPT

## 2023-08-16 PROCEDURE — 93010 ELECTROCARDIOGRAM REPORT: CPT | Performed by: SPECIALIST

## 2023-08-16 PROCEDURE — 6360000002 HC RX W HCPCS: Performed by: STUDENT IN AN ORGANIZED HEALTH CARE EDUCATION/TRAINING PROGRAM

## 2023-08-16 PROCEDURE — 71045 X-RAY EXAM CHEST 1 VIEW: CPT

## 2023-08-16 PROCEDURE — 87635 SARS-COV-2 COVID-19 AMP PRB: CPT

## 2023-08-16 PROCEDURE — 96374 THER/PROPH/DIAG INJ IV PUSH: CPT

## 2023-08-16 PROCEDURE — 85025 COMPLETE CBC W/AUTO DIFF WBC: CPT

## 2023-08-16 PROCEDURE — 2580000003 HC RX 258: Performed by: STUDENT IN AN ORGANIZED HEALTH CARE EDUCATION/TRAINING PROGRAM

## 2023-08-16 PROCEDURE — 6370000000 HC RX 637 (ALT 250 FOR IP): Performed by: STUDENT IN AN ORGANIZED HEALTH CARE EDUCATION/TRAINING PROGRAM

## 2023-08-16 RX ORDER — 0.9 % SODIUM CHLORIDE 0.9 %
1000 INTRAVENOUS SOLUTION INTRAVENOUS ONCE
Status: DISCONTINUED | OUTPATIENT
Start: 2023-08-16 | End: 2023-08-16

## 2023-08-16 RX ORDER — PROCHLORPERAZINE MALEATE 5 MG/1
5 TABLET ORAL
Status: COMPLETED | OUTPATIENT
Start: 2023-08-16 | End: 2023-08-16

## 2023-08-16 RX ORDER — 0.9 % SODIUM CHLORIDE 0.9 %
500 INTRAVENOUS SOLUTION INTRAVENOUS ONCE
Status: COMPLETED | OUTPATIENT
Start: 2023-08-16 | End: 2023-08-16

## 2023-08-16 RX ORDER — KETOROLAC TROMETHAMINE 15 MG/ML
15 INJECTION, SOLUTION INTRAMUSCULAR; INTRAVENOUS ONCE
Status: COMPLETED | OUTPATIENT
Start: 2023-08-16 | End: 2023-08-16

## 2023-08-16 RX ORDER — KETOROLAC TROMETHAMINE 10 MG/1
10 TABLET, FILM COATED ORAL EVERY 6 HOURS PRN
Qty: 20 TABLET | Refills: 0 | Status: SHIPPED | OUTPATIENT
Start: 2023-08-16

## 2023-08-16 RX ORDER — ACETAMINOPHEN 500 MG
1000 TABLET ORAL
Status: COMPLETED | OUTPATIENT
Start: 2023-08-16 | End: 2023-08-16

## 2023-08-16 RX ORDER — ALPRAZOLAM 0.5 MG/1
0.5 TABLET ORAL
Status: COMPLETED | OUTPATIENT
Start: 2023-08-16 | End: 2023-08-16

## 2023-08-16 RX ADMIN — ALPRAZOLAM 0.5 MG: 0.5 TABLET ORAL at 01:51

## 2023-08-16 RX ADMIN — PROCHLORPERAZINE MALEATE 5 MG: 5 TABLET ORAL at 03:20

## 2023-08-16 RX ADMIN — ACETAMINOPHEN 1000 MG: 500 TABLET ORAL at 01:26

## 2023-08-16 RX ADMIN — SODIUM CHLORIDE 500 ML: 9 INJECTION, SOLUTION INTRAVENOUS at 02:07

## 2023-08-16 RX ADMIN — KETOROLAC TROMETHAMINE 15 MG: 15 INJECTION, SOLUTION INTRAMUSCULAR; INTRAVENOUS at 02:09

## 2023-08-16 ASSESSMENT — PAIN - FUNCTIONAL ASSESSMENT: PAIN_FUNCTIONAL_ASSESSMENT: 0-10

## 2023-08-16 ASSESSMENT — PAIN SCALES - GENERAL: PAINLEVEL_OUTOF10: 3

## 2023-08-16 ASSESSMENT — ENCOUNTER SYMPTOMS: SHORTNESS OF BREATH: 0

## 2023-08-16 NOTE — ED TRIAGE NOTES
States they just got back from [place], came straight here from the airport. Symptoms started two days, is afraid they may have covid since no one in the airports had on a mask. States they have had pain in right flank for 2 weeks as well. Appears very anxious.

## 2023-08-16 NOTE — ED PROVIDER NOTES
[facility] EMERGENCY DEPT  EMERGENCY DEPARTMENT ENCOUNTER      Pt Name: Alfie Michael  MRN: [id number]  [address] [facility] 1980  Date of evaluation: 8/16/2023  Provider: Sarah Costello MD    CHIEF COMPLAINT       Chief Complaint   Patient presents with    Influenza    Cough    Chills    Chest Pain    Fatigue         HISTORY OF PRESENT ILLNESS   28-year-old female with history of anxiety, CHF, HLD, gastroparesis, PTSD presents to the ED with chief complaint of fever for the past 1 to 2 days. Patient reports associated body aches, cough, congestion. Patient recently traveled back from [place], was with someone who also has similar symptoms. No chest pain, difficulty breathing, abdominal pain, urinary symptoms, bowel symptoms. Has been taking over-the-counter medications for her fever though none in the past 6 hours. The history is provided by the patient. Review of External Medical Records:     Nursing Notes were reviewed. REVIEW OF SYSTEMS       Review of Systems   Respiratory:  Negative for shortness of breath. Cardiovascular:  Negative for chest pain. Except as noted above the remainder of the review of systems was reviewed and negative.        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

## 2023-09-26 ENCOUNTER — APPOINTMENT (OUTPATIENT)
Facility: HOSPITAL | Age: 43
End: 2023-09-26
Payer: MEDICAID

## 2023-09-26 ENCOUNTER — HOSPITAL ENCOUNTER (EMERGENCY)
Facility: HOSPITAL | Age: 43
Discharge: HOME | End: 2023-09-26
Attending: STUDENT IN AN ORGANIZED HEALTH CARE EDUCATION/TRAINING PROGRAM
Payer: MEDICAID

## 2023-09-26 VITALS
TEMPERATURE: 97.4 F | HEIGHT: 65 IN | RESPIRATION RATE: 18 BRPM | SYSTOLIC BLOOD PRESSURE: 137 MMHG | DIASTOLIC BLOOD PRESSURE: 68 MMHG | HEART RATE: 61 BPM | BODY MASS INDEX: 30.82 KG/M2 | WEIGHT: 185 LBS | OXYGEN SATURATION: 100 %

## 2023-09-26 DIAGNOSIS — G43.901 MIGRAINE WITH STATUS MIGRAINOSUS, NOT INTRACTABLE, UNSPECIFIED MIGRAINE TYPE: ICD-10-CM

## 2023-09-26 DIAGNOSIS — S09.90XD CLOSED HEAD INJURY WITHOUT LOSS OF CONSCIOUSNESS, SUBSEQUENT ENCOUNTER: Primary | ICD-10-CM

## 2023-09-26 DIAGNOSIS — S09.90XD INJURY OF HEAD, SUBSEQUENT ENCOUNTER: ICD-10-CM

## 2023-09-26 PROCEDURE — 70450 CT HEAD/BRAIN W/O DYE: CPT

## 2023-09-26 PROCEDURE — 99284 EMERGENCY DEPT VISIT MOD MDM: CPT

## 2023-09-26 PROCEDURE — 93005 ELECTROCARDIOGRAM TRACING: CPT | Performed by: STUDENT IN AN ORGANIZED HEALTH CARE EDUCATION/TRAINING PROGRAM

## 2023-09-26 RX ORDER — KETOROLAC TROMETHAMINE 30 MG/ML
30 INJECTION, SOLUTION INTRAMUSCULAR; INTRAVENOUS
Status: DISCONTINUED | OUTPATIENT
Start: 2023-09-26 | End: 2023-09-26 | Stop reason: HOSPADM

## 2023-09-26 ASSESSMENT — PAIN SCALES - GENERAL: PAINLEVEL_OUTOF10: 8

## 2023-09-26 ASSESSMENT — PAIN DESCRIPTION - LOCATION: LOCATION: HEAD

## 2023-09-26 ASSESSMENT — PAIN DESCRIPTION - ORIENTATION: ORIENTATION: POSTERIOR

## 2023-09-26 ASSESSMENT — PAIN - FUNCTIONAL ASSESSMENT: PAIN_FUNCTIONAL_ASSESSMENT: 0-10

## 2023-09-26 ASSESSMENT — PAIN DESCRIPTION - DESCRIPTORS: DESCRIPTORS: ACHING

## 2023-09-26 NOTE — DISCHARGE INSTRUCTIONS
Thank you for coming to the Emergency Department. Your head CT was normal.  We have given you a shot of Toradol in the emergency room since she did not want the migraine cocktail. Please continue to take Tylenol Motrin at home. She can follow-up with your primary care provider of also given you number to neurology.

## 2023-09-26 NOTE — ED TRIAGE NOTES
Patient arrives with c/o left sided chest pain x 1 week. Reports has been seen by PCP and has cardiology appointment scheduled for next week. Further reports being hit in back of head with a baseball on Sunday while playing catch with children. Reports being seen by PCP and advised to come to ED for evaluation. Patient denies LOC. Reports posterior head pain, pressure behind left eye. Lauren MARTINEZ, NP; in triage assessing patient.

## 2023-09-26 NOTE — ED NOTES
Patient given discharge instructions per provider and verbalized understanding     Celine Alberto RN  09/26/23 7621

## 2023-09-26 NOTE — ED PROVIDER NOTES
[facility] EMERGENCY DEPT  EMERGENCY DEPARTMENT ENCOUNTER      Pt Name: Alan Harrington  MRN: [id number]  [address] 1980  Date of evaluation: 9/26/2023  Provider: OG Pope NP    [address]       Chief Complaint   Patient presents with    Headache    Head Injury    Chest Pain         HISTORY OF PRESENT ILLNESS   (Location/Symptom, Timing/Onset, Context/Setting, Quality, Duration, Modifying Factors, Severity)  Note limiting factors. 81-year-old female with complaints of recent head injury on Sunday playing softball or baseball with friends and was hit in the back of the head. Patient states she went down but she did not lose consciousness. She is concerned because she has a knot to the back of her head and she feels a headache over her left eye. She is concerned for head bleed she called her primary care provider who told her to come to the emergency room. Review of External Medical Records:     Nursing Notes were reviewed. REVIEW OF SYSTEMS    (2-9 systems for level 4, 10 or more for level 5)     Review of Systems   Neurological:  Positive for headaches. Negative for dizziness, tremors, seizures, syncope, facial asymmetry, speech difficulty, weakness, light-headedness and numbness. Except as noted above the remainder of the review of systems was reviewed and negative.        PAST MEDICAL HISTORY     Past Medical History:   Diagnosis Date    Anxiety attack     Asthma     Avulsion fracture of lateral malleolus of right fibula     B12 deficiency     Biliary dyskinesia 2/6/2017    CHF (congestive heart failure) ([facility])     Closed fracture of foot with routine healing 7/33/2923    Complicated grief     Constipation     Depression     ANTONIO (generalized anxiety disorder)     Gastroparesis 7/18/2014    Hyperlipidemia     IGT (impaired glucose tolerance)     Incontinence of urine     1 episode of bed wetting 1 mo ago    Insomnia     Kidney stone     Myocarditis determined by

## 2023-09-27 LAB
EKG ATRIAL RATE: 85 BPM
EKG DIAGNOSIS: NORMAL
EKG P AXIS: 92 DEGREES
EKG P-R INTERVAL: 138 MS
EKG Q-T INTERVAL: 344 MS
EKG QRS DURATION: 80 MS
EKG QTC CALCULATION (BAZETT): 409 MS
EKG R AXIS: 95 DEGREES
EKG T AXIS: 13 DEGREES
EKG VENTRICULAR RATE: 85 BPM

## 2023-09-27 PROCEDURE — 93010 ELECTROCARDIOGRAM REPORT: CPT | Performed by: STUDENT IN AN ORGANIZED HEALTH CARE EDUCATION/TRAINING PROGRAM

## 2023-10-20 ENCOUNTER — HOSPITAL ENCOUNTER (OUTPATIENT)
Facility: HOSPITAL | Age: 43
End: 2023-10-20
Attending: PSYCHIATRY & NEUROLOGY
Payer: MEDICAID

## 2023-10-20 ENCOUNTER — OFFICE VISIT (OUTPATIENT)
Age: 43
End: 2023-10-20
Payer: MEDICARE

## 2023-10-20 VITALS
DIASTOLIC BLOOD PRESSURE: 70 MMHG | BODY MASS INDEX: 30.79 KG/M2 | SYSTOLIC BLOOD PRESSURE: 120 MMHG | TEMPERATURE: 96.8 F | HEART RATE: 108 BPM | OXYGEN SATURATION: 97 % | HEIGHT: 65 IN

## 2023-10-20 DIAGNOSIS — M54.50 ACUTE RIGHT-SIDED LOW BACK PAIN WITHOUT SCIATICA: ICD-10-CM

## 2023-10-20 DIAGNOSIS — M54.50 ACUTE RIGHT-SIDED LOW BACK PAIN WITHOUT SCIATICA: Primary | ICD-10-CM

## 2023-10-20 PROCEDURE — 72100 X-RAY EXAM L-S SPINE 2/3 VWS: CPT

## 2023-10-20 PROCEDURE — 4004F PT TOBACCO SCREEN RCVD TLK: CPT | Performed by: PSYCHIATRY & NEUROLOGY

## 2023-10-20 PROCEDURE — 99215 OFFICE O/P EST HI 40 MIN: CPT | Performed by: PSYCHIATRY & NEUROLOGY

## 2023-10-20 PROCEDURE — G8417 CALC BMI ABV UP PARAM F/U: HCPCS | Performed by: PSYCHIATRY & NEUROLOGY

## 2023-10-20 PROCEDURE — G8428 CUR MEDS NOT DOCUMENT: HCPCS | Performed by: PSYCHIATRY & NEUROLOGY

## 2023-10-20 PROCEDURE — G8484 FLU IMMUNIZE NO ADMIN: HCPCS | Performed by: PSYCHIATRY & NEUROLOGY

## 2023-10-20 RX ORDER — LIDOCAINE 50 MG/G
1 PATCH TOPICAL DAILY
Qty: 10 PATCH | Refills: 1 | Status: SHIPPED | OUTPATIENT
Start: 2023-10-20 | End: 2023-11-09

## 2023-10-20 RX ORDER — METAXALONE 800 MG/1
800 TABLET ORAL 3 TIMES DAILY
Qty: 90 TABLET | Refills: 1 | Status: SHIPPED | OUTPATIENT
Start: 2023-10-20 | End: 2023-12-19

## 2023-11-09 NOTE — TELEPHONE ENCOUNTER
No access to        Last visit 07/06/2020 Virtual visit MD Mohan Zabala   Next appointment 1 month (08/2020) Nothing scheduled   Previous refill encounter(s) 05/28/2020 Xanax #90 with 1 refill. Requested Prescriptions     Pending Prescriptions Disp Refills    ALPRAZolam (XANAX) 1 mg tablet 90 Tab 1     Sig: Take 1 Tab by mouth three (3) times daily. Max Daily Amount: 3 mg. SW Working with additional agencies to ensure safe discharge plan.

## 2023-11-20 ENCOUNTER — TELEPHONE (OUTPATIENT)
Age: 43
End: 2023-11-20

## 2023-11-20 NOTE — TELEPHONE ENCOUNTER
Called pt.  verified. Pt has an appt scheduled with Dr. Bonita Kunz for 23 at 12PM. Offered pt to come in earlier Dr. Bonita Kunz has 2 cancellations at 9:20AM and 11AM. Pt states that she is not feeling well and would like to cancel her scheduled noon appt. Pt states she will contact office to reschedule.

## 2024-02-20 NOTE — PROGRESS NOTES
Name: Jesusita Mota       Age: 29 y.o. Date: 2/23/2017    Chief Complaint:   Chief Complaint   Patient presents with    Skin Exam     Spot on right side of face        Subjective:    HPI:  Ms.. Jesusita Mota is a 29 y.o. female who presents for the evaluation of a lesion on the right temple. She saw me in December of 2016 for a skin examination and mentioned this same lesion at that visit. The lesion continues to be tender. She would like removal due to the raised nature, symptoms and fact she hits it with her glasses. ROS: Consitutional: Negative  Dermatological : positive for - skin lesion changes      Social History     Social History    Marital status: SINGLE     Spouse name: N/A    Number of children: N/A    Years of education: N/A     Occupational History    Not on file. Social History Main Topics    Smoking status: Former Smoker     Types: Cigarettes    Smokeless tobacco: Never Used    Alcohol use 0.0 oz/week     0 Standard drinks or equivalent per week      Comment: Rarely    Drug use: No    Sexual activity: Yes     Partners: Male     Birth control/ protection: Pill     Other Topics Concern    Not on file     Social History Narrative    Never , no children. Lives alone.             Family History   Problem Relation Age of Onset    Diabetes Mother     Heart Disease Mother     Hypertension Mother     Stroke Mother     Liver Disease Mother     Dementia Mother     Kidney Disease Mother        Past Medical History:   Diagnosis Date    Anemia NEC     Anxiety attack     Asthma     B12 deficiency     Biliary dyskinesia 2/6/2017    Constipation     Depression     Gastroparesis 7/18/2014    Heart abnormalities     right atria at 30% per [facility]    Hyperlipidemia     IGT (impaired glucose tolerance)     Incontinence of urine     1 episode of bed wetting 1 mo ago    Insomnia     Kidney stone     Neuropathy     Pap smear for cervical cancer screening 11/11/2015    11/11/15 neg HPV neg    PTSD (post-traumatic stress disorder)     Stroke [facility]) 2011    TIA    Urinary tract bacterial infections     Vaginal discharge 12/4/2013       Past Surgical History:   Procedure Laterality Date    EYE EXAM & TREATMENT      HX GI  may 2013    polyps removed,colonscopy       Current Outpatient Prescriptions   Medication Sig Dispense Refill    hydrOXYzine HCl (ATARAX) 25 mg tablet Take 1 Tab by mouth three (3) times daily as needed for Itching. 20 Tab 1    Omeprazole delayed release (PRILOSEC D/R) 20 mg tablet Take 1 Tab by mouth daily. 30 Tab 5    magic mouthwash solution Take 5 mL by mouth three (3) times daily as needed for Pain. Benadryl, maalox,, lidocaine in equal quantity 240 mL 1    ALPRAZolam (XANAX) 1 mg tablet Take 1 Tab by mouth two (2) times daily as needed for Anxiety. Max Daily Amount: 2 mg. Indications: ANXIETY 15 Tab 0    cyanocobalamin (VITAMIN B12) 500 mcg tablet Take 1 Tab by mouth daily. 30 Tab 5    citalopram (CELEXA) 20 mg tablet Take 1 Tab by mouth daily. 30 Tab 5    cyanocobalamin (VITAMIN B12) 500 mcg tablet Take 1 Tab by mouth daily. 30 Tab 11    butalbital-acetaminophen-caff (FIORICET) -40 mg per capsule Take 1 Cap by mouth every four (4) hours as needed for Pain for up to 20 doses. Max Daily Amount: 6 Caps. 20 Cap 0    zolpidem (AMBIEN) 10 mg tablet Take 1 Tab by mouth nightly as needed for Sleep. Max Daily Amount: 10 mg. TAKE 1 TABLET BY MOUTH AT BEDTIME AS NEEDED FOR SLEEP  Indications: SLEEP-ONSET INSOMNIA 30 Tab 5    norethindrone (MICRONOR) 0.35 mg tab Take 1 Tab by mouth daily. 28 Tab 11    Biotin 2,500 mcg cap Take 1 Cap by mouth daily.  ibuprofen (ADVIL) 200 mg tablet Take 200 mg by mouth every eight (8) hours as needed for Pain.          Allergies   Allergen Reactions    Amoxicillin Rash    Biaxin [Clarithromycin] Itching and Other (comments)     'eyes turn an funny color and she starts itching'    Flagyl [Metronidazole] Itching     Eyes change color         Objective:    Visit Vitals    /84 (BP 1 Location: Left arm, BP Patient Position: Sitting)    Pulse 94    Temp 98.1 °F (36.7 °C) (Oral)    Resp 20    Ht 5' 5\" (1.651 m)    Wt 79.8 kg (176 lb)    LMP 01/26/2017 (Approximate)    SpO2 100%    BMI 29.29 kg/m2       Quintin Harrell is a 29 y.o. female who appears well and in no distress. She is awake, alert, and oriented. There is no preauricular, submandibular, or cervical lymphadenopathy. A limited skin examination was completed including the right temple. There is a 6 x4 mm darkly pigmented papule, tender to palpation, likely inflamed SK. There are other SKs, smaller in the same area. Assessment/Plan: 1. Neoplasm of Uncertain Behavior, right temple, favor SK. The differential diagnoses were discussed. A shave removal was advised to address this lesion. The procedure was reviewed and verbal and written consent were obtained. The risks of pain, bleeding, infection, recurrence and scar were discussed. I performed the procedure. The site was cleansed and anesthetized with 1% Lidocaine with Epinephrine 1:100,000. A shave removal was performed to remove the lesion in its clinical entirety. Drysol was used for hemostasis. The wound was bandaged and care reviewed. The specimen was sent to pathology. I will contact the patient with the results and any further treatment that may be necessary. 2. Seborrheic keratoses. The diagnosis was reviewed and the patient was reassured that no treatment is needed for these benign lesions. [facility]   OFFICE PROCEDURE PROGRESS NOTE   Chart reviewed for the following:   Peter POTTS, have reviewed the History, Physical and updated the Allergic reactions for Quintin Harrell. TIME OUT performed immediately prior to start of procedure:   Nany POTTS, have performed the following reviews on Joce Alvarado   prior to the start of the procedure:     * Patient was identified by name and date of birth   * Agreement on procedure being performed was verified   * Risks and Benefits explained to the patient   * Procedure site verified and marked as necessary   * Patient was positioned for comfort   * Consent was signed and verified     Time: 1130  Date of procedure: 2/23/2017  Procedure performed by: Marissa Walters.  Devon Rubin  Provider assisted by: lpn   Patient assisted by: self   How tolerated by patient: tolerated the procedure well with no complications   Comments: none No

## 2024-06-20 ENCOUNTER — HOSPITAL ENCOUNTER (EMERGENCY)
Facility: HOSPITAL | Age: 44
Discharge: HOME | End: 2024-06-20
Attending: EMERGENCY MEDICINE
Payer: MEDICARE

## 2024-06-20 ENCOUNTER — APPOINTMENT (OUTPATIENT)
Facility: HOSPITAL | Age: 44
End: 2024-06-20
Payer: MEDICARE

## 2024-06-20 VITALS
RESPIRATION RATE: 15 BRPM | OXYGEN SATURATION: 100 % | WEIGHT: 209.66 LBS | BODY MASS INDEX: 34.93 KG/M2 | SYSTOLIC BLOOD PRESSURE: 121 MMHG | HEART RATE: 80 BPM | TEMPERATURE: 97.7 F | HEIGHT: 65 IN | DIASTOLIC BLOOD PRESSURE: 84 MMHG

## 2024-06-20 DIAGNOSIS — R11.0 NAUSEA: Primary | ICD-10-CM

## 2024-06-20 LAB
ALBUMIN SERPL-MCNC: 2.9 G/DL (ref 3.5–5)
ALBUMIN/GLOB SERPL: 0.7 (ref 1.1–2.2)
ALP SERPL-CCNC: 42 U/L (ref 45–117)
ALT SERPL-CCNC: 14 U/L (ref 12–78)
ANION GAP SERPL CALC-SCNC: 6 MMOL/L (ref 5–15)
AST SERPL-CCNC: 11 U/L (ref 15–37)
BASOPHILS # BLD: 0 K/UL (ref 0–0.1)
BASOPHILS NFR BLD: 0 % (ref 0–1)
BILIRUB SERPL-MCNC: 0.3 MG/DL (ref 0.2–1)
BUN SERPL-MCNC: 9 MG/DL (ref 6–20)
BUN/CREAT SERPL: 9 (ref 12–20)
CALCIUM SERPL-MCNC: 8.3 MG/DL (ref 8.5–10.1)
CHLORIDE SERPL-SCNC: 109 MMOL/L (ref 97–108)
CO2 SERPL-SCNC: 23 MMOL/L (ref 21–32)
COMMENT:: NORMAL
CREAT SERPL-MCNC: 0.96 MG/DL (ref 0.55–1.02)
DIFFERENTIAL METHOD BLD: ABNORMAL
EKG ATRIAL RATE: 57 BPM
EKG DIAGNOSIS: NORMAL
EKG P AXIS: 64 DEGREES
EKG P-R INTERVAL: 154 MS
EKG Q-T INTERVAL: 412 MS
EKG QRS DURATION: 84 MS
EKG QTC CALCULATION (BAZETT): 401 MS
EKG R AXIS: 84 DEGREES
EKG T AXIS: 45 DEGREES
EKG VENTRICULAR RATE: 57 BPM
EOSINOPHIL # BLD: 0.2 K/UL (ref 0–0.4)
EOSINOPHIL NFR BLD: 2 % (ref 0–7)
ERYTHROCYTE [DISTWIDTH] IN BLOOD BY AUTOMATED COUNT: 13.5 % (ref 11.5–14.5)
GLOBULIN SER CALC-MCNC: 4.3 G/DL (ref 2–4)
GLUCOSE SERPL-MCNC: 115 MG/DL (ref 65–100)
HCT VFR BLD AUTO: 30.9 % (ref 35–47)
HGB BLD-MCNC: 10.3 G/DL (ref 11.5–16)
IMM GRANULOCYTES # BLD AUTO: 0 K/UL (ref 0–0.04)
IMM GRANULOCYTES NFR BLD AUTO: 0 % (ref 0–0.5)
LYMPHOCYTES # BLD: 1.8 K/UL (ref 0.8–3.5)
LYMPHOCYTES NFR BLD: 21 % (ref 12–49)
MCH RBC QN AUTO: 28.6 PG (ref 26–34)
MCHC RBC AUTO-ENTMCNC: 33.3 G/DL (ref 30–36.5)
MCV RBC AUTO: 85.8 FL (ref 80–99)
MONOCYTES # BLD: 0.9 K/UL (ref 0–1)
MONOCYTES NFR BLD: 11 % (ref 5–13)
NEUTS SEG # BLD: 5.9 K/UL (ref 1.8–8)
NEUTS SEG NFR BLD: 66 % (ref 32–75)
NRBC # BLD: 0 K/UL (ref 0–0.01)
NRBC BLD-RTO: 0 PER 100 WBC
NT PRO BNP: 422 PG/ML
PLATELET # BLD AUTO: 275 K/UL (ref 150–400)
PMV BLD AUTO: 9.8 FL (ref 8.9–12.9)
POTASSIUM SERPL-SCNC: 3.1 MMOL/L (ref 3.5–5.1)
PROT SERPL-MCNC: 7.2 G/DL (ref 6.4–8.2)
RBC # BLD AUTO: 3.6 M/UL (ref 3.8–5.2)
SODIUM SERPL-SCNC: 138 MMOL/L (ref 136–145)
SPECIMEN HOLD: NORMAL
TROPONIN I SERPL HS-MCNC: <4 NG/L (ref 0–51)
WBC # BLD AUTO: 8.8 K/UL (ref 3.6–11)

## 2024-06-20 PROCEDURE — 36415 COLL VENOUS BLD VENIPUNCTURE: CPT

## 2024-06-20 PROCEDURE — 84484 ASSAY OF TROPONIN QUANT: CPT

## 2024-06-20 PROCEDURE — 6370000000 HC RX 637 (ALT 250 FOR IP): Performed by: EMERGENCY MEDICINE

## 2024-06-20 PROCEDURE — 2580000003 HC RX 258: Performed by: EMERGENCY MEDICINE

## 2024-06-20 PROCEDURE — 99285 EMERGENCY DEPT VISIT HI MDM: CPT

## 2024-06-20 PROCEDURE — 71045 X-RAY EXAM CHEST 1 VIEW: CPT

## 2024-06-20 PROCEDURE — 80053 COMPREHEN METABOLIC PANEL: CPT

## 2024-06-20 PROCEDURE — 83880 ASSAY OF NATRIURETIC PEPTIDE: CPT

## 2024-06-20 PROCEDURE — 85025 COMPLETE CBC W/AUTO DIFF WBC: CPT

## 2024-06-20 RX ORDER — SODIUM CHLORIDE 9 MG/ML
INJECTION, SOLUTION INTRAVENOUS ONCE
Status: COMPLETED | OUTPATIENT
Start: 2024-06-20 | End: 2024-06-20

## 2024-06-20 RX ORDER — ONDANSETRON 2 MG/ML
4 INJECTION INTRAMUSCULAR; INTRAVENOUS ONCE
Status: DISCONTINUED | OUTPATIENT
Start: 2024-06-20 | End: 2024-06-20 | Stop reason: HOSPADM

## 2024-06-20 RX ORDER — POTASSIUM CHLORIDE 750 MG/1
40 TABLET, FILM COATED, EXTENDED RELEASE ORAL ONCE
Status: COMPLETED | OUTPATIENT
Start: 2024-06-20 | End: 2024-06-20

## 2024-06-20 RX ORDER — FUROSEMIDE 10 MG/ML
20 INJECTION INTRAMUSCULAR; INTRAVENOUS ONCE
Status: DISCONTINUED | OUTPATIENT
Start: 2024-06-20 | End: 2024-06-20 | Stop reason: HOSPADM

## 2024-06-20 RX ADMIN — POTASSIUM CHLORIDE 40 MEQ: 750 TABLET, FILM COATED, EXTENDED RELEASE ORAL at 04:22

## 2024-06-20 RX ADMIN — SODIUM CHLORIDE: 9 INJECTION, SOLUTION INTRAVENOUS at 04:46

## 2024-06-20 ASSESSMENT — PAIN - FUNCTIONAL ASSESSMENT
PAIN_FUNCTIONAL_ASSESSMENT: ACTIVITIES ARE NOT PREVENTED
PAIN_FUNCTIONAL_ASSESSMENT: 0-10

## 2024-06-20 ASSESSMENT — PAIN DESCRIPTION - PAIN TYPE: TYPE: ACUTE PAIN

## 2024-06-20 ASSESSMENT — PAIN DESCRIPTION - LOCATION: LOCATION: CHEST

## 2024-06-20 ASSESSMENT — PAIN SCALES - GENERAL: PAINLEVEL_OUTOF10: 8

## 2024-06-20 ASSESSMENT — PAIN DESCRIPTION - ONSET: ONSET: ON-GOING

## 2024-06-20 ASSESSMENT — PAIN DESCRIPTION - DESCRIPTORS: DESCRIPTORS: DISCOMFORT

## 2024-06-20 ASSESSMENT — PAIN DESCRIPTION - FREQUENCY: FREQUENCY: CONTINUOUS

## 2024-06-20 ASSESSMENT — PAIN DESCRIPTION - ORIENTATION: ORIENTATION: LEFT

## 2024-06-20 NOTE — ED NOTES
Pt states that she wants to leave due to such a long wait. She said she has not been seen yet by the doctor and it is ridiculous that she has been here for over 6 hours. The pt repeatedly states the same thing over and over without allowing me time to speak.     I stated that the doctor has put in orders for meds for her symptoms being expressed but the primary RN alerted me that she did not want some of the meds without explanation. The RN attempted to educate the patient on the meds, but pt said it was not enough.     So she again was stating she is going to leave. She was insisting that I am now just kicking her out of the ED even though I actually expressed the opposite.     The pt does not appear to be amenable to coming up with a compromise.     Pt given lab results and given pt advocacy.

## 2024-06-20 NOTE — ED TRIAGE NOTES
Pt brought in my EMS with cc N/V since yesterday, weakness, and bilateral leg swelling. Pt also reports CP.     Hx CHF, hypothyroidism

## 2024-06-21 NOTE — ED PROVIDER NOTES
DEPARTMENT COURSE and DIFFERENTIAL DIAGNOSIS/MDM:   Medical Decision Making  Patient with multiple complaints, appears anxious.  Ambulating in her room with a steady gait does not appear to be in any distress.  Vital signs are stable.  Patient reports that she does not want any meds in the ED.  She then said I should fill her prescription for her to furosemide but I recommended she see her cardiologist and referral was provided.  Patient is on later presented to bedside to give patient a ride home.  She had questions regarding the workup and this was discussed and all questions were answered.  Patient reported that she would follow-up with parents and family practice to establish care with a PCP for symptom recheck.  Able to tolerate p.o. at time of disposition.  Will discharge.    Amount and/or Complexity of Data Reviewed  Labs: ordered. Decision-making details documented in ED Course.  Radiology: ordered and independent interpretation performed. Decision-making details documented in ED Course.  ECG/medicine tests: ordered.    Risk  Prescription drug management.        EKG: All EKG's are interpreted by the Emergency Department Physician who either signs or Co-signs this chart in the absence of a cardiologist.    ED Course as of 06/20/24 2150   Thu Jun 20, 2024   0406 I have independently viewed the obtained radiographic images and note cxr without acute findings. Will await radiology read.    [AL]   0414 ED EKG interpretation:  Rhythm: sinus bradycardia; and regular . Rate (approx.): 57; Axis: normal; ST/T wave: normal; No evidence of acute coronary ischemia.   [AL]   0730 Patient discharged by Dr. Castillo, at bedside.  [MG]      ED Course User Index  [AL] Nikkie Castillo MD  [MG] Tracey Hernandez DO       CRITICAL CARE TIME   Total Critical Care time was 0 minutes, excluding separately reportable procedures. There was a high probability of clinically significant/life threatening deterioration in the patient's

## 2024-06-24 ENCOUNTER — OFFICE VISIT (OUTPATIENT)
Age: 44
End: 2024-06-24
Payer: MEDICAID

## 2024-06-24 VITALS — OXYGEN SATURATION: 99 % | WEIGHT: 201 LBS | HEIGHT: 65 IN | BODY MASS INDEX: 33.49 KG/M2 | RESPIRATION RATE: 17 BRPM

## 2024-06-24 DIAGNOSIS — R55 BLACKOUT SPELL: ICD-10-CM

## 2024-06-24 DIAGNOSIS — R41.3 MEMORY DIFFICULTIES: Primary | ICD-10-CM

## 2024-06-24 DIAGNOSIS — R47.89 WORD FINDING DIFFICULTY: ICD-10-CM

## 2024-06-24 PROCEDURE — 99215 OFFICE O/P EST HI 40 MIN: CPT

## 2024-06-24 ASSESSMENT — PATIENT HEALTH QUESTIONNAIRE - PHQ9
SUM OF ALL RESPONSES TO PHQ QUESTIONS 1-9: 0
2. FEELING DOWN, DEPRESSED OR HOPELESS: NOT AT ALL
SUM OF ALL RESPONSES TO PHQ9 QUESTIONS 1 & 2: 0
SUM OF ALL RESPONSES TO PHQ QUESTIONS 1-9: 0
1. LITTLE INTEREST OR PLEASURE IN DOING THINGS: NOT AT ALL

## 2024-06-24 NOTE — PROGRESS NOTES
various forms of mental stimulation that would help to \"exercise\" her brain. This might include increased community engagement, learning a new skill or hobby, attending lectures, reading or listening to podcasts or videos on topics of her interest.    ANS testing (5/19/22)  1. Sinus tachycardia without associated symptoms or accompanying hypotension noted on tilt table testing, which is non-specific in etiology     2. Decreased sweat production in all 4 localities (forearm, proximal leg, distal leg, foot) which can be seen in small fiber polyneuropathy or medication effect (I.e Seroquel)        Review of Systems   Neurological:  Positive for speech difficulty, numbness and headaches.   Psychiatric/Behavioral:  Positive for behavioral problems, confusion, decreased concentration and sleep disturbance. The patient is nervous/anxious.          Past Medical History:   Diagnosis Date    Anxiety attack     Asthma     Avulsion fracture of lateral malleolus of right fibula     B12 deficiency     Biliary dyskinesia 2/6/2017    CHF (congestive heart failure) ([facility])     Closed fracture of foot with routine healing 5/19/2021    Complicated grief     Constipation     Depression     ANTONIO (generalized anxiety disorder)     Gastroparesis 7/18/2014    Hyperlipidemia     IGT (impaired glucose tolerance)     Incontinence of urine     1 episode of bed wetting 1 mo ago    Insomnia     Kidney stone     Myocarditis determined by echocardiography ([facility]) 2/17/2022    Due to Moderna COVID vaccine    Neuropathy     Pap smear for cervical cancer screening 11/11/2015    11/11/15 neg HPV neg    PTSD (post-traumatic stress disorder)     Stroke ([facility]) 2011    TIA    Urinary tract bacterial infections     Vaginal discharge 12/4/2013    Vitamin D deficiency      Family History   Problem Relation Age of Onset    Diabetes Mother     Heart Disease Mother     Other Brother         \"Disappeared in 2007\"    Cancer Father         Estranged    Kidney Disease

## 2024-06-26 NOTE — PROGRESS NOTES
Pt called in evening -complains of worsening shortness of breath and chest pain  Was in ED -ruled out CAD  Complains of nausea and vomiting   Was following [facility] cardiology Dr Nino   Wants to follow with our team   Will call for appointment

## 2024-06-28 ENCOUNTER — TELEPHONE (OUTPATIENT)
Age: 44
End: 2024-06-28

## 2024-06-28 NOTE — TELEPHONE ENCOUNTER
----- Message from Melina Scales RN sent at 6/28/2024  1:54 PM EDT -----  Please schedule with Wendi KING for reason: CHF :) Thank you!  ----- Message -----  From: Bhavik Greer MD  Sent: 6/25/2024  10:14 PM EDT  To: Melina Scales RN    Can please schedule appointment with me or peetr -- next available   Reason CHF  Thanks  Dr Greer

## 2024-06-28 NOTE — TELEPHONE ENCOUNTER
Spoke to patient and scheduled next available with Wendi MARTINEZ NP.     Future Appointments   Date Time Provider Department Center   7/23/2024 11:20 AM Jessie Son APRN - [facility]       Patient confirmed understanding of date and time.     [facility]

## 2024-07-08 ENCOUNTER — TELEPHONE (OUTPATIENT)
Age: 44
End: 2024-07-08

## 2024-07-08 NOTE — TELEPHONE ENCOUNTER
Patient is requesting a phone call from doctor or nurse. Patient was on the phone upset and crying due to the fact that she cannot remember things, saying things by the wrong name, and talking backwards.    Patient contact

## 2024-07-09 NOTE — TELEPHONE ENCOUNTER
I know she is upset. We are requested repeat neuro testing for her. I would reach out to her psychiatrist since he is prescribing most of her medications or her PCP. If there is a immediate need for help, or if she is worried for her safety, then she needs to go to the ED

## 2024-07-11 NOTE — TELEPHONE ENCOUNTER
Call placed to patient.  2 identifiers received.  Advised patient per FERNANDO Vila that she needs to call her PCP or psychiatrist as they are prescribing most of her medications.  Patient became in enraged and talking over me.  Patient stating call the \"pigs\" on me.  \"I am not crazy\"  \"my head is tingling\"  Advised multiple times that if she felt as if she was going to hurt herself for felt unsafe to have someone take her to the nearest ER for evaluation and treatment.  Patient continued screaming on phone.  Patient stated \"you know where I live call the \".  Advised I was only giving her the advice the provider gave and she became more enraged.  Patient hung up phone.

## 2024-07-12 ENCOUNTER — TELEPHONE (OUTPATIENT)
Dept: PRIMARY CARE CLINIC | Facility: CLINIC | Age: 44
End: 2024-07-12

## 2024-07-12 NOTE — TELEPHONE ENCOUNTER
Called patient. Was on a 3 way with her cousin. Patient stated, \"the nurse hung up on me.\" Informed her the nurse did not purposely hang up, she did not hear anything on the line. Patient then stated, \" I am not fucking crazy, stop telling me I need to see a psychiatrist as this is a neurological issue.\" \"Did she call the Pigs on me.\" Informed her nobody called the , stated the nurse said if she is feeling unsafe or feels the need to hurt self then she should contact 911 or go to the ER. Patient was cussing and talking over me during the whole phone call (most of the time, I could not understand what she was saying as she was yelling very loud and also cousin was on the phone) cousin was trying to help her calm down on the phone. Kept repeating, \"I am not fucking crazy,\" and stated that she does not know why she is having forgetfulness, speaking backwords, having a hard time spitting words. Stated during the visit, it was discussed that she should get her neuropsych testing done as this may help. Patient understood and states she will wait to hear the phone call to schedule for neuropsych testing.

## 2024-07-23 ENCOUNTER — TELEPHONE (OUTPATIENT)
Age: 44
End: 2024-07-23

## 2024-07-23 NOTE — TELEPHONE ENCOUNTER
Patient did not show for appointment today  Please call to see if she would like to reschedule    Future Appointments   Date Time Provider Department Center   8/12/2024  2:40 PM Romelia Goel PA-C [facility] AM[facility]   9/18/2024  1:40 PM Geovanny Mccurdy APRN - CNP [facility]

## 2025-03-11 ENCOUNTER — TELEPHONE (OUTPATIENT)
Age: 45
End: 2025-03-11

## 2025-03-11 NOTE — TELEPHONE ENCOUNTER
Patient is calling to schedule a neuropsych test with Dr. Cruz.     She states she saw Dr. Andrews in the past and he made an inappropriate comment about her failing the test on purpose. She found that very insulting.     Please call her at  to discuss the details of getting a test done and possibly scheduling. Thank you.

## 2025-03-12 ENCOUNTER — TELEPHONE (OUTPATIENT)
Age: 45
End: 2025-03-12

## 2025-03-12 NOTE — TELEPHONE ENCOUNTER
The Pt called to schedule her 1st appt. With Dr. NINI Cruz.  The Pt can be reached at [phone].

## 2025-03-13 NOTE — TELEPHONE ENCOUNTER
Pt called back stating that the call dropped, states that she has insurance but it's not good, she is in a poor financial situation and would like to verify that the appointment would be sent to insurance as a medical or mental diagnosis, because mental louis costs more.  Also states that she has been having issues paying for food or her bills. Also c/o tingling in her face and extremities but refuses to have checked due to financial situation.   Requesting a call back for more information. States that someone told her she could speak with Ramila for more information. Attempted to transfer to office, no answer. Please call back.

## 2025-03-13 NOTE — TELEPHONE ENCOUNTER
Patient returned my call and spoke to Gui. Message routed to PSR. See other encounter 3/12/25.    Returned call and spoke to patient. Patient states she was told by her referring provider she only needs to schedule an appt for testing as she has already completed an evaluation with her. I explained neuropsych testing is a three part process and she must meet with the provider for an initial consultation prior to completing testing. Patient states she deosn't understand why she needs to complete testing again. She states her memory loss is affecting her daily and she knows she is going to just fail the test. She asked for the provider to give her a call prior to scheduling to discuss her concerns. I explained the provider will be able to address her concerns in the initial evaluation. Dr Cruz is unable to discuss her medical concerns without an appt since she is not yet a patient of hers. Patient states she is unable to afford the cost of three appointments. She asked to schedule the first two appts and states she is able to review the results on her own or with her PCP to avoid paying for the cost of the third appt. Informed I will forward her concerns to the  and have her give her a call back to further discuss. Message forwarded to Ramila for review.

## 2025-03-13 NOTE — TELEPHONE ENCOUNTER
Pt called again to readdress memory issues. She is concerned because she left the door unlocked last night, which makes her feel unsafe, to be alone with her forgetfulness. States that she was able to get an appt with her PCP today, and wanted to know if she should bring up memory issues to them or wait for an appt for neuropsychology to address? I told her that it is a good idea to discuss with her PCP, she also asked if she needs a neuropsychologist or if she can just see a regular neurologist; referral was sent by Lisa Vila, who is a neurologist. She is still waiting on a call back regarding scheduling.

## 2025-03-14 ENCOUNTER — TELEPHONE (OUTPATIENT)
Age: 45
End: 2025-03-14

## 2025-03-14 NOTE — TELEPHONE ENCOUNTER
Received call from the call center and spoke to patient. Informed that Ramila will give her a call back at her earliest convenience as she must to speak with the provider to discuss her concerns. Patient states that she is low income and can't afford the appointments. She states her memory loss is affecting her daily and she needs to see someone as soon as possible. She feels this is urgent and asked what she should do in the meantime while waiting on a call back. I advised she go to her nearest emergency department for an evaluation. Patient asked if myself or Ramila will pay her hospital bill if she goes to the emergency department. She states she can't afford the appt with Dr Cruz so why do I think she can afford a hospital bill from the emergency department. I informed a message has been routed to Ramila and she will give her a call back to discuss her concerns as soon as she is available. The call was then disconnected.

## 2025-03-14 NOTE — TELEPHONE ENCOUNTER
Returned call to the patient to inform her that after speaking with the provider, the provider did not feel she would be able to see the patient since the patient wanted a medical diagnosis and not a behavioral health diagnosis. The provider suggested she contact behavioral health for an appointment. The patient refused to believe that was the reason and it was because she got upset on the phone. She insisted that it wasn't her fault we hire rude staff.  The rude staff caused her to threaten them. She said she was mad when she threatened them and wasn't serious, she lives to far away to drive to the office. She said so just go ahead and put me on the books. I again let her know we would not be scheduling. She asked me to put myself in her shoes. How would I like it if people were that rude to me. She stated that we treated her like a dog, and the way she was spoken to was not right. She stated she knows when she is being disrespected, she's not a dummy.  If we don't want to help her, we can't just push her off.  She said behavioral health was who sent her to us and when she talked to them, they recommended she sees us. She asked if she could be scheduled with another provider in the group. I let her know that since she escalated things in her previous call and threatened an employee that no provider in the group would be able to see her. She again started blaming our staff, saying if we hired people who were kind, she wouldn't have gotten so upset.  She asked if I thought she wanted to get the upset and have her heart racing. I let her vent for a few more minutes. She then asked if there were other neuropsychologists in [place]. I told her she could call [facility] or [facility]. She didn't like those options.  I told her I would look online to give her some other resources and she proceeded to disconnect the call. The call lasted 13:28 minutes.

## 2025-03-14 NOTE — TELEPHONE ENCOUNTER
Patient was blind transferred to the back line, I answered the call \"Thank you for calling [facility], this is Karey, how can I help you?\"  Patient immediately started demanding the name of the \" girl I just spoke\" then said \"the  lady I spoke with yesterday was nice\"   I placed the call on hold to verify how to handle the call going forward.  When I came back to the call and let the patient know I couldn't give her any names, she then told me \"You better give me the name or I'm coming up there and you better have security\".   Patient continued to escalate her manner of speaking and did not want to take no for an answer.  When the patient asked for my name so she could document who she spoke with today, I told her I did not feel comfortable giving that to her given her earlier and ongoing threat to come to the office.  At the end of the call, the patient informed me the call was being recorded, that I would be all over social media and that she was [facility].  At this point I again told the patient I was sorry she felt this way, that I would pass along her information, wished her a good weekend and disconnected the call.   The call lasted 14:56 minutes.

## 2025-03-27 ENCOUNTER — TELEPHONE (OUTPATIENT)
Age: 45
End: 2025-03-27

## 2025-03-27 NOTE — TELEPHONE ENCOUNTER
Patient called in today stating she was waiting on a call from the office to get scheduled. I called the [facility] office after reading the notes in Epic. I was told per [place] office at [facility] that a PE needed to go the [place] office because according to the notes they would not be telling the pt they aren't going to schedule her. I attempted to call the [place] office, no answer, so I told the patient I would send a message to this office and they would return the call to her. The patient stated that she needs help and she doesn't understand why feels as though she has dementia and she feels like she has to over talk people to say what is on her mind at that time or she will forget. She says she doesn't mean to come off as rude or disrespectful. She says she is asking for help with at least getting her to another doctor to help her with the feeling of having dementia.

## 2025-03-28 NOTE — TELEPHONE ENCOUNTER
This patient has been dismissed from all neuro departments. A dismissal letter, medical records request, and information for outside Neuropsychologists and Psychiatrists were sent certified to the patient's home.

## 2025-03-28 NOTE — TELEPHONE ENCOUNTER
Pt is calling again and is wanting to get an appt asap.  She said she had to call 911 yesterday again.  Please advise pt at [phone]

## 2025-05-14 ENCOUNTER — TELEPHONE (OUTPATIENT)
Dept: PRIMARY CARE CLINIC | Facility: CLINIC | Age: 45
End: 2025-05-14

## 2025-05-14 NOTE — TELEPHONE ENCOUNTER
----- Message from Maegan HARE sent at 5/14/2025  2:31 PM EDT -----  Regarding: [facility] Appointment Request  [facility] Appointment Request    Patient needs appointment for [facility] Appointment Type: New Patient.    Patient Requested Dates(s):sooner   Patient Requested Time:in the afternoon   Provider Name: any provider     Reason for Appointment Request: New Patient - Available appointments did not meet patient need  --------------------------------------------------------------------------------------------------------------------------    Relationship to Patient: Self     Call Back Information: OK to leave message on voicemail  Preferred Call Back Number: Phone [phone] (home)

## 2025-05-14 NOTE — TELEPHONE ENCOUNTER
Called patient try to scheduled with FERNANDO Rodríguez. Patient stated that she would call back.

## 2025-08-25 ENCOUNTER — ANESTHESIA EVENT (OUTPATIENT)
Facility: HOSPITAL | Age: 45
End: 2025-08-25
Payer: MEDICARE

## 2025-08-25 ENCOUNTER — HOSPITAL ENCOUNTER (OUTPATIENT)
Facility: HOSPITAL | Age: 45
Setting detail: OUTPATIENT SURGERY
Discharge: HOME | End: 2025-08-25
Attending: INTERNAL MEDICINE | Admitting: INTERNAL MEDICINE
Payer: MEDICARE

## 2025-08-25 ENCOUNTER — ANESTHESIA (OUTPATIENT)
Facility: HOSPITAL | Age: 45
End: 2025-08-25
Payer: MEDICARE

## 2025-08-25 VITALS
HEART RATE: 71 BPM | BODY MASS INDEX: 25.68 KG/M2 | TEMPERATURE: 98.1 F | RESPIRATION RATE: 19 BRPM | DIASTOLIC BLOOD PRESSURE: 78 MMHG | WEIGHT: 154.32 LBS | OXYGEN SATURATION: 100 % | SYSTOLIC BLOOD PRESSURE: 138 MMHG

## 2025-08-25 PROCEDURE — 7100000010 HC PHASE II RECOVERY - FIRST 15 MIN: Performed by: INTERNAL MEDICINE

## 2025-08-25 PROCEDURE — 3600007502: Performed by: INTERNAL MEDICINE

## 2025-08-25 PROCEDURE — 3600007512: Performed by: INTERNAL MEDICINE

## 2025-08-25 PROCEDURE — 88305 TISSUE EXAM BY PATHOLOGIST: CPT

## 2025-08-25 PROCEDURE — 6360000002 HC RX W HCPCS

## 2025-08-25 PROCEDURE — 3700000000 HC ANESTHESIA ATTENDED CARE: Performed by: INTERNAL MEDICINE

## 2025-08-25 PROCEDURE — 2580000003 HC RX 258: Performed by: INTERNAL MEDICINE

## 2025-08-25 PROCEDURE — 2500000003 HC RX 250 WO HCPCS

## 2025-08-25 PROCEDURE — 7100000011 HC PHASE II RECOVERY - ADDTL 15 MIN: Performed by: INTERNAL MEDICINE

## 2025-08-25 PROCEDURE — 3700000001 HC ADD 15 MINUTES (ANESTHESIA): Performed by: INTERNAL MEDICINE

## 2025-08-25 RX ORDER — DEXMEDETOMIDINE HYDROCHLORIDE 100 UG/ML
INJECTION, SOLUTION INTRAVENOUS
Status: DISCONTINUED | OUTPATIENT
Start: 2025-08-25 | End: 2025-08-25 | Stop reason: SDUPTHER

## 2025-08-25 RX ORDER — SODIUM CHLORIDE 0.9 % (FLUSH) 0.9 %
5-40 SYRINGE (ML) INJECTION PRN
Status: DISCONTINUED | OUTPATIENT
Start: 2025-08-25 | End: 2025-08-25 | Stop reason: HOSPADM

## 2025-08-25 RX ORDER — MIDAZOLAM HYDROCHLORIDE 2 MG/2ML
2 INJECTION, SOLUTION INTRAMUSCULAR; INTRAVENOUS ONCE
Status: COMPLETED | OUTPATIENT
Start: 2025-08-25 | End: 2025-08-25

## 2025-08-25 RX ORDER — SODIUM CHLORIDE 9 MG/ML
25 INJECTION, SOLUTION INTRAVENOUS PRN
Status: DISCONTINUED | OUTPATIENT
Start: 2025-08-25 | End: 2025-08-25 | Stop reason: HOSPADM

## 2025-08-25 RX ORDER — MIDAZOLAM HYDROCHLORIDE 1 MG/ML
INJECTION, SOLUTION INTRAMUSCULAR; INTRAVENOUS
Status: COMPLETED
Start: 2025-08-25 | End: 2025-08-25

## 2025-08-25 RX ORDER — SODIUM CHLORIDE 0.9 % (FLUSH) 0.9 %
5-40 SYRINGE (ML) INJECTION PRN
Status: CANCELLED | OUTPATIENT
Start: 2025-08-25

## 2025-08-25 RX ORDER — ONDANSETRON 2 MG/ML
4 INJECTION INTRAMUSCULAR; INTRAVENOUS EVERY 6 HOURS PRN
Status: CANCELLED | OUTPATIENT
Start: 2025-08-25

## 2025-08-25 RX ORDER — SODIUM CHLORIDE 9 MG/ML
INJECTION, SOLUTION INTRAVENOUS PRN
Status: CANCELLED | OUTPATIENT
Start: 2025-08-25

## 2025-08-25 RX ORDER — SODIUM CHLORIDE 0.9 % (FLUSH) 0.9 %
5-40 SYRINGE (ML) INJECTION EVERY 12 HOURS SCHEDULED
Status: DISCONTINUED | OUTPATIENT
Start: 2025-08-25 | End: 2025-08-25 | Stop reason: HOSPADM

## 2025-08-25 RX ORDER — PROPOFOL 10 MG/ML
INJECTION, EMULSION INTRAVENOUS
Status: DISCONTINUED | OUTPATIENT
Start: 2025-08-25 | End: 2025-08-25 | Stop reason: SDUPTHER

## 2025-08-25 RX ORDER — SODIUM CHLORIDE 0.9 % (FLUSH) 0.9 %
5-40 SYRINGE (ML) INJECTION EVERY 12 HOURS SCHEDULED
Status: CANCELLED | OUTPATIENT
Start: 2025-08-25

## 2025-08-25 RX ORDER — MIDAZOLAM HYDROCHLORIDE 1 MG/ML
INJECTION, SOLUTION INTRAMUSCULAR; INTRAVENOUS
Status: DISCONTINUED | OUTPATIENT
Start: 2025-08-25 | End: 2025-08-25 | Stop reason: SDUPTHER

## 2025-08-25 RX ORDER — ONDANSETRON 4 MG/1
4 TABLET, ORALLY DISINTEGRATING ORAL EVERY 8 HOURS PRN
Status: CANCELLED | OUTPATIENT
Start: 2025-08-25

## 2025-08-25 RX ORDER — LIDOCAINE HYDROCHLORIDE 20 MG/ML
INJECTION, SOLUTION EPIDURAL; INFILTRATION; INTRACAUDAL; PERINEURAL
Status: DISCONTINUED | OUTPATIENT
Start: 2025-08-25 | End: 2025-08-25 | Stop reason: SDUPTHER

## 2025-08-25 RX ADMIN — LIDOCAINE HYDROCHLORIDE 50 MG: 20 INJECTION, SOLUTION EPIDURAL; INFILTRATION; INTRACAUDAL; PERINEURAL at 12:16

## 2025-08-25 RX ADMIN — PROPOFOL 200 MCG/KG/MIN: 10 INJECTION, EMULSION INTRAVENOUS at 12:17

## 2025-08-25 RX ADMIN — MIDAZOLAM HYDROCHLORIDE 2 MG: 2 INJECTION, SOLUTION INTRAMUSCULAR; INTRAVENOUS at 11:55

## 2025-08-25 RX ADMIN — PROPOFOL 100 MG: 10 INJECTION, EMULSION INTRAVENOUS at 12:16

## 2025-08-25 RX ADMIN — MIDAZOLAM HYDROCHLORIDE 2 MG: 1 INJECTION, SOLUTION INTRAMUSCULAR; INTRAVENOUS at 11:55

## 2025-08-25 RX ADMIN — SODIUM CHLORIDE: 9 INJECTION, SOLUTION INTRAVENOUS at 12:12

## 2025-08-25 RX ADMIN — DEXMEDETOMIDINE 8 MCG: 100 INJECTION, SOLUTION INTRAVENOUS at 12:12

## 2025-08-25 RX ADMIN — MIDAZOLAM HYDROCHLORIDE 2 MG: 1 INJECTION, SOLUTION INTRAMUSCULAR; INTRAVENOUS at 12:13

## 2025-08-25 ASSESSMENT — PAIN - FUNCTIONAL ASSESSMENT: PAIN_FUNCTIONAL_ASSESSMENT: 0-10

## 2025-08-25 ASSESSMENT — PAIN SCALES - GENERAL
PAINLEVEL_OUTOF10: 0

## 2025-08-25 ASSESSMENT — PAIN SCALES - WONG BAKER: WONGBAKER_NUMERICALRESPONSE: NO HURT

## 2025-08-25 ASSESSMENT — PAIN DESCRIPTION - DESCRIPTORS: DESCRIPTORS: ACHING

## (undated) DEVICE — 1200 GUARD II KIT W/5MM TUBE W/O VAC TUBE: Brand: GUARDIAN

## (undated) DEVICE — SET EXTN TBNG L BOR 4 W STPCOCK ST 32IN PRIMING VOL 6ML

## (undated) DEVICE — KENDALL RADIOLUCENT FOAM MONITORING ELECTRODE -RECTANGULAR SHAPE: Brand: KENDALL

## (undated) DEVICE — SYRINGE MED 20ML STD CLR PLAS LUERLOCK TIP N CTRL DISP

## (undated) DEVICE — BW-412T DISP COMBO CLEANING BRUSH: Brand: SINGLE USE COMBINATION CLEANING BRUSH

## (undated) DEVICE — KIT IV STRT W CHLORAPREP PD 1ML

## (undated) DEVICE — ENDO CARRY-ON PROCEDURE KIT INCLUDES ENZYMATIC SPONGE, GAUZE, BIOHAZARD LABEL, TRAY, LUBRICANT, DIRTY SCOPE LABEL, WATER LABEL, TRAY, DRAWSTRING PAD, AND DEFENDO 4-PIECE KIT.: Brand: ENDO CARRY-ON PROCEDURE KIT

## (undated) DEVICE — CANN NASAL O2 CAPNOGRAPHY AD -- FILTERLINE

## (undated) DEVICE — SET ADMIN 16ML TBNG L100IN 2 Y INJ SITE IV PIGGY BK DISP

## (undated) DEVICE — CONTAINER SPEC 20 ML LID NEUT BUFF FORMALIN 10 % POLYPR STS

## (undated) DEVICE — BAG BELONG PT PERS CLEAR HANDL

## (undated) DEVICE — FORCEPS BX L240CM JAW DIA2.8MM L CAP W/ NDL MIC MESH TOOTH

## (undated) DEVICE — NEEDLE HYPO 18GA L1.5IN PNK S STL HUB POLYPR SHLD REG BVL

## (undated) DEVICE — BAG SPEC BIOHZD LF 2MIL 6X10IN -- CONVERT TO ITEM 357326

## (undated) DEVICE — CATH IV AUTOGRD BC BLU 22GA 25 -- INSYTE

## (undated) DEVICE — SOLIDIFIER FLUID 3000 CC ABSORB